# Patient Record
Sex: FEMALE | Race: WHITE | NOT HISPANIC OR LATINO | Employment: OTHER | URBAN - METROPOLITAN AREA
[De-identification: names, ages, dates, MRNs, and addresses within clinical notes are randomized per-mention and may not be internally consistent; named-entity substitution may affect disease eponyms.]

---

## 2017-01-02 ENCOUNTER — OFFICE VISIT (OUTPATIENT)
Dept: ALLERGY | Facility: CLINIC | Age: 62
End: 2017-01-02
Payer: COMMERCIAL

## 2017-01-02 VITALS
HEART RATE: 55 BPM | HEIGHT: 71 IN | DIASTOLIC BLOOD PRESSURE: 77 MMHG | WEIGHT: 193.8 LBS | OXYGEN SATURATION: 98 % | BODY MASS INDEX: 27.13 KG/M2 | SYSTOLIC BLOOD PRESSURE: 160 MMHG

## 2017-01-02 DIAGNOSIS — J31.0 RHINOCONJUNCTIVITIS: ICD-10-CM

## 2017-01-02 DIAGNOSIS — L20.89 OTHER ATOPIC DERMATITIS: ICD-10-CM

## 2017-01-02 DIAGNOSIS — H01.139 EYELID DERMATITIS, ECZEMATOUS, UNSPECIFIED LATERALITY: ICD-10-CM

## 2017-01-02 DIAGNOSIS — Z88.0 PENICILLIN ALLERGY: ICD-10-CM

## 2017-01-02 DIAGNOSIS — J45.40 MODERATE PERSISTENT ASTHMA WITHOUT COMPLICATION: Primary | ICD-10-CM

## 2017-01-02 DIAGNOSIS — H10.9 RHINOCONJUNCTIVITIS: ICD-10-CM

## 2017-01-02 PROBLEM — R06.02 SOB (SHORTNESS OF BREATH): Status: ACTIVE | Noted: 2017-01-02

## 2017-01-02 LAB
FEF 25/75: NORMAL
FEV-1: NORMAL
FEV1/FVC: NORMAL
FVC: NORMAL

## 2017-01-02 PROCEDURE — 99214 OFFICE O/P EST MOD 30 MIN: CPT | Mod: 25 | Performed by: ALLERGY & IMMUNOLOGY

## 2017-01-02 PROCEDURE — 94060 EVALUATION OF WHEEZING: CPT | Performed by: ALLERGY & IMMUNOLOGY

## 2017-01-02 RX ORDER — OLOPATADINE HYDROCHLORIDE 2 MG/ML
1 SOLUTION/ DROPS OPHTHALMIC DAILY
Qty: 2.5 ML | Refills: 3 | Status: SHIPPED | OUTPATIENT
Start: 2017-01-02 | End: 2017-03-02

## 2017-01-02 RX ORDER — ALBUTEROL SULFATE 90 UG/1
2 AEROSOL, METERED RESPIRATORY (INHALATION) EVERY 4 HOURS PRN
Qty: 1 INHALER | Refills: 1 | Status: SHIPPED | OUTPATIENT
Start: 2017-01-02 | End: 2019-02-04

## 2017-01-02 RX ORDER — HYDROCORTISONE 2.5 %
CREAM (GRAM) TOPICAL 2 TIMES DAILY
Qty: 60 G | Refills: 1 | Status: SHIPPED | OUTPATIENT
Start: 2017-01-02 | End: 2021-03-11

## 2017-01-02 RX ORDER — TRIAMCINOLONE ACETONIDE 1 MG/G
CREAM TOPICAL 2 TIMES DAILY
Qty: 60 G | Refills: 3 | Status: SHIPPED | OUTPATIENT
Start: 2017-01-02 | End: 2021-03-11

## 2017-01-02 NOTE — MR AVS SNAPSHOT
After Visit Summary   1/2/2017    Miri Naylor    MRN: [id number]           Patient Information     Date Of Birth          1955        Visit Information        Provider Department      1/2/2017 4:20 PM Nish Stockton,  [facility]        Today's Diagnoses     Moderate persistent asthma without complication    -  1     Eyelid dermatitis, eczematous, unspecified laterality         Other atopic dermatitis         Rhinoconjunctivitis           Care Instructions    If you have any questions regarding your allergies, asthma, or what we discussed during your visit today please call the allergy clinic or contact us via [employer].    [facility] Allergy ([place], [place]): [phone]  [facility] Allergy: [phone]  [facility] Allergy: [phone]  [facility] Allergy: [phone]  [facility] Allergy: [phone]  [facility] Allergy: [phone]    1. Return to clinic for skin testing. Need to hold antihistamines for 7 days prior to return appointment.   2. Start hydrocortisone 2.5% cream twice daily to rash under eyes. Do not use longer than 2 weeks.   3. Emollient (Eucerin, Vanicream, Aquaphor, or Vaseline) twice daily to eczematous areas.   4. Avoid itching at eyes.   5. Start Flonase, Nasacort or Rhinocort 2 sprays/nostril daily.   6. Pataday eye drops 1 drop/eye daily as needed. Hold for 2 days prior to skin testing.           Follow-ups after your visit        Your next 10 appointments already scheduled     Jul 19, 2017  1:00 PM   Return Visit with Nir Massey MD   [facility]    [address] [place] [id number] [phone]              Who to contact     If you have questions or need follow up information about today's clinic visit or your schedule please contact [facility] directly at [phone].  Normal or non-critical lab and imaging  "results will be communicated to you by MyChart, letter or phone within 4 business days after the clinic has received the results. If you do not hear from us within 7 days, please contact the clinic through [employer] or phone. If you have a critical or abnormal lab result, we will notify you by phone as soon as possible.  Submit refill requests through [employer] or call your pharmacy and they will forward the refill request to us. Please allow 3 business days for your refill to be completed.          Additional Information About Your Visit        [employer] Information     [employer] gives you secure access to your electronic health record. If you see a primary care provider, you can also send messages to your care team and make appointments. If you have questions, please call your primary care clinic.  If you do not have a primary care provider, please call [phone] and they will assist you.        Your Vitals Were     Pulse Height BMI (Body Mass Index) Pulse Oximetry          55 1.804 m (5' 11\") 27.01 kg/m2 98%         Blood Pressure from Last 3 Encounters:   01/02/17 160/77   07/12/16 140/100   04/27/16 141/86    Weight from Last 3 Encounters:   01/02/17 87.907 kg (193 lb 12.8 oz)   10/12/16 83.28 kg (183 lb 9.6 oz)   08/31/16 82.736 kg (182 lb 6.4 oz)              We Performed the Following     ALBUTEROL UNIT DOSE, 1 MG     Spirometry, Breathing Capacity          Today's Medication Changes          These changes are accurate as of: 1/2/17  5:29 PM.  If you have any questions, ask your nurse or doctor.               Start taking these medicines.        Dose/Directions    albuterol 108 (90 BASE) MCG/ACT Inhaler   Commonly known as:  albuterol   Used for:  Moderate persistent asthma without complication   Started by:  Nish Stockton,         Dose:  2 puff   Inhale 2 puffs into the lungs every 4 hours as needed for shortness of breath / dyspnea or wheezing   Quantity:  1 Inhaler   Refills:  1       beclomethasone 80 " MCG/ACT Inhaler   Commonly known as:  QVAR   Used for:  Moderate persistent asthma without complication   Started by:  Nish Stockton DO        Dose:  2 puff   Inhale 2 puffs into the lungs 2 times daily   Quantity:  1 Inhaler   Refills:  11       hydrocortisone 2.5 % cream   Used for:  Eyelid dermatitis, eczematous, unspecified laterality   Started by:  Nish Stockton DO        Apply topically 2 times daily   Quantity:  60 g   Refills:  1       olopatadine HCl 0.2 % Soln   Commonly known as:  PATADAY   Used for:  Rhinoconjunctivitis   Started by:  Nish Stockton DO        Dose:  1 drop   Place 1 drop into both eyes daily   Quantity:  2.5 mL   Refills:  3       triamcinolone 0.1 % cream   Commonly known as:  KENALOG   Used for:  Other atopic dermatitis   Started by:  Nish Stockton DO        Apply topically 2 times daily   Quantity:  60 g   Refills:  3            Where to get your medicines      These medications were sent to [facility] - [place], [place] - [address]  [address], [facility] [address]     Phone:  [phone]    - albuterol 108 (90 BASE) MCG/ACT Inhaler  - beclomethasone 80 MCG/ACT Inhaler  - hydrocortisone 2.5 % cream  - olopatadine HCl 0.2 % Soln  - triamcinolone 0.1 % cream             Primary Care Provider Office Phone # Fax #    Elizabeth Castrejon DO [place] [phone]       [address] [place] [address]        Thank you!     Thank you for choosing [facility]  for your care. Our goal is always to provide you with excellent care. Hearing back from our patients is one way we can continue to improve our services. Please take a few minutes to complete the written survey that you may receive in the mail after your visit with us. Thank you!             Your Updated Medication List - Protect others around you: Learn how to safely use, store and throw away your medicines at www.disposemymeds.org.           This list is accurate as of: 1/2/17  5:29 PM.  Always use your most recent med list.                   Brand Name Dispense Instructions for use    Acai 500 MG Caps      1 tablet daily       Acidophilus Tabs      1 tablet daily       albuterol 108 (90 BASE) MCG/ACT Inhaler    albuterol    1 Inhaler    Inhale 2 puffs into the lungs every 4 hours as needed for shortness of breath / dyspnea or wheezing       ascorbic acid 500 MG tablet    VITAMIN C     2 TABLET DAILY       atenolol 25 MG tablet    TENORMIN    90 tablet    Take 1 tablet (25 mg) by mouth daily OK to fill this once but she needs follow-up with vitals recheck on the ancillary schedule prior to additional refills.       beclomethasone 80 MCG/ACT Inhaler    QVAR    1 Inhaler    Inhale 2 puffs into the lungs 2 times daily       CALCIUM 600+D PO      250 mg vitamin d- 3 tablets daily       DAILY MULTIVITAMIN PO      1 tablet daily       escitalopram 20 MG tablet    LEXAPRO    90 tablet    Take 1 tablet (20 mg) by mouth daily       flaxseed oil 1000 MG Caps      2 tablets daily       gabapentin 300 MG capsule    NEURONTIN    270 capsule    Take one capsule by mouth three times daily       Glucosamine-Chondroitin Tabs      1500 mg glucosamine and 1200mg chondroitin daily-2 tablets daily       hydrocortisone 2.5 % cream     60 g    Apply topically 2 times daily       hydrOXYzine 25 MG tablet    ATARAX    60 tablet    Take 1-2 tablets (25-50 mg) by mouth every 6 hours as needed for itching (muscle spasm.)       ibuprofen 200 MG tablet    ADVIL/MOTRIN     take 1 tablet (200 mg) by oral route every 6 hours as needed with food       lisinopril 40 MG tablet    PRINIVIL/ZESTRIL    90 tablet    Take 1 tablet (40 mg) by mouth daily       olopatadine HCl 0.2 % Soln    PATADAY    2.5 mL    Place 1 drop into both eyes daily       pantoprazole 40 MG EC tablet    PROTONIX    90 tablet    Take 40 mg by mouth once daily, 30 to 60 minutes before a meal.       pravastatin 40 MG  tablet    PRAVACHOL    90 tablet    Take 1 tablet (40 mg) by mouth daily       S-Adenosylmethionine 200 MG Tabs      2 tablets daily       SUPER B COMPLEX Tabs      1 tablet daily       triamcinolone 0.1 % cream    KENALOG    60 g    Apply topically 2 times daily       vitamin E 400 UNIT capsule   Generic drug:  vitamin E      Take one pill by mouth once daily       ZYRTEC 10 MG tablet   Generic drug:  cetirizine      Take 10 mg by mouth daily.

## 2017-01-02 NOTE — PROGRESS NOTES
Miri Naylor is a 61 year old White female with previous medical history significant for hyperlipidemia, GERD, hypertension, depression, penicillin allergy who returns for a follow up visit. Miri Naylor is being seen today for asthma, eczema and seasonal allergies.     The patient reports that over the last 2 months she has had an erythematous, pruritic, rough, dry rash involving bilateral lower eyelids. She has tried using topical vitamin E which was not helpful. She has tried triple antibiotic ointment which is not helpful. She has tried hydrocortisone 1% cream which was not helpful. She is not consistently using a cream or lotion for an emollient. She has been using the same makeups, face washes and detergents for multiple years. She is using a Revlon Foundation, Elf eye shadow, cover girl eyeliner, Neutrogena night cream, Neutrogena daytime cream, Neutrogena face wash. She occasionally wears nail polish. She has had no change in her soaps or shampoos. She uses a seventh generation lavender scented detergent and a seventh generation fabric softener. She has had increased ocular watering, ocular itching. She does endorse a sensation of a foreign body. She is not using any eyedrops. She has not had any vision changes including photophobia or double vision. She does have a history of atopic dermatitis. This is been present for multiple years and can involve anywhere on her body.    The patient has a history of perennial nasal symptoms get worse in spring and fall. Symptoms include rhinorrhea, sneezing, congestion, postnasal drip, ocular watering and ocular itching. She will take Zyrtec 10 mg by mouth daily and this is helpful for these symptoms. She previously used Flonase and she thinks it was may be somewhat helpful. No other medications. Cause her to have increased symptoms. Talks do not bother her. She underwent allergy testing approximately 30 years ago and was positive for mold, dust mite and some  grasses.    Patient reports that recently she has been having increased shortness of breath, coughing and chest tightness with exercise. She will additionally notes symptoms if she is in hot weather. She has never been diagnosed with asthma. She does not have an albuterol inhaler. No hospitalizations for chest symptoms. She has not been on prednisone for chest symptoms.    Positive skin testing for Pre-Pen in 2014. She has since avoided penicillin antibiotics.     ACT Total Scores 1/2/2017   ACT TOTAL SCORE (Goal Greater than or Equal to 20) 18   In the past 12 months, how many times did you visit the emergency room for your asthma without being admitted to the hospital? 0   In the past 12 months, how many times were you hospitalized overnight because of your asthma? 0     Past Medical History   Diagnosis Date     Unspecified essential hypertension      Depressive disorder, not elsewhere classified      lumbar degeneration      Raynaud's disease      Hx of abnormal Pap smear ?     no specifics given     Drug allergy, multiple 9/23/14--passed graded oral challenge for Zithromax.      7/3/14 POS PRE-PEN skin test. 7/30/14 NEG skin tests and oral challenge to Cefzil     Family History   Problem Relation Age of Onset     Arthritis Mother      Blood Disease Father      Depression Son      Depression Daughter      Hypertension Father      Hyperlipidemia Mother      Hyperlipidemia Father      Hyperlipidemia Brother      Other Cancer Mother      Glioblastoma Multiforme     Depression Daughter      Depression Son      Anxiety Disorder Son      OSTEOPOROSIS Mother      Past Surgical History   Procedure Laterality Date     Biopsy of breast, needle core       Colonoscopy       C pelvis/hip joint surgery unlisted Right 7/19/16     total hip arthroplasty     Hip surgery Right 07/19/2016       REVIEW OF SYSTEMS:  General: negative for weight gain. negative for weight loss. positive for changes in sleep.   Ears: negative for  fullness. negative for hearing loss. positive  for dizziness.   Nose: positive  for snoring.positive  for changes in smell. positive  for drainage.   Throat: positive  for hoarseness. positive  for sore throat. negative for trouble swallowing.   Lungs: positive  for shortness of breath.negative for wheezing. negative for sputum production.   Cardiovascular: negative for chest pain. positive  for swelling of ankles. positive  for fast or irregular heartbeat.   Gastrointestinal: positive  for nausea. positive  for heartburn. positive  for acid reflux.   Musculoskeletal: negative for joint pain. positive  for joint stiffness. negative for joint swelling.   Neurologic: negative for seizures. negative for fainting. negative for weakness.   Psychiatric: negative for changes in mood. negative for anxiety.   Endocrine: negative for cold intolerance. positive  for heat intolerance. negative for tremors.   Hematologic: positive  for easy bruising. negative for easy bleeding.  Integumentary: positive  for rash. positive  for scaling. negative for nail changes.       Current outpatient prescriptions:      hydrocortisone 2.5 % cream, Apply topically 2 times daily, Disp: 60 g, Rfl: 1     triamcinolone (KENALOG) 0.1 % cream, Apply topically 2 times daily, Disp: 60 g, Rfl: 3     olopatadine HCl (PATADAY) 0.2 % SOLN, Place 1 drop into both eyes daily, Disp: 2.5 mL, Rfl: 3     beclomethasone (QVAR) 80 MCG/ACT Inhaler, Inhale 2 puffs into the lungs 2 times daily, Disp: 1 Inhaler, Rfl: 11     albuterol (ALBUTEROL) 108 (90 BASE) MCG/ACT Inhaler, Inhale 2 puffs into the lungs every 4 hours as needed for shortness of breath / dyspnea or wheezing, Disp: 1 Inhaler, Rfl: 1     pravastatin (PRAVACHOL) 40 MG tablet, Take 1 tablet (40 mg) by mouth daily, Disp: 90 tablet, Rfl: 0     escitalopram (LEXAPRO) 20 MG tablet, Take 1 tablet (20 mg) by mouth daily, Disp: 90 tablet, Rfl: 0     atenolol (TENORMIN) 25 MG tablet, Take 1 tablet (25 mg) by  mouth daily OK to fill this once but she needs follow-up with vitals recheck on the ancillary schedule prior to additional refills., Disp: 90 tablet, Rfl: 0     pantoprazole (PROTONIX) 40 MG enteric coated tablet, Take 40 mg by mouth once daily, 30 to 60 minutes before a meal., Disp: 90 tablet, Rfl: 3     lisinopril (PRINIVIL,ZESTRIL) 40 MG tablet, Take 1 tablet (40 mg) by mouth daily, Disp: 90 tablet, Rfl: 1     gabapentin (NEURONTIN) 300 MG capsule, Take one capsule by mouth three times daily, Disp: 270 capsule, Rfl: 1     ibuprofen (ADVIL,MOTRIN) 200 MG tablet, take 1 tablet (200 mg) by oral route every 6 hours as needed with food, Disp: , Rfl:      vitamin E (E-400) 400 UNIT capsule, Take one pill by mouth once daily, Disp: , Rfl:      cetirizine (ZYRTEC) 10 MG tablet, Take 10 mg by mouth daily., Disp: , Rfl:      VITAMIN C 500 MG PO TABS, 2 TABLET DAILY, Disp: , Rfl:      ACIDOPHILUS OR TABS, 1 tablet daily, Disp: , Rfl:      SUPER B COMPLEX PO TABS, 1 tablet daily, Disp: , Rfl:      FLAXSEED OIL 1000 MG PO CAPS, 2 tablets daily, Disp: , Rfl:      ACAI 500 MG PO CAPS, 1 tablet daily, Disp: , Rfl:      GLUCOSAMINE-CHONDROITIN PO TABS, 1500 mg glucosamine and 1200mg chondroitin daily-2 tablets daily, Disp: , Rfl:      CALCIUM 600+D PO, 250 mg vitamin d- 3 tablets daily, Disp: , Rfl:      DAILY MULTIVITAMIN PO, 1 tablet daily, Disp: , Rfl:      S-ADENOSYLMETHIONINE 200 MG PO TABS, 2 tablets daily, Disp: , Rfl:   Immunization History   Administered Date(s) Administered     Influenza Vaccine IM 3yrs+ 4 Valent IIV4 12/03/2013, 11/16/2015     TD (ADULT, 7+) 03/06/2008     TDAP (ADACEL AGES 11-64) 08/03/2012     Allergies   Allergen Reactions     Penicillins Hives     Confirmed by skin prick testing 7/3/14     Amoxicillin Hives     Clindamycin Hcl Hives     Levaquin      tendonitis     Augmentin Rash     Azithromycin Rash     Cephalexin Rash         EXAM:   Constitutional:  Appears well-developed and well-nourished. No  distress.   HEENT:   Head: Normocephalic.   Right Ear: External ear normal. TM normal  Left Ear: External ear normal. TM normal  Mouth/Throat: No oropharyngeal exudate present.   No cobblestoning of posterior oropharynx.   Boggy nasal tissue and pale.    Eyes: Conjunctivae are non-erythematous.   No papilla noted.   No ocular watering noted.   No maxillary or frontal sinus tenderness to palpation.   Cardiovascular: Normal rate, regular rhythm and normal heart sounds. Exam reveals no gallop and no friction rub.   No murmur heard.  Respiratory: Effort normal and breath sounds normal. No respiratory distress. No wheezes. No rales.   Musculoskeletal: Normal range of motion.   Lymphadenopathy:   No cervical adenopathy.   No lower extremity edema.   Neuro: Oriented to person, place, and time.  Skin: Erythematous rash of bilateral lower eyelids. Rough and dry to touch.   Dry, rough patches on bilateral hands and upper extremities consistent with eczema.   Psychiatric: Normal mood and affect.     Nursing note and vitals reviewed.      WORKUP:  Spirometry-Pre  FVC % pred:81  FEV1 % pred:67  FEV1/FVC % act:64  FEF 25-75% pred:56    Spirometry-Post  FVC % pred:85  FEV1 % pred:79  FEV1/FVC % act:72  FEF 25-75% pred:74    Significant improvement post bronchodilator in FEV1 and [id number].    ASSESSMENT/PLAN:  Problem List Items Addressed This Visit        Respiratory    Moderate persistent asthma without complication - Primary     She has been having increased chest symptoms with exercise and quitting shortness of breath, coughing and chest tightness. She does not have albuterol. She additionally can have symptoms with hot weather. No other triggers that she is aware of. She has never been diagnosed with asthma.    Spirometry today with an FEV1 of 67%. She was given albuterol and had repeat spirometry and had improvement of her FEV1 to 79%. She had significant improvement in both her FEV1 and FEF 25-75.  ACT 18    - An asthma  action plan was provided and discussed with patient and family.   - Albuterol 2-4 puffs inhaled (use a spacer unless using a Proair Respiclick device) every 4 hours as needed for chest tightness, wheezing, shortness of breath and/or coughing.   - Albuterol 2-4 puffs inhaled (use spacer if not using Proair Respiclick device) 15-20 minutes prior to physical activity.   - Please ensure warm up period prior to exercise.   - Avoid asthma triggers.  - Start QVAR 80mcg 2 puffs inhaled twice daily. Use with spacer. Instructed how and when to use.   - Return to clinic for allergen skin testing.              Relevant Medications    beclomethasone (QVAR) 80 MCG/ACT Inhaler    albuterol (ALBUTEROL) 108 (90 BASE) MCG/ACT Inhaler    Other Relevant Orders    Spirometry, Breathing Capacity (Completed)    ALBUTEROL UNIT DOSE, 1 MG (Completed)       Musculoskeletal and Integumentary    Eyelid dermatitis, eczematous, unspecified laterality     Bilateral lower eyelid dermatitis over the course of the last 2 months. Dermatitis is rough and itchy. Likely contact dermatitis versus atopic keratoconjunctivitis versus eczematous dermatitis. She does not have any papilla suggestive of atopic keratoconjunctivitis. She does have increased ocular itching and ocular watering. She has been using hydrocortisone 1% cream. She is using a Revlon Foundation, Elf eye shadow, cover girl eyeliner, Neutrogena night cream, Neutrogena daytime cream, Neutrogena face wash.     - Pataday (olapatadine) 1 drop/eye daily as needed.   - Hydrocortisone 2.5% cream b.i.d. to dermatitis. Use until rash resolves or two weeks.   - Emollient (Eucerin, Vanicream, Aquaphor, or Vaseline) twice daily to eczematous areas.   - If no improvement consider patch testing.   - Return to clinic skin testing.   - Avoid makeup and cleansers to eye. If going to use make up use hypoallergenic make up         Relevant Medications    hydrocortisone 2.5 % cream    triamcinolone (KENALOG)  0.1 % cream    olopatadine HCl (PATADAY) 0.2 % SOLN    beclomethasone (QVAR) 80 MCG/ACT Inhaler    albuterol (ALBUTEROL) 108 (90 BASE) MCG/ACT Inhaler    Other atopic dermatitis     History of atopic dermatitis which has been present for multiple years. She has some dry, rough patches on her hands and upper extremities. Not using topical corticosteroid.    - Emollients b.i.d.  - Triamcinolone 0.1% cream b.i.d. as needed.         Relevant Medications    hydrocortisone 2.5 % cream    triamcinolone (KENALOG) 0.1 % cream    olopatadine HCl (PATADAY) 0.2 % SOLN       Infectious/Inflammatory    Rhinoconjunctivitis     History of perennial nasal and ocular symptoms get worse in the spring and fall. Symptoms include ocular watering, ocular itching, rhinorrhea, sneezing, congestion and postnasal drip. Symptoms increase around cats.    - Return to clinic for allergy testing. She was on cetirizine today so could not obtain. Hold antihistamine for 7 days prior to allergy testing.   - Flonase, Nasacort or Rhinocort 2 sprays per nostril daily.  - Pataday (olapatadine) 1 drop/eye daily as needed.            Relevant Medications    olopatadine HCl (PATADAY) 0.2 % SOLN       Other    Penicillin allergy     Positive skin testing for Pre-Pen in 2014. She has since avoided penicillin antibiotics.     - Continue to avoid penicillin antibiotics.         Relevant Medications    beclomethasone (QVAR) 80 MCG/ACT Inhaler    albuterol (ALBUTEROL) 108 (90 BASE) MCG/ACT Inhaler          Chart documentation with Dragon Voice recognition Software. Although reviewed after completion, some words and grammatical errors may remain.    Nish Stockton,    Allergy/Immunology  [facility] [place]

## 2017-01-02 NOTE — NURSING NOTE
"Chief Complaint   Patient presents with     RECHECK       Initial /77 mmHg  Pulse 55  Ht 5' 11\" (1.804 m)  Wt 193 lb 12.8 oz (87.907 kg)  BMI 27.01 kg/m2  SpO2 98% Estimated body mass index is 27.01 kg/(m^2) as calculated from the following:    Height as of this encounter: 5' 11\" (1.803 m).    Weight as of this encounter: 193 lb 12.8 oz (87.907 kg).  BP completed using cuff size: ana Garcia MA      "

## 2017-01-03 ASSESSMENT — ASTHMA QUESTIONNAIRES: ACT_TOTALSCORE: 18

## 2017-01-03 NOTE — ASSESSMENT & PLAN NOTE
Positive skin testing for Pre-Pen in 2014. She has since avoided penicillin antibiotics.     - Continue to avoid penicillin antibiotics.

## 2017-01-03 NOTE — ASSESSMENT & PLAN NOTE
History of atopic dermatitis which has been present for multiple years. She has some dry, rough patches on her hands and upper extremities. Not using topical corticosteroid.    - Emollients b.i.d.  - Triamcinolone 0.1% cream b.i.d. as needed.

## 2017-01-03 NOTE — ASSESSMENT & PLAN NOTE
Bilateral lower eyelid dermatitis over the course of the last 2 months. Dermatitis is rough and itchy. Likely contact dermatitis versus atopic keratoconjunctivitis versus eczematous dermatitis. She does not have any papilla suggestive of atopic keratoconjunctivitis. She does have increased ocular itching and ocular watering. She has been using hydrocortisone 1% cream. She is using a Revlon Foundation, Elf eye shadow, cover girl eyeliner, Neutrogena night cream, Neutrogena daytime cream, Neutrogena face wash.     - Pataday (olapatadine) 1 drop/eye daily as needed.   - Hydrocortisone 2.5% cream b.i.d. to dermatitis. Use until rash resolves or two weeks.   - Emollient (Eucerin, Vanicream, Aquaphor, or Vaseline) twice daily to eczematous areas.   - If no improvement consider patch testing.   - Return to clinic skin testing.   - Avoid makeup and cleansers to eye. If going to use make up use hypoallergenic make up

## 2017-01-03 NOTE — ASSESSMENT & PLAN NOTE
History of perennial nasal and ocular symptoms get worse in the spring and fall. Symptoms include ocular watering, ocular itching, rhinorrhea, sneezing, congestion and postnasal drip. Symptoms increase around cats.    - Return to clinic for allergy testing. She was on cetirizine today so could not obtain. Hold antihistamine for 7 days prior to allergy testing.   - Flonase, Nasacort or Rhinocort 2 sprays per nostril daily.  - Pataday (olapatadine) 1 drop/eye daily as needed.

## 2017-01-16 ENCOUNTER — OFFICE VISIT (OUTPATIENT)
Dept: ALLERGY | Facility: CLINIC | Age: 62
End: 2017-01-16
Payer: COMMERCIAL

## 2017-01-16 VITALS
OXYGEN SATURATION: 97 % | DIASTOLIC BLOOD PRESSURE: 82 MMHG | SYSTOLIC BLOOD PRESSURE: 150 MMHG | HEART RATE: 56 BPM | BODY MASS INDEX: 26.71 KG/M2 | HEIGHT: 71 IN | WEIGHT: 190.8 LBS

## 2017-01-16 DIAGNOSIS — H10.9 RHINOCONJUNCTIVITIS: ICD-10-CM

## 2017-01-16 DIAGNOSIS — J45.40 MODERATE PERSISTENT ASTHMA WITHOUT COMPLICATION: Primary | ICD-10-CM

## 2017-01-16 DIAGNOSIS — J31.0 RHINOCONJUNCTIVITIS: ICD-10-CM

## 2017-01-16 DIAGNOSIS — L20.89 OTHER ATOPIC DERMATITIS: ICD-10-CM

## 2017-01-16 DIAGNOSIS — H01.139 EYELID DERMATITIS, ECZEMATOUS, UNSPECIFIED LATERALITY: ICD-10-CM

## 2017-01-16 LAB
FEF 25/75: NORMAL
FEV-1: NORMAL
FEV1/FVC: NORMAL
FVC: NORMAL

## 2017-01-16 PROCEDURE — 95004 PERQ TESTS W/ALRGNC XTRCS: CPT | Performed by: ALLERGY & IMMUNOLOGY

## 2017-01-16 PROCEDURE — 94010 BREATHING CAPACITY TEST: CPT | Performed by: ALLERGY & IMMUNOLOGY

## 2017-01-16 PROCEDURE — 99214 OFFICE O/P EST MOD 30 MIN: CPT | Mod: 25 | Performed by: ALLERGY & IMMUNOLOGY

## 2017-01-16 NOTE — ASSESSMENT & PLAN NOTE
Bilateral lower eyelid dermatitis over the course of the last 2 months. Dermatitis is rough and itchy. Likely contact dermatitis versus atopic keratoconjunctivitis versus eczematous dermatitis. Started on hydrocortisone 2.5% cream b.i.d. to active dermatitis at last visit. This has resolved dermatitis. No recurrent dermatitis.    - Ketotifen (Zaditor, Alaway) 1 drop/eye twice daily as needed.   - Hydrocortisone 2.5% cream b.i.d. to dermatitis as needed.     - Emollient (Eucerin, Vanicream, Aquaphor, or Vaseline) twice daily to eczematous areas.    - Avoid makeup and cleansers to eye. If going to use make up use hypoallergenic make up

## 2017-01-16 NOTE — NURSING NOTE
"Chief Complaint   Patient presents with     RECHECK     allergy testing       Initial /82 mmHg  Pulse 56  Ht 5' 10.95\" (1.802 m)  Wt 190 lb 12.8 oz (86.546 kg)  BMI 26.65 kg/m2  SpO2 97% Estimated body mass index is 26.65 kg/(m^2) as calculated from the following:    Height as of this encounter: 5' 10.95\" (1.802 m).    Weight as of this encounter: 190 lb 12.8 oz (86.546 kg).  BP completed using cuff size: ana Garcia MA      "

## 2017-01-16 NOTE — PATIENT INSTRUCTIONS
If you have any questions regarding your allergies, asthma, or what we discussed during your visit today please call the allergy clinic or contact us via [employer].    [facility] All[facility] ([place], [place]): [phone]  [facility] Allergy: [phone]  [facility]: [phone]  [facility] Allergy: [phone]  [facility] Allergy: [phone]  [facility] Allergy: [phone]    1. Continue Flonase 2 sprays/nostril daily.   2. Cetirizine (zyrtec) 10 mg by mouth daily as needed.   3. Ketotifen (Zaditor, Alaway) 1 drop/eye twice daily as needed.   4. Continue QVAR 80mcg 2 puffs inhaled twice daily.   5. Return in 3 months.

## 2017-01-16 NOTE — ASSESSMENT & PLAN NOTE
History of atopic dermatitis which has been present for multiple years. She has some dry, rough patches on her hands and upper extremities. Significant improvement since starting on triamcinolone. No active eczema.    - Emollients b.i.d.  - Triamcinolone 0.1% cream b.i.d. as needed.

## 2017-01-16 NOTE — ASSESSMENT & PLAN NOTE
She has been having increased chest symptoms with exercise. Symptoms include shortness of breath, coughing and chest tightness. She additionally can have symptoms with hot weather. Started on Qvar at last visit. This has been significantly helpful.    Spirometry today with an FEV1 of 78%. Significant improvement in FEV1 from last visit.  ACT 16     - Albuterol 2-4 puffs inhaled (use a spacer unless using a Proair Respiclick device) every 4 hours as needed for chest tightness, wheezing, shortness of breath and/or coughing.   - Albuterol 2-4 puffs inhaled (use spacer if not using Proair Respiclick device) 15-20 minutes prior to physical activity.    - Please ensure warm up period prior to exercise.    - Avoid asthma triggers.  - Continue QVAR 80mcg 2 puffs inhaled twice daily. Use with spacer. Instructed how and when to use.    - Return to clinic in 3 months

## 2017-01-16 NOTE — NURSING NOTE
Per provider verbal order, RN placed Adult Environmental scratch testing panels.  Consent was obtained prior to procedure.  Once panels were placed, patient was monitored for 15 minutes in clinic.  RN read test after 15 minutes and provider was notified of results.  Pt tolerated procedure well.  All questions and concerns were addressed at office visit.     Vanessa Adkins RN

## 2017-01-16 NOTE — MR AVS SNAPSHOT
After Visit Summary   1/16/2017    Miri Naylor    MRN: [id number]           Patient Information     Date Of Birth          1955        Visit Information        Provider Department      1/16/2017 4:00 PM Nish Stockton,  [facility]        Today's Diagnoses     Moderate persistent asthma without complication    -  1       Care Instructions    If you have any questions regarding your allergies, asthma, or what we discussed during your visit today please call the allergy clinic or contact us via [employer].    [facility] All[facility] ([place], [place]): [phone]  [facility] Allergy: [phone]  [facility]: [phone]  [facility] Allergy: [phone]  [facility] Allergy: [phone]  [facility] Allergy: [phone]    1. Continue Flonase 2 sprays/nostril daily.   2. Cetirizine (zyrtec) 10 mg by mouth daily as needed.   3. Ketotifen (Zaditor, Alaway) 1 drop/eye twice daily as needed.   4. Continue QVAR 80mcg 2 puffs inhaled twice daily.   5. Return in 3 months.         Follow-ups after your visit        Follow-up notes from your care team     Return in about 3 months (around 4/16/2017).      Your next 10 appointments already scheduled     Jul 19, 2017  1:00 PM   Return Visit with Nir Massey MD   [facility]    [address] [place] [id number] [phone]              Who to contact     If you have questions or need follow up information about today's clinic visit or your schedule please contact [facility] directly at [phone].  Normal or non-critical lab and imaging results will be communicated to you by MyChart, letter or phone within 4 business days after the clinic has received the results. If you do not hear from us within 7 days, please contact the clinic through MyChart or phone. If you have a critical or abnormal lab result,  "we will notify you by phone as soon as possible.  Submit refill requests through [employer] or call your pharmacy and they will forward the refill request to us. Please allow 3 business days for your refill to be completed.          Additional Information About Your Visit        [employer] Information     [employer] gives you secure access to your electronic health record. If you see a primary care provider, you can also send messages to your care team and make appointments. If you have questions, please call your primary care clinic.  If you do not have a primary care provider, please call [phone] and they will assist you.        Care EveryWhere ID     This is your Care EveryWhere ID. This could be used by other organizations to access your [facility] medical records  [id number]        Your Vitals Were     Pulse Height BMI (Body Mass Index) Pulse Oximetry          56 5' 10.95\" (1.802 m) 26.65 kg/m2 97%         Blood Pressure from Last 3 Encounters:   01/16/17 150/82   01/02/17 160/77   07/12/16 140/100    Weight from Last 3 Encounters:   01/16/17 190 lb 12.8 oz (86.546 kg)   01/02/17 193 lb 12.8 oz (87.907 kg)   10/12/16 183 lb 9.6 oz (83.28 kg)              We Performed the Following     Spirometry, Breathing Capacity        Primary Care Provider Office Phone # Fax #    Elizabeth Corley [phone]       [facility] [place] [place] [address]        Thank you!     Thank you for choosing [facility]  [facility]. Our goal is always to provide you with excellent care. Hearing back from our patients is one way we can continue to improve our services. Please take a few minutes to complete the written survey that you may receive in the mail after your visit with us. Thank you!             Your Updated Medication List - Protect others around you: Learn how to safely use, store and throw away your medicines at www.disposemymeds.org.          This list is accurate as of: " 1/16/17  5:02 PM.  Always use your most recent med list.                   Brand Name Dispense Instructions for use    Acai 500 MG Caps      1 tablet daily       Acidophilus Tabs      1 tablet daily       albuterol 108 (90 BASE) MCG/ACT Inhaler    albuterol    1 Inhaler    Inhale 2 puffs into the lungs every 4 hours as needed for shortness of breath / dyspnea or wheezing       ascorbic acid 500 MG tablet    VITAMIN C     2 TABLET DAILY       atenolol 25 MG tablet    TENORMIN    90 tablet    Take 1 tablet (25 mg) by mouth daily OK to fill this once but she needs follow-up with vitals recheck on the ancillary schedule prior to additional refills.       beclomethasone 80 MCG/ACT Inhaler    QVAR    1 Inhaler    Inhale 2 puffs into the lungs 2 times daily       CALCIUM 600+D PO      250 mg vitamin d- 3 tablets daily       DAILY MULTIVITAMIN PO      1 tablet daily       escitalopram 20 MG tablet    LEXAPRO    90 tablet    Take 1 tablet (20 mg) by mouth daily       flaxseed oil 1000 MG Caps      2 tablets daily       gabapentin 300 MG capsule    NEURONTIN    270 capsule    Take one capsule by mouth three times daily       Glucosamine-Chondroitin Tabs      1500 mg glucosamine and 1200mg chondroitin daily-2 tablets daily       hydrocortisone 2.5 % cream     60 g    Apply topically 2 times daily       ibuprofen 200 MG tablet    ADVIL/MOTRIN     take 1 tablet (200 mg) by oral route every 6 hours as needed with food       lisinopril 40 MG tablet    PRINIVIL/ZESTRIL    90 tablet    Take 1 tablet (40 mg) by mouth daily       olopatadine HCl 0.2 % Soln    PATADAY    2.5 mL    Place 1 drop into both eyes daily       pantoprazole 40 MG EC tablet    PROTONIX    90 tablet    Take 40 mg by mouth once daily, 30 to 60 minutes before a meal.       pravastatin 40 MG tablet    PRAVACHOL    90 tablet    Take 1 tablet (40 mg) by mouth daily       S-Adenosylmethionine 200 MG Tabs      2 tablets daily       SUPER B COMPLEX Tabs      1 tablet  daily       triamcinolone 0.1 % cream    KENALOG    60 g    Apply topically 2 times daily       vitamin E 400 UNIT capsule   Generic drug:  vitamin E      Take one pill by mouth once daily       ZYRTEC 10 MG tablet   Generic drug:  cetirizine      Take 10 mg by mouth daily.

## 2017-01-16 NOTE — PROGRESS NOTES
Miri Naylor is a 61 year old White female with previous medical history significant for moderate persistent asthma, eyelid dermatitis, chronic rhinitis and penicillin allergy who returns for a follow up visit. Miri Naylor is being seen today for asthma, eczema and seasonal allergies.     The patient was seen 2 weeks ago and at that time she had been having increased chest symptoms with exercise with shortness of breath, coughing and chest tightness. She had additionally had chest symptoms and hot weather. Minimal exertion was causing symptoms. She had an FEV1 of 67% that reversed with albuterol. She was started on Qvar 80  g 2 puffs inhaled b.i.d. She has had significant improvement in her chest symptoms since starting on Qvar. Over the last 2-3 days she has had increased coughing with an upper respiratory tract infection. She used her albuterol and this was helpful.    The patient additionally has a history of perennial with spring and fall worsening rhinorrhea, sneezing, congestion, postnasal drip, ocular watering and ocular itching. She is here today for environmental skin testing. She underwent skin testing 30 years ago which was positive for mold, dust mite and grasses. She was prescribed Pataday, but this was never used. She additionally has been using nasal corticosteroid 2 sprays per nostril daily. She is using Flonase. She had increased symptoms after she stopped cetirizine for 7 days for allergy testing.    At last visit the patient had an eyelid dermatitis.she used hydrocortisone 2.5% cream and this was significant helpful. He has additionally been using emollient twice daily to eczematous areas underneath her eyes.She additionally had a spot of atopic dermatitis on her hand for which she applied triamcinolone 0.1% cream.    ACT Total Scores 1/16/2017   ACT TOTAL SCORE (Goal Greater than or Equal to 20) 16   In the past 12 months, how many times did you visit the emergency room for your  asthma without being admitted to the hospital? 0   In the past 12 months, how many times were you hospitalized overnight because of your asthma? 0     Past Medical History   Diagnosis Date     Unspecified essential hypertension      Depressive disorder, not elsewhere classified      lumbar degeneration      Raynaud's disease      Hx of abnormal Pap smear ?     no specifics given     Drug allergy, multiple 9/23/14--passed graded oral challenge for Zithromax.      7/3/14 POS PRE-PEN skin test. 7/30/14 NEG skin tests and oral challenge to Cefzil     Family History   Problem Relation Age of Onset     Arthritis Mother      Blood Disease Father      Depression Son      Depression Daughter      Hypertension Father      Hyperlipidemia Mother      Hyperlipidemia Father      Hyperlipidemia Brother      Other Cancer Mother      Glioblastoma Multiforme     Depression Daughter      Depression Son      Anxiety Disorder Son      OSTEOPOROSIS Mother      Past Surgical History   Procedure Laterality Date     Biopsy of breast, needle core       Colonoscopy       C pelvis/hip joint surgery unlisted Right 7/19/16     total hip arthroplasty     Hip surgery Right 07/19/2016       REVIEW OF SYSTEMS:  General: negative for weight gain. negative for weight loss. positive  for changes in sleep.   Ears: negative for fullness. negative for hearing loss. negative for dizziness.   Nose: negative for snoring.negative for changes in smell. negative for drainage.   Throat: negative for hoarseness. negative for sore throat. negative for trouble swallowing.   Lungs: negative for shortness of breath.negative for wheezing. negative for sputum production.   Cardiovascular: negative for chest pain. negative for swelling of ankles. negative for fast or irregular heartbeat.   Gastrointestinal: positive  for nausea. negative for heartburn. negative for acid reflux.   Musculoskeletal: positive  for joint pain. positive  for joint stiffness. negative for  joint swelling.   Neurologic: negative for seizures. negative for fainting. negative for weakness.   Psychiatric: negative for changes in mood. negative for anxiety.   Endocrine: negative for cold intolerance. negative for heat intolerance. negative for tremors.   Hematologic: positive  for easy bruising. negative for easy bleeding.  Integumentary: negative for rash. negative for scaling. negative for nail changes.       Current outpatient prescriptions:      hydrocortisone 2.5 % cream, Apply topically 2 times daily, Disp: 60 g, Rfl: 1     triamcinolone (KENALOG) 0.1 % cream, Apply topically 2 times daily, Disp: 60 g, Rfl: 3     olopatadine HCl (PATADAY) 0.2 % SOLN, Place 1 drop into both eyes daily, Disp: 2.5 mL, Rfl: 3     beclomethasone (QVAR) 80 MCG/ACT Inhaler, Inhale 2 puffs into the lungs 2 times daily, Disp: 1 Inhaler, Rfl: 11     albuterol (ALBUTEROL) 108 (90 BASE) MCG/ACT Inhaler, Inhale 2 puffs into the lungs every 4 hours as needed for shortness of breath / dyspnea or wheezing, Disp: 1 Inhaler, Rfl: 1     pravastatin (PRAVACHOL) 40 MG tablet, Take 1 tablet (40 mg) by mouth daily, Disp: 90 tablet, Rfl: 0     escitalopram (LEXAPRO) 20 MG tablet, Take 1 tablet (20 mg) by mouth daily, Disp: 90 tablet, Rfl: 0     atenolol (TENORMIN) 25 MG tablet, Take 1 tablet (25 mg) by mouth daily OK to fill this once but she needs follow-up with vitals recheck on the ancillary schedule prior to additional refills., Disp: 90 tablet, Rfl: 0     pantoprazole (PROTONIX) 40 MG enteric coated tablet, Take 40 mg by mouth once daily, 30 to 60 minutes before a meal., Disp: 90 tablet, Rfl: 3     lisinopril (PRINIVIL,ZESTRIL) 40 MG tablet, Take 1 tablet (40 mg) by mouth daily, Disp: 90 tablet, Rfl: 1     gabapentin (NEURONTIN) 300 MG capsule, Take one capsule by mouth three times daily, Disp: 270 capsule, Rfl: 1     ibuprofen (ADVIL,MOTRIN) 200 MG tablet, take 1 tablet (200 mg) by oral route every 6 hours as needed with food, Disp: ,  Rfl:      vitamin E (E-400) 400 UNIT capsule, Take one pill by mouth once daily, Disp: , Rfl:      cetirizine (ZYRTEC) 10 MG tablet, Take 10 mg by mouth daily., Disp: , Rfl:      VITAMIN C 500 MG PO TABS, 2 TABLET DAILY, Disp: , Rfl:      ACIDOPHILUS OR TABS, 1 tablet daily, Disp: , Rfl:      SUPER B COMPLEX PO TABS, 1 tablet daily, Disp: , Rfl:      FLAXSEED OIL 1000 MG PO CAPS, 2 tablets daily, Disp: , Rfl:      ACAI 500 MG PO CAPS, 1 tablet daily, Disp: , Rfl:      GLUCOSAMINE-CHONDROITIN PO TABS, 1500 mg glucosamine and 1200mg chondroitin daily-2 tablets daily, Disp: , Rfl:      CALCIUM 600+D PO, 250 mg vitamin d- 3 tablets daily, Disp: , Rfl:      DAILY MULTIVITAMIN PO, 1 tablet daily, Disp: , Rfl:      S-ADENOSYLMETHIONINE 200 MG PO TABS, 2 tablets daily, Disp: , Rfl:   Immunization History   Administered Date(s) Administered     Influenza Vaccine IM 3yrs+ 4 Valent IIV4 12/03/2013, 11/16/2015     TD (ADULT, 7+) 03/06/2008     TDAP (ADACEL AGES 11-64) 08/03/2012     Allergies   Allergen Reactions     Penicillins Hives     Confirmed by skin prick testing 7/3/14     Amoxicillin Hives     Clindamycin Hcl Hives     Levaquin      tendonitis     Augmentin Rash     Azithromycin Rash     Cephalexin Rash         EXAM:   Constitutional:  Appears well-developed and well-nourished. No distress.   HEENT:   Head: Normocephalic.   Right Ear: External ear normal. TM normal  Left Ear: External ear normal. TM normal  Mouth/Throat: No oropharyngeal exudate present.   No cobblestoning of posterior oropharynx.   Nasal tissue pink and normal appearing.  No rhinorrhea noted.    Eyes: Conjunctivae are non-erythematous   No maxillary or frontal sinus tenderness to palpation.   Cardiovascular: Normal rate, regular rhythm and normal heart sounds. Exam reveals no gallop and no friction rub.   No murmur heard.  Respiratory: Effort normal and breath sounds normal. No respiratory distress. No wheezes. No rales.   Musculoskeletal: Normal  range of motion.   Lymphadenopathy:   No cervical adenopathy.   No lower extremity edema.   Neuro: Oriented to person, place, and time.  Skin: Skin is warm and dry. No rash noted.   Psychiatric: Normal mood and affect.     Nursing note and vitals reviewed.      WORKUP:   Skin testing:  Negative for environmental allergens.     Spirometry  FVC % pred:88  FEV1 % pred:78  FEV1/FVC % act:69  FEF 25-75% pred:75    ASSESSMENT/PLAN:  Problem List Items Addressed This Visit        Respiratory    Moderate persistent asthma without complication - Primary     She has been having increased chest symptoms with exercise. Symptoms include shortness of breath, coughing and chest tightness. She additionally can have symptoms with hot weather. Started on Qvar at last visit. This has been significantly helpful.    Spirometry today with an FEV1 of 78%. Significant improvement in FEV1 from last visit.  ACT 16     - Albuterol 2-4 puffs inhaled (use a spacer unless using a Proair Respiclick device) every 4 hours as needed for chest tightness, wheezing, shortness of breath and/or coughing.   - Albuterol 2-4 puffs inhaled (use spacer if not using Proair Respiclick device) 15-20 minutes prior to physical activity.    - Please ensure warm up period prior to exercise.    - Avoid asthma triggers.  - Continue QVAR 80mcg 2 puffs inhaled twice daily. Use with spacer. Instructed how and when to use.    - Return to clinic in 3 months         Relevant Orders    Spirometry, Breathing Capacity (Completed)    ALLERGY SKIN TESTS,ALLERGENS (Completed)       Musculoskeletal and Integumentary    Eyelid dermatitis, eczematous, unspecified laterality     Bilateral lower eyelid dermatitis over the course of the last 2 months. Dermatitis is rough and itchy. Likely contact dermatitis versus atopic keratoconjunctivitis versus eczematous dermatitis. Started on hydrocortisone 2.5% cream b.i.d. to active dermatitis at last visit. This has resolved dermatitis. No  recurrent dermatitis.    - Ketotifen (Zaditor, Alaway) 1 drop/eye twice daily as needed.   - Hydrocortisone 2.5% cream b.i.d. to dermatitis as needed.     - Emollient (Eucerin, Vanicream, Aquaphor, or Vaseline) twice daily to eczematous areas.    - Avoid makeup and cleansers to eye. If going to use make up use hypoallergenic make up         Other atopic dermatitis     History of atopic dermatitis which has been present for multiple years. She has some dry, rough patches on her hands and upper extremities. Significant improvement since starting on triamcinolone. No active eczema.    - Emollients b.i.d.  - Triamcinolone 0.1% cream b.i.d. as needed.            Infectious/Inflammatory    Rhinoconjunctivitis     History of perennial nasal and ocular symptoms get worse in the spring and fall. Symptoms include ocular watering, ocular itching, rhinorrhea, sneezing, congestion and postnasal drip. Symptoms increase around cats. She underwent allergy testing approximately 30 years ago and was positive for mold, dust mite and some grasses. Increased symptoms when she stopped taking cetirizine.    Allergy skin testing was completely negative today.    - She is nonallergic based on skin testing. However, when she stopped cetirizine she had increased symptoms. Therefore continue cetirizine as needed. She either has nonallergic rhinitis or local allergic rhinitis with negative skin testing.   - Flonase 2 sprays per nostril daily.  - Ketotifen (Zaditor, Alaway) 1 drop/eye twice daily as needed.            Relevant Orders    ALLERGY SKIN TESTS,ALLERGENS (Completed)         Total time 25 minutes. Estimated counseling time 15 minutes. Counseled patient regarding dermatitis, rhinoconjunctivitis and asthma.    Chart documentation with Dragon Voice recognition Software. Although reviewed after completion, some words and grammatical errors may remain.    Nish Stockton,    Allergy/Immunology  [facility], [place] and  [place], [place]

## 2017-01-16 NOTE — ASSESSMENT & PLAN NOTE
History of perennial nasal and ocular symptoms get worse in the spring and fall. Symptoms include ocular watering, ocular itching, rhinorrhea, sneezing, congestion and postnasal drip. Symptoms increase around cats. She underwent allergy testing approximately 30 years ago and was positive for mold, dust mite and some grasses. Increased symptoms when she stopped taking cetirizine.    Allergy skin testing was completely negative today.    - She is nonallergic based on skin testing. However, when she stopped cetirizine she had increased symptoms. Therefore continue cetirizine as needed. She either has nonallergic rhinitis or local allergic rhinitis with negative skin testing.   - Flonase 2 sprays per nostril daily.  - Ketotifen (Zaditor, Alaway) 1 drop/eye twice daily as needed.

## 2017-01-17 ASSESSMENT — ASTHMA QUESTIONNAIRES: ACT_TOTALSCORE: 16

## 2017-01-28 DIAGNOSIS — Z12.31 VISIT FOR SCREENING MAMMOGRAM: ICD-10-CM

## 2017-01-28 PROCEDURE — G0202 SCR MAMMO BI INCL CAD: HCPCS | Mod: TC

## 2017-02-02 ENCOUNTER — TELEPHONE (OUTPATIENT)
Dept: FAMILY MEDICINE | Facility: CLINIC | Age: 62
End: 2017-02-02

## 2017-02-02 DIAGNOSIS — F32.5 MAJOR DEPRESSION IN COMPLETE REMISSION (H): ICD-10-CM

## 2017-02-02 DIAGNOSIS — J45.40 MODERATE PERSISTENT ASTHMA WITHOUT COMPLICATION: Primary | ICD-10-CM

## 2017-02-02 NOTE — TELEPHONE ENCOUNTER
Panel Management Review      Patient has the following on her problem list:     Depression / Dysthymia review  PHQ-9 SCORE 1/8/2016 7/12/2016 1/16/2017   Total Score - - -   Total Score MyChart - - 4 (Minimal depression)   Total Score 4 3 -      Patient is due for:  DAP    Asthma review     ACT Total Scores 1/16/2017   ACT TOTAL SCORE (Goal Greater than or Equal to 20) 16   In the past 12 months, how many times did you visit the emergency room for your asthma without being admitted to the hospital? 0   In the past 12 months, how many times were you hospitalized overnight because of your asthma? 0      1. Is Asthma diagnosis on the Problem List? Yes    2. Is Asthma listed on Health Maintenance? No but I added it today   3. Patient is due for:  AAP. ACT is not at goal but just saw allergy on 1/16/17.      IVD   ASA: Failed    Last LDL:    CHOL      224   1/8/2016  HDL       69   1/8/2016  LDL      137   1/8/2016  LDL      171   7/27/2012  TRIG       90   1/8/2016   CHOLHDLRATIO      3.3   3/12/2015     Is the patient on a Statin? YES   Is the patient on Aspirin? NO                  Medications     HMG CoA Reductase Inhibitors    pravastatin (PRAVACHOL) 40 MG tablet          Last three blood pressure readings:  BP Readings from Last 3 Encounters:   01/16/17 150/82   01/02/17 160/77   07/12/16 140/100        Tobacco History:     History   Smoking status     Never Smoker    Smokeless tobacco     Never Used         Hypertension   Last three blood pressure readings:  BP Readings from Last 3 Encounters:   01/16/17 150/82   01/02/17 160/77   07/12/16 140/100     Blood pressure: Failed    HTN Guidelines:  Age 18-59 BP range:  Less than 140/90  Age 60-85 with Diabetes:  Less than 140/90  Age 60-85 without Diabetes:  less than 150/90      Composite cancer screening  Chart review shows that this patient is due/due soon for the following None  Summary:    Patient is due/failing the following:   BP check, AAP, DAP, lipid,  advanced directive    Action needed:   Patient needs nurse only appointment. Print and mail AAP and DAP. Needs a lab appt for lipid. Needs info for advanced directive.    Type of outreach:    Sent [employer] message. I also mailed her AAP and DAP.    Questions for provider review:    None                                                                                                                                    Domonique ATKINSON     Chart routed to none .

## 2017-02-02 NOTE — Clinical Note
My Depression Action Plan  Name: Miri Naylor   Date of Birth 1955  Date: 2/2/2017    My doctor: Elizabeth Corley   My clinic: [address] [address] [phone]          GREEN    ZONE   Good Control    What it looks like:     Things are going generally well. You have normal up s and down s. You may even feel depressed from time to time, but bad moods usually last less than a day.   What you need to do:  1. Continue to care for yourself (see self care plan)  2. Check your depression survival kit and update it as needed  3. Follow your physician s recommendations including any medication.  4. Do not stop taking medication unless you consult with your physician first.           YELLOW         ZONE Getting Worse    What it looks like:     Depression is starting to interfere with your life.     It may be hard to get out of bed; you may be starting to isolate yourself from others.    Symptoms of depression are starting to last most all day and this has happened for several days.     You may have suicidal thoughts but they are not constant.   What you need to do:     1. Call your care team, your response to treatment will improve if you keep your care team informed of your progress. Yellow periods are signs an adjustment may need to be made.     2. Continue your self-care, even if you have to fake it!    3. Talk to someone in your support network    4. Open up your depression survival kit           RED    ZONE Medical Alert - Get Help    What it looks like:     Depression is seriously interfering with your life.     You may experience these or other symptoms: You can t get out of bed most days, can t work or engage in other necessary activities, you have trouble taking care of basic hygiene, or basic responsibilities, thoughts of suicide or death that will not go away, self-injurious behavior.     What you need to do:  1. Call your care team  and request a same-day appointment. If they are not available (weekends or after hours) call your local crisis line, emergency room or 911.          Depression Self Care Plan / Survival Kit    Self-Care for Depression  Here s the deal. Your body and mind are really not as separate as most people think.  What you do and think affects how you feel and how you feel influences what you do and think. This means if you do things that people who feel good do, it will help you feel better.  Sometimes this is all it takes.  There is also a place for medication and therapy depending on how severe your depression is, so be sure to consult with your medical provider and/ or Behavioral Health Consultant if your symptoms are worsening or not improving.     In order to better manage my stress, I will:    Exercise  Get some form of exercise, every day. This will help reduce pain and release endorphins, the  feel good  chemicals in your brain. This is almost as good as taking antidepressants!  This is not the same as joining a gym and then never going! (they count on that by the way ) It can be as simple as just going for a walk or doing some gardening, anything that will get you moving.      Hygiene   Maintain good hygiene (Get out of bed in the morning, Make your bed, Brush your teeth, Take a shower, and Get dressed like you were going to work, even if you are unemployed).  If your clothes don't fit try to get ones that do.    Diet  I will strive to eat foods that are good for me, drink plenty of water, and avoid excessive sugar, caffeine, alcohol, and other mood-altering substances.  Some foods that are helpful in depression are: complex carbohydrates, B vitamins, flaxseed, fish or fish oil, fresh fruits and vegetables.    Psychotherapy  I agree to participate in Individual Therapy (if recommended).    Medication  If prescribed medications, I agree to take them.  Missing doses can result in serious side effects.  I understand  that drinking alcohol, or other illicit drug use, may cause potential side effects.  I will not stop my medication abruptly without first discussing it with my provider.    Staying Connected With Others  I will stay in touch with my friends, family members, and my primary care provider/team.    Use your imagination  Be creative.  We all have a creative side; it doesn t matter if it s oil painting, sand castles, or mud pies! This will also kick up the endorphins.    Witness Beauty  (AKA stop and smell the roses) Take a look outside, even in mid-winter. Notice colors, textures. Watch the squirrels and birds.     Service to others  Be of service to others.  There is always someone else in need.  By helping others we can  get out of ourselves  and remember the really important things.  This also provides opportunities for practicing all the other parts of the program.    Humor  Laugh and be silly!  Adjust your TV habits for less news and crime-drama and more comedy.    Control your stress  Try breathing deep, massage therapy, biofeedback, and meditation. Find time to relax each day.     My support system    Clinic Contact:  Phone number:    Contact 1:  Phone number:    Contact 2:  Phone number:    Mormon/:  Phone number:    Therapist:  Phone number:    Local crisis center:    Phone number:    Other community support:  Phone number:

## 2017-02-02 NOTE — Clinical Note
My Asthma Action Plan  Name: Miri Naylor   YOB: 1955  Date: 2/2/2017   My doctor: Elizabeth Corley   My clinic: [facility]      My Control Medicine: Beclomethasone (QVar) -  80 mcg        Dose: 2 puffs 2 times per day  My Rescue Medicine: Albuterol (Proair/Ventolin/Proventil) HFA        Dose: 2 puffs every 4 hours as needed   My Asthma Severity: moderate persistent  Avoid your asthma triggers: see enclosed list        GREEN ZONE   Good Control    I feel good    No cough or wheeze    Can work, sleep and play without asthma symptoms       Take your asthma control medicine every day.     1. If exercise triggers your asthma, take your rescue medication    15 minutes before exercise or sports, and    During exercise if you have asthma symptoms  2. Spacer to use with inhaler: If you have a spacer, make sure to use it with your inhaler             YELLOW ZONE Getting Worse  I have ANY of these:    I do not feel good    Cough or wheeze    Chest feels tight    Wake up at night   1. Keep taking your Green Zone medications  2. Start taking your rescue medicine:    every 20 minutes for up to 1 hour. Then every 4 hours for 24-48 hours.  3. If you stay in the Yellow Zone for more than 12-24 hours, contact your doctor.  4. If you do not return to the Green Zone in 12-24 hours or you get worse, start taking your oral steroid medicine if prescribed by your provider.           RED ZONE Medical Alert - Get Help  I have ANY of these:    I feel awful    Medicine is not helping    Breathing getting harder    Trouble walking or talking    Nose opens wide to breathe       1. Take your rescue medicine NOW  2. If your provider has prescribed an oral steroid medicine, start taking it NOW  3. Call your doctor NOW  4. If you are still in the Red Zone after 20 minutes and you have not reached your doctor:    Take your rescue medicine again and    Call 911 or go to the emergency room right away    See your  regular doctor within 2 weeks of an Emergency Room or Urgent Care visit for follow-up treatment.        The above medication may be given at school or day care?: N/A (Adult Patient)  Child can carry and use inhaler(s) at school with approval of school nurse?: N/A (Adult Patient)      Annual Reminders:  Meet with Asthma Educator,  Flu Shot in the Fall, consider Pneumonia Vaccination for patients with asthma (aged 19 and older).    Pharmacy:    [facility] - [place], [place] - [address] [facility] - [place], [place] - [address]  [facility] - [place], [place] - [address]                    Asthma Triggers  How To Control Things That Make Your Asthma Worse    Triggers are things that make your asthma worse.  Look at the list below to help you find your triggers and what you can do about them.  You can help prevent asthma flare-ups by staying away from your triggers.      Trigger                                                          What you can do   Cigarette Smoke  Tobacco smoke can make asthma worse. Do not allow smoking in your home, car or around you.  Be sure no one smokes at a child s day care or school.  If you smoke, ask your health care provider for ways to help you quit.  Ask family members to quit too.  Ask your health care provider for a referral to Quit Plan to help you quit smoking, or call 9-891-119-PLAN.     Colds, Flu, Bronchitis  These are common triggers of asthma. Wash your hands often.  Don t touch your eyes, nose or mouth.  Get a flu shot every year.     Dust Mites  These are tiny bugs that live in cloth or carpet. They are too small to see. Wash sheets and blankets in hot water every week.   Encase pillows and mattress in dust mite proof covers.  Avoid having carpet if you can. If you have carpet, vacuum weekly.   Use a dust mask and HEPA vacuum.   Pollen and Outdoor Mold  Some people are allergic to trees, grass, or weed  pollen, or molds. Try to keep your windows closed.  Limit time out doors when pollen count is high.   Ask you health care provider about taking medicine during allergy season.     Animal Dander  Some people are allergic to skin flakes, urine or saliva from pets with fur or feathers. Keep pets with fur or feathers out of your home.    If you can t keep the pet outdoors, then keep the pet out of your bedroom.  Keep the bedroom door closed.  Keep pets off cloth furniture and away from stuffed toys.     Mice, Rats, and Cockroaches  Some people are allergic to the waste from these pests.   Cover food and garbage.  Clean up spills and food crumbs.  Store grease in the refrigerator.   Keep food out of the bedroom.   Indoor Mold  This can be a trigger if your home has high moisture. Fix leaking faucets, pipes, or other sources of water.   Clean moldy surfaces.  Dehumidify basement if it is damp and smelly.   Smoke, Strong Odors, and Sprays  These can reduce air quality. Stay away from strong odors and sprays, such as perfume, powder, hair spray, paints, smoke incense, paint, cleaning products, candles and new carpet.   Exercise or Sports  Some people with asthma have this trigger. Be active!  Ask your doctor about taking medicine before sports or exercise to prevent symptoms.    Warm up for 5-10 minutes before and after sports or exercise.     Other Triggers of Asthma  Cold air:  Cover your nose and mouth with a scarf.  Sometimes laughing or crying can be a trigger.  Some medicines and food can trigger asthma.

## 2017-02-06 DIAGNOSIS — F32.5 MAJOR DEPRESSION IN COMPLETE REMISSION (H): Primary | ICD-10-CM

## 2017-02-06 NOTE — TELEPHONE ENCOUNTER
Escitalopram  20mg     Last Written Prescription Date: 11/28/2016 #90 x 0  Last filled - not provided, mail order pharmacy, [facility]  Last Office Visit with [facility] primary care provider:  07/12/2016 PRADIP Corley        Last PHQ-9 score on record=   PHQ-9 SCORE 1/16/2017   Total Score -   Total Score MyChart 4 (Minimal depression)   Total Score -

## 2017-02-07 RX ORDER — ESCITALOPRAM OXALATE 20 MG/1
20 TABLET ORAL DAILY
Qty: 90 TABLET | Refills: 0 | Status: SHIPPED | OUTPATIENT
Start: 2017-02-07 | End: 2017-05-08

## 2017-02-07 NOTE — TELEPHONE ENCOUNTER
Prescription approved per [facility].  Jhoana Russell RN

## 2017-02-15 ENCOUNTER — TELEPHONE (OUTPATIENT)
Dept: ALLERGY | Facility: OTHER | Age: 62
End: 2017-02-15

## 2017-02-15 NOTE — TELEPHONE ENCOUNTER
Incoming fax requesting 90 day supply of QVAR instead of 30 day supply. Provider signed prescription. Faxed back to [phone]. Closing encounter.    Vanessa Adkins RN

## 2017-02-24 DIAGNOSIS — I10 ESSENTIAL HYPERTENSION WITH GOAL BLOOD PRESSURE LESS THAN 140/90: ICD-10-CM

## 2017-02-24 NOTE — TELEPHONE ENCOUNTER
Atenolol  25mg      Last Written Prescription Date: 11/25/2016 #90 x 0  Last filled - not provided, mail order pharmacy, [facility]  Last Office Visit with [facility], [facility] or [facility] prescribing provider:  07/12/2016 PRADIP Corley   Future Office Visit:   none     BP Readings from Last 3 Encounters:   01/16/17 150/82   01/02/17 160/77   07/12/16 (!) 140/100

## 2017-02-26 ENCOUNTER — DOCUMENTATION ONLY (OUTPATIENT)
Dept: FAMILY MEDICINE | Facility: CLINIC | Age: 62
End: 2017-02-26

## 2017-02-26 DIAGNOSIS — Z11.59 NEED FOR HEPATITIS C SCREENING TEST: Primary | ICD-10-CM

## 2017-02-26 NOTE — PROGRESS NOTES
Per notes, Miri is requesting multiple labs and vaccinations, please sign orders or notify patient if an OV is required

## 2017-02-27 RX ORDER — ATENOLOL 25 MG/1
25 TABLET ORAL DAILY
Qty: 90 TABLET | Refills: 0 | Status: SHIPPED | OUTPATIENT
Start: 2017-02-27 | End: 2017-05-26

## 2017-02-27 NOTE — TELEPHONE ENCOUNTER
Please call pt.  She needs to see me to address BP prior to additional refills.  Elizabeth Corley

## 2017-02-27 NOTE — PROGRESS NOTES
Left patient a message to call back .     [facility]

## 2017-02-27 NOTE — PROGRESS NOTES
Only hep c was pended.  Please make sure this is all pt was looking for.  She is also due for a lipid and order is available.    Elizabeth Corley

## 2017-03-01 DIAGNOSIS — Z11.59 NEED FOR HEPATITIS C SCREENING TEST: ICD-10-CM

## 2017-03-01 DIAGNOSIS — E78.5 HYPERLIPIDEMIA LDL GOAL <130: ICD-10-CM

## 2017-03-01 LAB
CHOLEST SERPL-MCNC: 242 MG/DL
HCV AB SERPL QL IA: NORMAL
HDLC SERPL-MCNC: 78 MG/DL
LDLC SERPL CALC-MCNC: 141 MG/DL
NONHDLC SERPL-MCNC: 164 MG/DL
TRIGL SERPL-MCNC: 115 MG/DL

## 2017-03-01 PROCEDURE — 86803 HEPATITIS C AB TEST: CPT | Performed by: FAMILY MEDICINE

## 2017-03-01 PROCEDURE — 36415 COLL VENOUS BLD VENIPUNCTURE: CPT | Performed by: FAMILY MEDICINE

## 2017-03-01 PROCEDURE — 80061 LIPID PANEL: CPT | Performed by: FAMILY MEDICINE

## 2017-03-02 ENCOUNTER — OFFICE VISIT (OUTPATIENT)
Dept: FAMILY MEDICINE | Facility: CLINIC | Age: 62
End: 2017-03-02
Payer: COMMERCIAL

## 2017-03-02 VITALS
SYSTOLIC BLOOD PRESSURE: 132 MMHG | WEIGHT: 196.2 LBS | TEMPERATURE: 97.5 F | DIASTOLIC BLOOD PRESSURE: 82 MMHG | HEART RATE: 60 BPM | HEIGHT: 71 IN | BODY MASS INDEX: 27.47 KG/M2

## 2017-03-02 DIAGNOSIS — E78.5 HYPERLIPIDEMIA LDL GOAL <130: Primary | ICD-10-CM

## 2017-03-02 DIAGNOSIS — Z12.11 SPECIAL SCREENING FOR MALIGNANT NEOPLASMS, COLON: ICD-10-CM

## 2017-03-02 DIAGNOSIS — I10 HYPERTENSION GOAL BP (BLOOD PRESSURE) < 140/90: ICD-10-CM

## 2017-03-02 DIAGNOSIS — Z80.0 FAMILY HISTORY OF COLON CANCER: ICD-10-CM

## 2017-03-02 PROCEDURE — 99213 OFFICE O/P EST LOW 20 MIN: CPT | Performed by: FAMILY MEDICINE

## 2017-03-02 RX ORDER — ATORVASTATIN CALCIUM 40 MG/1
40 TABLET, FILM COATED ORAL DAILY
Qty: 90 TABLET | Refills: 3 | Status: SHIPPED | OUTPATIENT
Start: 2017-03-02 | End: 2017-06-29

## 2017-03-02 NOTE — PATIENT INSTRUCTIONS
We'll have you stop the pravastatin and begin the atorvastatin in its place.  I sent a new prescription to your [facility].    Please plan on scheduling your colonoscopy sometime this summer.    You'll be due for fasting labs again then too.  Orders are available.    Please stop into a [facility] a couple of times per month for blood pressure checks.  We'll want to make sure your numbers stay in this range.    We can visit again this time next year for your physical as long as everything is going well.

## 2017-03-02 NOTE — PROGRESS NOTES
SUBJECTIVE:                                                    Miri Naylor is a 61 year old female who presents to clinic today for the following health issues:    Hypertension Follow-up      Outpatient blood pressures are being checked at store.  Results are 135-150/80.    Low Salt Diet: not monitoring salt       Amount of exercise or physical activity: 2-3 days/week for an average of 30-45 minutes    Problems taking medications regularly: No    Medication side effects: fatigue  Diet: regular (no restrictions)    Problem list and histories reviewed & adjusted, as indicated.  Additional history: as documented    Reviewed and updated as needed this visit by clinical staff  Tobacco  Allergies  Meds  Med Hx  Surg Hx  Fam Hx  Soc Hx      Reviewed and updated as needed this visit by Provider  Tobacco  Med Hx  Surg Hx  Fam Hx  Soc Hx        ROS: Remainder of Constitutional, CV, Respiratory, GI,  negative with exception of that mentioned above    PE:  VS as above   Gen:  WN/WD/WH female in NAD   Heart:  RRR without murmur, nl S1, S2, no rubs or gallops   Lungs CTA hua without rales/ronchi/wheezes   Ext:  No pedal edema    A/P:      ICD-10-CM    1. Hyperlipidemia LDL goal <130 E78.5 atorvastatin (LIPITOR) 40 MG tablet     Lipid Profile (Chol, Trig, HDL, LDL calc)     Comprehensive metabolic panel   2. Hypertension goal BP (blood pressure) < 140/90 I10 Comprehensive metabolic panel   3. Family history of colon cancer Z80.0    4. Special screening for malignant neoplasms, colon Z12.11 GASTROENTEROLOGY ADULT REF PROCEDURE ONLY     Patient Instructions   We'll have you stop the pravastatin and begin the atorvastatin in its place.  I sent a new prescription to your [facility].    Please plan on scheduling your colonoscopy sometime this summer.    You'll be due for fasting labs again then too.  Orders are available.    Please stop into a [facility] a couple of times per month for blood pressure  checks.  We'll want to make sure your numbers stay in this range.    We can visit again this time next year for your physical as long as everything is going well.

## 2017-03-02 NOTE — MR AVS SNAPSHOT
After Visit Summary   3/2/2017    Miri Naylor    MRN: [id number]           Patient Information     Date Of Birth          1955        Visit Information        Provider Department      3/2/2017 9:40 AM Elizabeth Corley DO [facility]        Today's Diagnoses     Hyperlipidemia LDL goal <130    -  1    Hypertension goal BP (blood pressure) < 140/90        Family history of colon cancer        Special screening for malignant neoplasms, colon          Care Instructions    We'll have you stop the pravastatin and begin the atorvastatin in its place.  I sent a new prescription to your [facility].    Please plan on scheduling your colonoscopy sometime this summer.    You'll be due for fasting labs again then too.  Orders are available.    Please stop into a [facility] a couple of times per month for blood pressure checks.  We'll want to make sure your numbers stay in this range.    We can visit again this time next year for your physical as long as everything is going well.        Follow-ups after your visit        Additional Services     GASTROENTEROLOGY ADULT REF PROCEDURE ONLY       Last Lab Result: Creatinine (mg/dL)       Date                     Value                 07/27/2016               0.82             ----------  Body mass index is 27.17 kg/(m^2).     Needed:  No  Language:  English    Patient will be contacted to schedule procedure.     Please be aware that coverage of these services is subject to the terms and limitations of your health insurance plan.  Call member services at your health plan with any benefit or coverage questions.  Any procedures must be performed at a [facility] facility OR coordinated by your clinic's referral office.    Please bring the following with you to your appointment:    (1) Any X-Rays, CTs or MRIs which have been performed.  Contact the facility where they were done to arrange for  prior to your scheduled  appointment.    (2) List of current medications   (3) This referral request   (4) Any documents/labs given to you for this referral                  Your next 10 appointments already scheduled     Apr 17, 2017  4:00 PM CDT   [email or web address] Allergy Care Return with Nish Stockton DO   [facility] ([facility])    [address] [facility] [address]   [phone]            Jul 19, 2017  1:00 PM CDT   Return Visit with Nir Massey MD   [facility] ([facility]    [address] [facility] [id number] [phone]              Future tests that were ordered for you today     Open Future Orders        Priority Expected Expires Ordered    Lipid Profile (Chol, Trig, HDL, LDL calc) Routine 7/2/2017 3/2/2018 3/2/2017    Comprehensive metabolic panel Routine 7/2/2017 3/2/2018 3/2/2017            Who to contact     Normal or non-critical lab and imaging results will be communicated to you by FÃ[employer]vrier 46hart, letter or phone within 4 business days after the clinic has received the results. If you do not hear from us within 7 days, please contact the clinic through [email or web address] or phone. If you have a critical or abnormal lab result, we will notify you by phone as soon as possible.  Submit refill requests through [employer] or call your pharmacy and they will forward the refill request to us. Please allow 3 business days for your refill to be completed.          If you need to speak with a  for additional information , please call: [phone]           Additional Information About Your Visit        Hungrio Information     [employer] gives you secure access to your electronic health record. If you see a primary care provider, you can also send messages to your care team and make appointments. If you have questions, please call your primary care clinic.  If you do not have a primary care provider, please call [phone] and they will  "assist you.        Care EveryWhere ID     This is your Care EveryWhere ID. This could be used by other organizations to access your [facility] medical records  [id number]        Your Vitals Were     Pulse Temperature Height Breastfeeding? BMI (Body Mass Index)       60 97.5  F (36.4  C) (Tympanic) 5' 11.25\" (1.81 m) No 27.17 kg/m2        Blood Pressure from Last 3 Encounters:   03/02/17 132/82   01/16/17 150/82   01/02/17 160/77    Weight from Last 3 Encounters:   03/02/17 196 lb 3.2 oz (89 kg)   01/16/17 190 lb 12.8 oz (86.5 kg)   01/02/17 193 lb 12.8 oz (87.9 kg)              We Performed the Following     GASTROENTEROLOGY ADULT REF PROCEDURE ONLY          Today's Medication Changes          These changes are accurate as of: 3/2/17 10:33 AM.  If you have any questions, ask your nurse or doctor.               Start taking these medicines.        Dose/Directions    atorvastatin 40 MG tablet   Commonly known as:  LIPITOR   Used for:  Hyperlipidemia LDL goal <130   Started by:  Elizabeth Corley DO        Dose:  40 mg   Take 1 tablet (40 mg) by mouth daily   Quantity:  90 tablet   Refills:  3         Stop taking these medicines if you haven't already. Please contact your care team if you have questions.     olopatadine HCl 0.2 % Soln   Commonly known as:  PATADAY   Stopped by:  Elizabeth Corley DO           pravastatin 40 MG tablet   Commonly known as:  PRAVACHOL   Stopped by:  Elizabeth Corley DO                Where to get your medicines      These medications were sent to [facility] [address] [address]     Phone:  [phone]     atorvastatin 40 MG tablet                Primary Care Provider Office Phone # Fax #    Elizabeth Corley [phone]       [address] [place] [address]        Thank you!     Thank you for choosing [facility]  for your care. Our goal is always to " provide you with excellent care. Hearing back from our patients is one way we can continue to improve our services. Please take a few minutes to complete the written survey that you may receive in the mail after your visit with us. Thank you!             Your Updated Medication List - Protect others around you: Learn how to safely use, store and throw away your medicines at www.disposemymeds.org.          This list is accurate as of: 3/2/17 10:33 AM.  Always use your most recent med list.                   Brand Name Dispense Instructions for use    Acai 500 MG Caps      1 tablet daily       Acidophilus Tabs      1 tablet daily       albuterol 108 (90 BASE) MCG/ACT Inhaler    albuterol    1 Inhaler    Inhale 2 puffs into the lungs every 4 hours as needed for shortness of breath / dyspnea or wheezing       ascorbic acid 500 MG tablet    VITAMIN C     2 TABLET DAILY       atenolol 25 MG tablet    TENORMIN    90 tablet    Take 1 tablet (25 mg) by mouth daily       atorvastatin 40 MG tablet    LIPITOR    90 tablet    Take 1 tablet (40 mg) by mouth daily       beclomethasone 80 MCG/ACT Inhaler    QVAR    1 Inhaler    Inhale 2 puffs into the lungs 2 times daily       CALCIUM 600+D PO      250 mg vitamin d- 3 tablets daily       DAILY MULTIVITAMIN PO      1 tablet daily       escitalopram 20 MG tablet    LEXAPRO    90 tablet    Take 1 tablet (20 mg) by mouth daily       flaxseed oil 1000 MG Caps      1 tablet daily       gabapentin 300 MG capsule    NEURONTIN    270 capsule    Take one capsule by mouth three times daily       Glucosamine-Chondroitin Tabs      1500 mg glucosamine and 1200mg chondroitin daily-2 tablets daily       hydrocortisone 2.5 % cream     60 g    Apply topically 2 times daily       ibuprofen 200 MG tablet    ADVIL/MOTRIN     take 1 tablet (200 mg) by oral route every 6 hours as needed with food       lisinopril 40 MG tablet    PRINIVIL/ZESTRIL    90 tablet    Take 1 tablet (40 mg) by mouth daily        pantoprazole 40 MG EC tablet    PROTONIX    90 tablet    Take 40 mg by mouth once daily, 30 to 60 minutes before a meal.       S-Adenosylmethionine 200 MG Tabs      2 tablets daily       SUPER B COMPLEX Tabs      1 tablet daily       triamcinolone 0.1 % cream    KENALOG    60 g    Apply topically 2 times daily       vitamin E 400 UNIT capsule   Generic drug:  vitamin E      Take one pill by mouth once daily       ZADITOR OP      Apply to eye daily as needed       ZYRTEC 10 MG tablet   Generic drug:  cetirizine      Take 10 mg by mouth daily.

## 2017-03-20 DIAGNOSIS — G89.29 CHRONIC NONINTRACTABLE HEADACHE, UNSPECIFIED HEADACHE TYPE: ICD-10-CM

## 2017-03-20 DIAGNOSIS — R51.9 CHRONIC NONINTRACTABLE HEADACHE, UNSPECIFIED HEADACHE TYPE: ICD-10-CM

## 2017-03-20 NOTE — TELEPHONE ENCOUNTER
Gabapentin 300mg      Last Written Prescription Date: 09/22/2016 #270 x 1  Last filled - not provided, mail order pharmacy, [facility]  Last Office Visit with G, P or [facility] prescribing provider: 03/02/2017 PRADIP Corley       Creatinine   Date Value Ref Range Status   07/27/2016 0.82 0.52 - 1.04 mg/dL Final     Lab Results   Component Value Date    AST 28 07/12/2016     Lab Results   Component Value Date    ALT 42 07/12/2016     BP Readings from Last 3 Encounters:   03/02/17 132/82   01/16/17 150/82   01/02/17 160/77

## 2017-03-21 RX ORDER — GABAPENTIN 300 MG/1
CAPSULE ORAL
Qty: 270 CAPSULE | Refills: 1 | Status: SHIPPED | OUTPATIENT
Start: 2017-03-21 | End: 2017-09-17

## 2017-03-21 NOTE — TELEPHONE ENCOUNTER
Routing refill request to provider for review/approval because:  Drug not on the [facility] refill protocol     Thank you  Mary Lou TAVAREZ RN

## 2017-03-27 DIAGNOSIS — I10 ESSENTIAL HYPERTENSION WITH GOAL BLOOD PRESSURE LESS THAN 140/90: ICD-10-CM

## 2017-03-27 NOTE — TELEPHONE ENCOUNTER
Lisinopril  40mg      Last Written Prescription Date: 09/26/2016 #90 x 1  Last filled - not provided, mail order pharmacy, [facility]  Last Office Visit with [facility], [facility] or [facility] prescribing provider: 03/02/2017 PRADIP Barney       Potassium   Date Value Ref Range Status   07/27/2016 4.2 3.4 - 5.3 mmol/L Final     Creatinine   Date Value Ref Range Status   07/27/2016 0.82 0.52 - 1.04 mg/dL Final     BP Readings from Last 3 Encounters:   03/02/17 132/82   01/16/17 150/82   01/02/17 160/77

## 2017-03-28 RX ORDER — LISINOPRIL 40 MG/1
40 TABLET ORAL DAILY
Qty: 90 TABLET | Refills: 1 | Status: SHIPPED | OUTPATIENT
Start: 2017-03-28 | End: 2017-09-27

## 2017-03-28 NOTE — TELEPHONE ENCOUNTER
Prescription approved per [facility] Refill Protocol or patient Primary care provider (PCP)  JAKE Jones RN/Sy Grimaldo

## 2017-04-17 ENCOUNTER — OFFICE VISIT (OUTPATIENT)
Dept: ALLERGY | Facility: CLINIC | Age: 62
End: 2017-04-17
Payer: COMMERCIAL

## 2017-04-17 VITALS
WEIGHT: 200.6 LBS | SYSTOLIC BLOOD PRESSURE: 150 MMHG | HEART RATE: 53 BPM | DIASTOLIC BLOOD PRESSURE: 74 MMHG | OXYGEN SATURATION: 99 % | BODY MASS INDEX: 27.78 KG/M2

## 2017-04-17 DIAGNOSIS — J31.0 RHINOCONJUNCTIVITIS: ICD-10-CM

## 2017-04-17 DIAGNOSIS — H10.9 RHINOCONJUNCTIVITIS: ICD-10-CM

## 2017-04-17 DIAGNOSIS — J45.40 MODERATE PERSISTENT ASTHMA WITHOUT COMPLICATION: Primary | ICD-10-CM

## 2017-04-17 LAB
FEF 25/75: NORMAL
FEV-1: NORMAL
FEV1/FVC: NORMAL
FVC: NORMAL

## 2017-04-17 PROCEDURE — 94010 BREATHING CAPACITY TEST: CPT | Performed by: ALLERGY & IMMUNOLOGY

## 2017-04-17 PROCEDURE — 99214 OFFICE O/P EST MOD 30 MIN: CPT | Mod: 25 | Performed by: ALLERGY & IMMUNOLOGY

## 2017-04-17 RX ORDER — BUDESONIDE AND FORMOTEROL FUMARATE DIHYDRATE 160; 4.5 UG/1; UG/1
2 AEROSOL RESPIRATORY (INHALATION) 2 TIMES DAILY
Qty: 1 INHALER | Refills: 11 | Status: SHIPPED | OUTPATIENT
Start: 2017-04-17 | End: 2018-02-09

## 2017-04-17 NOTE — LETTER
My Asthma Action Plan  Name: Miri Naylor   YOB: 1955  Date: 4/17/2017   My doctor: Nish Stockton, DO   My clinic: [facility]        My Control Medicine: Budesonide + formoterol (Symbicort) -  160/4.5 mcg 2 puffs inhaled twice daily.   My Rescue Medicine:Albuterol 2-4 puffs inhaled (use a spacer unless using a Proair Respiclick device) every 4 hours as needed for chest tightness, wheezing, shortness of breath and/or coughing.      My Asthma Severity: moderate persistent  Avoid your asthma triggers: exercise or sports and signing               GREEN ZONE     Good Control    I feel good    No cough or wheeze    Can work, sleep and play without asthma symptoms       Take your asthma control medicine every day.     1. If exercise triggers your asthma, take your rescue medication    15 minutes before exercise or sports, and    During exercise if you have asthma symptoms  2. Spacer to use with inhaler: If you have a spacer, make sure to use it with your inhaler             YELLOW ZONE     Getting Worse  I have ANY of these:    I do not feel good    Cough or wheeze    Chest feels tight    Wake up at night   1. Keep taking your Green Zone medications  2. Start taking your rescue medicine:    every 20 minutes for up to 1 hour. Then every 4 hours for 24-48 hours.  3. If you stay in the Yellow Zone for more than 12-24 hours, contact your doctor.  4. If you do not return to the Green Zone in 12-24 hours or you get worse, start taking your oral steroid medicine if prescribed by your provider.           RED ZONE     Medical Alert - Get Help  I have ANY of these:    I feel awful    Medicine is not helping    Breathing getting harder    Trouble walking or talking    Nose opens wide to breathe       1. Take your rescue medicine NOW  2. If your provider has prescribed an oral steroid medicine, start taking it NOW  3. Call your doctor NOW  4. If you are still in the Red Zone after 20 minutes and  you have not reached your doctor:    Take your rescue medicine again and    Call 911 or go to the emergency room right away    See your regular doctor within 2 weeks of an Emergency Room or Urgent Care visit for follow-up treatment.        Electronically signed by: Nish Stockton, April 17, 2017    Annual Reminders:  Meet with Asthma Educator,  Flu Shot in the Fall, consider Pneumonia Vaccination for patients with asthma (aged 19 and older).    Pharmacy:    [facility] - [place], [place] - [address] [facility] - [place], [place] - [address]                    Asthma Triggers  How To Control Things That Make Your Asthma Worse    Triggers are things that make your asthma worse.  Look at the list below to help you find your triggers and what you can do about them.  You can help prevent asthma flare-ups by staying away from your triggers.      Trigger                                                          What you can do   Cigarette Smoke  Tobacco smoke can make asthma worse. Do not allow smoking in your home, car or around you.  Be sure no one smokes at a child s day care or school.  If you smoke, ask your health care provider for ways to help you quit.  Ask family members to quit too.  Ask your health care provider for a referral to Quit Plan to help you quit smoking, or call 2-835-893-PLAN.     Colds, Flu, Bronchitis  These are common triggers of asthma. Wash your hands often.  Don t touch your eyes, nose or mouth.  Get a flu shot every year.     Dust Mites  These are tiny bugs that live in cloth or carpet. They are too small to see. Wash sheets and blankets in hot water every week.   Encase pillows and mattress in dust mite proof covers.  Avoid having carpet if you can. If you have carpet, vacuum weekly.   Use a dust mask and HEPA vacuum.   Pollen and Outdoor Mold  Some people are allergic to trees, grass, or weed pollen, or molds. Try to keep your  windows closed.  Limit time out doors when pollen count is high.   Ask you health care provider about taking medicine during allergy season.     Animal Dander  Some people are allergic to skin flakes, urine or saliva from pets with fur or feathers. Keep pets with fur or feathers out of your home.    If you can t keep the pet outdoors, then keep the pet out of your bedroom.  Keep the bedroom door closed.  Keep pets off cloth furniture and away from stuffed toys.     Mice, Rats, and Cockroaches  Some people are allergic to the waste from these pests.   Cover food and garbage.  Clean up spills and food crumbs.  Store grease in the refrigerator.   Keep food out of the bedroom.   Indoor Mold  This can be a trigger if your home has high moisture. Fix leaking faucets, pipes, or other sources of water.   Clean moldy surfaces.  Dehumidify basement if it is damp and smelly.   Smoke, Strong Odors, and Sprays  These can reduce air quality. Stay away from strong odors and sprays, such as perfume, powder, hair spray, paints, smoke incense, paint, cleaning products, candles and new carpet.   Exercise or Sports  Some people with asthma have this trigger. Be active!  Ask your doctor about taking medicine before sports or exercise to prevent symptoms.    Warm up for 5-10 minutes before and after sports or exercise.     Other Triggers of Asthma  Cold air:  Cover your nose and mouth with a scarf.  Sometimes laughing or crying can be a trigger.  Some medicines and food can trigger asthma.

## 2017-04-17 NOTE — PROGRESS NOTES
Miri Naylor is a 61 year old White female with previous medical history significant for moderate persistent asthma and nonallergic rhinitis who returns for a follow up visit. Miri Naylor is being seen today for asthma and nonallergic rhinitis.     The patient was last seen in January 2017. At that time she was continued on Qvar 80 micrograms 2 puffs inhaled twice daily. She was started on that medication by me at prior visit. She had had improvement in her shortness of breath, wheezing, chest tightness and coughing after starting on Qvar. Since last visit she reports that she has had no shortness of breath, wheezing or chest tightness aside from occasionally with moderate exertion. She has additionally had coughing and shortness of breath with singing. She has  used albuterol prior to singing and this was beneficial. She additionally has used prior to exertional activities and this has been beneficial. No nocturnal awakenings. Otherwise no chest symptoms. No interval prednisone, no interval hospitalizations, and no interval ER visits.    She does report that her spring rhinorrhea, congestion, postnasal drip and sneezing have started. As you may recall she underwent skin testing at last visit and was nonallergic based on skin testing. I thought was either local allergic or nonallergic. She was continued on Flonase 2 sprays per nostril daily, oral antihistamine as needed and ocular antihistamine as needed. These medications are significantly helpful.      ACT Total Scores 4/17/2017   ACT TOTAL SCORE (Goal Greater than or Equal to 20) 20   In the past 12 months, how many times did you visit the emergency room for your asthma without being admitted to the hospital? 0   In the past 12 months, how many times were you hospitalized overnight because of your asthma? 0       Recent asthma triggers that patient is dealing with: exercise or sports and Singing          Past Medical History:   Diagnosis Date      Depressive disorder, not elsewhere classified      Drug allergy, multiple 9/23/14--passed graded oral challenge for Zithromax.     7/3/14 POS PRE-PEN skin test. 7/30/14 NEG skin tests and oral challenge to Cefzil     Hx of abnormal Pap smear ?    no specifics given     lumbar degeneration      Raynaud's disease      Unspecified essential hypertension      Family History   Problem Relation Age of Onset     Arthritis Mother      Hyperlipidemia Mother      Other Cancer Mother      Glioblastoma Multiforme     OSTEOPOROSIS Mother      Blood Disease Father      Hypertension Father      Hyperlipidemia Father      Depression Son      Depression Daughter      Hyperlipidemia Brother      Anxiety Disorder Son      Past Surgical History:   Procedure Laterality Date     BIOPSY OF BREAST, NEEDLE CORE       C PELVIS/HIP JOINT SURGERY UNLISTED Right 7/19/16    total hip arthroplasty     COLONOSCOPY       HIP SURGERY Right 07/19/2016       REVIEW OF SYSTEMS:  General: negative for weight gain. negative for weight loss. negative for changes in sleep.   Ears: negative for fullness. negative for hearing loss. negative for dizziness.   Nose: negative for snoring.negative for changes in smell. negative for drainage.   Throat: positive  for hoarseness. negative for sore throat. negative for trouble swallowing.   Lungs: negative for shortness of breath.negative for wheezing. negative for sputum production.   Cardiovascular: negative for chest pain. negative for swelling of ankles. negative for fast or irregular heartbeat.   Gastrointestinal: negative for nausea. negative for heartburn. negative for acid reflux.   Musculoskeletal: negative for joint pain. negative for joint stiffness. negative for joint swelling.   Neurologic: negative for seizures. negative for fainting. negative for weakness.   Psychiatric: negative for changes in mood. negative for anxiety.   Endocrine: negative for cold intolerance. negative for heat intolerance.  negative for tremors.   Hematologic: negative for easy bruising. negative for easy bleeding.  Integumentary: negative for rash. negative for scaling. negative for nail changes.       Current Outpatient Prescriptions:      budesonide-formoterol (SYMBICORT) 160-4.5 MCG/ACT Inhaler, Inhale 2 puffs into the lungs 2 times daily, Disp: 1 Inhaler, Rfl: 11     lisinopril (PRINIVIL/ZESTRIL) 40 MG tablet, Take 1 tablet (40 mg) by mouth daily, Disp: 90 tablet, Rfl: 1     gabapentin (NEURONTIN) 300 MG capsule, Take one capsule by mouth three times daily, Disp: 270 capsule, Rfl: 1     Ketotifen Fumarate (ZADITOR OP), Apply to eye daily as needed, Disp: , Rfl:      atorvastatin (LIPITOR) 40 MG tablet, Take 1 tablet (40 mg) by mouth daily, Disp: 90 tablet, Rfl: 3     atenolol (TENORMIN) 25 MG tablet, Take 1 tablet (25 mg) by mouth daily, Disp: 90 tablet, Rfl: 0     escitalopram (LEXAPRO) 20 MG tablet, Take 1 tablet (20 mg) by mouth daily, Disp: 90 tablet, Rfl: 0     hydrocortisone 2.5 % cream, Apply topically 2 times daily, Disp: 60 g, Rfl: 1     triamcinolone (KENALOG) 0.1 % cream, Apply topically 2 times daily, Disp: 60 g, Rfl: 3     beclomethasone (QVAR) 80 MCG/ACT Inhaler, Inhale 2 puffs into the lungs 2 times daily, Disp: 1 Inhaler, Rfl: 11     albuterol (ALBUTEROL) 108 (90 BASE) MCG/ACT Inhaler, Inhale 2 puffs into the lungs every 4 hours as needed for shortness of breath / dyspnea or wheezing, Disp: 1 Inhaler, Rfl: 1     pantoprazole (PROTONIX) 40 MG enteric coated tablet, Take 40 mg by mouth once daily, 30 to 60 minutes before a meal., Disp: 90 tablet, Rfl: 3     ibuprofen (ADVIL,MOTRIN) 200 MG tablet, take 1 tablet (200 mg) by oral route every 6 hours as needed with food, Disp: , Rfl:      vitamin E (E-400) 400 UNIT capsule, Take one pill by mouth once daily, Disp: , Rfl:      cetirizine (ZYRTEC) 10 MG tablet, Take 10 mg by mouth daily., Disp: , Rfl:      VITAMIN C 500 MG PO TABS, 2 TABLET DAILY, Disp: , Rfl:       ACIDOPHILUS OR TABS, 1 tablet daily, Disp: , Rfl:      SUPER B COMPLEX PO TABS, 1 tablet daily, Disp: , Rfl:      FLAXSEED OIL 1000 MG PO CAPS, 1 tablet daily, Disp: , Rfl:      ACAI 500 MG PO CAPS, 1 tablet daily, Disp: , Rfl:      GLUCOSAMINE-CHONDROITIN PO TABS, 1500 mg glucosamine and 1200mg chondroitin daily-2 tablets daily, Disp: , Rfl:      CALCIUM 600+D PO, 250 mg vitamin d- 3 tablets daily, Disp: , Rfl:      DAILY MULTIVITAMIN PO, 1 tablet daily, Disp: , Rfl:      S-ADENOSYLMETHIONINE 200 MG PO TABS, 2 tablets daily, Disp: , Rfl:   Immunization History   Administered Date(s) Administered     Influenza Vaccine IM 3yrs+ 4 Valent IIV4 12/03/2013, 11/16/2015     TD (ADULT, 7+) 03/06/2008     TDAP Vaccine (Adacel) 08/03/2012     Allergies   Allergen Reactions     Penicillins Hives     Confirmed by skin prick testing 7/3/14     Amoxicillin Hives     Clindamycin Hcl Hives     Levaquin      tendonitis     Augmentin Rash     Azithromycin Rash     Cephalexin Rash         EXAM:   Constitutional:  Appears well-developed and well-nourished. No distress.   HEENT:   Head: Normocephalic.   Right Ear: External ear normal. TM normal  Left Ear: External ear normal. TM normal  Mouth/Throat: No oropharyngeal exudate present.   Cobblestoning of posterior oropharynx.   Boggy nasal tissue and pale.    Eyes: Conjunctivae are non-erythematous   No maxillary or frontal sinus tenderness to palpation.   Cardiovascular: Normal rate, regular rhythm and normal heart sounds. Exam reveals no gallop and no friction rub.   No murmur heard.  Respiratory: Effort normal and breath sounds normal. No respiratory distress. No wheezes. No rales.   Musculoskeletal: Normal range of motion.   Lymphadenopathy:   No cervical adenopathy.   No lower extremity edema.   Neuro: Oriented to person, place, and time.  Skin: Skin is warm and dry. No rash noted.   Psychiatric: Normal mood and affect.     Nursing note and vitals reviewed.      WORKUP:    Spirometry  FVC % pred:77  FEV1 % pred:64  FEV1/FVC % act:64  FEF 25-75% pred:62    Mild obstruction noted.    ASSESSMENT/PLAN:  Problem List Items Addressed This Visit        Respiratory    Moderate persistent asthma without complication - Primary     She has had chest symptoms with signing and exertion. No symptoms at other times. She is on QVAR 80mcg 2 puffs bid.      Spirometry today with an FEV1 of 64%. Down from last visit.       - Albuterol 2-4 puffs inhaled (use a spacer unless using a Proair Respiclick device) every 4 hours as needed for chest tightness, wheezing, shortness of breath and/or coughing.   - Albuterol 2-4 puffs inhaled (use spacer if not using Proair Respiclick device) 15-20 minutes prior to physical activity.    - Please ensure warm up period prior to exercise.    - Avoid asthma triggers.  - Given chest symptoms with minimal exertion and singing and decrease in FEV1 will trial Symbicort 160/4.5mcg 2 puffs inhaled twice daily.   - An updated asthma action plan was provided and reviewed with the patient.   - Return to clinic in 3 months         Relevant Medications    budesonide-formoterol (SYMBICORT) 160-4.5 MCG/ACT Inhaler    Other Relevant Orders    Spirometry, Breathing Capacity (Completed)       Infectious/Inflammatory    Rhinoconjunctivitis     History of perennial nasal and ocular symptoms get worse in the spring and fall. Symptoms include ocular watering, ocular itching, rhinorrhea, sneezing, congestion and postnasal drip. Symptoms increase around cats. Symptoms over the last 1-2 weeks.      Allergy skin testing was completely negative.     - Flonase 2 sprays per nostril daily.  - Ketotifen (Zaditor, Al[place]) 1 drop/eye twice daily as needed.   - Oral antihistamine as needed.   - Likely local allergic rhinitis as symptoms improved with treatment with antihistamines               Chart documentation with Dragon Voice recognition Software. Although reviewed after completion, some words  and grammatical errors may remain.    Nish [place],    Allergy/Immunology  [facility], [place] and [place], [place]

## 2017-04-17 NOTE — ASSESSMENT & PLAN NOTE
She has had chest symptoms with signing and exertion. No symptoms at other times. She is on QVAR 80mcg 2 puffs bid.      Spirometry today with an FEV1 of 64%. Down from last visit.       - Albuterol 2-4 puffs inhaled (use a spacer unless using a Proair Respiclick device) every 4 hours as needed for chest tightness, wheezing, shortness of breath and/or coughing.   - Albuterol 2-4 puffs inhaled (use spacer if not using Proair Respiclick device) 15-20 minutes prior to physical activity.    - Please ensure warm up period prior to exercise.    - Avoid asthma triggers.  - Given chest symptoms with minimal exertion and singing and decrease in FEV1 will trial Symbicort 160/4.5mcg 2 puffs inhaled twice daily.   - An updated asthma action plan was provided and reviewed with the patient.   - Return to clinic in 3 months

## 2017-04-17 NOTE — ASSESSMENT & PLAN NOTE
History of perennial nasal and ocular symptoms get worse in the spring and fall. Symptoms include ocular watering, ocular itching, rhinorrhea, sneezing, congestion and postnasal drip. Symptoms increase around cats. Symptoms over the last 1-2 weeks.      Allergy skin testing was completely negative.     - Flonase 2 sprays per nostril daily.  - Ketotifen (Zaditor, Alaway) 1 drop/eye twice daily as needed.   - Oral antihistamine as needed.   - Likely local allergic rhinitis as symptoms improved with treatment with antihistamines

## 2017-04-17 NOTE — MR AVS SNAPSHOT
After Visit Summary   4/17/2017    Miri Naylor    MRN: [id number]           Patient Information     Date Of Birth          1955        Visit Information        Provider Department      4/17/2017 4:00 PM Nish [place] DO [facility]        Today's Diagnoses     Moderate persistent asthma without complication    -  1      Care Instructions    Allergy Staff Appt Hours Shot Hours Locations    Physician     Nish [place] DO       Support Staff     MEMO Carias MA  Monday:                      [place] 8-7     Tuesday:         [place] 8-5     Wednesday:        [place]: 7-5 Friday:        [place] 7-5   [place]        Monday: 9-6 Friday: 7-2     [place]        Tuesday: 7-12 Thursday: 1-6 [place] Tuesday: 1-6 Wednesday: 11-6 Thursday: 7-12 [facility]  [address] [place] [address]  Appt Line: [phone]  Allergy RN (Monday):  [phone]    [facility]  [address] [place] [address]  Appt Line: [phone]  Allergy RN (Tues & Wed):  [phone]    [facility]  [address] [place] [address]  Appt Line: [phone]  Allergy RN (Friday):  [phone]       Important Scheduling Information  Aspirin Desensitization: Appt will last 2 clinic days. Please call the Allergy RN line for your clinic to schedule. Discontinue antihistamines 7 days prior to the appointment.     Food Challenges: Appt will last 3-4 hours. Please call the Allergy RN line for your clinic to schedule. Discontinue antihistamines 7 days prior to the appointment.     Penicillin Testing: Appt will last 2-3 hours. Please call the Allergy RN line for your clinic to schedule. Discontinue antihistamines 7 days prior to the appointment.     Skin Testing: Appt will about 40 minutes. Call the appointment line for your clinic to schedule. Discontinue antihistamines 7 days prior to the appointment.      Venom Testing: Appt will last 2-3 hours. Please call the Allergy RN line for your clinic to schedule. Discontinue antihistamines 7 days prior to the appointment.     Thank you for trusting us with your Allergy, Asthma, and Immunology care. Please feel free to contact us with any questions or concerns you may have.      - See asthma action plan.   - Allergy medications as needed.   - Return to clinic in 3 months.         Follow-ups after your visit        Follow-up notes from your care team     Return in about 3 months (around 7/17/2017).      Your next 10 appointments already scheduled     Jul 19, 2017  1:00 PM CDT   Return Visit with Nir Massey MD   [facility] ([facility])    [address] [place] [address] [phone]              Who to contact     If you have questions or need follow up information about today's clinic visit or your schedule please contact [facility] directly at [phone].  Normal or non-critical lab and imaging results will be communicated to you by AmigoCAThart, letter or phone within 4 business days after the clinic has received the results. If you do not hear from us within 7 days, please contact the clinic through NotesFirstt or phone. If you have a critical or abnormal lab result, we will notify you by phone as soon as possible.  Submit refill requests through WellFX or call your pharmacy and they will forward the refill request to us. Please allow 3 business days for your refill to be completed.          Additional Information About Your Visit        [employer] Information     Well[employer] gives you secure access to your electronic health record. If you see a primary care provider, you can also send messages to your care team and make appointments. If you have questions, please call your primary care clinic.  If you do not have a primary care provider, please call [phone] and they will assist you.        Care  EveryWhere ID     This is your Care EveryWhere ID. This could be used by other organizations to access your [facility] medical records  [id number]        Your Vitals Were     Pulse Pulse Oximetry BMI (Body Mass Index)             53 99% 27.78 kg/m2          Blood Pressure from Last 3 Encounters:   04/17/17 150/74   03/02/17 132/82   01/16/17 150/82    Weight from Last 3 Encounters:   04/17/17 91 kg (200 lb 9.6 oz)   03/02/17 89 kg (196 lb 3.2 oz)   01/16/17 86.5 kg (190 lb 12.8 oz)              We Performed the Following     Spirometry, Breathing Capacity          Today's Medication Changes          These changes are accurate as of: 4/17/17  4:29 PM.  If you have any questions, ask your nurse or doctor.               Start taking these medicines.        Dose/Directions    budesonide-formoterol 160-4.5 MCG/ACT Inhaler   Commonly known as:  SYMBICORT   Used for:  Moderate persistent asthma without complication   Started by:  Nish [place] DO        Dose:  2 puff   Inhale 2 puffs into the lungs 2 times daily   Quantity:  1 Inhaler   Refills:  11            Where to get your medicines      These medications were sent to [facility] - [address] [address]     Phone:  [phone]     budesonide-formoterol 160-4.5 MCG/ACT Inhaler                Primary Care Provider Office Phone # Fax #    Elizabeth Lucía Corley [phone]       [place] TIAGO ROMAN [address] [place] [address] DR TIAGO ROMAN [place] [address]        Thank you!     Thank you for [facility]  [facility]. Our goal is always to provide you with excellent care. Hearing back from our patients is one way we can continue to improve our services. Please take a few minutes to complete the written survey that you may receive in the mail after your visit with us. Thank you!             Your Updated Medication List - Protect others around you: Learn how to safely use, store  and throw away your medicines at www.disposemymeds.org.          This list is accurate as of: 4/17/17  4:29 PM.  Always use your most recent med list.                   Brand Name Dispense Instructions for use    Acai 500 MG Caps      1 tablet daily       Acidophilus Tabs      1 tablet daily       albuterol 108 (90 BASE) MCG/ACT Inhaler    albuterol    1 Inhaler    Inhale 2 puffs into the lungs every 4 hours as needed for shortness of breath / dyspnea or wheezing       ascorbic acid 500 MG tablet    VITAMIN C     2 TABLET DAILY       atenolol 25 MG tablet    TENORMIN    90 tablet    Take 1 tablet (25 mg) by mouth daily       atorvastatin 40 MG tablet    LIPITOR    90 tablet    Take 1 tablet (40 mg) by mouth daily       beclomethasone 80 MCG/ACT Inhaler    QVAR    1 Inhaler    Inhale 2 puffs into the lungs 2 times daily       budesonide-formoterol 160-4.5 MCG/ACT Inhaler    SYMBICORT    1 Inhaler    Inhale 2 puffs into the lungs 2 times daily       CALCIUM 600+D PO      250 mg vitamin d- 3 tablets daily       DAILY MULTIVITAMIN PO      1 tablet daily       escitalopram 20 MG tablet    LEXAPRO    90 tablet    Take 1 tablet (20 mg) by mouth daily       flaxseed oil 1000 MG Caps      1 tablet daily       gabapentin 300 MG capsule    NEURONTIN    270 capsule    Take one capsule by mouth three times daily       Glucosamine-Chondroitin Tabs      1500 mg glucosamine and 1200mg chondroitin daily-2 tablets daily       hydrocortisone 2.5 % cream     60 g    Apply topically 2 times daily       ibuprofen 200 MG tablet    ADVIL/MOTRIN     take 1 tablet (200 mg) by oral route every 6 hours as needed with food       lisinopril 40 MG tablet    PRINIVIL/ZESTRIL    90 tablet    Take 1 tablet (40 mg) by mouth daily       pantoprazole 40 MG EC tablet    PROTONIX    90 tablet    Take 40 mg by mouth once daily, 30 to 60 minutes before a meal.       S-Adenosylmethionine 200 MG Tabs      2 tablets daily       SUPER B COMPLEX Tabs      1  tablet daily       triamcinolone 0.1 % cream    KENALOG    60 g    Apply topically 2 times daily       vitamin E 400 UNIT capsule   Generic drug:  vitamin E      Take one pill by mouth once daily       ZADITOR OP      Apply to eye daily as needed       ZYRTEC 10 MG tablet   Generic drug:  cetirizine      Take 10 mg by mouth daily.

## 2017-04-17 NOTE — NURSING NOTE
"Chief Complaint   Patient presents with     RECHECK       Initial /74 (BP Location: Right arm, Patient Position: Chair, Cuff Size: Adult Large)  Pulse 53  Wt 91 kg (200 lb 9.6 oz)  SpO2 99%  BMI 27.78 kg/m2 Estimated body mass index is 27.78 kg/(m^2) as calculated from the following:    Height as of 3/2/17: 1.81 m (5' 11.25\").    Weight as of this encounter: 91 kg (200 lb 9.6 oz).  Medication Reconciliation: complete   Angie Calderon MA      "

## 2017-04-17 NOTE — PATIENT INSTRUCTIONS
Allergy Staff Appt Hours Shot Hours Locations    Physician     Nish [place] DO       Support Staff     Vanessa ANDERSON RN      Angie LEAL MA  Monday:                      [place] 8-7     Tuesday:         [place] 8-5     Wednesday:        [place]: 7-5     Friday:        [place] 7-5   [place]        Monday: 9-6        Friday: 7-2     [place]        Tuesday: 7-12 Thursday: 1-6     [place] Tuesday: 1-6 Wednesday: 11-6 Thursday: 7-12 [facility]  [address] [place] [address]  Appt Line: [phone]  Allergy RN (Monday):  [phone]    [facility]  [address] [place] [address]  Appt Line: [phone]  Allergy RN (Tues & Wed):  [phone]    [facility]  [address] [place] [address]  Appt Line: [phone]  Allergy RN (Friday):  [phone]       Important Scheduling Information  Aspirin Desensitization: Appt will last 2 clinic days. Please call the Allergy RN line for your clinic to schedule. Discontinue antihistamines 7 days prior to the appointment.     Food Challenges: Appt will last 3-4 hours. Please call the Allergy RN line for your clinic to schedule. Discontinue antihistamines 7 days prior to the appointment.     Penicillin Testing: Appt will last 2-3 hours. Please call the Allergy RN line for your clinic to schedule. Discontinue antihistamines 7 days prior to the appointment.     Skin Testing: Appt will about 40 minutes. Call the appointment line for your clinic to schedule. Discontinue antihistamines 7 days prior to the appointment.     Venom Testing: Appt will last 2-3 hours. Please call the Allergy RN line for your clinic to schedule. Discontinue antihistamines 7 days prior to the appointment.     Thank you for trusting us with your Allergy, Asthma, and Immunology care. Please feel free to contact us with any questions or concerns you may have.      - See asthma action plan.   - Allergy medications as needed.   - Return  to clinic in 3 months.

## 2017-04-18 ASSESSMENT — ASTHMA QUESTIONNAIRES: ACT_TOTALSCORE: 20

## 2017-05-08 DIAGNOSIS — F32.5 MAJOR DEPRESSION IN COMPLETE REMISSION (H): ICD-10-CM

## 2017-05-09 NOTE — TELEPHONE ENCOUNTER
Escitalopram  20mg     Last Written Prescription Date: 02/07/2017 #90 x 0  Last filled 02/09/2017  Last Office Visit with [facility] primary care provider:  03/02/2017 PRADIP Corley     Next 5 appointments (look out 90 days)     Jul 19, 2017  1:00 PM CDT   Return Visit with Nir Massey MD   [facility] ([facility])    [address] [facility] [id number] [phone]            Jul 24, 2017  5:00 PM CDT   Return Visit with Nish [place] DO   [facility] ([facility]    [address] [facility] [address] [phone]                   Last PHQ-9 score on record=   PHQ-9 SCORE 1/16/2017   Total Score -   Total Score MyChart 4 (Minimal depression)   Total Score -

## 2017-05-11 RX ORDER — ESCITALOPRAM OXALATE 20 MG/1
TABLET ORAL
Qty: 90 TABLET | Refills: 1 | Status: SHIPPED | OUTPATIENT
Start: 2017-05-11 | End: 2017-11-07

## 2017-05-11 NOTE — TELEPHONE ENCOUNTER
Prescription approved per [facility] Refill Protocol or patient Primary care provider (PCP)  JAKE Jones RN/Sy Grimaldo

## 2017-05-25 ENCOUNTER — TELEPHONE (OUTPATIENT)
Dept: FAMILY MEDICINE | Facility: CLINIC | Age: 62
End: 2017-05-25

## 2017-05-25 DIAGNOSIS — Z12.11 SPECIAL SCREENING FOR MALIGNANT NEOPLASMS, COLON: Primary | ICD-10-CM

## 2017-05-25 NOTE — TELEPHONE ENCOUNTER
Same Day surgery called and said there were no orders for the patient colonoscopy.Jhoana Russell RN

## 2017-05-26 DIAGNOSIS — I10 ESSENTIAL HYPERTENSION WITH GOAL BLOOD PRESSURE LESS THAN 140/90: ICD-10-CM

## 2017-05-26 RX ORDER — ATENOLOL 25 MG/1
TABLET ORAL
Qty: 90 TABLET | Refills: 2 | Status: SHIPPED | OUTPATIENT
Start: 2017-05-26 | End: 2018-02-19

## 2017-05-26 NOTE — TELEPHONE ENCOUNTER
Atenolol  25mg      Last Written Prescription Date: 03/27/2017  #90 x 0  Last filled 02/28/2017  Last Office Visit with [facility] or [facility] prescribing provider:  03/02/2017 PRADIP Corley   Future Office Visit:    Next 5 appointments (look out 90 days)     Jul 24, 2017  5:00 PM CDT   Return Visit with Nish [place] DO   [facility]    [address] [facility] [address] [phone]                    BP Readings from Last 3 Encounters:   04/17/17 150/74   03/02/17 132/82   01/16/17 150/82

## 2017-06-29 ENCOUNTER — OFFICE VISIT (OUTPATIENT)
Dept: URGENT CARE | Facility: URGENT CARE | Age: 62
End: 2017-06-29
Payer: COMMERCIAL

## 2017-06-29 VITALS
TEMPERATURE: 97.8 F | DIASTOLIC BLOOD PRESSURE: 98 MMHG | HEART RATE: 64 BPM | WEIGHT: 198 LBS | OXYGEN SATURATION: 94 % | SYSTOLIC BLOOD PRESSURE: 147 MMHG | BODY MASS INDEX: 27.42 KG/M2

## 2017-06-29 DIAGNOSIS — R06.2 WHEEZING: ICD-10-CM

## 2017-06-29 DIAGNOSIS — J20.9 ACUTE BRONCHITIS, UNSPECIFIED ORGANISM: Primary | ICD-10-CM

## 2017-06-29 DIAGNOSIS — J01.90 ACUTE BACTERIAL SINUSITIS: ICD-10-CM

## 2017-06-29 DIAGNOSIS — B96.89 ACUTE BACTERIAL SINUSITIS: ICD-10-CM

## 2017-06-29 PROCEDURE — 99213 OFFICE O/P EST LOW 20 MIN: CPT | Performed by: NURSE PRACTITIONER

## 2017-06-29 RX ORDER — CODEINE PHOSPHATE AND GUAIFENESIN 10; 100 MG/5ML; MG/5ML
1 SOLUTION ORAL EVERY 4 HOURS PRN
Qty: 120 ML | Refills: 0 | Status: SHIPPED | OUTPATIENT
Start: 2017-06-29 | End: 2017-07-04

## 2017-06-29 RX ORDER — DOXYCYCLINE 100 MG/1
100 CAPSULE ORAL 2 TIMES DAILY
Qty: 20 CAPSULE | Refills: 0 | Status: SHIPPED | OUTPATIENT
Start: 2017-06-29 | End: 2017-07-09

## 2017-06-29 RX ORDER — PREDNISONE 20 MG/1
20 TABLET ORAL 2 TIMES DAILY
Qty: 10 TABLET | Refills: 0 | Status: SHIPPED | OUTPATIENT
Start: 2017-06-29 | End: 2017-07-04

## 2017-06-29 NOTE — NURSING NOTE
"Chief Complaint   Patient presents with     URI     Pt c/o URI sx since last Wednesday.       Initial BP (!) 147/98 (BP Location: Left arm, Patient Position: Chair, Cuff Size: Adult Large)  Pulse 64  Temp 97.8  F (36.6  C) (Oral)  Wt 198 lb (89.8 kg)  SpO2 94%  Breastfeeding? No  BMI 27.42 kg/m2 Estimated body mass index is 27.42 kg/(m^2) as calculated from the following:    Height as of 3/2/17: 5' 11.25\" (1.81 m).    Weight as of this encounter: 198 lb (89.8 kg).  Medication Reconciliation: complete     Celeste Orozco CMA (AAMA)      "

## 2017-06-29 NOTE — PATIENT INSTRUCTIONS
Bronchitis, Antibiotic Treatment (Adult)    Bronchitis is an infection of the air passages (bronchial tubes) in your lungs. It often occurs when you have a cold. This illness is contagious during the first few days and is spread through the air by coughing and sneezing, or by direct contact (touching the sick person and then touching your own eyes, nose, or mouth).  Symptoms of bronchitis include cough with mucus (phlegm) and low-grade fever. Bronchitis usually lasts 7 to 14 days. Mild cases can be treated with simple home remedies. More severe infection is treated with an antibiotic.  Home care  Follow these guidelines when caring for yourself at home:    If your symptoms are severe, rest at home for the first 2 to 3 days. When you go back to your usual activities, don't let yourself get too tired.    Do not smoke. Also avoid being exposed to secondhand smoke.    You may use over-the-counter medicines to control fever or pain, unless another medicine was prescribed. (Note: If you have chronic liver or kidney disease or have ever had a stomach ulcer or gastrointestinal bleeding, talk with your healthcare provider before using these medicines. Also talk to your provider if you are taking medicine to prevent blood clots.) Aspirin should never be given to anyone younger than 18 years of age who is ill with a viral infection or fever. It may cause severe liver or brain damage.    Your appetite may be poor, so a light diet is fine. Avoid dehydration by drinking 6 to 8 glasses of fluids per day (such as water, soft drinks, sports drinks, juices, tea, or soup). Extra fluids will help loosen secretions in the nose and lungs.    Over-the-counter cough, cold, and sore-throat medicines will not shorten the length of the illness, but they may be helpful to reduce symptoms. (Note: Do not use decongestants if you have high blood pressure.)    Finish all antibiotic medicine. Do this even if you are feeling better after only a  few days.  Follow-up care  Follow up with your healthcare provider, or as advised. If you had an X-ray or ECG (electrocardiogram), a specialist will review it. You will be notified of any new findings that may affect your care.  Note: If you are age 65 or older, or if you have a chronic lung disease or condition that affects your immune system, or you smoke, talk to your healthcare provider about having pneumococcal vaccinations and a yearly influenza vaccination (flu shot).  When to seek medical advice  Call your healthcare provider right away if any of these occur:    Fever of 100.4 F (38 C) or higher    Coughing up increased amounts of colored sputum    Weakness, drowsiness, headache, facial pain, ear pain, or a stiff neck  Call 911, or get immediate medical care  Contact emergency services right away if any of these occur.    Coughing up blood    Worsening weakness, drowsiness, headache, or stiff neck    Trouble breathing, wheezing, or pain with breathing  Date Last Reviewed: 9/13/2015 2000-2017 [employer]. [address] [place] [address]. All rights reserved. This information is not intended as a substitute for professional medical care. Always follow your healthcare professional's instructions.        Acute Bacterial Rhinosinusitis (ABRS)    Acute bacterial rhinosinusitis (ABRS) is an infection of your nasal cavity and sinuses. It s caused by bacteria. Acute means that you ve had symptoms for less than 12 weeks.  Understanding your sinuses  The nasal cavity is the large air-filled space behind your nose. The sinuses are a group of spaces formed by the bones of your face. They connect with your nasal cavity. ABRS causes the tissue lining these spaces to become inflamed. Mucus may not drain normally. This leads to facial pain and other symptoms.  What causes ABRS?  ABRS most often follows an upper respiratory infection caused by a virus. Bacteria then infect the lining of your nasal  cavity and sinuses. But you can also get ABRS if you have:    Nasal allergies    Long-term nasal swelling and congestion not caused by allergies    Blockage in the nose  Symptoms of ABRS  The symptoms of ABRS may be different for each person, and can include:    Nasal congestion    Runny nose    Fluid draining from the nose down the throat (postnasal drip)    Headache    Cough    Pain in the sinuses    Thick, colored fluid from the nose (mucus)    Fever  Diagnosing ABRS  ABRS may be diagnosed if you ve had an upper respiratory infection like a cold and cough for longer than 10 to 14 days. Your health care provider will ask about your symptoms and your medical history. The provider will check your vital signs, including your temperature. You ll have a physical exam. The health care provider will check your ears, nose, and throat. You likely won t need any tests. If ABRS comes back, you may have a culture or other tests.  Treatment for ABRS  Treatment may include:    Antibiotic medicine. This is for symptoms that last for at least 10 to 14 days.    Nasal corticosteroid medicine. Drops or spray used in the nose can lessen swelling and congestion.    Over-the-counter pain medicine. This is to lessen sinus pain and pressure.    Nasal decongestant medicine. Spray or drops may help to lessen congestion. Do not use them for more than a few days.    Salt wash (saline irrigation). This can help to loosen mucus.  Possible complications of ABRS  ABRS may come back or become long-term (chronic).  In rare cases, ABRS may cause complications such as:     Inflamed tissue around the brain and spinal cord (meningitis)    Inflamed tissue around the eyes (orbital cellulitis)    Inflamed bones around the sinuses (osteitis)  These problems may need to be treated in a hospital with intravenous (IV) antibiotic medicine or surgery.  When to call the health care provider  Call your health care provider if you have any of the  following:    Symptoms that don t get better, or get worse    Symptoms that don t get better after 3 to 5 days on antibiotics    Trouble seeing    Swelling around your eyes    Confusion or trouble staying awake   Date Last Reviewed: 3/3/2015    6346-3722 [employer]. [address] [place] [address], [place] [address]. All rights reserved. This information is not intended as a substitute for professional medical care. Always follow your healthcare professional's instructions.

## 2017-06-29 NOTE — MR AVS SNAPSHOT
After Visit Summary   6/29/2017    Miri Naylor    MRN: [id number]           Patient Information     Date Of Birth          1955        Visit Information        Provider Department      6/29/2017 11:45 AM Ce Hamm NP [facility]        Today's Diagnoses     Acute bronchitis, unspecified organism    -  1    Acute bacterial sinusitis          Care Instructions      Bronchitis, Antibiotic Treatment (Adult)    Bronchitis is an infection of the air passages (bronchial tubes) in your lungs. It often occurs when you have a cold. This illness is contagious during the first few days and is spread through the air by coughing and sneezing, or by direct contact (touching the sick person and then touching your own eyes, nose, or mouth).  Symptoms of bronchitis include cough with mucus (phlegm) and low-grade fever. Bronchitis usually lasts 7 to 14 days. Mild cases can be treated with simple home remedies. More severe infection is treated with an antibiotic.  Home care  Follow these guidelines when caring for yourself at home:    If your symptoms are severe, rest at home for the first 2 to 3 days. When you go back to your usual activities, don't let yourself get too tired.    Do not smoke. Also avoid being exposed to secondhand smoke.    You may use over-the-counter medicines to control fever or pain, unless another medicine was prescribed. (Note: If you have chronic liver or kidney disease or have ever had a stomach ulcer or gastrointestinal bleeding, talk with your healthcare provider before using these medicines. Also talk to your provider if you are taking medicine to prevent blood clots.) Aspirin should never be given to anyone younger than 18 years of age who is ill with a viral infection or fever. It may cause severe liver or brain damage.    Your appetite may be poor, so a light diet is fine. Avoid dehydration by drinking 6 to 8 glasses of fluids per day (such as water, soft  drinks, sports drinks, juices, tea, or soup). Extra fluids will help loosen secretions in the nose and lungs.    Over-the-counter cough, cold, and sore-throat medicines will not shorten the length of the illness, but they may be helpful to reduce symptoms. (Note: Do not use decongestants if you have high blood pressure.)    Finish all antibiotic medicine. Do this even if you are feeling better after only a few days.  Follow-up care  Follow up with your healthcare provider, or as advised. If you had an X-ray or ECG (electrocardiogram), a specialist will review it. You will be notified of any new findings that may affect your care.  Note: If you are age 65 or older, or if you have a chronic lung disease or condition that affects your immune system, or you smoke, talk to your healthcare provider about having pneumococcal vaccinations and a yearly influenza vaccination (flu shot).  When to seek medical advice  Call your healthcare provider right away if any of these occur:    Fever of 100.4 F (38 C) or higher    Coughing up increased amounts of colored sputum    Weakness, drowsiness, headache, facial pain, ear pain, or a stiff neck  Call 911, or get immediate medical care  Contact emergency services right away if any of these occur.    Coughing up blood    Worsening weakness, drowsiness, headache, or stiff neck    Trouble breathing, wheezing, or pain with breathing  Date Last Reviewed: 9/13/2015 2000-2017 [employer]. [address] [place], [place] [id number]. All rights reserved. This information is not intended as a substitute for professional medical care. Always follow your healthcare professional's instructions.        Acute Bacterial Rhinosinusitis (ABRS)    Acute bacterial rhinosinusitis (ABRS) is an infection of your nasal cavity and sinuses. It s caused by bacteria. Acute means that you ve had symptoms for less than 12 weeks.  Understanding your sinuses  The nasal cavity is the large air-filled  space behind your nose. The sinuses are a group of spaces formed by the bones of your face. They connect with your nasal cavity. ABRS causes the tissue lining these spaces to become inflamed. Mucus may not drain normally. This leads to facial pain and other symptoms.  What causes ABRS?  ABRS most often follows an upper respiratory infection caused by a virus. Bacteria then infect the lining of your nasal cavity and sinuses. But you can also get ABRS if you have:    Nasal allergies    Long-term nasal swelling and congestion not caused by allergies    Blockage in the nose  Symptoms of ABRS  The symptoms of ABRS may be different for each person, and can include:    Nasal congestion    Runny nose    Fluid draining from the nose down the throat (postnasal drip)    Headache    Cough    Pain in the sinuses    Thick, colored fluid from the nose (mucus)    Fever  Diagnosing ABRS  ABRS may be diagnosed if you ve had an upper respiratory infection like a cold and cough for longer than 10 to 14 days. Your health care provider will ask about your symptoms and your medical history. The provider will check your vital signs, including your temperature. You ll have a physical exam. The health care provider will check your ears, nose, and throat. You likely won t need any tests. If ABRS comes back, you may have a culture or other tests.  Treatment for ABRS  Treatment may include:    Antibiotic medicine. This is for symptoms that last for at least 10 to 14 days.    Nasal corticosteroid medicine. Drops or spray used in the nose can lessen swelling and congestion.    Over-the-counter pain medicine. This is to lessen sinus pain and pressure.    Nasal decongestant medicine. Spray or drops may help to lessen congestion. Do not use them for more than a few days.    Salt wash (saline irrigation). This can help to loosen mucus.  Possible complications of ABRS  ABRS may come back or become long-term (chronic).  In rare cases, ABRS may cause  complications such as:     Inflamed tissue around the brain and spinal cord (meningitis)    Inflamed tissue around the eyes (orbital cellulitis)    Inflamed bones around the sinuses (osteitis)  These problems may need to be treated in a hospital with intravenous (IV) antibiotic medicine or surgery.  When to call the health care provider  Call your health care provider if you have any of the following:    Symptoms that don t get better, or get worse    Symptoms that don t get better after 3 to 5 days on antibiotics    Trouble seeing    Swelling around your eyes    Confusion or trouble staying awake   Date Last Reviewed: 3/3/2015    9098-1124 [employer]. [address] [place] [address]. All rights reserved. This information is not intended as a substitute for professional medical care. Always follow your healthcare professional's instructions.                Follow-ups after your visit        Your next 10 appointments already scheduled     Jul 24, 2017  5:00 PM CDT   Return Visit with Nish Stockton DO   [facility] ([facility]    [address] [address] [phone]            Jul 26, 2017  1:00 PM CDT   [facility]   [facility]    [address] [place] [address] [phone]              Who to contact     If you have questions or need follow up information about today's clinic visit or your schedule please contact [facility] directly at [phone].  Normal or non-critical lab and imaging results will be communicated to you by MyChart, letter or phone within 4 business days after the clinic has received the results. If you do not hear from us within 7 days, please contact the clinic through MyChart or phone. If you have a critical or abnormal lab result, we will notify you by phone as soon as  possible.  Submit refill requests through "[employer]" or call your pharmacy and they will forward the refill request to us. Please allow 3 business days for your refill to be completed.          Additional Information About Your Visit        [employer] Information     "[employer]" gives you secure access to your electronic health record. If you see a primary care provider, you can also send messages to your care team and make appointments. If you have questions, please call your primary care clinic.  If you do not have a primary care provider, please call [phone] and they will assist you.        Care EveryWhere ID     This is your Care EveryWhere ID. This could be used by other organizations to access your [facility] medical records  [id number]        Your Vitals Were     Pulse Temperature Pulse Oximetry Breastfeeding? BMI (Body Mass Index)       64 97.8  F (36.6  C) (Oral) 94% No 27.42 kg/m2        Blood Pressure from Last 3 Encounters:   06/29/17 (!) 147/98   04/17/17 150/74   03/02/17 132/82    Weight from Last 3 Encounters:   06/29/17 198 lb (89.8 kg)   04/17/17 200 lb 9.6 oz (91 kg)   03/02/17 196 lb 3.2 oz (89 kg)              Today, you had the following     No orders found for display         Today's Medication Changes          These changes are accurate as of: 6/29/17 12:09 PM.  If you have any questions, ask your nurse or doctor.               Start taking these medicines.        Dose/Directions    doxycycline 100 MG capsule   Commonly known as:  VIBRAMYCIN   Used for:  Acute bacterial sinusitis   Started by:  Ce Hamm NP        Dose:  100 mg   Take 1 capsule (100 mg) by mouth 2 times daily for 10 days   Quantity:  20 capsule   Refills:  0       guaiFENesin-codeine 100-10 MG/5ML Soln solution   Commonly known as:  ROBITUSSIN AC   Used for:  Acute bronchitis, unspecified organism   Started by:  Ce Hamm NP        Dose:  1 tsp.   Take 5 mLs by mouth every 4 hours as needed   Quantity:  120 mL   Refills:  0        predniSONE 20 MG tablet   Commonly known as:  DELTASONE   Used for:  Acute bronchitis, unspecified organism   Started by:  Ce Hamm NP        Dose:  20 mg   Take 1 tablet (20 mg) by mouth 2 times daily for 5 days   Quantity:  10 tablet   Refills:  0            Where to get your medicines      These medications were sent to [facility] [place] - [place] - [address]  [address], [place] [address]     Phone:  [phone]     doxycycline 100 MG capsule    predniSONE 20 MG tablet         Some of these will need a paper prescription and others can be bought over the counter.  Ask your nurse if you have questions.     Bring a paper prescription for each of these medications     guaiFENesin-codeine 100-10 MG/5ML Soln solution                Primary Care Provider Office Phone # Fax #    Elizabeth Castrejon DO [place] [phone]       [place] [facility] [address] [place] [facility] [address]        Equal Access to Services     SONIA SANDERS AH: Hadii aad ku hadasho Soomaali, waaxda luqadaha, qaybta kaalmada adeegyada, waxay idiin hayduc cohen . So [facility] [phone].    ATENCIÓN: Si habla español, tiene a dao disposición servicios gratuitos de asistencia lingüística. Llame al [phone].    We comply with applicable federal civil rights laws and [place] laws. We do not discriminate on the basis of race, color, national origin, age, disability sex, sexual orientation or gender identity.            Thank you!     Thank you for choosing [facility]  for your care. Our goal is always to provide you with excellent care. Hearing back from our patients is one way we can continue to improve our services. Please take a few minutes to complete the written survey that you may receive in the mail after your visit with us. Thank you!             Your Updated Medication List - Protect others around you: Learn how to safely use, store and throw away your  medicines at www.disposemymeds.org.          This list is accurate as of: 6/29/17 12:09 PM.  Always use your most recent med list.                   Brand Name Dispense Instructions for use Diagnosis    Acai 500 MG Caps      1 tablet daily        Acidophilus Tabs      1 tablet daily        albuterol 108 (90 BASE) MCG/ACT Inhaler    albuterol    1 Inhaler    Inhale 2 puffs into the lungs every 4 hours as needed for shortness of breath / dyspnea or wheezing    Moderate persistent asthma without complication       ascorbic acid 500 MG tablet    VITAMIN C     2 TABLET DAILY        atenolol 25 MG tablet    TENORMIN    90 tablet    TAKE 1 TABLET DAILY    Essential hypertension with goal blood pressure less than 140/90       beclomethasone 80 MCG/ACT Inhaler    QVAR    1 Inhaler    Inhale 2 puffs into the lungs 2 times daily    Moderate persistent asthma without complication       budesonide-formoterol 160-4.5 MCG/ACT Inhaler    SYMBICORT    1 Inhaler    Inhale 2 puffs into the lungs 2 times daily    Moderate persistent asthma without complication       CALCIUM 600+D PO      250 mg vitamin d- 3 tablets daily        DAILY MULTIVITAMIN PO      1 tablet daily        doxycycline 100 MG capsule    VIBRAMYCIN    20 capsule    Take 1 capsule (100 mg) by mouth 2 times daily for 10 days    Acute bacterial sinusitis       escitalopram 20 MG tablet    LEXAPRO    90 tablet    TAKE 1 TABLET DAILY    Major depression in complete remission (H)       flaxseed oil 1000 MG Caps      1 tablet daily        gabapentin 300 MG capsule    NEURONTIN    270 capsule    Take one capsule by mouth three times daily    Chronic nonintractable headache, unspecified headache type       Glucosamine-Chondroitin Tabs      1500 mg glucosamine and 1200mg chondroitin daily-2 tablets daily        guaiFENesin-codeine 100-10 MG/5ML Soln solution    ROBITUSSIN AC    120 mL    Take 5 mLs by mouth every 4 hours as needed    Acute bronchitis, unspecified organism        hydrocortisone 2.5 % cream     60 g    Apply topically 2 times daily    Eyelid dermatitis, eczematous, unspecified laterality       ibuprofen 200 MG tablet    ADVIL/MOTRIN     take 1 tablet (200 mg) by oral route every 6 hours as needed with food        lisinopril 40 MG tablet    PRINIVIL/ZESTRIL    90 tablet    Take 1 tablet (40 mg) by mouth daily    Essential hypertension with goal blood pressure less than 140/90       pantoprazole 40 MG EC tablet    PROTONIX    90 tablet    Take 40 mg by mouth once daily, 30 to 60 minutes before a meal.    Gastroesophageal reflux disease without esophagitis       predniSONE 20 MG tablet    DELTASONE    10 tablet    Take 1 tablet (20 mg) by mouth 2 times daily for 5 days    Acute bronchitis, unspecified organism       S-Adenosylmethionine 200 MG Tabs      2 tablets daily    Menopausal syndrome (hot flashes)       SUPER B COMPLEX Tabs      1 tablet daily        triamcinolone 0.1 % cream    KENALOG    60 g    Apply topically 2 times daily    Other atopic dermatitis       vitamin E 400 UNIT capsule   Generic drug:  vitamin E      Take one pill by mouth once daily        ZADITOR OP      Apply to eye daily as needed        ZYRTEC 10 MG tablet   Generic drug:  cetirizine      Take 10 mg by mouth daily.

## 2017-06-29 NOTE — PROGRESS NOTES
SUBJECTIVE:                                                    Miri Naylor is a 62 year old female who presents to clinic today for the following health issues:  RESPIRATORY SYMPTOMS      Duration: last Wednesday    Description  Cough, loss of voice, abdominal discomfort, headache, wheezing, facial pain, sinus pain    Severity: moderate    Accompanying signs and symptoms: None    History (predisposing factors): hx of asthma    Precipitating or alleviating factors: None    Therapies tried and outcome:  Inhaler, cough syrup OTC- little relief.          Problem list and histories reviewed & adjusted, as indicated.  Additional history: as documented    Patient Active Problem List   Diagnosis     Major depression in complete remission (H)     Essential hypertension     Raynaud's disease     Backache     Hypertension goal BP (blood pressure) < 140/90     Allergic state     Psoriasis     Advanced directives, counseling/discussion     Abnormal Pap smear of cervix     24 hour contact handout given     Hyperlipidemia LDL goal <130     GERD (gastroesophageal reflux disease)     Family history of colon cancer     Penicillin allergy     Drug allergy, multiple     Chronic nonintractable headache, unspecified headache type     Moderate persistent asthma without complication     Eyelid dermatitis, eczematous, unspecified laterality     Rhinoconjunctivitis     Other atopic dermatitis     Past Surgical History:   Procedure Laterality Date     BIOPSY OF BREAST, NEEDLE CORE       C PELVIS/HIP JOINT SURGERY UNLISTED Right 7/19/16    total hip arthroplasty     COLONOSCOPY       HIP SURGERY Right 07/19/2016       Social History   Substance Use Topics     Smoking status: Never Smoker     Smokeless tobacco: Never Used     Alcohol use Yes      Comment: one glass of wine daily     Family History   Problem Relation Age of Onset     Arthritis Mother      Hyperlipidemia Mother      Other Cancer Mother      Glioblastoma Multiforme      OSTEOPOROSIS Mother      Blood Disease Father      Hypertension Father      Hyperlipidemia Father      Depression Son      Depression Daughter      Hyperlipidemia Brother      Anxiety Disorder Son          Current Outpatient Prescriptions   Medication Sig Dispense Refill     predniSONE (DELTASONE) 20 MG tablet Take 1 tablet (20 mg) by mouth 2 times daily for 5 days 10 tablet 0     doxycycline (VIBRAMYCIN) 100 MG capsule Take 1 capsule (100 mg) by mouth 2 times daily for 10 days 20 capsule 0     guaiFENesin-codeine (ROBITUSSIN AC) 100-10 MG/5ML SOLN solution Take 5 mLs by mouth every 4 hours as needed 120 mL 0     atenolol (TENORMIN) 25 MG tablet TAKE 1 TABLET DAILY 90 tablet 2     escitalopram (LEXAPRO) 20 MG tablet TAKE 1 TABLET DAILY 90 tablet 1     budesonide-formoterol (SYMBICORT) 160-4.5 MCG/ACT Inhaler Inhale 2 puffs into the lungs 2 times daily 1 Inhaler 11     lisinopril (PRINIVIL/ZESTRIL) 40 MG tablet Take 1 tablet (40 mg) by mouth daily 90 tablet 1     gabapentin (NEURONTIN) 300 MG capsule Take one capsule by mouth three times daily 270 capsule 1     Ketotifen Fumarate (ZADITOR OP) Apply to eye daily as needed       hydrocortisone 2.5 % cream Apply topically 2 times daily 60 g 1     triamcinolone (KENALOG) 0.1 % cream Apply topically 2 times daily 60 g 3     beclomethasone (QVAR) 80 MCG/ACT Inhaler Inhale 2 puffs into the lungs 2 times daily 1 Inhaler 11     albuterol (ALBUTEROL) 108 (90 BASE) MCG/ACT Inhaler Inhale 2 puffs into the lungs every 4 hours as needed for shortness of breath / dyspnea or wheezing 1 Inhaler 1     pantoprazole (PROTONIX) 40 MG enteric coated tablet Take 40 mg by mouth once daily, 30 to 60 minutes before a meal. 90 tablet 3     ibuprofen (ADVIL,MOTRIN) 200 MG tablet take 1 tablet (200 mg) by oral route every 6 hours as needed with food       vitamin E (E-400) 400 UNIT capsule Take one pill by mouth once daily       cetirizine (ZYRTEC) 10 MG tablet Take 10 mg by mouth daily.        VITAMIN C 500 MG PO TABS 2 TABLET DAILY       ACIDOPHILUS OR TABS 1 tablet daily       SUPER B COMPLEX PO TABS 1 tablet daily       FLAXSEED OIL 1000 MG PO CAPS 1 tablet daily       ACAI 500 MG PO CAPS 1 tablet daily       GLUCOSAMINE-CHONDROITIN PO TABS 1500 mg glucosamine and 1200mg chondroitin daily-2 tablets daily       CALCIUM 600+D  mg vitamin d- 3 tablets daily       DAILY MULTIVITAMIN PO 1 tablet daily       S-ADENOSYLMETHIONINE 200 MG PO TABS 2 tablets daily       Allergies   Allergen Reactions     Penicillins Hives     Confirmed by skin prick testing 7/3/14     Amoxicillin Hives     Clindamycin Hcl Hives     Levaquin      tendonitis     Augmentin Rash     Azithromycin Rash     Cephalexin Rash       Reviewed and updated as needed this visit by clinical staff       Reviewed and updated as needed this visit by Provider         ROS:  C: NEGATIVE for fever, chills, change in weight  E/M: NEGATIVE for ear, mouth and throat problems  RESP:POSITIVE for  cough-productive, sputum yellow,  wheezing   CV: NEGATIVE for chest pain, palpitations or peripheral edema    OBJECTIVE:     BP (!) 147/98 (BP Location: Left arm, Patient Position: Chair, Cuff Size: Adult Large)  Pulse 64  Temp 97.8  F (36.6  C) (Oral)  Wt 198 lb (89.8 kg)  SpO2 94%  Breastfeeding? No  BMI 27.42 kg/m2  Body mass index is 27.42 kg/(m^2).  GENERAL: healthy, alert and no distress  HENT: normal cephalic/atraumatic, ear canals and TM's normal, nasal mucosa edematous , rhinorrhea yellow and green, oral mucous membranes moist and sinuses: maxillary, frontal tenderness on bilateral  NECK: no adenopathy, no asymmetry, masses, or scars and thyroid normal to palpation  RESP: expiratory wheeze and rhonchi throughout  CV: regular rate and rhythm, normal S1 S2, no S3 or S4, no murmur, click or rub, no peripheral edema and peripheral pulses strong  ABDOMEN: soft, nontender, no hepatosplenomegaly, no masses and bowel sounds normal  MS: no gross  musculoskeletal defects noted, no edema    Diagnostic Test Results:  none     ASSESSMENT/PLAN:       ICD-10-CM    1. Acute bronchitis, unspecified organism J20.9 predniSONE (DELTASONE) 20 MG tablet     guaiFENesin-codeine (ROBITUSSIN AC) 100-10 MG/5ML SOLN solution   2. Acute bacterial sinusitis J01.90 doxycycline (VIBRAMYCIN) 100 MG capsule    B96.89    3. Wheezing R06.2        Albuterol every 4 hours for the next 48 hours, then as needed.   Antibiotics as prescribed.  Recheck in 2 weeks.  Rest and fluids.  Patient educational/instructional material provided including reasons for follow-up    The patient indicates understanding of these issues and agrees with the plan.    Ce Hamm NP  [facility]

## 2017-07-24 ENCOUNTER — OFFICE VISIT (OUTPATIENT)
Dept: ALLERGY | Facility: CLINIC | Age: 62
End: 2017-07-24
Payer: COMMERCIAL

## 2017-07-24 VITALS
HEIGHT: 71 IN | SYSTOLIC BLOOD PRESSURE: 145 MMHG | OXYGEN SATURATION: 98 % | HEART RATE: 61 BPM | WEIGHT: 202.4 LBS | BODY MASS INDEX: 28.34 KG/M2 | DIASTOLIC BLOOD PRESSURE: 80 MMHG

## 2017-07-24 DIAGNOSIS — H10.9 RHINOCONJUNCTIVITIS: ICD-10-CM

## 2017-07-24 DIAGNOSIS — J45.40 MODERATE PERSISTENT ASTHMA WITHOUT COMPLICATION: Primary | ICD-10-CM

## 2017-07-24 DIAGNOSIS — J31.0 RHINOCONJUNCTIVITIS: ICD-10-CM

## 2017-07-24 DIAGNOSIS — K21.9 GASTROESOPHAGEAL REFLUX DISEASE, ESOPHAGITIS PRESENCE NOT SPECIFIED: ICD-10-CM

## 2017-07-24 DIAGNOSIS — J01.90 ACUTE SINUSITIS WITH SYMPTOMS > 10 DAYS: ICD-10-CM

## 2017-07-24 LAB
FEF 25/75: NORMAL
FEV-1: NORMAL
FEV1/FVC: NORMAL
FVC: NORMAL

## 2017-07-24 PROCEDURE — 99214 OFFICE O/P EST MOD 30 MIN: CPT | Mod: 25 | Performed by: ALLERGY & IMMUNOLOGY

## 2017-07-24 PROCEDURE — 94010 BREATHING CAPACITY TEST: CPT | Performed by: ALLERGY & IMMUNOLOGY

## 2017-07-24 RX ORDER — FLUTICASONE PROPIONATE 50 MCG
1-2 SPRAY, SUSPENSION (ML) NASAL DAILY
Qty: 1 BOTTLE | Refills: 11 | Status: SHIPPED | OUTPATIENT
Start: 2017-07-24 | End: 2017-07-26

## 2017-07-24 RX ORDER — MONTELUKAST SODIUM 10 MG/1
10 TABLET ORAL AT BEDTIME
Qty: 90 TABLET | Refills: 3 | Status: SHIPPED | OUTPATIENT
Start: 2017-07-24 | End: 2017-07-26

## 2017-07-24 RX ORDER — DOXYCYCLINE 100 MG/1
100 CAPSULE ORAL 2 TIMES DAILY
Qty: 28 CAPSULE | Refills: 0 | Status: SHIPPED | OUTPATIENT
Start: 2017-07-24 | End: 2017-07-26

## 2017-07-24 NOTE — PROGRESS NOTES
Miri Naylor is a 62 year old White female with previous medical history significant for moderate persistent asthma, GERD, hyperlipidemia, hypertension who returns for a follow up visit. Miri Naylor is being seen today for asthma and sinusitis.     The patient was last seen in April 2017. At that time she was started on Symbicort 160/4.5  g 2 puffs inhaled b.i.d. She continued to have chest tightness and coughing greater than twice per week independent of exertion. She had been using albuterol prior to exertion and this had been beneficial. She developed an upper respiratory tract infection and was treated for bronchitis 3 weeks ago. On 6/29/2017 she was prescribed prednisone 20 mg by mouth twice daily for 5 days and doxycycline for 10 days. She reports that her nasal and chest symptoms improved, but not return to baseline. After this she started to develop wheezing, tightness in chest, coughing and shortness of breath multiple times daily. Albuterol was not clearly beneficial for these symptoms. She did not get a chest x-ray. Prednisone was beneficial. She continues to have chest symptoms at least once per day. She is not on montelukast. She does endorse sinus pressure and white mucus. She has had increased congestion and postnasal drip. She has not been using Flonase. Oral antihistamine has been somewhat beneficial, but not 100%. Otherwise she has been having ocular itching, hoarseness, postnasal drip, sneezing. As you may recall allergy testing was normal.    ACT Total Scores 7/24/2017   ACT TOTAL SCORE (Goal Greater than or Equal to 20) 14   In the past 12 months, how many times did you visit the emergency room for your asthma without being admitted to the hospital? 0   In the past 12 months, how many times were you hospitalized overnight because of your asthma? 0           Past Medical History:   Diagnosis Date     Depressive disorder, not elsewhere classified      Drug allergy, multiple  9/23/14--passed graded oral challenge for Zithromax.     7/3/14 POS PRE-PEN skin test. 7/30/14 NEG skin tests and oral challenge to Cefzil     Hx of abnormal Pap smear ?    no specifics given     lumbar degeneration      Raynaud's disease      Unspecified essential hypertension      Family History   Problem Relation Age of Onset     Arthritis Mother      Hyperlipidemia Mother      Other Cancer Mother      Glioblastoma Multiforme     OSTEOPOROSIS Mother      Blood Disease Father      Hypertension Father      Hyperlipidemia Father      Depression Son      Depression Daughter      Hyperlipidemia Brother      Anxiety Disorder Son      Past Surgical History:   Procedure Laterality Date     BIOPSY OF BREAST, NEEDLE CORE       C PELVIS/HIP JOINT SURGERY UNLISTED Right 7/19/16    total hip arthroplasty     COLONOSCOPY       HIP SURGERY Right 07/19/2016       REVIEW OF SYSTEMS:  General: positive  for weight gain. negative for weight loss. positive  for changes in sleep.   Ears: negative for fullness. negative for hearing loss. negative for dizziness.   Nose: positive  for snoring.negative for changes in smell. negative for drainage.   Throat: positive  for hoarseness. positive  for sore throat. negative for trouble swallowing.   Lungs: positive  for shortness of breath.positive  for wheezing. positive  for sputum production.   Cardiovascular: negative for chest pain. positive  for swelling of ankles. negative for fast or irregular heartbeat.   Gastrointestinal: positive  for nausea. positive  for heartburn. negative for acid reflux.   Musculoskeletal: positive  for joint pain. negative for joint stiffness. negative for joint swelling.   Neurologic: negative for seizures. negative for fainting. negative for weakness.   Psychiatric: negative for changes in mood. negative for anxiety.   Endocrine: negative for cold intolerance. negative for heat intolerance. negative for tremors.   Hematologic: positive  for easy bruising.  negative for easy bleeding.  Integumentary: negative for rash. negative for scaling. negative for nail changes.       Current Outpatient Prescriptions:      montelukast (SINGULAIR) 10 MG tablet, Take 1 tablet (10 mg) by mouth At Bedtime, Disp: 90 tablet, Rfl: 3     doxycycline (VIBRAMYCIN) 100 MG capsule, Take 1 capsule (100 mg) by mouth 2 times daily, Disp: 28 capsule, Rfl: 0     fluticasone (FLONASE) 50 MCG/ACT spray, Spray 1-2 sprays into both nostrils daily, Disp: 1 Bottle, Rfl: 11     atenolol (TENORMIN) 25 MG tablet, TAKE 1 TABLET DAILY, Disp: 90 tablet, Rfl: 2     escitalopram (LEXAPRO) 20 MG tablet, TAKE 1 TABLET DAILY, Disp: 90 tablet, Rfl: 1     budesonide-formoterol (SYMBICORT) 160-4.5 MCG/ACT Inhaler, Inhale 2 puffs into the lungs 2 times daily, Disp: 1 Inhaler, Rfl: 11     lisinopril (PRINIVIL/ZESTRIL) 40 MG tablet, Take 1 tablet (40 mg) by mouth daily, Disp: 90 tablet, Rfl: 1     gabapentin (NEURONTIN) 300 MG capsule, Take one capsule by mouth three times daily, Disp: 270 capsule, Rfl: 1     Ketotifen Fumarate (ZADITOR OP), Apply to eye daily as needed, Disp: , Rfl:      hydrocortisone 2.5 % cream, Apply topically 2 times daily, Disp: 60 g, Rfl: 1     triamcinolone (KENALOG) 0.1 % cream, Apply topically 2 times daily, Disp: 60 g, Rfl: 3     beclomethasone (QVAR) 80 MCG/ACT Inhaler, Inhale 2 puffs into the lungs 2 times daily, Disp: 1 Inhaler, Rfl: 11     albuterol (ALBUTEROL) 108 (90 BASE) MCG/ACT Inhaler, Inhale 2 puffs into the lungs every 4 hours as needed for shortness of breath / dyspnea or wheezing, Disp: 1 Inhaler, Rfl: 1     pantoprazole (PROTONIX) 40 MG enteric coated tablet, Take 40 mg by mouth once daily, 30 to 60 minutes before a meal., Disp: 90 tablet, Rfl: 3     ibuprofen (ADVIL,MOTRIN) 200 MG tablet, take 1 tablet (200 mg) by oral route every 6 hours as needed with food, Disp: , Rfl:      vitamin E (E-400) 400 UNIT capsule, Take one pill by mouth once daily, Disp: , Rfl:      cetirizine  (ZYRTEC) 10 MG tablet, Take 10 mg by mouth daily., Disp: , Rfl:      VITAMIN C 500 MG PO TABS, 2 TABLET DAILY, Disp: , Rfl:      ACIDOPHILUS OR TABS, 1 tablet daily, Disp: , Rfl:      SUPER B COMPLEX PO TABS, 1 tablet daily, Disp: , Rfl:      FLAXSEED OIL 1000 MG PO CAPS, 1 tablet daily, Disp: , Rfl:      ACAI 500 MG PO CAPS, 1 tablet daily, Disp: , Rfl:      GLUCOSAMINE-CHONDROITIN PO TABS, 1500 mg glucosamine and 1200mg chondroitin daily-2 tablets daily, Disp: , Rfl:      CALCIUM 600+D PO, 250 mg vitamin d- 3 tablets daily, Disp: , Rfl:      DAILY MULTIVITAMIN PO, 1 tablet daily, Disp: , Rfl:      S-ADENOSYLMETHIONINE 200 MG PO TABS, 2 tablets daily, Disp: , Rfl:   Immunization History   Administered Date(s) Administered     Influenza Vaccine IM 3yrs+ 4 Valent IIV4 12/03/2013, 11/16/2015     TD (ADULT, 7+) 03/06/2008     TDAP Vaccine (Adacel) 08/03/2012     Allergies   Allergen Reactions     Penicillins Hives     Confirmed by skin prick testing 7/3/14     Amoxicillin Hives     Clindamycin Hcl Hives     Levaquin      tendonitis     Augmentin Rash     Azithromycin Rash     Cephalexin Rash         EXAM:   Constitutional:  Appears well-developed and well-nourished. No distress.   HEENT:   Head: Normocephalic.   Right Ear: External ear normal. TM normal  Left Ear: External ear normal. TM normal  Mouth/Throat: White oropharyngeal mucus present.   Cobblestoning of posterior oropharynx.   Boggy nasal tissue and pale.    Eyes: Conjunctivae are non-erythematous   Maxillary and frontal sinus tenderness to palpation.  Cardiovascular: Normal rate, regular rhythm and normal heart sounds. Exam reveals no gallop and no friction rub.   No murmur heard.  Respiratory: Effort normal and breath sounds normal. No respiratory distress. No wheezes. No rales.   Musculoskeletal: Normal range of motion.   Lymphadenopathy:   No cervical adenopathy.   No lower extremity edema.   Neuro: Oriented to person, place, and time.  Skin: Skin is warm  and dry. No rash noted.   Psychiatric: Normal mood and affect.     Nursing note and vitals reviewed.      WORKUP:   Spirometry  FVC % pred:81  FEV1 % pred:59  FEV1/FVC % act:57  FEF 25-75% pred:53    Moderate obstruction.    ASSESSMENT/PLAN:  Problem List Items Addressed This Visit        Respiratory    Moderate persistent asthma without complication - Primary     She has had chest symptoms with signing and exertion. Symptoms recently with bronchitis. Prednisone was used and beneficial. Albuterol not clearly beneficial. Albuterol prior to exertion has been beneficial. Symbicort 160/4.5mcg 2 puffs inhaled twice daily with a spacer.     Spirometry today with an FEV1 of 59%. Down from last visit.   ACT 14      - Albuterol 2-4 puffs inhaled (use a spacer unless using a Proair Respiclick device) every 4 hours as needed for chest tightness, wheezing, shortness of breath and/or coughing.   - Albuterol 2-4 puffs inhaled (use spacer if not using Proair Respiclick device) 15-20 minutes prior to physical activity.    - Please ensure warm up period prior to exercise.    - Avoid asthma triggers.  - Symbicort 160/4.5mcg 2 puffs inhaled twice daily with a spacer.   - Singulair 10mg by mouth daily at night.   -  Treating sinusitis with another course of doxycycline for 14 days. May be contributing to symptoms.   - If no improvement will obtain chest x-ray, cbc with diff and allergy blood testing including total IgE.   - An updated asthma action plan was provided and reviewed with the patient.   - Return to clinic in 3 months         Relevant Medications    montelukast (SINGULAIR) 10 MG tablet    fluticasone (FLONASE) 50 MCG/ACT spray    Other Relevant Orders    Spirometry, Breathing Capacity (Completed)    Acute sinusitis with symptoms > 10 days     Sinus pressure, increased congestion and postnasal drip. Symptoms improved somewhat, but did not return to baseline after recent treatment with doxycycline. Tenderness to palpation  involving maxillary and frontal sinuses on examination. White mucus noted in posterior oropharynx.    - Doxycycline 100 mg p.o. b.i.d. for 14 days.  - Resume Flonase 2 sprays per nostril daily.  - If no improvement would consider CT scan of sinuses.         Relevant Medications    montelukast (SINGULAIR) 10 MG tablet    doxycycline (VIBRAMYCIN) 100 MG capsule    fluticasone (FLONASE) 50 MCG/ACT spray       Digestive    GERD (gastroesophageal reflux disease)     - Continue protonix.             Infectious/Inflammatory    Rhinoconjunctivitis     History of perennial nasal and ocular symptoms that get worse in the spring and fall. Symptoms include ocular watering, ocular itching, rhinorrhea, sneezing, congestion and postnasal drip. Symptoms increase around cats. Stopped using Flonase and increased symptoms.     Allergy skin testing was negative.      - Resume Flonase 2 sprays per nostril daily.  - Oral antihistamine as needed.   - Likely local allergic rhinitis as symptoms improved with treatment with antihistamines.               Chart documentation with Dragon Voice recognition Software. Although reviewed after completion, some words and grammatical errors may remain.    Nish Stockton,    Allergy/Immunology  [facility] [place] and [place]

## 2017-07-24 NOTE — PATIENT INSTRUCTIONS
Allergy Staff Appt Hours Shot Hours Locations    Physician     Nish Stockton DO       Support Staff     Vanessa ANDERSON RN      Angie LEAL MA  Monday:                      [place] 8-7     Tuesday:         [place] 8-5 Wednesday:        [place]: 7-5     Friday:        [place] 7-5   [place]        Monday: 9-6        Friday: 7-2     [place]        Tuesday: 7-12 Thursday: 1-6 [place] Tuesday: 1-6 Wednesday: 11-6 Thursday: 7-12 [facility]  [address] [place] [address]  Appt Line: [phone]  Allergy RN (Monday):  [phone]    [facility]  [address] [place] [address]  Appt Line: [phone]  Allergy RN (Tues & Wed):  [phone]    [facility]  [address] [place] [address]  Appt Line: [phone]  Allergy RN (Friday):  [phone]       Important Scheduling Information  Aspirin Desensitization: Appt will last 2 clinic days. Please call the Allergy RN line for your clinic to schedule. Discontinue antihistamines 7 days prior to the appointment.     Food Challenges: Appt will last 3-4 hours. Please call the Allergy RN line for your clinic to schedule. Discontinue antihistamines 7 days prior to the appointment.     Penicillin Testing: Appt will last 2-3 hours. Please call the Allergy RN line for your clinic to schedule. Discontinue antihistamines 7 days prior to the appointment.     Skin Testing: Appt will about 40 minutes. Call the appointment line for your clinic to schedule. Discontinue antihistamines 7 days prior to the appointment.     Venom Testing: Appt will last 2-3 hours. Please call the Allergy RN line for your clinic to schedule. Discontinue antihistamines 7 days prior to the appointment.     Thank you for trusting us with your Allergy, Asthma, and Immunology care. Please feel free to contact us with any questions or concerns you may have.      - Symbicort 160/4.5mcg 2 puffs inhaled twice daily with a spacer.   -  Singulair 10mg by mouth daily at night.   - Flonase 50mcg 2 sprays/nostril daily.   - Doxycycline 100mg by mouth daily for 14 days.   - Oral antihistamine as needed.   - Return to clinic in 6 weeks.

## 2017-07-24 NOTE — MR AVS SNAPSHOT
After Visit Summary   7/24/2017    Miri Naylor    MRN: [id number]           Patient Information     Date Of Birth          1955        Visit Information        Provider Department      7/24/2017 5:00 PM Nish Stockton DO [facility]        Today's Diagnoses     Moderate persistent asthma without complication    -  1    Acute sinusitis with symptoms > 10 days          Care Instructions    Allergy Staff Appt Hours Shot Hours Locations    Physician     Nish Stockton DO       Support Staff     MEMO Carias MA  Monday:                      [place] 8-7     Tuesday:         [place] 8-5     Wednesday:        [place]: 7-5 Friday:        [place] 7-5   [place]        Monday: 9-6 Friday: 7-2     [place]        Tuesday: 7-12 Thursday: 1-6 [place] Tuesday: 1-6 Wednesday: 11-6 Thursday: 7-12 [facility]  [address] [place] [address]  Appt Line: [phone]  Allergy RN (Monday):  [phone]    [facility]  [address] [place] [address]  Appt Line: [phone]  Allergy RN (Tues & Wed):  [phone]    [facility]  [address] [place] [address]  Appt Line: [phone]  Allergy RN (Friday):  [phone]       Important Scheduling Information  Aspirin Desensitization: Appt will last 2 clinic days. Please call the Allergy RN line for your clinic to schedule. Discontinue antihistamines 7 days prior to the appointment.     Food Challenges: Appt will last 3-4 hours. Please call the Allergy RN line for your clinic to schedule. Discontinue antihistamines 7 days prior to the appointment.     Penicillin Testing: Appt will last 2-3 hours. Please call the Allergy RN line for your clinic to schedule. Discontinue antihistamines 7 days prior to the appointment.     Skin Testing: Appt will about 40 minutes. Call the appointment line for your clinic to schedule. Discontinue  antihistamines 7 days prior to the appointment.     Venom Testing: Appt will last 2-3 hours. Please call the Allergy RN line for your clinic to schedule. Discontinue antihistamines 7 days prior to the appointment.     Thank you for trusting us with your Allergy, Asthma, and Immunology care. Please feel free to contact us with any questions or concerns you may have.      - Symbicort 160/4.5mcg 2 puffs inhaled twice daily with a spacer.   - Singulair 10mg by mouth daily at night.   - Flonase 50mcg 2 sprays/nostril daily.   - Doxycycline 100mg by mouth daily for 14 days.   - Oral antihistamine as needed.   - Return to clinic in 6 weeks.             Follow-ups after your visit        Follow-up notes from your care team     Return in about 6 weeks (around 9/4/2017).      Who to contact     If you have questions or need follow up information about today's clinic visit or your schedule please contact [facility] directly at [phone].  Normal or non-critical lab and imaging results will be communicated to you by Aeponahart, letter or phone within 4 business days after the clinic has received the results. If you do not hear from us within 7 days, please contact the clinic through [employer] or phone. If you have a critical or abnormal lab result, we will notify you by phone as soon as possible.  Submit refill requests through [employer] or call your pharmacy and they will forward the refill request to us. Please allow 3 business days for your refill to be completed.          Additional Information About Your Visit        [employer] Information     [employer] gives you secure access to your electronic health record. If you see a primary care provider, you can also send messages to your care team and make appointments. If you have questions, please call your primary care clinic.  If you do not have a primary care provider, please call [phone] and they will assist you.        Care EveryWhere ID     This is your Care  "EveryWhere ID. This could be used by other organizations to access your [facility] medical records  [id number]        Your Vitals Were     Pulse Height Pulse Oximetry BMI (Body Mass Index)          61 5' 11.06\" (1.805 m) 98% 28.18 kg/m2         Blood Pressure from Last 3 Encounters:   07/24/17 145/80   06/29/17 (!) 147/98   04/17/17 150/74    Weight from Last 3 Encounters:   07/24/17 202 lb 6.4 oz (91.8 kg)   06/29/17 198 lb (89.8 kg)   04/17/17 200 lb 9.6 oz (91 kg)              We Performed the Following     Spirometry, Breathing Capacity          Today's Medication Changes          These changes are accurate as of: 7/24/17  5:43 PM.  If you have any questions, ask your nurse or doctor.               Start taking these medicines.        Dose/Directions    doxycycline 100 MG capsule   Commonly known as:  VIBRAMYCIN   Used for:  Acute sinusitis with symptoms > 10 days   Started by:  Nish Stockton DO        Dose:  100 mg   Take 1 capsule (100 mg) by mouth 2 times daily   Quantity:  28 capsule   Refills:  0       fluticasone 50 MCG/ACT spray   Commonly known as:  FLONASE   Used for:  Acute sinusitis with symptoms > 10 days   Started by:  Nish Stockton DO        Dose:  1-2 spray   Spray 1-2 sprays into both nostrils daily   Quantity:  1 Bottle   Refills:  11       montelukast 10 MG tablet   Commonly known as:  SINGULAIR   Used for:  Moderate persistent asthma without complication   Started by:  Nish Stockton DO        Dose:  10 mg   Take 1 tablet (10 mg) by mouth At Bedtime   Quantity:  90 tablet   Refills:  3            Where to get your medicines      These medications were sent to "[facility] HOME DELIVERY - [facility], [place] - [address]  [address] [facility] [address]     Phone:  [phone]     doxycycline 100 MG capsule    fluticasone 50 MCG/ACT spray    montelukast 10 MG tablet                Primary Care Provider Office Phone # Fax #    Elizabeth Corley [phone] " [phone]       [facility] [address] [place] [facility] [address]        Equal Access to Services     SONIA SANDERS : Hadii aad ku haddidieraleksandra Sanchezali, waaxda luqadaha, qaybta kaalmada lindy, bj shaqin hayaapete armandomando gonzales noel espinoza. So [facility] [phone].    ATENCIÓN: Si habla español, tiene a dao disposición servicios gratuitos de asistencia lingüística. Llame al [phone].    We comply with applicable federal civil rights laws and [place] laws. We do not discriminate on the basis of race, color, national origin, age, disability sex, sexual orientation or gender identity.            Thank you!     Thank you for choosing [facility]  for your care. Our goal is always to provide you with excellent care. Hearing back from our patients is one way we can continue to improve our services. Please take a few minutes to complete the written survey that you may receive in the mail after your visit with us. Thank you!             Your Updated Medication List - Protect others around you: Learn how to safely use, store and throw away your medicines at www.disposemymeds.org.          This list is accurate as of: 7/24/17  5:43 PM.  Always use your most recent med list.                   Brand Name Dispense Instructions for use Diagnosis    Acai 500 MG Caps      1 tablet daily        Acidophilus Tabs      1 tablet daily        albuterol 108 (90 BASE) MCG/ACT Inhaler    albuterol    1 Inhaler    Inhale 2 puffs into the lungs every 4 hours as needed for shortness of breath / dyspnea or wheezing    Moderate persistent asthma without complication       ascorbic acid 500 MG tablet    VITAMIN C     2 TABLET DAILY        atenolol 25 MG tablet    TENORMIN    90 tablet    TAKE 1 TABLET DAILY    Essential hypertension with goal blood pressure less than 140/90       beclomethasone 80 MCG/ACT Inhaler    QVAR    1 Inhaler    Inhale 2 puffs into the lungs 2 times daily    Moderate persistent asthma without  complication       budesonide-formoterol 160-4.5 MCG/ACT Inhaler    SYMBICORT    1 Inhaler    Inhale 2 puffs into the lungs 2 times daily    Moderate persistent asthma without complication       CALCIUM 600+D PO      250 mg vitamin d- 3 tablets daily        DAILY MULTIVITAMIN PO      1 tablet daily        doxycycline 100 MG capsule    VIBRAMYCIN    28 capsule    Take 1 capsule (100 mg) by mouth 2 times daily    Acute sinusitis with symptoms > 10 days       escitalopram 20 MG tablet    LEXAPRO    90 tablet    TAKE 1 TABLET DAILY    Major depression in complete remission (H)       flaxseed oil 1000 MG Caps      1 tablet daily        fluticasone 50 MCG/ACT spray    FLONASE    1 Bottle    Spray 1-2 sprays into both nostrils daily    Acute sinusitis with symptoms > 10 days       gabapentin 300 MG capsule    NEURONTIN    270 capsule    Take one capsule by mouth three times daily    Chronic nonintractable headache, unspecified headache type       Glucosamine-Chondroitin Tabs      1500 mg glucosamine and 1200mg chondroitin daily-2 tablets daily        hydrocortisone 2.5 % cream     60 g    Apply topically 2 times daily    Eyelid dermatitis, eczematous, unspecified laterality       ibuprofen 200 MG tablet    ADVIL/MOTRIN     take 1 tablet (200 mg) by oral route every 6 hours as needed with food        lisinopril 40 MG tablet    PRINIVIL/ZESTRIL    90 tablet    Take 1 tablet (40 mg) by mouth daily    Essential hypertension with goal blood pressure less than 140/90       montelukast 10 MG tablet    SINGULAIR    90 tablet    Take 1 tablet (10 mg) by mouth At Bedtime    Moderate persistent asthma without complication       pantoprazole 40 MG EC tablet    PROTONIX    90 tablet    Take 40 mg by mouth once daily, 30 to 60 minutes before a meal.    Gastroesophageal reflux disease without esophagitis       S-Adenosylmethionine 200 MG Tabs      2 tablets daily    Menopausal syndrome (hot flashes)       SUPER B COMPLEX Tabs      1  tablet daily        triamcinolone 0.1 % cream    KENALOG    60 g    Apply topically 2 times daily    Other atopic dermatitis       vitamin E 400 UNIT capsule   Generic drug:  vitamin E      Take one pill by mouth once daily        ZADITOR OP      Apply to eye daily as needed        ZYRTEC 10 MG tablet   Generic drug:  cetirizine      Take 10 mg by mouth daily.

## 2017-07-24 NOTE — LETTER
My Asthma Action Plan  Name: Miri Naylor   YOB: 1955  Date: 7/24/2017   My doctor: Nish Stockton, DO   My clinic: [facility]        My Control Medicine: Budesonide + formoterol (Symbicort) -  160/4.5 mcg 2 puffs inhaled twice daily.   Montelukast (Singulair) -  10 mg daily  My Rescue Medicine:   Albuterol 2-4 puffs inhaled (use a spacer unless using a Proair Respiclick device) every 4 hours as needed for chest tightness, wheezing, shortness of breath and/or coughing.     Albuterol 2-4 puffs inhaled (use spacer if not using Proair Respiclick device) 15-20 minutes prior to physical activity.     Please ensure warm up period prior to exercise.      My Asthma Severity: moderate persistent  Avoid your asthma triggers:   exercise or sports  Singing            GREEN ZONE   Good Control    I feel good    No cough or wheeze    Can work, sleep and play without asthma symptoms       Take your asthma control medicine every day.     1. If exercise triggers your asthma, take your rescue medication    15 minutes before exercise or sports, and    During exercise if you have asthma symptoms  2. Spacer to use with inhaler: If you have a spacer, make sure to use it with your inhaler             YELLOW ZONE Getting Worse  I have ANY of these:    I do not feel good    Cough or wheeze    Chest feels tight    Wake up at night   1. Keep taking your Green Zone medications  2. Start taking your rescue medicine:    every 20 minutes for up to 1 hour. Then every 4 hours for 24-48 hours.  3. If you stay in the Yellow Zone for more than 12-24 hours, contact your doctor.  4. If you do not return to the Green Zone in 12-24 hours or you get worse, start taking your oral steroid medicine if prescribed by your provider.           RED ZONE Medical Alert - Get Help  I have ANY of these:    I feel awful    Medicine is not helping    Breathing getting harder    Trouble walking or talking    Nose opens wide to  breathe       1. Take your rescue medicine NOW  2. If your provider has prescribed an oral steroid medicine, start taking it NOW  3. Call your doctor NOW  4. If you are still in the Red Zone after 20 minutes and you have not reached your doctor:    Take your rescue medicine again and    Call 911 or go to the emergency room right away    See your regular doctor within 2 weeks of an Emergency Room or Urgent Care visit for follow-up treatment.        Electronically signed by: Nish Stockton, July 24, 2017    Annual Reminders:  Meet with Asthma Educator,  Flu Shot in the Fall, consider Pneumonia Vaccination for patients with asthma (aged 19 and older).    Pharmacy:    [facility] - [facility] [place] - [address] [facility] [place], [place] - [address]                    Asthma Triggers  How To Control Things That Make Your Asthma Worse    Triggers are things that make your asthma worse.  Look at the list below to help you find your triggers and what you can do about them.  You can help prevent asthma flare-ups by staying away from your triggers.      Trigger                                                          What you can do   Cigarette Smoke  Tobacco smoke can make asthma worse. Do not allow smoking in your home, car or around you.  Be sure no one smokes at a child s day care or school.  If you smoke, ask your health care provider for ways to help you quit.  Ask family members to quit too.  Ask your health care provider for a referral to Quit Plan to help you quit smoking, or call 8-694-954-PLAN.     Colds, Flu, Bronchitis  These are common triggers of asthma. Wash your hands often.  Don t touch your eyes, nose or mouth.  Get a flu shot every year.     Dust Mites  These are tiny bugs that live in cloth or carpet. They are too small to see. Wash sheets and blankets in hot water every week.   Encase pillows and mattress in dust mite proof covers.  Avoid  having carpet if you can. If you have carpet, vacuum weekly.   Use a dust mask and HEPA vacuum.   Pollen and Outdoor Mold  Some people are allergic to trees, grass, or weed pollen, or molds. Try to keep your windows closed.  Limit time out doors when pollen count is high.   Ask you health care provider about taking medicine during allergy season.     Animal Dander  Some people are allergic to skin flakes, urine or saliva from pets with fur or feathers. Keep pets with fur or feathers out of your home.    If you can t keep the pet outdoors, then keep the pet out of your bedroom.  Keep the bedroom door closed.  Keep pets off cloth furniture and away from stuffed toys.     Mice, Rats, and Cockroaches  Some people are allergic to the waste from these pests.   Cover food and garbage.  Clean up spills and food crumbs.  Store grease in the refrigerator.   Keep food out of the bedroom.   Indoor Mold  This can be a trigger if your home has high moisture. Fix leaking faucets, pipes, or other sources of water.   Clean moldy surfaces.  Dehumidify basement if it is damp and smelly.   Smoke, Strong Odors, and Sprays  These can reduce air quality. Stay away from strong odors and sprays, such as perfume, powder, hair spray, paints, smoke incense, paint, cleaning products, candles and new carpet.   Exercise or Sports  Some people with asthma have this trigger. Be active!  Ask your doctor about taking medicine before sports or exercise to prevent symptoms.    Warm up for 5-10 minutes before and after sports or exercise.     Other Triggers of Asthma  Cold air:  Cover your nose and mouth with a scarf.  Sometimes laughing or crying can be a trigger.  Some medicines and food can trigger asthma.

## 2017-07-25 ENCOUNTER — MYC MEDICAL ADVICE (OUTPATIENT)
Dept: ALLERGY | Facility: OTHER | Age: 62
End: 2017-07-25

## 2017-07-25 ASSESSMENT — ASTHMA QUESTIONNAIRES: ACT_TOTALSCORE: 14

## 2017-07-25 NOTE — ASSESSMENT & PLAN NOTE
She has had chest symptoms with signing and exertion. Symptoms recently with bronchitis. Prednisone was used and beneficial. Albuterol not clearly beneficial. Albuterol prior to exertion has been beneficial. Symbicort 160/4.5mcg 2 puffs inhaled twice daily with a spacer.     Spirometry today with an FEV1 of 59%. Down from last visit.   ACT 14      - Albuterol 2-4 puffs inhaled (use a spacer unless using a Proair Respiclick device) every 4 hours as needed for chest tightness, wheezing, shortness of breath and/or coughing.   - Albuterol 2-4 puffs inhaled (use spacer if not using Proair Respiclick device) 15-20 minutes prior to physical activity.    - Please ensure warm up period prior to exercise.    - Avoid asthma triggers.  - Symbicort 160/4.5mcg 2 puffs inhaled twice daily with a spacer.   - Singulair 10mg by mouth daily at night.   -  Treating sinusitis with another course of doxycycline for 14 days. May be contributing to symptoms.   - If no improvement will obtain chest x-ray, cbc with diff and allergy blood testing including total IgE.   - An updated asthma action plan was provided and reviewed with the patient.   - Return to clinic in 3 months

## 2017-07-25 NOTE — ASSESSMENT & PLAN NOTE
History of perennial nasal and ocular symptoms that get worse in the spring and fall. Symptoms include ocular watering, ocular itching, rhinorrhea, sneezing, congestion and postnasal drip. Symptoms increase around cats. Stopped using Flonase and increased symptoms.     Allergy skin testing was negative.      - Resume Flonase 2 sprays per nostril daily.  - Oral antihistamine as needed.   - Likely local allergic rhinitis as symptoms improved with treatment with antihistamines.

## 2017-07-25 NOTE — ASSESSMENT & PLAN NOTE
Sinus pressure, increased congestion and postnasal drip. Symptoms improved somewhat, but did not return to baseline after recent treatment with doxycycline. Tenderness to palpation involving maxillary and frontal sinuses on examination. White mucus noted in posterior oropharynx.    - Doxycycline 100 mg p.o. b.i.d. for 14 days.  - Resume Flonase 2 sprays per nostril daily.  - If no improvement would consider CT scan of sinuses.

## 2017-07-26 RX ORDER — DOXYCYCLINE 100 MG/1
100 CAPSULE ORAL 2 TIMES DAILY
Qty: 28 CAPSULE | Refills: 0 | Status: SHIPPED | OUTPATIENT
Start: 2017-07-26 | End: 2017-11-10

## 2017-07-26 RX ORDER — MONTELUKAST SODIUM 10 MG/1
10 TABLET ORAL AT BEDTIME
Qty: 90 TABLET | Refills: 3 | Status: SHIPPED | OUTPATIENT
Start: 2017-07-26 | End: 2018-07-02

## 2017-07-26 RX ORDER — FLUTICASONE PROPIONATE 50 MCG
1-2 SPRAY, SUSPENSION (ML) NASAL DAILY
Qty: 1 BOTTLE | Refills: 11 | Status: SHIPPED | OUTPATIENT
Start: 2017-07-26 | End: 2018-07-27

## 2017-08-16 ENCOUNTER — OFFICE VISIT (OUTPATIENT)
Dept: ORTHOPEDICS | Facility: CLINIC | Age: 62
End: 2017-08-16
Payer: COMMERCIAL

## 2017-08-16 ENCOUNTER — RADIANT APPOINTMENT (OUTPATIENT)
Dept: GENERAL RADIOLOGY | Facility: CLINIC | Age: 62
End: 2017-08-16
Attending: ORTHOPAEDIC SURGERY
Payer: COMMERCIAL

## 2017-08-16 VITALS — BODY MASS INDEX: 28.22 KG/M2 | HEIGHT: 71 IN | WEIGHT: 201.6 LBS | RESPIRATION RATE: 16 BRPM

## 2017-08-16 DIAGNOSIS — Z96.641 S/P TOTAL HIP ARTHROPLASTY, RIGHT: ICD-10-CM

## 2017-08-16 DIAGNOSIS — Z96.641 S/P TOTAL HIP ARTHROPLASTY, RIGHT: Primary | ICD-10-CM

## 2017-08-16 PROCEDURE — 73502 X-RAY EXAM HIP UNI 2-3 VIEWS: CPT

## 2017-08-16 PROCEDURE — 99212 OFFICE O/P EST SF 10 MIN: CPT | Performed by: ORTHOPAEDIC SURGERY

## 2017-08-16 ASSESSMENT — PAIN SCALES - GENERAL: PAINLEVEL: MILD PAIN (2)

## 2017-08-16 NOTE — PATIENT INSTRUCTIONS
Please remember to call and schedule a follow up appointment if one was recommended at your earliest convenience.  Orthopedics CLINIC HOURS TELEPHONE NUMBER   Dr. Bryson Smith  Certified Medical Assistant   Monday & Wednesday   8am - 5pm  Thursday 1pm - 5pm  Friday 8am -11:30am Specialty schedulers:   [phone] to schedule your surgery.  Main Clinic:   [phone] to make an appointment with any provider.    Urgent Care locations:    [facility] Monday-Friday Closed  Saturday-Sunday 9am-5pm      Monday-Friday 12pm - 8pm  Saturday-Sunday 9am-5pm [phone]     If SURGERY has been recommended, please call our Specialty Schedulers at [phone] to schedule your procedure.    If you need a medication refill, please contact your pharmacy. Please allow 3 business days for your refill to be completed.    If an MRI or CT scan has been recommended, please call [facility] Schedulers at [phone] to schedule your appointment.  Use [employer] (secure e-mail communication and access to your chart) to send a message or to make an appointment. Please ask how you can sign up for [employer].  Your care team's suggested websites for health information:   [email or web address] : Up to date and easily searchable information on multiple topics.   [email or web address] : [place] dept of heat, public health issues in [place], N1N1

## 2017-08-16 NOTE — PROGRESS NOTES
Chief Complaint   Patient presents with     Surgical Followup     Right WENCESLAO. DOS 7/19/16, 1 year 1 month PO. Patient states her hip is feeling ok. She still has some soreness in the piriformis area, otherwise it is functioning. She will take ibuprofen/tylenol for pain. She is now starting to have issues with her left hip.        SURGERY: Total hip arthroplasty, right hip ( [facility])  DATE OF SURGERY: 7/19/2016      HISTORY OF PRESENT ILLNESS: Miri Naylor is a 62 year old female seen for postoperative evaluation of right total hip arthroplasty. It has been 1 year since surgery. Returns today doing well. Her pain is mild today, rated a 2/10. She continues to have mild soreness with external rotation in the buttock area. She feels that with prolonged walking she will have some soreness. Minimal pain with sleeping on it. Pre-op symptoms in the groin area have resolved. She is having a colonoscopy in the near future and is wondering if she needs antibiotics.    She is also having some mild left hip pain. Notes that it is not of any concern at present, however would like to bring it up as she will consider surgery in the future.      PAST MEDICAL HISTORY:   Past Medical History:   Diagnosis Date     Depressive disorder, not elsewhere classified      Drug allergy, multiple 9/23/14--passed graded oral challenge for Zithromax.     7/3/14 POS PRE-PEN skin test. 7/30/14 NEG skin tests and oral challenge to Cefzil     Hx of abnormal Pap smear ?    no specifics given     lumbar degeneration      Raynaud's disease      Unspecified essential hypertension        PAST SURGICAL HISTORY:   Past Surgical History:   Procedure Laterality Date     BIOPSY OF BREAST, NEEDLE CORE       C PELVIS/HIP JOINT SURGERY UNLISTED Right 7/19/16    total hip arthroplasty     COLONOSCOPY       HIP SURGERY Right 07/19/2016       Medications:   Current Outpatient Prescriptions:      doxycycline (VIBRAMYCIN) 100 MG capsule, Take 1  capsule (100 mg) by mouth 2 times daily, Disp: 28 capsule, Rfl: 0     fluticasone (FLONASE) 50 MCG/ACT spray, Spray 1-2 sprays into both nostrils daily, Disp: 1 Bottle, Rfl: 11     montelukast (SINGULAIR) 10 MG tablet, Take 1 tablet (10 mg) by mouth At Bedtime, Disp: 90 tablet, Rfl: 3     atenolol (TENORMIN) 25 MG tablet, TAKE 1 TABLET DAILY, Disp: 90 tablet, Rfl: 2     escitalopram (LEXAPRO) 20 MG tablet, TAKE 1 TABLET DAILY, Disp: 90 tablet, Rfl: 1     budesonide-formoterol (SYMBICORT) 160-4.5 MCG/ACT Inhaler, Inhale 2 puffs into the lungs 2 times daily, Disp: 1 Inhaler, Rfl: 11     lisinopril (PRINIVIL/ZESTRIL) 40 MG tablet, Take 1 tablet (40 mg) by mouth daily, Disp: 90 tablet, Rfl: 1     gabapentin (NEURONTIN) 300 MG capsule, Take one capsule by mouth three times daily, Disp: 270 capsule, Rfl: 1     Ketotifen Fumarate (ZADITOR OP), Apply to eye daily as needed, Disp: , Rfl:      hydrocortisone 2.5 % cream, Apply topically 2 times daily, Disp: 60 g, Rfl: 1     triamcinolone (KENALOG) 0.1 % cream, Apply topically 2 times daily, Disp: 60 g, Rfl: 3     beclomethasone (QVAR) 80 MCG/ACT Inhaler, Inhale 2 puffs into the lungs 2 times daily, Disp: 1 Inhaler, Rfl: 11     albuterol (ALBUTEROL) 108 (90 BASE) MCG/ACT Inhaler, Inhale 2 puffs into the lungs every 4 hours as needed for shortness of breath / dyspnea or wheezing, Disp: 1 Inhaler, Rfl: 1     pantoprazole (PROTONIX) 40 MG enteric coated tablet, Take 40 mg by mouth once daily, 30 to 60 minutes before a meal., Disp: 90 tablet, Rfl: 3     ibuprofen (ADVIL,MOTRIN) 200 MG tablet, take 1 tablet (200 mg) by oral route every 6 hours as needed with food, Disp: , Rfl:      vitamin E (E-400) 400 UNIT capsule, Take one pill by mouth once daily, Disp: , Rfl:      cetirizine (ZYRTEC) 10 MG tablet, Take 10 mg by mouth daily., Disp: , Rfl:      VITAMIN C 500 MG PO TABS, 2 TABLET DAILY, Disp: , Rfl:      ACIDOPHILUS OR TABS, 1 tablet daily, Disp: , Rfl:      SUPER B COMPLEX PO  "TABS, 1 tablet daily, Disp: , Rfl:      FLAXSEED OIL 1000 MG PO CAPS, 1 tablet daily, Disp: , Rfl:      ACAI 500 MG PO CAPS, 1 tablet daily, Disp: , Rfl:      GLUCOSAMINE-CHONDROITIN PO TABS, 1500 mg glucosamine and 1200mg chondroitin daily-2 tablets daily, Disp: , Rfl:      CALCIUM 600+D PO, 250 mg vitamin d- 3 tablets daily, Disp: , Rfl:      DAILY MULTIVITAMIN PO, 1 tablet daily, Disp: , Rfl:      S-ADENOSYLMETHIONINE 200 MG PO TABS, 2 tablets daily, Disp: , Rfl:     Allergies:   Allergies   Allergen Reactions     Penicillins Hives     Confirmed by skin prick testing 7/3/14     Amoxicillin Hives     Clindamycin Hcl Hives     Levaquin      tendonitis     Augmentin Rash     Azithromycin Rash     Cephalexin Rash         REVIEW OF SYSTEMS:  CONSTITUTIONAL:NEGATIVE for fever, chills, night sweats, change in weight  INTEGUMENTARY/SKIN: NEGATIVE for worrisome rashes, moles or lesions  MUSCULOSKELETAL:See HPI above  NEURO: NEGATIVE for weakness, dizziness or paresthesias    This document serves as a record of the services and decisions personally performed and made by Nir Massey MD. It was created on his behalf by Bree Mcbride, a trained medical scribe. The creation of this document is based the provider's statements to the medical scribe.    Scribe Bree Mcbride 1:04 PM 8/16/2017       PHYSICAL EXAM:  Resp 16  Ht 1.803 m (5' 11\")  Wt 91.4 kg (201 lb 9.6 oz)  BMI 28.12 kg/m2   GENERAL APPEARANCE: healthy, alert, no distress  SKIN: no suspicious lesions or rashes  NEURO: Normal strength and tone, mentation intact and speech normal  PSYCH:  mentation appears normal and affect normal/bright  RESPIRATORY: No increased work of breathing.    BILATERAL LOWER EXTREMITIES:  Gait: normal.    Right lower extremity:  No gross deformities or masses.  mild Quad atrophy  Intact sensation deep peroneal nerve, superficial peroneal nerve, med/lat tibial nerve, sural nerve, saphenous nerve  Intact EHL, EDL, TA, FHL, GS, quadriceps " hamstrings and hip flexors  Toes warm and well perfused, brisk capillary refill. Palpable 2+ dp pulses.  calf soft and minimal tender to squeeze.  Edema: trace      RIGHT HIP EXAM:    Palpation: tenderness over sacroiliac joint, buttock.  Nontender to palpation greater trochanter, iliotibial band, groin.  Strength:  Grossly intact, able to quickly flexion at the hip without discomfort.  Hip range of motion: within normal limits, no discomfort.  Leg lengths: grossly symmetric at medial malleolus    X-RAY:  AP pelvis, lateral views right hip from 8/16/2017 were reviewed in clinic today. No obvious fractures or dislocations. Hip arthroplasty components in place without obvious failure, complication, fracture, or dislocation. Mild left hip degenerative changes.    Impression: 62 year old female seen for postoperative evaluation of right total hip arthroplasty. It has been 1 year since surgery. Doing well.    Plan:   * reviewed xrays with patient today showing total hip arthroplasty implants. Stable alignment, well fixed.  * discomfort in buttock likely muscular and SI joint in nature.    * return to clinic 4 years, 7/2021 for 5 year followup.  * all questions addressed and answered prior to discharge from clinic today to patient's satisfaction.  * patient will need longterm prophylactic antibiotics use with dental procedures or other invasive procedures (2 g amoxicilin or 450mg ([id number]) clindamycin) 1 hour prior to dental procedures.    The information in this document, created by a scribe for me, accurately reflects the services I personally performed and the decisions made by me. I have reviewed and approved this document for accuracy.      Nir Massey M.D., M.S.  Dept. of Orthopaedic Surgery  [facility]

## 2017-08-16 NOTE — MR AVS SNAPSHOT
After Visit Summary   8/16/2017    Miri Naylor    MRN: [id number]           Patient Information     Date Of Birth          1955        Visit Information        Provider Department      8/16/2017 1:00 PM Nir Massey MD [facility]        Today's Diagnoses     S/P total hip arthroplasty, right    -  1      Care Instructions    Please remember to call and schedule a follow up appointment if one was recommended at your earliest convenience.  Orthopedics CLINIC HOURS TELEPHONE NUMBER   Dr. Bryson Smith  Certified Medical Assistant   Monday & Wednesday   8am - 5pm  Thursday 1pm - 5pm  Friday 8am -11:30am Specialty schedulers:   [phone] to schedule your surgery.  Main Clinic:   [phone] to make an appointment with any provider.    Urgent Care locations:    [facility] Monday-Friday Closed  Saturday-Sunday 9am-5pm      Monday-Friday 12pm - 8pm  Saturday-Sunday 9am-5pm [phone]     If SURGERY has been recommended, please call our Specialty Schedulers at [phone] to schedule your procedure.    If you need a medication refill, please contact your pharmacy. Please allow 3 business days for your refill to be completed.    If an MRI or CT scan has been recommended, please call [facility] Schedulers at [phone] to schedule your appointment.  Use [employer] (secure e-mail communication and access to your chart) to send a message or to make an appointment. Please ask how you can sign up for [facility].  Your care team's suggested websites for health information:   [email or web address] : Up to date and easily searchable information on multiple topics.   [email or web address] : [place] dept of heat, public health issues in [place], N1N1              Follow-ups after your visit        Follow-up notes from your care team     Return in about 4 years (around 8/16/2021).      Who to contact     If you have  "questions or need follow up information about today's clinic visit or your schedule please contact [facility] KARLA CHOUDHARY directly at [phone].  Normal or non-critical lab and imaging results will be communicated to you by MyChart, letter or phone within 4 business days after the clinic has received the results. If you do not hear from us within 7 days, please contact the clinic through MyChart or phone. If you have a critical or abnormal lab result, we will notify you by phone as soon as possible.  Submit refill requests through [employer] or call your pharmacy and they will forward the refill request to us. Please allow 3 business days for your refill to be completed.          Additional Information About Your Visit        [employer] Information     [employer] gives you secure access to your electronic health record. If you see a primary care provider, you can also send messages to your care team and make appointments. If you have questions, please call your primary care clinic.  If you do not have a primary care provider, please call [phone] and they will assist you.        Care EveryWhere ID     This is your Care EveryWhere ID. This could be used by other organizations to access your [facility] medical records  [id number]        Your Vitals Were     Respirations Height BMI (Body Mass Index)             16 5' 11\" (1.803 m) 28.12 kg/m2          Blood Pressure from Last 3 Encounters:   07/24/17 145/80   06/29/17 (!) 147/98   04/17/17 150/74    Weight from Last 3 Encounters:   08/16/17 201 lb 9.6 oz (91.4 kg)   07/24/17 202 lb 6.4 oz (91.8 kg)   06/29/17 198 lb (89.8 kg)               Primary Care Provider Office Phone # Fax #    Elizabeth Corley [phone] [address] [place] [address]        Equal Access to Services     CHARLENE SANDERS AH: Day simonso Sofe, waaxda luqadaha, qaybta kaalmada adeegyada, waxay jordy espinoza. So waallen " [phone].    ATENCIÓN: Si joey sorenson, tiene a dao disposición servicios gratuitos de asistencia lingüística. Evangelista brooke [phone].    We comply with applicable federal civil rights laws and [place] laws. We do not discriminate on the basis of race, color, national origin, age, disability sex, sexual orientation or gender identity.            Thank you!     Thank you for choosing [facility]  for your care. Our goal is always to provide you with excellent care. Hearing back from our patients is one way we can continue to improve our services. Please take a few minutes to complete the written survey that you may receive in the mail after your visit with us. Thank you!             Your Updated Medication List - Protect others around you: Learn how to safely use, store and throw away your medicines at www.disposemymeds.org.          This list is accurate as of: 8/16/17  4:09 PM.  Always use your most recent med list.                   Brand Name Dispense Instructions for use Diagnosis    Acai 500 MG Caps      1 tablet daily        Acidophilus Tabs      1 tablet daily        albuterol 108 (90 BASE) MCG/ACT Inhaler    albuterol    1 Inhaler    Inhale 2 puffs into the lungs every 4 hours as needed for shortness of breath / dyspnea or wheezing    Moderate persistent asthma without complication       ascorbic acid 500 MG tablet    VITAMIN C     2 TABLET DAILY        atenolol 25 MG tablet    TENORMIN    90 tablet    TAKE 1 TABLET DAILY    Essential hypertension with goal blood pressure less than 140/90       beclomethasone 80 MCG/ACT Inhaler    QVAR    1 Inhaler    Inhale 2 puffs into the lungs 2 times daily    Moderate persistent asthma without complication       budesonide-formoterol 160-4.5 MCG/ACT Inhaler    SYMBICORT    1 Inhaler    Inhale 2 puffs into the lungs 2 times daily    Moderate persistent asthma without complication       CALCIUM 600+D PO      250 mg vitamin d- 3 tablets daily         DAILY MULTIVITAMIN PO      1 tablet daily        doxycycline 100 MG capsule    VIBRAMYCIN    28 capsule    Take 1 capsule (100 mg) by mouth 2 times daily    Acute sinusitis with symptoms > 10 days       escitalopram 20 MG tablet    LEXAPRO    90 tablet    TAKE 1 TABLET DAILY    Major depression in complete remission (H)       flaxseed oil 1000 MG Caps      1 tablet daily        fluticasone 50 MCG/ACT spray    FLONASE    1 Bottle    Spray 1-2 sprays into both nostrils daily    Acute sinusitis with symptoms > 10 days       gabapentin 300 MG capsule    NEURONTIN    270 capsule    Take one capsule by mouth three times daily    Chronic nonintractable headache, unspecified headache type       Glucosamine-Chondroitin Tabs      1500 mg glucosamine and 1200mg chondroitin daily-2 tablets daily        hydrocortisone 2.5 % cream     60 g    Apply topically 2 times daily    Eyelid dermatitis, eczematous, unspecified laterality       ibuprofen 200 MG tablet    ADVIL/MOTRIN     take 1 tablet (200 mg) by oral route every 6 hours as needed with food        lisinopril 40 MG tablet    PRINIVIL/ZESTRIL    90 tablet    Take 1 tablet (40 mg) by mouth daily    Essential hypertension with goal blood pressure less than 140/90       montelukast 10 MG tablet    SINGULAIR    90 tablet    Take 1 tablet (10 mg) by mouth At Bedtime    Moderate persistent asthma without complication       pantoprazole 40 MG EC tablet    PROTONIX    90 tablet    Take 40 mg by mouth once daily, 30 to 60 minutes before a meal.    Gastroesophageal reflux disease without esophagitis       S-Adenosylmethionine 200 MG Tabs      2 tablets daily    Menopausal syndrome (hot flashes)       SUPER B COMPLEX Tabs      1 tablet daily        triamcinolone 0.1 % cream    KENALOG    60 g    Apply topically 2 times daily    Other atopic dermatitis       vitamin E 400 UNIT capsule   Generic drug:  vitamin E      Take one pill by mouth once daily        ZADITOR OP      Apply to eye  daily as needed        ZYRTEC 10 MG tablet   Generic drug:  cetirizine      Take 10 mg by mouth daily.

## 2017-08-16 NOTE — LETTER
8/16/2017         RE: Miri Naylor  [address]  [place] [address]        Dear Colleague,    Thank you for referring your patient, Miri Naylor, to [facility]. Please see a copy of my visit note below.    Chief Complaint   Patient presents with     Surgical Followup     Right WENCESLAO. DOS 7/19/16, 1 year 1 month PO. Patient states her hip is feeling ok. She still has some soreness in the piriformis area, otherwise it is functioning. She will take ibuprofen/tylenol for pain. She is now starting to have issues with her left hip.        SURGERY: Total hip arthroplasty, right hip ( [facility])  DATE OF SURGERY: 7/19/2016      HISTORY OF PRESENT ILLNESS: Miri Naylor is a 62 year old female seen for postoperative evaluation of right total hip arthroplasty. It has been 1 year since surgery. Returns today doing well. Her pain is mild today, rated a 2/10. She continues to have mild soreness with external rotation in the buttock area. She feels that with prolonged walking she will have some soreness. Minimal pain with sleeping on it. Pre-op symptoms in the groin area have resolved. She is having a colonoscopy in the near future and is wondering if she needs antibiotics.    She is also having some mild left hip pain. Notes that it is not of any concern at present, however would like to bring it up as she will consider surgery in the future.      PAST MEDICAL HISTORY:   Past Medical History:   Diagnosis Date     Depressive disorder, not elsewhere classified      Drug allergy, multiple 9/23/14--passed graded oral challenge for Zithromax.     7/3/14 POS PRE-PEN skin test. 7/30/14 NEG skin tests and oral challenge to Cefzil     Hx of abnormal Pap smear ?    no specifics given     lumbar degeneration      Raynaud's disease      Unspecified essential hypertension        PAST SURGICAL HISTORY:   Past Surgical History:   Procedure Laterality Date     BIOPSY OF BREAST,  NEEDLE CORE       C PELVIS/HIP JOINT SURGERY UNLISTED Right 7/19/16    total hip arthroplasty     COLONOSCOPY       HIP SURGERY Right 07/19/2016       Medications:   Current Outpatient Prescriptions:      doxycycline (VIBRAMYCIN) 100 MG capsule, Take 1 capsule (100 mg) by mouth 2 times daily, Disp: 28 capsule, Rfl: 0     fluticasone (FLONASE) 50 MCG/ACT spray, Spray 1-2 sprays into both nostrils daily, Disp: 1 Bottle, Rfl: 11     montelukast (SINGULAIR) 10 MG tablet, Take 1 tablet (10 mg) by mouth At Bedtime, Disp: 90 tablet, Rfl: 3     atenolol (TENORMIN) 25 MG tablet, TAKE 1 TABLET DAILY, Disp: 90 tablet, Rfl: 2     escitalopram (LEXAPRO) 20 MG tablet, TAKE 1 TABLET DAILY, Disp: 90 tablet, Rfl: 1     budesonide-formoterol (SYMBICORT) 160-4.5 MCG/ACT Inhaler, Inhale 2 puffs into the lungs 2 times daily, Disp: 1 Inhaler, Rfl: 11     lisinopril (PRINIVIL/ZESTRIL) 40 MG tablet, Take 1 tablet (40 mg) by mouth daily, Disp: 90 tablet, Rfl: 1     gabapentin (NEURONTIN) 300 MG capsule, Take one capsule by mouth three times daily, Disp: 270 capsule, Rfl: 1     Ketotifen Fumarate (ZADITOR OP), Apply to eye daily as needed, Disp: , Rfl:      hydrocortisone 2.5 % cream, Apply topically 2 times daily, Disp: 60 g, Rfl: 1     triamcinolone (KENALOG) 0.1 % cream, Apply topically 2 times daily, Disp: 60 g, Rfl: 3     beclomethasone (QVAR) 80 MCG/ACT Inhaler, Inhale 2 puffs into the lungs 2 times daily, Disp: 1 Inhaler, Rfl: 11     albuterol (ALBUTEROL) 108 (90 BASE) MCG/ACT Inhaler, Inhale 2 puffs into the lungs every 4 hours as needed for shortness of breath / dyspnea or wheezing, Disp: 1 Inhaler, Rfl: 1     pantoprazole (PROTONIX) 40 MG enteric coated tablet, Take 40 mg by mouth once daily, 30 to 60 minutes before a meal., Disp: 90 tablet, Rfl: 3     ibuprofen (ADVIL,MOTRIN) 200 MG tablet, take 1 tablet (200 mg) by oral route every 6 hours as needed with food, Disp: , Rfl:      vitamin E (E-400) 400 UNIT capsule, Take one pill by  "mouth once daily, Disp: , Rfl:      cetirizine (ZYRTEC) 10 MG tablet, Take 10 mg by mouth daily., Disp: , Rfl:      VITAMIN C 500 MG PO TABS, 2 TABLET DAILY, Disp: , Rfl:      ACIDOPHILUS OR TABS, 1 tablet daily, Disp: , Rfl:      SUPER B COMPLEX PO TABS, 1 tablet daily, Disp: , Rfl:      FLAXSEED OIL 1000 MG PO CAPS, 1 tablet daily, Disp: , Rfl:      ACAI 500 MG PO CAPS, 1 tablet daily, Disp: , Rfl:      GLUCOSAMINE-CHONDROITIN PO TABS, 1500 mg glucosamine and 1200mg chondroitin daily-2 tablets daily, Disp: , Rfl:      CALCIUM 600+D PO, 250 mg vitamin d- 3 tablets daily, Disp: , Rfl:      DAILY MULTIVITAMIN PO, 1 tablet daily, Disp: , Rfl:      S-ADENOSYLMETHIONINE 200 MG PO TABS, 2 tablets daily, Disp: , Rfl:     Allergies:   Allergies   Allergen Reactions     Penicillins Hives     Confirmed by skin prick testing 7/3/14     Amoxicillin Hives     Clindamycin Hcl Hives     Levaquin      tendonitis     Augmentin Rash     Azithromycin Rash     Cephalexin Rash         REVIEW OF SYSTEMS:  CONSTITUTIONAL:NEGATIVE for fever, chills, night sweats, change in weight  INTEGUMENTARY/SKIN: NEGATIVE for worrisome rashes, moles or lesions  MUSCULOSKELETAL:See HPI above  NEURO: NEGATIVE for weakness, dizziness or paresthesias    This document serves as a record of the services and decisions personally performed and made by Nir Massey MD. It was created on his behalf by Bree Mcbride, a trained medical scribe. The creation of this document is based the provider's statements to the medical scribe.    Scribe Bree Mcbride 1:04 PM 8/16/2017       PHYSICAL EXAM:  Resp 16  Ht 1.803 m (5' 11\")  Wt 91.4 kg (201 lb 9.6 oz)  BMI 28.12 kg/m2   GENERAL APPEARANCE: healthy, alert, no distress  SKIN: no suspicious lesions or rashes  NEURO: Normal strength and tone, mentation intact and speech normal  PSYCH:  mentation appears normal and affect normal/bright  RESPIRATORY: No increased work of breathing.    BILATERAL LOWER EXTREMITIES:  Gait: " normal.    Right lower extremity:  No gross deformities or masses.  mild Quad atrophy  Intact sensation deep peroneal nerve, superficial peroneal nerve, med/lat tibial nerve, sural nerve, saphenous nerve  Intact EHL, EDL, TA, FHL, GS, quadriceps hamstrings and hip flexors  Toes warm and well perfused, brisk capillary refill. Palpable 2+ dp pulses.  calf soft and minimal tender to squeeze.  Edema: trace      RIGHT HIP EXAM:    Palpation: tenderness over sacroiliac joint, buttock.  Nontender to palpation greater trochanter, iliotibial band, groin.  Strength:  Grossly intact, able to quickly flexion at the hip without discomfort.  Hip range of motion: within normal limits, no discomfort.  Leg lengths: grossly symmetric at medial malleolus    X-RAY:  AP pelvis, lateral views right hip from 8/16/2017 were reviewed in clinic today. No obvious fractures or dislocations. Hip arthroplasty components in place without obvious failure, complication, fracture, or dislocation. Mild left hip degenerative changes.    Impression: 62 year old female seen for postoperative evaluation of right total hip arthroplasty. It has been 1 year since surgery. Doing well.    Plan:   * reviewed xrays with patient today showing total hip arthroplasty implants. Stable alignment, well fixed.  * discomfort in buttock likely muscular and SI joint in nature.    * return to clinic 4 years, 7/2021 for 5 year followup.  * all questions addressed and answered prior to discharge from clinic today to patient's satisfaction.  * patient will need longterm prophylactic antibiotics use with dental procedures or other invasive procedures (2 g amoxicilin or 450mg ([id number]) clindamycin) 1 hour prior to dental procedures.    The information in this document, created by a scribe for me, accurately reflects the services I personally performed and the decisions made by me. I have reviewed and approved this document for accuracy.      Nir Massey M.D., M.S.  Dept.  of Orthopaedic Surgery  [facility]    Again, thank you for allowing me to participate in the care of your patient.        Sincerely,        Nir Massey MD

## 2017-08-16 NOTE — NURSING NOTE
"Chief Complaint   Patient presents with     Surgical Followup     Right WENCESLAO. DOS 7/19/16, 1 year 1 month PO. Patient states her hip is feeling ok. She still has some soreness in the piriformis area, otherwise it is functioning. She will take ibuprofen/tylenol for pain. She is now starting to have issues with her left hip.        Initial Resp 16  Ht 1.803 m (5' 11\")  Wt 91.4 kg (201 lb 9.6 oz)  BMI 28.12 kg/m2 Estimated body mass index is 28.12 kg/(m^2) as calculated from the following:    Height as of this encounter: 1.803 m (5' 11\").    Weight as of this encounter: 91.4 kg (201 lb 9.6 oz).  Medication Reconciliation: aminata Copeland Certified Medical Assistant    "

## 2017-08-26 DIAGNOSIS — E78.5 HYPERLIPIDEMIA LDL GOAL <130: ICD-10-CM

## 2017-08-26 DIAGNOSIS — I10 HYPERTENSION GOAL BP (BLOOD PRESSURE) < 140/90: ICD-10-CM

## 2017-08-26 PROCEDURE — 80061 LIPID PANEL: CPT | Performed by: FAMILY MEDICINE

## 2017-08-26 PROCEDURE — 36415 COLL VENOUS BLD VENIPUNCTURE: CPT | Performed by: FAMILY MEDICINE

## 2017-08-26 PROCEDURE — 80053 COMPREHEN METABOLIC PANEL: CPT | Performed by: FAMILY MEDICINE

## 2017-08-28 LAB
ALBUMIN SERPL-MCNC: 3.9 G/DL (ref 3.4–5)
ALP SERPL-CCNC: 55 U/L (ref 40–150)
ALT SERPL W P-5'-P-CCNC: 27 U/L (ref 0–50)
ANION GAP SERPL CALCULATED.3IONS-SCNC: 8 MMOL/L (ref 3–14)
AST SERPL W P-5'-P-CCNC: 20 U/L (ref 0–45)
BILIRUB SERPL-MCNC: 0.4 MG/DL (ref 0.2–1.3)
BUN SERPL-MCNC: 12 MG/DL (ref 7–30)
CALCIUM SERPL-MCNC: 9.1 MG/DL (ref 8.5–10.1)
CHLORIDE SERPL-SCNC: 105 MMOL/L (ref 94–109)
CHOLEST SERPL-MCNC: 271 MG/DL
CO2 SERPL-SCNC: 26 MMOL/L (ref 20–32)
CREAT SERPL-MCNC: 0.86 MG/DL (ref 0.52–1.04)
GFR SERPL CREATININE-BSD FRML MDRD: 67 ML/MIN/1.7M2
GLUCOSE SERPL-MCNC: 97 MG/DL (ref 70–99)
HDLC SERPL-MCNC: 71 MG/DL
LDLC SERPL CALC-MCNC: 170 MG/DL
NONHDLC SERPL-MCNC: 200 MG/DL
POTASSIUM SERPL-SCNC: 3.9 MMOL/L (ref 3.4–5.3)
PROT SERPL-MCNC: 7.1 G/DL (ref 6.8–8.8)
SODIUM SERPL-SCNC: 139 MMOL/L (ref 133–144)
TRIGL SERPL-MCNC: 150 MG/DL

## 2017-08-29 ENCOUNTER — MYC MEDICAL ADVICE (OUTPATIENT)
Dept: FAMILY MEDICINE | Facility: CLINIC | Age: 62
End: 2017-08-29

## 2017-09-16 ENCOUNTER — MYC MEDICAL ADVICE (OUTPATIENT)
Dept: FAMILY MEDICINE | Facility: CLINIC | Age: 62
End: 2017-09-16

## 2017-09-17 DIAGNOSIS — R51.9 CHRONIC NONINTRACTABLE HEADACHE, UNSPECIFIED HEADACHE TYPE: ICD-10-CM

## 2017-09-17 DIAGNOSIS — G89.29 CHRONIC NONINTRACTABLE HEADACHE, UNSPECIFIED HEADACHE TYPE: ICD-10-CM

## 2017-09-18 RX ORDER — GABAPENTIN 300 MG/1
CAPSULE ORAL
Qty: 270 CAPSULE | Refills: 1 | Status: SHIPPED | OUTPATIENT
Start: 2017-09-18 | End: 2018-05-16

## 2017-09-18 NOTE — TELEPHONE ENCOUNTER
Gabapentin 300mg      Last Written Prescription Date: 03/21/2017  #270 x 1  Last filled 06/20/2017  Last Office Visit with G, P or [facility] prescribing provider: 03/02/2017 PRADIP Corley       Creatinine   Date Value Ref Range Status   08/26/2017 0.86 0.52 - 1.04 mg/dL Final     Lab Results   Component Value Date    AST 20 08/26/2017     Lab Results   Component Value Date    ALT 27 08/26/2017     BP Readings from Last 3 Encounters:   07/24/17 145/80   06/29/17 (!) 147/98   04/17/17 150/74

## 2017-09-18 NOTE — TELEPHONE ENCOUNTER
Routing refill request to provider for review/approval because:  Drug not on the [facility] refill protocol     Misty Lopez RN

## 2017-09-27 DIAGNOSIS — I10 ESSENTIAL HYPERTENSION WITH GOAL BLOOD PRESSURE LESS THAN 140/90: ICD-10-CM

## 2017-09-27 NOTE — TELEPHONE ENCOUNTER
lisinopril (PRINIVIL/ZESTRIL) 40 MG tablet      Last Written Prescription Date: 3/28/17  Last Fill Quantity: 90, # refills: 1  Last Office Visit with G, P or [facility] prescribing provider: 3/2/17       Potassium   Date Value Ref Range Status   08/26/2017 3.9 3.4 - 5.3 mmol/L Final     Creatinine   Date Value Ref Range Status   08/26/2017 0.86 0.52 - 1.04 mg/dL Final     BP Readings from Last 3 Encounters:   07/24/17 145/80   06/29/17 (!) 147/98   04/17/17 150/74

## 2017-09-28 RX ORDER — LISINOPRIL 40 MG/1
TABLET ORAL
Qty: 90 TABLET | Refills: 1 | Status: SHIPPED | OUTPATIENT
Start: 2017-09-28 | End: 2018-03-26

## 2017-10-07 DIAGNOSIS — K21.9 GASTROESOPHAGEAL REFLUX DISEASE WITHOUT ESOPHAGITIS: ICD-10-CM

## 2017-10-09 NOTE — TELEPHONE ENCOUNTER
Pantoprazole DR 40mg      Last Written Prescription Date: 10/12/2016 #90 x 3  Last filled 07/11/2017  Last Office Visit with [facility], [facility] or [facility] prescribing provider: 03/02/2017 PRADIP Corley

## 2017-10-11 RX ORDER — PANTOPRAZOLE SODIUM 40 MG/1
TABLET, DELAYED RELEASE ORAL
Qty: 90 TABLET | Refills: 3 | Status: SHIPPED | OUTPATIENT
Start: 2017-10-11 | End: 2018-07-12

## 2017-10-11 NOTE — TELEPHONE ENCOUNTER
Prescription approved per [facility] [place]. Jhoana Russell RN

## 2017-10-29 ENCOUNTER — HEALTH MAINTENANCE LETTER (OUTPATIENT)
Age: 62
End: 2017-10-29

## 2017-11-03 ENCOUNTER — OFFICE VISIT (OUTPATIENT)
Dept: FAMILY MEDICINE | Facility: CLINIC | Age: 62
End: 2017-11-03
Payer: COMMERCIAL

## 2017-11-03 VITALS
DIASTOLIC BLOOD PRESSURE: 90 MMHG | WEIGHT: 201.4 LBS | HEART RATE: 52 BPM | OXYGEN SATURATION: 99 % | TEMPERATURE: 98.4 F | SYSTOLIC BLOOD PRESSURE: 146 MMHG | BODY MASS INDEX: 28.09 KG/M2

## 2017-11-03 DIAGNOSIS — L03.032 CELLULITIS OF TOE OF LEFT FOOT: Primary | ICD-10-CM

## 2017-11-03 PROCEDURE — 99213 OFFICE O/P EST LOW 20 MIN: CPT | Performed by: PHYSICIAN ASSISTANT

## 2017-11-03 RX ORDER — SULFAMETHOXAZOLE/TRIMETHOPRIM 800-160 MG
1 TABLET ORAL 2 TIMES DAILY
Qty: 14 TABLET | Refills: 0 | Status: SHIPPED | OUTPATIENT
Start: 2017-11-03 | End: 2017-11-10

## 2017-11-03 RX ORDER — MUPIROCIN CALCIUM 20 MG/G
CREAM TOPICAL 3 TIMES DAILY
Qty: 30 G | Refills: 1 | Status: SHIPPED | OUTPATIENT
Start: 2017-11-03 | End: 2017-11-10

## 2017-11-03 ASSESSMENT — PATIENT HEALTH QUESTIONNAIRE - PHQ9: SUM OF ALL RESPONSES TO PHQ QUESTIONS 1-9: 4

## 2017-11-03 NOTE — PATIENT INSTRUCTIONS
Cellulitis  Cellulitis is an infection of the deep layers of skin. A break in the skin, such as a cut or scratch, can let bacteria under the skin. If the bacteria get to deep layers of the skin, it can be serious. If not treated, cellulitis can get into the bloodstream and lymph nodes. The infection can then spread throughout the body. This causes serious illness.  Cellulitis causes the affected skin to become red, swollen, warm, and sore. The reddened areas have a visible border. An open sore may leak fluid (pus). You may have a fever, chills, and pain.  Cellulitis is treated with antibiotics taken for 7 to 10 days. An open sore may be cleaned and covered with cool wet gauze. Symptoms should get better 1 to 2 days after treatment is started. Make sure to take all the antibiotics for the full number of days until they are gone. Keep taking the medicine even if your symptoms go away.  Home care  Follow these tips:    Limit the use of the part of your body with cellulitis.     If the infection is on your leg, keep your leg raised while sitting. This will help to reduce swelling.    Take all of the antibiotic medicine exactly as directed until it is gone. Do not miss any doses, especially during the first 7 days. Don t stop taking the medicine when your symptoms get better.    Keep the affected area clean and dry.    Wash your hands with soap and warm water before and after touching your skin. Anyone else who touches your skin should also wash his or her hands. Don't share towels.  Follow-up care  Follow up with your healthcare provider, or as advised. If your infection does not go away on the first antibiotic, your healthcare provider will prescribe a different one.  When to seek medical advice  Call your healthcare provider right away if any of these occur:    Red areas that spread    Swelling or pain that gets worse    Fluid leaking from the skin (pus)    Fever higher of 100.4  F (38.0  C) or higher after 2 days  on antibiotics  Date Last Reviewed: 9/1/2016 2000-2017 [employer]. [address] [place] [address], [place] [address]. All rights reserved. This information is not intended as a substitute for professional medical care. Always follow your healthcare professional's instructions.

## 2017-11-03 NOTE — PROGRESS NOTES
SUBJECTIVE:   Miri Naylor is a 62 year old female who presents to clinic today for the following health issues:      Concern - Infected Toe  Onset:Last week Tuesday, was wearing in diana wearing sandels and had a blister which popped.      Description:     Left 4th toe    Intensity: moderate    Progression of Symptoms:  worsening    Accompanying Signs & Symptoms:  Swollen, red, warmth    Previous history of similar problem:   none    Precipitating factors:   Worsened by: none    Alleviating factors:  Improved by: OTC MED    Therapies Tried and outcome: OTC MED     Pt is going to make appointment on her own time for colonoscopy.    Did take BP meds today.        Allergies   Allergen Reactions     Penicillins Hives     Confirmed by skin prick testing 7/3/14     Amoxicillin Hives     Clindamycin Hcl Hives     Levaquin      tendonitis     Augmentin Rash     Azithromycin Rash     Cephalexin Rash       Patient Active Problem List   Diagnosis     Major depression in complete remission (H)     Essential hypertension     Raynaud's disease     Backache     Hypertension goal BP (blood pressure) < 140/90     Allergic state     Psoriasis     Advanced directives, counseling/discussion     Abnormal Pap smear of cervix     24 hour contact handout given     Hyperlipidemia LDL goal <130     GERD (gastroesophageal reflux disease)     Family history of colon cancer     Penicillin allergy     Drug allergy, multiple     Chronic nonintractable headache, unspecified headache type     Moderate persistent asthma without complication     Eyelid dermatitis, eczematous, unspecified laterality     Rhinoconjunctivitis     Other atopic dermatitis     Acute sinusitis with symptoms > 10 days       Past Medical History:   Diagnosis Date     Depressive disorder, not elsewhere classified      Drug allergy, multiple 9/23/14--passed graded oral challenge for Zithromax.     7/3/14 POS PRE-PEN skin test. 7/30/14 NEG skin tests and oral challenge to  Cefzil     Hx of abnormal Pap smear ?    no specifics given     lumbar degeneration      Raynaud's disease      Unspecified essential hypertension          Current Outpatient Prescriptions on File Prior to Visit:  pantoprazole (PROTONIX) 40 MG EC tablet TAKE 1 TABLET DAILY 30 TO 60 MINUTES BEFORE A MEAL   lisinopril (PRINIVIL/ZESTRIL) 40 MG tablet TAKE 1 TABLET DAILY   gabapentin (NEURONTIN) 300 MG capsule TAKE 1 CAPSULE THREE TIMES A DAY   doxycycline (VIBRAMYCIN) 100 MG capsule Take 1 capsule (100 mg) by mouth 2 times daily   fluticasone (FLONASE) 50 MCG/ACT spray Spray 1-2 sprays into both nostrils daily   montelukast (SINGULAIR) 10 MG tablet Take 1 tablet (10 mg) by mouth At Bedtime   atenolol (TENORMIN) 25 MG tablet TAKE 1 TABLET DAILY   escitalopram (LEXAPRO) 20 MG tablet TAKE 1 TABLET DAILY   budesonide-formoterol (SYMBICORT) 160-4.5 MCG/ACT Inhaler Inhale 2 puffs into the lungs 2 times daily   Ketotifen Fumarate (ZADITOR OP) Apply to eye daily as needed   hydrocortisone 2.5 % cream Apply topically 2 times daily   triamcinolone (KENALOG) 0.1 % cream Apply topically 2 times daily   beclomethasone (QVAR) 80 MCG/ACT Inhaler Inhale 2 puffs into the lungs 2 times daily   albuterol (ALBUTEROL) 108 (90 BASE) MCG/ACT Inhaler Inhale 2 puffs into the lungs every 4 hours as needed for shortness of breath / dyspnea or wheezing   ibuprofen (ADVIL,MOTRIN) 200 MG tablet take 1 tablet (200 mg) by oral route every 6 hours as needed with food   vitamin E (E-400) 400 UNIT capsule Take one pill by mouth once daily   cetirizine (ZYRTEC) 10 MG tablet Take 10 mg by mouth daily.   VITAMIN C 500 MG PO TABS 2 TABLET DAILY   ACIDOPHILUS OR TABS 1 tablet daily   SUPER B COMPLEX PO TABS 1 tablet daily   FLAXSEED OIL 1000 MG PO CAPS 1 tablet daily   ACAI 500 MG PO CAPS 1 tablet daily   GLUCOSAMINE-CHONDROITIN PO TABS 1500 mg glucosamine and 1200mg chondroitin daily-2 tablets daily   CALCIUM 600+D  mg vitamin d- 3 tablets daily    DAILY MULTIVITAMIN PO 1 tablet daily   S-ADENOSYLMETHIONINE 200 MG PO TABS 2 tablets daily     No current facility-administered medications on file prior to visit.     Social History   Substance Use Topics     Smoking status: Never Smoker     Smokeless tobacco: Never Used     Alcohol use Yes      Comment: one glass of wine daily       ROS:  GEN: no fever  SKIN: as above  Musculoskel: + as above    OBJECTIVE:  /90 (BP Location: Left arm, Patient Position: Chair, Cuff Size: Adult Regular)  Pulse 52  Temp 98.4  F (36.9  C) (Oral)  Wt 201 lb 6.4 oz (91.4 kg)  SpO2 99%  BMI 28.09 kg/m2   General:   awake, alert, and cooperative.  NAD.   Head: Normocephalic, atraumatic.  Eyes: Conjunctiva clear,   MS:  LEFT 4th toe. NATASHA. Sensory intact.   Cap refill intact.   Toe minimal swollen, faintly red, nonttp no fluctuance, there is a roughly round red macular lesion at the base of the toe.    Neuro: Alert and oriented - normal speech.         ASSESSMENT:well appearing but plan complicated by many drug allergies. Will start with topical given pretty benign exam and try bactrim as needed if worsening or not improving.  May need ID referral if continues to worsen as no clear PO options with approp coverage available.      ICD-10-CM    1. Cellulitis of toe of left foot L03.032 mupirocin (BACTROBAN) 2 % cream     sulfamethoxazole-trimethoprim (BACTRIM DS) 800-160 MG per tablet           PLAN:   Advised about symptoms which might herald more serious problems.

## 2017-11-07 DIAGNOSIS — F32.5 MAJOR DEPRESSION IN COMPLETE REMISSION (H): ICD-10-CM

## 2017-11-08 RX ORDER — ESCITALOPRAM OXALATE 20 MG/1
TABLET ORAL
Qty: 90 TABLET | Refills: 1 | Status: SHIPPED | OUTPATIENT
Start: 2017-11-08 | End: 2018-08-24 | Stop reason: DRUGHIGH

## 2017-11-10 ENCOUNTER — OFFICE VISIT (OUTPATIENT)
Dept: FAMILY MEDICINE | Facility: CLINIC | Age: 62
End: 2017-11-10
Payer: COMMERCIAL

## 2017-11-10 ENCOUNTER — RADIANT APPOINTMENT (OUTPATIENT)
Dept: GENERAL RADIOLOGY | Facility: CLINIC | Age: 62
End: 2017-11-10
Attending: FAMILY MEDICINE
Payer: COMMERCIAL

## 2017-11-10 VITALS
TEMPERATURE: 98.2 F | HEART RATE: 64 BPM | DIASTOLIC BLOOD PRESSURE: 78 MMHG | SYSTOLIC BLOOD PRESSURE: 128 MMHG | HEIGHT: 71 IN | WEIGHT: 203.4 LBS | BODY MASS INDEX: 28.48 KG/M2

## 2017-11-10 DIAGNOSIS — L53.9 ERYTHEMA OF FOOT: ICD-10-CM

## 2017-11-10 DIAGNOSIS — L98.9 SKIN LESION: ICD-10-CM

## 2017-11-10 DIAGNOSIS — Z00.00 ENCOUNTER FOR ROUTINE ADULT HEALTH EXAMINATION WITHOUT ABNORMAL FINDINGS: Primary | ICD-10-CM

## 2017-11-10 LAB
BASOPHILS # BLD AUTO: 0 10E9/L (ref 0–0.2)
BASOPHILS NFR BLD AUTO: 0.7 %
CRP SERPL-MCNC: <2.9 MG/L (ref 0–8)
DIFFERENTIAL METHOD BLD: NORMAL
EOSINOPHIL # BLD AUTO: 0.1 10E9/L (ref 0–0.7)
EOSINOPHIL NFR BLD AUTO: 2.8 %
ERYTHROCYTE [DISTWIDTH] IN BLOOD BY AUTOMATED COUNT: 12.4 % (ref 10–15)
ERYTHROCYTE [SEDIMENTATION RATE] IN BLOOD BY WESTERGREN METHOD: 20 MM/H (ref 0–30)
HCT VFR BLD AUTO: 36.2 % (ref 35–47)
HGB BLD-MCNC: 12.1 G/DL (ref 11.7–15.7)
LYMPHOCYTES # BLD AUTO: 1.5 10E9/L (ref 0.8–5.3)
LYMPHOCYTES NFR BLD AUTO: 32.9 %
MCH RBC QN AUTO: 31.3 PG (ref 26.5–33)
MCHC RBC AUTO-ENTMCNC: 33.4 G/DL (ref 31.5–36.5)
MCV RBC AUTO: 94 FL (ref 78–100)
MONOCYTES # BLD AUTO: 0.4 10E9/L (ref 0–1.3)
MONOCYTES NFR BLD AUTO: 9.2 %
NEUTROPHILS # BLD AUTO: 2.5 10E9/L (ref 1.6–8.3)
NEUTROPHILS NFR BLD AUTO: 54.4 %
PLATELET # BLD AUTO: 321 10E9/L (ref 150–450)
RBC # BLD AUTO: 3.86 10E12/L (ref 3.8–5.2)
WBC # BLD AUTO: 4.6 10E9/L (ref 4–11)

## 2017-11-10 PROCEDURE — 99396 PREV VISIT EST AGE 40-64: CPT | Performed by: FAMILY MEDICINE

## 2017-11-10 PROCEDURE — 85025 COMPLETE CBC W/AUTO DIFF WBC: CPT | Performed by: FAMILY MEDICINE

## 2017-11-10 PROCEDURE — 99213 OFFICE O/P EST LOW 20 MIN: CPT | Mod: 25 | Performed by: FAMILY MEDICINE

## 2017-11-10 PROCEDURE — 36415 COLL VENOUS BLD VENIPUNCTURE: CPT | Performed by: FAMILY MEDICINE

## 2017-11-10 PROCEDURE — 85652 RBC SED RATE AUTOMATED: CPT | Performed by: FAMILY MEDICINE

## 2017-11-10 PROCEDURE — 86140 C-REACTIVE PROTEIN: CPT | Performed by: FAMILY MEDICINE

## 2017-11-10 PROCEDURE — 73630 X-RAY EXAM OF FOOT: CPT | Mod: LT

## 2017-11-10 NOTE — MR AVS SNAPSHOT
After Visit Summary   11/10/2017    Miri Naylor    MRN: [id number]           Patient Information     Date Of Birth          1955        Visit Information        Provider Department      11/10/2017 11:00 AM Elizabeth Corley DO [facility]        Today's Diagnoses     Skin lesion    -  1    Erythema of foot          Care Instructions      Preventive Health Recommendations  Female Ages 50 - 64    Yearly exam: See your health care provider every year in order to  o Review health changes.   o Discuss preventive care.    o Review your medicines if your doctor has prescribed any.      Get a Pap test every three years (unless you have an abnormal result and your provider advises testing more often).    If you get Pap tests with HPV test, you only need to test every 5 years, unless you have an abnormal result.     You do not need a Pap test if your uterus was removed (hysterectomy) and you have not had cancer.    You should be tested each year for STDs (sexually transmitted diseases) if you're at risk.     Have a mammogram every 1 to 2 years.    Have a colonoscopy at age 50, or have a yearly FIT test (stool test). These exams screen for colon cancer.      Have a cholesterol test every 5 years, or more often if advised.    Have a diabetes test (fasting glucose) every three years. If you are at risk for diabetes, you should have this test more often.     If you are at risk for osteoporosis (brittle bone disease), think about having a bone density scan (DEXA).    Shots: Get a flu shot each year. Get a tetanus shot every 10 years.    Nutrition:     Eat at least 5 servings of fruits and vegetables each day.    Eat whole-grain bread, whole-wheat pasta and brown rice instead of white grains and rice.    Talk to your provider about Calcium and Vitamin D.     Lifestyle    Exercise at least 150 minutes a week (30 minutes a day, 5 days a week). This will help you control your weight and  prevent disease.    Limit alcohol to one drink per day.    No smoking.     Wear sunscreen to prevent skin cancer.     See your dentist every six months for an exam and cleaning.    See your eye doctor every 1 to 2 years.            Follow-ups after your visit        Additional Services     DERMATOLOGY REFERRAL       Your provider has referred you to: TANGELA: [facility] [phone]   [email or web address]    Please be aware that coverage of these services is subject to the terms and limitations of your health insurance plan.  Call member services at your health plan with any benefit or coverage questions.      Please bring the following with you to your appointment:    (1) Any X-Rays, CTs or MRIs which have been performed.  Contact the facility where they were done to arrange for  prior to your scheduled appointment.    (2) List of current medications  (3) This referral request   (4) Any documents/labs given to you for this referral                  Who to contact     Normal or non-critical lab and imaging results will be communicated to you by [employer], letter or phone within 4 business days after the clinic has received the results. If you do not hear from us within 7 days, please contact the clinic through [employer] or phone. If you have a critical or abnormal lab result, we will notify you by phone as soon as possible.  Submit refill requests through "[employer] or call your pharmacy and they will forward the refill request to us. Please allow 3 business days for your refill to be completed.          If you need to speak with a  for additional information , please call: [phone]           Additional Information About Your Visit        "[employer] Information     "[employer] gives you secure access to your electronic health record. If you see a primary care provider, you can also send messages to your care team and make appointments. If you have  "questions, please call your primary care clinic.  If you do not have a primary care provider, please call [phone] and they will assist you.        Care EveryWhere ID     This is your Care EveryWhere ID. This could be used by other organizations to access your [place] medical records  [id number]        Your Vitals Were     Pulse Temperature Height Breastfeeding? BMI (Body Mass Index)       64 98.2  F (36.8  C) (Tympanic) 5' 11\" (1.803 m) No 28.37 kg/m2        Blood Pressure from Last 3 Encounters:   11/10/17 128/78   11/03/17 146/90   07/24/17 145/80    Weight from Last 3 Encounters:   11/10/17 203 lb 6.4 oz (92.3 kg)   11/03/17 201 lb 6.4 oz (91.4 kg)   08/16/17 201 lb 9.6 oz (91.4 kg)              We Performed the Following     CBC with platelets and differential     CRP, inflammation     DERMATOLOGY REFERRAL     ESR: Erythrocyte sedimentation rate          Today's Medication Changes          These changes are accurate as of: 11/10/17 12:25 PM.  If you have any questions, ask your nurse or doctor.               These medicines have changed or have updated prescriptions.        Dose/Directions    triamcinolone 0.1 % cream   Commonly known as:  KENALOG   This may have changed:    - when to take this  - reasons to take this   Used for:  Other atopic dermatitis        Apply topically 2 times daily   Quantity:  60 g   Refills:  3         Stop taking these medicines if you haven't already. Please contact your care team if you have questions.     Acai 500 MG Caps   Stopped by:  Elizabeth Corley DO           beclomethasone 80 MCG/ACT Inhaler   Commonly known as:  QVAR   Stopped by:  Elizabeth Corley DO                    Primary Care Provider Office Phone # Fax #    Elizabeth Corley [phone] [address] [place] [address]        Equal Access to Services     SONIA SAND[employer] AH: Day dickens Sofe, waaxda luqadaha, qaybta kaalmada adeegrosalee, bj gonzales " la'divyan olga. So [facility] [phone].    ATENCIÓN: Si habla yas, tiene a dao disposición servicios gratuitos de asistencia lingüística. Evangelista al [phone].    We comply with applicable federal civil rights laws and [place] laws. We do not discriminate on the basis of race, color, national origin, age, disability, sex, sexual orientation, or gender identity.            Thank you!     Thank you for choosing [facility]  for your care. Our goal is always to provide you with excellent care. Hearing back from our patients is one way we can continue to improve our services. Please take a few minutes to complete the written survey that you may receive in the mail after your visit with us. Thank you!             Your Updated Medication List - Protect others around you: Learn how to safely use, store and throw away your medicines at www.disposemymeds.org.          This list is accurate as of: 11/10/17 12:25 PM.  Always use your most recent med list.                   Brand Name Dispense Instructions for use Diagnosis    Acidophilus Tabs      1 tablet daily        albuterol 108 (90 BASE) MCG/ACT Inhaler    PROAIR HFA    1 Inhaler    Inhale 2 puffs into the lungs every 4 hours as needed for shortness of breath / dyspnea or wheezing    Moderate persistent asthma without complication       ascorbic acid 500 MG tablet    VITAMIN C     2 TABLET DAILY        atenolol 25 MG tablet    TENORMIN    90 tablet    TAKE 1 TABLET DAILY    Essential hypertension with goal blood pressure less than 140/90       budesonide-formoterol 160-4.5 MCG/ACT Inhaler    SYMBICORT    1 Inhaler    Inhale 2 puffs into the lungs 2 times daily    Moderate persistent asthma without complication       CALCIUM 600+D PO      250 mg vitamin d- 3 tablets daily        DAILY MULTIVITAMIN PO      1 tablet daily        escitalopram 20 MG tablet    LEXAPRO    90 tablet    TAKE 1 TABLET DAILY    Major depression in complete remission (H)       flaxseed  oil 1000 MG Caps      1 tablet daily        fluticasone 50 MCG/ACT spray    FLONASE    1 Bottle    Spray 1-2 sprays into both nostrils daily    Acute sinusitis with symptoms > 10 days       gabapentin 300 MG capsule    NEURONTIN    270 capsule    TAKE 1 CAPSULE THREE TIMES A DAY    Chronic nonintractable headache, unspecified headache type       Glucosamine-Chondroitin Tabs      1500 mg glucosamine and 1200mg chondroitin daily-2 tablets daily        hydrocortisone 2.5 % cream     60 g    Apply topically 2 times daily    Eyelid dermatitis, eczematous, unspecified laterality       ibuprofen 200 MG tablet    ADVIL/MOTRIN     take 1 tablet (200 mg) by oral route every 6 hours as needed with food        lisinopril 40 MG tablet    PRINIVIL/ZESTRIL    90 tablet    TAKE 1 TABLET DAILY    Essential hypertension with goal blood pressure less than 140/90       montelukast 10 MG tablet    SINGULAIR    90 tablet    Take 1 tablet (10 mg) by mouth At Bedtime    Moderate persistent asthma without complication       mupirocin 2 % cream    BACTROBAN    30 g    Apply topically 3 times daily for 7 days May change to ointment if cheaper.    Cellulitis of toe of left foot       pantoprazole 40 MG EC tablet    PROTONIX    90 tablet    TAKE 1 TABLET DAILY 30 TO 60 MINUTES BEFORE A MEAL    Gastroesophageal reflux disease without esophagitis       S-Adenosylmethionine 200 MG Tabs      2 tablets daily    Menopausal syndrome (hot flashes)       SUPER B COMPLEX Tabs      1 tablet daily        triamcinolone 0.1 % cream    KENALOG    60 g    Apply topically 2 times daily    Other atopic dermatitis       vitamin E 400 UNIT capsule   Generic drug:  vitamin E      Take one pill by mouth once daily        ZADITOR OP      Apply to eye daily as needed        ZYRTEC 10 MG tablet   Generic drug:  cetirizine      Take 10 mg by mouth daily.

## 2017-11-10 NOTE — PROGRESS NOTES
"   SUBJECTIVE:   CC: Miri Naylor is an 62 year old woman who presents for preventive health visit.     Healthy Habits:    Do you get at least three servings of calcium containing foods daily (dairy, green leafy vegetables, etc.)? yes    Amount of exercise or daily activities, outside of work: 3-4 day(s) per week    Problems taking medications regularly No    Medication side effects: Yes upset stomach, dizziness, fatigue    Have you had an eye exam in the past two years? yes    Do you see a dentist twice per year? yes    Do you have sleep apnea, excessive snoring or daytime drowsiness?yes      Concerns:  * mole on back  * recheck left toe infection, saw BRIANNA Hubbard in [place] 11/3/17, has spread  Started 16 days ago.  Was in [place] just prior for 2.5 weeks.  Got a blister and was walking in yucky water.  Got a blister on 4th toe, then that resolved the developed on the 3rd toe, treated with antibiotic ointment from bactroban with resolution    Now over the course of the day yesterday developed a red spot on the 5th toe as well    Just finished bactrim course, last dose today.    No fever.  Swelling in the 4th toe persistent, not worse.  Toes feel sensitive and \"burning\".        Today's PHQ-2 Score:   PHQ-2 ( 1999 [employer]) 11/10/2017 7/2/2015   Q1: Little interest or pleasure in doing things 1 1   Q2: Feeling down, depressed or hopeless 1 0   PHQ-2 Score 2 1       Abuse: Current or Past(Physical, Sexual or Emotional)- No  Do you feel safe in your environment - Yes    Social History   Substance Use Topics     Smoking status: Never Smoker     Smokeless tobacco: Never Used     Alcohol use Yes      Comment: one glass of wine daily     The patient does not drink >3 drinks per day nor >7 drinks per week.    Reviewed orders with patient.  Reviewed health maintenance and updated orders accordingly - Yes    Patient over age 50, mutual decision to screen reflected in health maintenance.      Pertinent " "mammograms are reviewed under the imaging tab.  History of abnormal Pap smear:   YES - updated in Problem List and Health Maintenance accordingly  Last 3 Pap Results:   PAP (no units)   Date Value   07/02/2015 NIL   12/03/2013 NIL   07/27/2012 NIL       Reviewed and updated as needed this visit by clinical staffTobacco  Allergies  Meds  Med Hx  Surg Hx  Fam Hx  Soc Hx        Reviewed and updated as needed this visit by Provider  Tobacco  Allergies  Med Hx  Surg Hx  Fam Hx  Soc Hx       Past Medical History:   Diagnosis Date     Depressive disorder, not elsewhere classified      Drug allergy, multiple 9/23/14--passed graded oral challenge for Zithromax.     7/3/14 POS PRE-PEN skin test. 7/30/14 NEG skin tests and oral challenge to Cefzil     Hx of abnormal Pap smear ?    no specifics given     lumbar degeneration      Raynaud's disease      Unspecified essential hypertension       Past Surgical History:   Procedure Laterality Date     BIOPSY OF BREAST, NEEDLE CORE       C PELVIS/HIP JOINT SURGERY UNLISTED Right 7/19/16    total hip arthroplasty     COLONOSCOPY       HIP SURGERY Right 07/19/2016       ROS:  C: NEGATIVE for fever, chills, change in weight  INTEGUMENTARY/SKIN: as above  E: NEGATIVE for vision changes or irritation  ENT: NEGATIVE for ear, mouth and throat problems  R: NEGATIVE for significant cough or SOB  B: NEGATIVE for masses, tenderness or discharge  CV: NEGATIVE for chest pain, palpitations or peripheral edema  GI: NEGATIVE for nausea, abdominal pain, heartburn, or change in bowel habits  : NEGATIVE for unusual urinary or vaginal symptoms. No vaginal bleeding.  M: NEGATIVE for significant arthralgias or myalgia  N: NEGATIVE for weakness, dizziness or paresthesias  P: NEGATIVE for changes in mood or affect     OBJECTIVE:   /78  Pulse 64  Temp 98.2  F (36.8  C) (Tympanic)  Ht 5' 11\" (1.803 m)  Wt 203 lb 6.4 oz (92.3 kg)  Breastfeeding? No  BMI 28.37 kg/m2  EXAM:  GENERAL: " healthy, alert and no distress  EYES: Eyes grossly normal to inspection, PERRL and conjunctivae and sclerae normal  HENT: ear canals and TM's normal, nose and mouth without ulcers or lesions  NECK: no adenopathy, no asymmetry, masses, or scars and thyroid normal to palpation  RESP: lungs clear to auscultation - no rales, rhonchi or wheezes  BREAST: normal without masses, tenderness or nipple discharge and no palpable axillary masses or adenopathy  CV: regular rate and rhythm, normal S1 S2, no S3 or S4, no murmur, click or rub, no peripheral edema and peripheral pulses strong  ABDOMEN: soft, nontender, no hepatosplenomegaly, no masses and bowel sounds normal  MS: no gross musculoskeletal defects noted, no edema  L foot with erythema on 2nd and 5th digits, c/w burn rather then cellulitis.  Mild diffuse erythema on 4th toe with mils swelling, no warmth noted.  Cap refill <3 seconds.  SKIN: large and somewhat irritate probable SK on R back.    NEURO: Normal strength and tone, mentation intact and speech normal  PSYCH: mentation appears normal, affect normal/bright    X-ray l foot appears normal per my visualization    Sed rate and CBC wnl, CRP pending.    ASSESSMENT/PLAN:       ICD-10-CM    1. Encounter for routine adult health examination without abnormal findings Z00.00    2. Skin lesion L98.9 DERMATOLOGY REFERRAL   3. Erythema of foot L53.9 CBC with platelets and differential     ESR: Erythrocyte sedimentation rate     CRP, inflammation     XR Foot Left G/E 3 Views     Unclear etiology of foot erythema, appears more burn like then infectious.  Has continue to evolve on antibiotic but not really spread (like cellulitis).  CBC and sed rate wnl.  Will plan ice, elevation over the weekend and montioring.  Reviewed reasons to seek care over the weekend.  Update with symptoms Monday.    COUNSELING:   Reviewed preventive health counseling, as reflected in patient instructions  Special attention given to:        Regular  "exercise       Healthy diet/nutrition       Osteoporosis Prevention/Bone Health       Colon cancer screening      BP Screening:   Last 3 BP Readings:    BP Readings from Last 3 Encounters:   11/10/17 128/78   11/03/17 146/90   07/24/17 145/80          reports that she has never smoked. She has never used smokeless tobacco.    Estimated body mass index is 28.37 kg/(m^2) as calculated from the following:    Height as of this encounter: 5' 11\" (1.803 m).    Weight as of this encounter: 203 lb 6.4 oz (92.3 kg).   Weight management plan: Discussed healthy diet and exercise guidelines and patient will follow up in 12 months in clinic to re-evaluate.    Counseling Resources:  ATP IV Guidelines  Pooled Cohorts Equation Calculator  Breast Cancer Risk Calculator  FRAX Risk Assessment  ICSI Preventive Guidelines  Dietary Guidelines for Americans, 2010  [employer]'s My[employer]ate  ASA Prophylaxis  Lung CA Screening    Elizabeth Corley,   [facility]    Patient Instructions     Preventive Health Recommendations  Female Ages 50 - 64    Yearly exam: See your health care provider every year in order to  o Review health changes.   o Discuss preventive care.    o Review your medicines if your doctor has prescribed any.      Get a Pap test every three years (unless you have an abnormal result and your provider advises testing more often).    If you get Pap tests with HPV test, you only need to test every 5 years, unless you have an abnormal result.     You do not need a Pap test if your uterus was removed (hysterectomy) and you have not had cancer.    You should be tested each year for STDs (sexually transmitted diseases) if you're at risk.     Have a mammogram every 1 to 2 years.    Have a colonoscopy at age 50, or have a yearly FIT test (stool test). These exams screen for colon cancer.      Have a cholesterol test every 5 years, or more often if advised.    Have a diabetes test (fasting glucose) every three years. If you are " at risk for diabetes, you should have this test more often.     If you are at risk for osteoporosis (brittle bone disease), think about having a bone density scan (DEXA).    Shots: Get a flu shot each year. Get a tetanus shot every 10 years.    Nutrition:     Eat at least 5 servings of fruits and vegetables each day.    Eat whole-grain bread, whole-wheat pasta and brown rice instead of white grains and rice.    Talk to your provider about Calcium and Vitamin D.     Lifestyle    Exercise at least 150 minutes a week (30 minutes a day, 5 days a week). This will help you control your weight and prevent disease.    Limit alcohol to one drink per day.    No smoking.     Wear sunscreen to prevent skin cancer.     See your dentist every six months for an exam and cleaning.    See your eye doctor every 1 to 2 years.

## 2017-11-10 NOTE — NURSING NOTE
"Initial /78  Pulse 64  Temp 98.2  F (36.8  C) (Tympanic)  Ht 5' 11\" (1.803 m)  Wt 203 lb 6.4 oz (92.3 kg)  Breastfeeding? No  BMI 28.37 kg/m2 Estimated body mass index is 28.37 kg/(m^2) as calculated from the following:    Height as of this encounter: 5' 11\" (1.803 m).    Weight as of this encounter: 203 lb 6.4 oz (92.3 kg). .      "

## 2017-11-13 ENCOUNTER — MYC MEDICAL ADVICE (OUTPATIENT)
Dept: FAMILY MEDICINE | Facility: CLINIC | Age: 62
End: 2017-11-13

## 2017-12-18 ENCOUNTER — OFFICE VISIT (OUTPATIENT)
Dept: DERMATOLOGY | Facility: CLINIC | Age: 62
End: 2017-12-18
Payer: COMMERCIAL

## 2017-12-18 VITALS — OXYGEN SATURATION: 95 % | SYSTOLIC BLOOD PRESSURE: 148 MMHG | HEART RATE: 63 BPM | DIASTOLIC BLOOD PRESSURE: 95 MMHG

## 2017-12-18 DIAGNOSIS — L82.0 INFLAMED SEBORRHEIC KERATOSIS: Primary | ICD-10-CM

## 2017-12-18 DIAGNOSIS — D18.00 ANGIOMA: ICD-10-CM

## 2017-12-18 DIAGNOSIS — L82.1 SK (SEBORRHEIC KERATOSIS): ICD-10-CM

## 2017-12-18 DIAGNOSIS — L81.4 LENTIGO: ICD-10-CM

## 2017-12-18 PROCEDURE — 99203 OFFICE O/P NEW LOW 30 MIN: CPT | Mod: 25 | Performed by: DERMATOLOGY

## 2017-12-18 PROCEDURE — 17110 DESTRUCTION B9 LES UP TO 14: CPT | Performed by: DERMATOLOGY

## 2017-12-18 NOTE — NURSING NOTE
"Initial BP (!) 148/95  Pulse 63  SpO2 95% Estimated body mass index is 28.37 kg/(m^2) as calculated from the following:    Height as of 11/10/17: 1.803 m (5' 11\").    Weight as of 11/10/17: 92.3 kg (203 lb 6.4 oz). .    Lisa Fernandez LPN    "

## 2017-12-18 NOTE — PATIENT INSTRUCTIONS
WOUND CARE INSTRUCTIONS   FOR CRYOSURGERY   Back and lower abdomen  This area treated with liquid nitrogen will form a blister. You do not need to bandage the area until after the blister forms and breaks (which may be a few days). When the blister breaks, begin daily dressing changes as follows:   1) Clean and dry the area with tap water using clean Q-tip or sterile gauze pad.   2) Apply Polysporin ointment or Bacitracin ointment over entire wound. Do NOT use Neosporin ointment.   3) Cover the wound with a band-aid or sterile non-stick gauze pad and micropore paper tape.   REPEAT THESE INSTRUCTIONS AT LEAST ONCE A DAY UNTIL THE WOUND HAS COMPLETELY HEALED.   It is an old wives tale that a wound heals better when it is exposed to air and allowed to dry out. The wound will heal faster with a better cosmetic result if it is kept moist with ointment and covered with a bandage.   Do not let the wound dry out.   IMPORTANT INFORMATION ON REVERSE SIDE   Supplies Needed:   *Cotton tipped applicators (Q-tips)   *Polysporin ointment or Bacitracin ointment (NOT NEOSPORIN)   *Band-aids, or non stick gauze pads and micropore paper tape   PATIENT INFORMATION   During the healing process you will notice a number of changes. All wounds develop a small halo of redness surrounding the wound. This means healing is occurring. Severe itching with extensive redness usually indicates sensitivity to the ointment or bandage tape used to dress the wound. You should call our office if this develops.   Swelling and/or discoloration around your surgical site is common, particularly when performed around the eye.   All wounds normally drain. The larger the wound the more drainage there will be. After 7-10 days, you will notice the wound beginning to shrink and new skin will begin to grow. The wound is healed when you can see skin has formed over the entire area. A healed wound has a healthy, shiny look to the surface and is red to dark pink in  color to normalize. Wounds may take approximately 4-6 weeks to heal. Larger wounds may take 6-8 weeks. After the wound is healed you may discontinue dressing changes.   You may experience a sensation of tightness as your wound heals. This is normal and will gradually subside.   Your healed wound may be sensitive to temperature changes. This sensitivity improves with time, but if you re having a lot of discomfort, try to avoid temperature extremes.   Patients frequently experience itching after their wound appears to have healed because of the continue healing under the skin. Plain Vaseline will help relieve the itching.

## 2017-12-18 NOTE — PROGRESS NOTES
Miri Naylor is a 62 year old year old female patient here today for itching spot on back and r ab.   .  Patient states this has been present for a while.  Patient reports the following symptoms:  itching .  Patient reports the following previous treatments none.  Patient reports the following modifying factors none.  Associated symptoms: none.  Patient has no other skin complaints today.  Remainder of the HPI, Meds, PMH, Allergies, FH, and SH was reviewed in chart.      Past Medical History:   Diagnosis Date     Depressive disorder, not elsewhere classified      Drug allergy, multiple 9/23/14--passed graded oral challenge for Zithromax.     7/3/14 POS PRE-PEN skin test. 7/30/14 NEG skin tests and oral challenge to Cefzil     Hx of abnormal Pap smear ?    no specifics given     lumbar degeneration      Raynaud's disease      Unspecified essential hypertension        Past Surgical History:   Procedure Laterality Date     BIOPSY OF BREAST, NEEDLE CORE       C PELVIS/HIP JOINT SURGERY UNLISTED Right 7/19/16    total hip arthroplasty     COLONOSCOPY       HIP SURGERY Right 07/19/2016        Family History   Problem Relation Age of Onset     Arthritis Mother      Hyperlipidemia Mother      Other Cancer Mother      Glioblastoma Multiforme     OSTEOPOROSIS Mother      Blood Disease Father      Hypertension Father      Hyperlipidemia Father      Other Cancer Father      lung cancer     Depression Son      Depression Daughter      Hyperlipidemia Brother      Anxiety Disorder Son      Skin Cancer No family hx of        Social History     Social History     Marital status:      Spouse name: N/A     Number of children: N/A     Years of education: N/A     Occupational History     Not on file.     Social History Main Topics     Smoking status: Never Smoker     Smokeless tobacco: Never Used     Alcohol use Yes      Comment: one glass of wine daily     Drug use: No     Sexual activity: Yes     Partners: Male     Other  Topics Concern     Parent/Sibling W/ Cabg, Mi Or Angioplasty Before 65f 55m? No     Social History Narrative       Outpatient Encounter Prescriptions as of 12/18/2017   Medication Sig Dispense Refill     escitalopram (LEXAPRO) 20 MG tablet TAKE 1 TABLET DAILY 90 tablet 1     pantoprazole (PROTONIX) 40 MG EC tablet TAKE 1 TABLET DAILY 30 TO 60 MINUTES BEFORE A MEAL 90 tablet 3     lisinopril (PRINIVIL/ZESTRIL) 40 MG tablet TAKE 1 TABLET DAILY 90 tablet 1     gabapentin (NEURONTIN) 300 MG capsule TAKE 1 CAPSULE THREE TIMES A  capsule 1     fluticasone (FLONASE) 50 MCG/ACT spray Spray 1-2 sprays into both nostrils daily 1 Bottle 11     montelukast (SINGULAIR) 10 MG tablet Take 1 tablet (10 mg) by mouth At Bedtime 90 tablet 3     atenolol (TENORMIN) 25 MG tablet TAKE 1 TABLET DAILY 90 tablet 2     budesonide-formoterol (SYMBICORT) 160-4.5 MCG/ACT Inhaler Inhale 2 puffs into the lungs 2 times daily 1 Inhaler 11     Ketotifen Fumarate (ZADITOR OP) Apply to eye daily as needed       hydrocortisone 2.5 % cream Apply topically 2 times daily 60 g 1     triamcinolone (KENALOG) 0.1 % cream Apply topically 2 times daily (Patient taking differently: Apply topically 2 times daily as needed ) 60 g 3     albuterol (ALBUTEROL) 108 (90 BASE) MCG/ACT Inhaler Inhale 2 puffs into the lungs every 4 hours as needed for shortness of breath / dyspnea or wheezing 1 Inhaler 1     ibuprofen (ADVIL,MOTRIN) 200 MG tablet take 1 tablet (200 mg) by oral route every 6 hours as needed with food       vitamin E (E-400) 400 UNIT capsule Take one pill by mouth once daily       cetirizine (ZYRTEC) 10 MG tablet Take 10 mg by mouth daily.       VITAMIN C 500 MG PO TABS 2 TABLET DAILY       ACIDOPHILUS OR TABS 1 tablet daily       SUPER B COMPLEX PO TABS 1 tablet daily       FLAXSEED OIL 1000 MG PO CAPS 1 tablet daily       GLUCOSAMINE-CHONDROITIN PO TABS 1500 mg glucosamine and 1200mg chondroitin daily-2 tablets daily       CALCIUM 600+D  mg  vitamin d- 3 tablets daily       DAILY MULTIVITAMIN PO 1 tablet daily       S-ADENOSYLMETHIONINE 200 MG PO TABS 2 tablets daily       No facility-administered encounter medications on file as of 12/18/2017.              Review Of Systems  Skin: As above  Eyes: negative  Ears/Nose/Throat: negative  Respiratory: No shortness of breath, dyspnea on exertion, cough, or hemoptysis  Cardiovascular: negative  Gastrointestinal: negative  Genitourinary: negative  Musculoskeletal: negative  Neurologic: negative  Psychiatric: negative  Hematologic/Lymphatic/Immunologic: negative  Endocrine: negative      O:   NAD, WDWN, Alert & Oriented, Mood & Affect wnl, Vitals stable   Here today alone   BP (!) 148/95  Pulse 63  SpO2 95%   General appearance normal   Vitals stable   Alert, oriented and in no acute distress      Following lymph nodes palpated: Occipital, Cervical, Supraclavicular no lad   Stuck on papules and brown macules on trunk and ext    Red papules on trunk   Inflamed seborrheic keratosis r back and r ab         The remainder of the full exam was unremarkable; the following areas were examined:  conjunctiva/lids, oral mucosa, neck, peripheral vascular system, abdomen, lymph nodes, digits/nails, eccrine and apocrine glands, scalp/hair, face, neck, chest, abdomen, buttocks, back, RUE, LUE, RLE, LLE       Eyes: Conjunctivae/lids:Normal     ENT: Lips, buccal mucosa, tongue: normal    MSK:Normal    Cardiovascular: peripheral edema none    Pulm: Breathing Normal    Lymph Nodes: No Head and Neck Lymphadenopathy     Neuro/Psych: Orientation:Normal; Mood/Affect:Normal      A/P:  1. Seborrheic keratosis, lentigo, angioma  2. Inflamed seborrheic keratosis x2  LN2:  Treated with LN2 for 5s for 1-2 cycles. Warned risks of blistering, pain, pigment change, scarring, and incomplete resolution.  Advised patient to return if lesions do not completely resolve.  Wound care sheet given.  BENIGN LESIONS DISCUSSED WITH PATIENT:  I  discussed the specifics of tumor, prognosis, and genetics of benign lesions.  I explained that treatment of these lesions would be purely cosmetic and not medically neccessary.  I discussed with patient different removal options including excision, cautery and /or laser.      Nature and genetics of benign skin lesions dicussed with patient.  Signs and Symptoms of skin cancer discussed with patient.  Patient encouraged to perform monthly skin exams.  UV precautions reviewed with patient.  Skin care regimen reviewed with patient: Eliminate harsh soaps, i.e. Dial, zest, irsih spring; Mild soaps such as Cetaphil or Dove sensitive skin, avoid hot or cold showers, aggressive use of emollients including vanicream, cetaphil or cerave discussed with patient.    Risks of non-melanoma skin cancer discussed with patient   Return to clinic 12 months

## 2017-12-18 NOTE — LETTER
12/18/2017         RE: Miri Naylor  [address]  [place] [address]        Dear Colleague,    Thank you for referring your patient, Miri Naylor, to the [facility]. Please see a copy of my visit note below.    Miri Naylor is a 62 year old year old female patient here today for itching spot on back and r ab.   .  Patient states this has been present for a while.  Patient reports the following symptoms:  itching .  Patient reports the following previous treatments none.  Patient reports the following modifying factors none.  Associated symptoms: none.  Patient has no other skin complaints today.  Remainder of the HPI, Meds, PMH, Allergies, FH, and SH was reviewed in chart.      Past Medical History:   Diagnosis Date     Depressive disorder, not elsewhere classified      Drug allergy, multiple 9/23/14--passed graded oral challenge for Zithromax.     7/3/14 POS PRE-PEN skin test. 7/30/14 NEG skin tests and oral challenge to Cefzil     Hx of abnormal Pap smear ?    no specifics given     lumbar degeneration      Raynaud's disease      Unspecified essential hypertension        Past Surgical History:   Procedure Laterality Date     BIOPSY OF BREAST, NEEDLE CORE       C PELVIS/HIP JOINT SURGERY UNLISTED Right 7/19/16    total hip arthroplasty     COLONOSCOPY       HIP SURGERY Right 07/19/2016        Family History   Problem Relation Age of Onset     Arthritis Mother      Hyperlipidemia Mother      Other Cancer Mother      Glioblastoma Multiforme     OSTEOPOROSIS Mother      Blood Disease Father      Hypertension Father      Hyperlipidemia Father      Other Cancer Father      lung cancer     Depression Son      Depression Daughter      Hyperlipidemia Brother      Anxiety Disorder Son      Skin Cancer No family hx of        Social History     Social History     Marital status:      Spouse name: N/A     Number of children: N/A     Years of education: N/A      Occupational History     Not on file.     Social History Main Topics     Smoking status: Never Smoker     Smokeless tobacco: Never Used     Alcohol use Yes      Comment: one glass of wine daily     Drug use: No     Sexual activity: Yes     Partners: Male     Other Topics Concern     Parent/Sibling W/ Cabg, Mi Or Angioplasty Before 65f 55m? No     Social History Narrative       Outpatient Encounter Prescriptions as of 12/18/2017   Medication Sig Dispense Refill     escitalopram (LEXAPRO) 20 MG tablet TAKE 1 TABLET DAILY 90 tablet 1     pantoprazole (PROTONIX) 40 MG EC tablet TAKE 1 TABLET DAILY 30 TO 60 MINUTES BEFORE A MEAL 90 tablet 3     lisinopril (PRINIVIL/ZESTRIL) 40 MG tablet TAKE 1 TABLET DAILY 90 tablet 1     gabapentin (NEURONTIN) 300 MG capsule TAKE 1 CAPSULE THREE TIMES A  capsule 1     fluticasone (FLONASE) 50 MCG/ACT spray Spray 1-2 sprays into both nostrils daily 1 Bottle 11     montelukast (SINGULAIR) 10 MG tablet Take 1 tablet (10 mg) by mouth At Bedtime 90 tablet 3     atenolol (TENORMIN) 25 MG tablet TAKE 1 TABLET DAILY 90 tablet 2     budesonide-formoterol (SYMBICORT) 160-4.5 MCG/ACT Inhaler Inhale 2 puffs into the lungs 2 times daily 1 Inhaler 11     Ketotifen Fumarate (ZADITOR OP) Apply to eye daily as needed       hydrocortisone 2.5 % cream Apply topically 2 times daily 60 g 1     triamcinolone (KENALOG) 0.1 % cream Apply topically 2 times daily (Patient taking differently: Apply topically 2 times daily as needed ) 60 g 3     albuterol (ALBUTEROL) 108 (90 BASE) MCG/ACT Inhaler Inhale 2 puffs into the lungs every 4 hours as needed for shortness of breath / dyspnea or wheezing 1 Inhaler 1     ibuprofen (ADVIL,MOTRIN) 200 MG tablet take 1 tablet (200 mg) by oral route every 6 hours as needed with food       vitamin E (E-400) 400 UNIT capsule Take one pill by mouth once daily       cetirizine (ZYRTEC) 10 MG tablet Take 10 mg by mouth daily.       VITAMIN C 500 MG PO TABS 2 TABLET DAILY        ACIDOPHILUS OR TABS 1 tablet daily       SUPER B COMPLEX PO TABS 1 tablet daily       FLAXSEED OIL 1000 MG PO CAPS 1 tablet daily       GLUCOSAMINE-CHONDROITIN PO TABS 1500 mg glucosamine and 1200mg chondroitin daily-2 tablets daily       CALCIUM 600+D  mg vitamin d- 3 tablets daily       DAILY MULTIVITAMIN PO 1 tablet daily       S-ADENOSYLMETHIONINE 200 MG PO TABS 2 tablets daily       No facility-administered encounter medications on file as of 12/18/2017.              Review Of Systems  Skin: As above  Eyes: negative  Ears/Nose/Throat: negative  Respiratory: No shortness of breath, dyspnea on exertion, cough, or hemoptysis  Cardiovascular: negative  Gastrointestinal: negative  Genitourinary: negative  Musculoskeletal: negative  Neurologic: negative  Psychiatric: negative  Hematologic/Lymphatic/Immunologic: negative  Endocrine: negative      O:   NAD, WDWN, Alert & Oriented, Mood & Affect wnl, Vitals stable   Here today alone   BP (!) 148/95  Pulse 63  SpO2 95%   General appearance normal   Vitals stable   Alert, oriented and in no acute distress      Following lymph nodes palpated: Occipital, Cervical, Supraclavicular no lad   Stuck on papules and brown macules on trunk and ext    Red papules on trunk   Inflamed seborrheic keratosis r back and r ab         The remainder of the full exam was unremarkable; the following areas were examined:  conjunctiva/lids, oral mucosa, neck, peripheral vascular system, abdomen, lymph nodes, digits/nails, eccrine and apocrine glands, scalp/hair, face, neck, chest, abdomen, buttocks, back, RUE, LUE, RLE, LLE       Eyes: Conjunctivae/lids:Normal     ENT: Lips, buccal mucosa, tongue: normal    MSK:Normal    Cardiovascular: peripheral edema none    Pulm: Breathing Normal    Lymph Nodes: No Head and Neck Lymphadenopathy     Neuro/Psych: Orientation:Normal; Mood/Affect:Normal      A/P:  1. Seborrheic keratosis, lentigo, angioma  2. Inflamed seborrheic keratosis x2  LN2:   Treated with LN2 for 5s for 1-2 cycles. Warned risks of blistering, pain, pigment change, scarring, and incomplete resolution.  Advised patient to return if lesions do not completely resolve.  Wound care sheet given.  BENIGN LESIONS DISCUSSED WITH PATIENT:  I discussed the specifics of tumor, prognosis, and genetics of benign lesions.  I explained that treatment of these lesions would be purely cosmetic and not medically neccessary.  I discussed with patient different removal options including excision, cautery and /or laser.      Nature and genetics of benign skin lesions dicussed with patient.  Signs and Symptoms of skin cancer discussed with patient.  Patient encouraged to perform monthly skin exams.  UV precautions reviewed with patient.  Skin care regimen reviewed with patient: Eliminate harsh soaps, i.e. Dial, zest, irsih spring; Mild soaps such as Cetaphil or Dove sensitive skin, avoid hot or cold showers, aggressive use of emollients including vanicream, cetaphil or cerave discussed with patient.    Risks of non-melanoma skin cancer discussed with patient   Return to clinic 12 months      Again, thank you for allowing me to participate in the care of your patient.        Sincerely,        Erik Uribe MD

## 2017-12-18 NOTE — MR AVS SNAPSHOT
After Visit Summary   12/18/2017    Miri Naylor    MRN: [id number]           Patient Information     Date Of Birth          1955        Visit Information        Provider Department      12/18/2017 1:15 PM Erik Uribe MD [facility]        Care Instructions    WOUND CARE INSTRUCTIONS   FOR CRYOSURGERY   Back and lower abdomen  This area treated with liquid nitrogen will form a blister. You do not need to bandage the area until after the blister forms and breaks (which may be a few days). When the blister breaks, begin daily dressing changes as follows:   1) Clean and dry the area with tap water using clean Q-tip or sterile gauze pad.   2) Apply Polysporin ointment or Bacitracin ointment over entire wound. Do NOT use Neosporin ointment.   3) Cover the wound with a band-aid or sterile non-stick gauze pad and micropore paper tape.   REPEAT THESE INSTRUCTIONS AT LEAST ONCE A DAY UNTIL THE WOUND HAS COMPLETELY HEALED.   It is an old wives tale that a wound heals better when it is exposed to air and allowed to dry out. The wound will heal faster with a better cosmetic result if it is kept moist with ointment and covered with a bandage.   Do not let the wound dry out.   IMPORTANT INFORMATION ON REVERSE SIDE   Supplies Needed:   *Cotton tipped applicators (Q-tips)   *Polysporin ointment or Bacitracin ointment (NOT NEOSPORIN)   *Band-aids, or non stick gauze pads and micropore paper tape   PATIENT INFORMATION   During the healing process you will notice a number of changes. All wounds develop a small halo of redness surrounding the wound. This means healing is occurring. Severe itching with extensive redness usually indicates sensitivity to the ointment or bandage tape used to dress the wound. You should call our office if this develops.   Swelling and/or discoloration around your surgical site is common, particularly when performed around the eye.   All wounds normally  drain. The larger the wound the more drainage there will be. After 7-10 days, you will notice the wound beginning to shrink and new skin will begin to grow. The wound is healed when you can see skin has formed over the entire area. A healed wound has a healthy, shiny look to the surface and is red to dark pink in color to normalize. Wounds may take approximately 4-6 weeks to heal. Larger wounds may take 6-8 weeks. After the wound is healed you may discontinue dressing changes.   You may experience a sensation of tightness as your wound heals. This is normal and will gradually subside.   Your healed wound may be sensitive to temperature changes. This sensitivity improves with time, but if you re having a lot of discomfort, try to avoid temperature extremes.   Patients frequently experience itching after their wound appears to have healed because of the continue healing under the skin. Plain Vaseline will help relieve the itching.                 Follow-ups after your visit        Who to contact     If you have questions or need follow up information about today's clinic visit or your schedule please contact [facility] directly at [phone].  Normal or non-critical lab and imaging results will be communicated to you by [employer], letter or phone within 4 business days after the clinic has received the results. If you do not hear from us within 7 days, please contact the clinic through [employer] or phone. If you have a critical or abnormal lab result, we will notify you by phone as soon as possible.  Submit refill requests through [employer] or call your pharmacy and they will forward the refill request to us. Please allow 3 business days for your refill to be completed.          Additional Information About Your Visit        [employer] Information     [employer] gives you secure access to your electronic health record. If you see a primary care provider, you can also send messages to your care team and make  appointments. If you have questions, please call your primary care clinic.  If you do not have a primary care provider, please call [phone] and they will assist you.        Care EveryWhere ID     This is your Care EveryWhere ID. This could be used by other organizations to access your [facility] medical records  [id number]        Your Vitals Were     Pulse Pulse Oximetry                63 95%           Blood Pressure from Last 3 Encounters:   12/18/17 (!) 148/95   11/10/17 128/78   11/03/17 146/90    Weight from Last 3 Encounters:   11/10/17 92.3 kg (203 lb 6.4 oz)   11/03/17 91.4 kg (201 lb 6.4 oz)   08/16/17 91.4 kg (201 lb 9.6 oz)              Today, you had the following     No orders found for display         Today's Medication Changes          These changes are accurate as of: 12/18/17  1:51 PM.  If you have any questions, ask your nurse or doctor.               These medicines have changed or have updated prescriptions.        Dose/Directions    triamcinolone 0.1 % cream   Commonly known as:  KENALOG   This may have changed:    - when to take this  - reasons to take this   Used for:  Other atopic dermatitis        Apply topically 2 times daily   Quantity:  60 g   Refills:  3                Primary Care Provider Office Phone # Fax #    Elizabeth Wilkinsonsabas Corley [phone] [address] [place] [address]        Equal Access to Services     SONIA SANDERS AH: Hadii alesha Ferguson, waaxda luchioma, qaybta kaalmada lindy, bj espinoza. So [facility] [phone].    ATENCIÓN: Si habla español, tiene a dao disposición servicios gratuitos de asistencia lingüística. Llame al [phone].    We comply with applicable federal civil rights laws and [place] laws. We do not discriminate on the basis of race, color, national origin, age, disability, sex, sexual orientation, or gender identity.            Thank you!     Thank you for choosing [facility]  [place]  for your care. Our goal is always to provide you with excellent care. Hearing back from our patients is one way we can continue to improve our services. Please take a few minutes to complete the written survey that you may receive in the mail after your visit with us. Thank you!             Your Updated Medication List - Protect others around you: Learn how to safely use, store and throw away your medicines at www.disposemymeds.org.          This list is accurate as of: 12/18/17  1:51 PM.  Always use your most recent med list.                   Brand Name Dispense Instructions for use Diagnosis    Acidophilus Tabs      1 tablet daily        albuterol 108 (90 BASE) MCG/ACT Inhaler    PROAIR HFA    1 Inhaler    Inhale 2 puffs into the lungs every 4 hours as needed for shortness of breath / dyspnea or wheezing    Moderate persistent asthma without complication       ascorbic acid 500 MG tablet    VITAMIN C     2 TABLET DAILY        atenolol 25 MG tablet    TENORMIN    90 tablet    TAKE 1 TABLET DAILY    Essential hypertension with goal blood pressure less than 140/90       budesonide-formoterol 160-4.5 MCG/ACT Inhaler    SYMBICORT    1 Inhaler    Inhale 2 puffs into the lungs 2 times daily    Moderate persistent asthma without complication       CALCIUM 600+D PO      250 mg vitamin d- 3 tablets daily        DAILY MULTIVITAMIN PO      1 tablet daily        escitalopram 20 MG tablet    LEXAPRO    90 tablet    TAKE 1 TABLET DAILY    Major depression in complete remission (H)       flaxseed oil 1000 MG Caps      1 tablet daily        fluticasone 50 MCG/ACT spray    FLONASE    1 Bottle    Spray 1-2 sprays into both nostrils daily    Acute sinusitis with symptoms > 10 days       gabapentin 300 MG capsule    NEURONTIN    270 capsule    TAKE 1 CAPSULE THREE TIMES A DAY    Chronic nonintractable headache, unspecified headache type       Glucosamine-Chondroitin Tabs      1500 mg glucosamine and 1200mg chondroitin  daily-2 tablets daily        hydrocortisone 2.5 % cream     60 g    Apply topically 2 times daily    Eyelid dermatitis, eczematous, unspecified laterality       ibuprofen 200 MG tablet    ADVIL/MOTRIN     take 1 tablet (200 mg) by oral route every 6 hours as needed with food        lisinopril 40 MG tablet    PRINIVIL/ZESTRIL    90 tablet    TAKE 1 TABLET DAILY    Essential hypertension with goal blood pressure less than 140/90       montelukast 10 MG tablet    SINGULAIR    90 tablet    Take 1 tablet (10 mg) by mouth At Bedtime    Moderate persistent asthma without complication       pantoprazole 40 MG EC tablet    PROTONIX    90 tablet    TAKE 1 TABLET DAILY 30 TO 60 MINUTES BEFORE A MEAL    Gastroesophageal reflux disease without esophagitis       S-Adenosylmethionine 200 MG Tabs      2 tablets daily    Menopausal syndrome (hot flashes)       SUPER B COMPLEX Tabs      1 tablet daily        triamcinolone 0.1 % cream    KENALOG    60 g    Apply topically 2 times daily    Other atopic dermatitis       vitamin E 400 UNIT capsule   Generic drug:  vitamin E      Take one pill by mouth once daily        ZADITOR OP      Apply to eye daily as needed        ZYRTEC 10 MG tablet   Generic drug:  cetirizine      Take 10 mg by mouth daily.

## 2018-01-14 ENCOUNTER — MYC MEDICAL ADVICE (OUTPATIENT)
Dept: FAMILY MEDICINE | Facility: CLINIC | Age: 63
End: 2018-01-14

## 2018-01-14 DIAGNOSIS — E78.5 HYPERLIPIDEMIA LDL GOAL <130: Primary | ICD-10-CM

## 2018-01-15 RX ORDER — PRAVASTATIN SODIUM 40 MG
40 TABLET ORAL DAILY
Qty: 90 TABLET | Refills: 3 | Status: SHIPPED | OUTPATIENT
Start: 2018-01-15 | End: 2018-08-30

## 2018-01-15 NOTE — TELEPHONE ENCOUNTER
Dr. Corley -   It looks like patient was restarted on the pravastatin 40 mg daily on 8/29/17. I do not see it is active on her medication list. Please resend a new script if appropriate. Thank you.  Ne Gardiner RN

## 2018-01-18 ENCOUNTER — ANESTHESIA EVENT (OUTPATIENT)
Dept: GASTROENTEROLOGY | Facility: CLINIC | Age: 63
End: 2018-01-18
Payer: COMMERCIAL

## 2018-01-18 NOTE — ANESTHESIA PREPROCEDURE EVALUATION
Anesthesia Evaluation     . Pt has had prior anesthetic.            ROS/MED HX    ENT/Pulmonary: Comment: sinusitis    (+)Moderate Persistent asthma , . .    Neurologic: Comment: Trigeminal neuritis  Headaches  Raynaud's      Cardiovascular:     (+) Dyslipidemia, hypertension----. : . . . :. . Previous cardiac testing date:results:date: results:ECG reviewed date:7-12-16 results:Sinus Bradycardia (56)  WITHIN NORMAL LIMITS date: results:          METS/Exercise Tolerance:     Hematologic:         Musculoskeletal: Comment: Mandibular osteomyelitis  Lumbar degeneration        GI/Hepatic:     (+) GERD       Renal/Genitourinary:         Endo:         Psychiatric:     (+) psychiatric history depression      Infectious Disease:         Malignancy:         Other: Comment: Dermatitis  psoriasis                    Physical Exam  Normal systems: cardiovascular, pulmonary and dental    Airway   Mallampati: II  TM distance: >3 FB  Neck ROM: full    Dental     Cardiovascular       Pulmonary                     Anesthesia Plan      History & Physical Review  History and physical reviewed and following examination; no interval change.    ASA Status:  3 .    NPO Status:  > 6 hours    Plan for MAC Reason for MAC:  Deep or markedly invasive procedure (G8)         Postoperative Care      Consents  Anesthetic plan, risks, benefits and alternatives discussed with:  Patient..                          .

## 2018-01-23 ENCOUNTER — SURGERY (OUTPATIENT)
Age: 63
End: 2018-01-23

## 2018-01-23 ENCOUNTER — ANESTHESIA (OUTPATIENT)
Dept: GASTROENTEROLOGY | Facility: CLINIC | Age: 63
End: 2018-01-23
Payer: COMMERCIAL

## 2018-01-23 ENCOUNTER — HOSPITAL ENCOUNTER (OUTPATIENT)
Facility: CLINIC | Age: 63
Discharge: HOME | End: 2018-01-23
Attending: SURGERY | Admitting: SURGERY
Payer: COMMERCIAL

## 2018-01-23 VITALS
DIASTOLIC BLOOD PRESSURE: 85 MMHG | HEIGHT: 71 IN | OXYGEN SATURATION: 100 % | TEMPERATURE: 97.7 F | BODY MASS INDEX: 28.7 KG/M2 | WEIGHT: 205 LBS | RESPIRATION RATE: 16 BRPM | SYSTOLIC BLOOD PRESSURE: 155 MMHG

## 2018-01-23 LAB — COLONOSCOPY: NORMAL

## 2018-01-23 PROCEDURE — 25000128 H RX IP 250 OP 636: Performed by: NURSE ANESTHETIST, CERTIFIED REGISTERED

## 2018-01-23 PROCEDURE — 88305 TISSUE EXAM BY PATHOLOGIST: CPT | Performed by: SURGERY

## 2018-01-23 PROCEDURE — 43250 EGD CAUTERY TUMOR POLYP: CPT | Performed by: SURGERY

## 2018-01-23 PROCEDURE — 25000125 ZZHC RX 250: Performed by: NURSE ANESTHETIST, CERTIFIED REGISTERED

## 2018-01-23 PROCEDURE — 25000128 H RX IP 250 OP 636: Performed by: SURGERY

## 2018-01-23 PROCEDURE — 37000008 ZZH ANESTHESIA TECHNICAL FEE, 1ST 30 MIN: Performed by: SURGERY

## 2018-01-23 PROCEDURE — 45385 COLONOSCOPY W/LESION REMOVAL: CPT | Mod: PT | Performed by: SURGERY

## 2018-01-23 PROCEDURE — 88305 TISSUE EXAM BY PATHOLOGIST: CPT | Mod: 26 | Performed by: SURGERY

## 2018-01-23 RX ORDER — LIDOCAINE 40 MG/G
CREAM TOPICAL
Status: DISCONTINUED | OUTPATIENT
Start: 2018-01-23 | End: 2018-01-23 | Stop reason: HOSPADM

## 2018-01-23 RX ORDER — SODIUM CHLORIDE, SODIUM LACTATE, POTASSIUM CHLORIDE, CALCIUM CHLORIDE 600; 310; 30; 20 MG/100ML; MG/100ML; MG/100ML; MG/100ML
INJECTION, SOLUTION INTRAVENOUS CONTINUOUS
Status: DISCONTINUED | OUTPATIENT
Start: 2018-01-23 | End: 2018-01-23 | Stop reason: HOSPADM

## 2018-01-23 RX ORDER — PROPOFOL 10 MG/ML
INJECTION, EMULSION INTRAVENOUS PRN
Status: DISCONTINUED | OUTPATIENT
Start: 2018-01-23 | End: 2018-01-23

## 2018-01-23 RX ORDER — PROPOFOL 10 MG/ML
INJECTION, EMULSION INTRAVENOUS CONTINUOUS PRN
Status: DISCONTINUED | OUTPATIENT
Start: 2018-01-23 | End: 2018-01-23

## 2018-01-23 RX ORDER — ONDANSETRON 2 MG/ML
4 INJECTION INTRAMUSCULAR; INTRAVENOUS
Status: DISCONTINUED | OUTPATIENT
Start: 2018-01-23 | End: 2018-01-23 | Stop reason: HOSPADM

## 2018-01-23 RX ADMIN — SODIUM CHLORIDE, POTASSIUM CHLORIDE, SODIUM LACTATE AND CALCIUM CHLORIDE: 600; 310; 30; 20 INJECTION, SOLUTION INTRAVENOUS at 07:39

## 2018-01-23 RX ADMIN — LIDOCAINE HYDROCHLORIDE 0.2 ML: 10 INJECTION, SOLUTION EPIDURAL; INFILTRATION; INTRACAUDAL; PERINEURAL at 07:38

## 2018-01-23 RX ADMIN — PROPOFOL 200 MCG/KG/MIN: 10 INJECTION, EMULSION INTRAVENOUS at 08:32

## 2018-01-23 RX ADMIN — SODIUM CHLORIDE, POTASSIUM CHLORIDE, SODIUM LACTATE AND CALCIUM CHLORIDE: 600; 310; 30; 20 INJECTION, SOLUTION INTRAVENOUS at 08:29

## 2018-01-23 RX ADMIN — PROPOFOL 50 MG: 10 INJECTION, EMULSION INTRAVENOUS at 08:31

## 2018-01-23 RX ADMIN — PROPOFOL 50 MG: 10 INJECTION, EMULSION INTRAVENOUS at 08:32

## 2018-01-23 NOTE — BRIEF OP NOTE
[facility]   Brief Operative Note    Pre-operative diagnosis: screening   Post-operative diagnosis single polyp, otherwise normal   Procedure: Procedure(s):  Colonoscopy - Wound Class: II-Clean Contaminated   Surgeon(s): Surgeon(s) and Role:     * Harley Weiner MD - Primary   Estimated blood loss: * No values recorded between 1/23/2018 12:00 AM and 1/23/2018  8:57 AM *    Specimens:   ID Type Source Tests Collected by Time Destination   A : polyp at 20 cm Polyp Colon SURGICAL PATHOLOGY EXAM Harley Weiner MD 1/23/2018  8:38 AM       Findings: 1. Single 4 mm polyp at 20 cm  2. Colon otherwise normal

## 2018-01-23 NOTE — H&P
62 year old year old female here for colonoscopy for screening.    Patient Active Problem List   Diagnosis     Major depression in complete remission (H)     Essential hypertension     Raynaud's disease     Backache     Hypertension goal BP (blood pressure) < 140/90     Allergic state     Psoriasis     Advanced directives, counseling/discussion     Abnormal Pap smear of cervix     24 hour contact handout given     Hyperlipidemia LDL goal <130     GERD (gastroesophageal reflux disease)     Family history of colon cancer     Penicillin allergy     Drug allergy, multiple     Chronic nonintractable headache, unspecified headache type     Moderate persistent asthma without complication     Eyelid dermatitis, eczematous, unspecified laterality     Rhinoconjunctivitis     Other atopic dermatitis     Acute sinusitis with symptoms > 10 days       Past Medical History:   Diagnosis Date     Depressive disorder, not elsewhere classified      Drug allergy, multiple 9/23/14--passed graded oral challenge for Zithromax.     7/3/14 POS PRE-PEN skin test. 7/30/14 NEG skin tests and oral challenge to Cefzil     Hx of abnormal Pap smear ?    no specifics given     lumbar degeneration      Raynaud's disease      Unspecified essential hypertension        Past Surgical History:   Procedure Laterality Date     BIOPSY OF BREAST, NEEDLE CORE       C PELVIS/HIP JOINT SURGERY UNLISTED Right 7/19/16    total hip arthroplasty     COLONOSCOPY       [facility]IP SURGERY Right 07/19/2016       [facility]    No current outpatient prescriptions on file.       Allergies   Allergen Reactions     Levofloxacin Rash     Other reaction(s): Contact Dermatitis     Penicillins Hives and Rash     Confirmed by skin prick testing 7/3/14     Amoxicillin Hives and Rash     Amoxicillin-Pot Clavulanate Rash     Azithromycin Rash     Cephalexin Rash     Clindamycin Rash and Hives     Atorvastatin Other (See Comments)     Felt sick, intolerance     Clindamycin Hcl Hives      "Levaquin      tendonitis     Augmentin Rash       Pt reports that she has never smoked. She has never used smokeless tobacco. She reports that she drinks alcohol. She reports that she does not use illicit drugs.    Exam:  /86  Temp 97.7  F (36.5  C) (Oral)  Resp 18  Ht 1.803 m (5' 11\")  Wt 93 kg (205 lb)  SpO2 96%  BMI 28.59 kg/m2    Awake, Alert OX3  Lungs - CTA bilaterally  CV - RRR, no murmurs, distal pulses intact  Abd - soft, non-distended, non-tender, +BS  Extr - No cyanosis or edema    A/P 62 year old year old female in need of colonoscopy for screening. Risks, benefits, alternatives, and complications were discussed including the possibility of perforation and the patient agreed to proceed    Harley Weiner MD   "

## 2018-01-23 NOTE — ANESTHESIA CARE TRANSFER NOTE
Patient: Miri Naylor    Procedure(s):  Colonoscopy - Wound Class: II-Clean Contaminated    Diagnosis: screening  Diagnosis Additional Information: No value filed.    Anesthesia Type:   MAC     Note:    Patient transferred to:Phase II  Handoff Report: Identifed the Patient, Identified the Reponsible Provider, Reviewed the pertinent medical history, Discussed the surgical course, Reviewed Intra-OP anesthesia mangement and issues during anesthesia, Set expectations for post-procedure period and Allowed opportunity for questions and acknowledgement of understanding      Vitals: (Last set prior to Anesthesia Care Transfer)    CRNA VITALS  1/23/2018 0826 - 1/23/2018 0856      1/23/2018             Pulse: 62    Ht Rate: 66    SpO2: 98 %                Electronically Signed By: Ivis Alcala CRNA, APRN CRNA  January 23, 2018  8:56 AM

## 2018-01-24 LAB — COPATH REPORT: NORMAL

## 2018-02-01 ENCOUNTER — MYC MEDICAL ADVICE (OUTPATIENT)
Dept: FAMILY MEDICINE | Facility: CLINIC | Age: 63
End: 2018-02-01

## 2018-02-01 DIAGNOSIS — Z12.31 ENCOUNTER FOR SCREENING MAMMOGRAM FOR BREAST CANCER: Primary | ICD-10-CM

## 2018-02-09 ENCOUNTER — OFFICE VISIT (OUTPATIENT)
Dept: ALLERGY | Facility: CLINIC | Age: 63
End: 2018-02-09
Payer: COMMERCIAL

## 2018-02-09 VITALS
DIASTOLIC BLOOD PRESSURE: 81 MMHG | OXYGEN SATURATION: 98 % | SYSTOLIC BLOOD PRESSURE: 138 MMHG | TEMPERATURE: 97.7 F | HEART RATE: 60 BPM | BODY MASS INDEX: 29.26 KG/M2 | HEIGHT: 71 IN | WEIGHT: 209 LBS | RESPIRATION RATE: 16 BRPM

## 2018-02-09 DIAGNOSIS — H10.9 RHINOCONJUNCTIVITIS: ICD-10-CM

## 2018-02-09 DIAGNOSIS — J31.0 RHINOCONJUNCTIVITIS: ICD-10-CM

## 2018-02-09 DIAGNOSIS — K21.9 GASTROESOPHAGEAL REFLUX DISEASE, ESOPHAGITIS PRESENCE NOT SPECIFIED: ICD-10-CM

## 2018-02-09 DIAGNOSIS — J45.40 MODERATE PERSISTENT ASTHMA WITHOUT COMPLICATION: Primary | ICD-10-CM

## 2018-02-09 PROBLEM — J01.90 ACUTE SINUSITIS WITH SYMPTOMS > 10 DAYS: Status: RESOLVED | Noted: 2017-07-24 | Resolved: 2018-02-09

## 2018-02-09 LAB
FEF 25/75: NORMAL
FEV-1: NORMAL
FEV1/FVC: NORMAL
FVC: NORMAL

## 2018-02-09 PROCEDURE — 99214 OFFICE O/P EST MOD 30 MIN: CPT | Mod: 25 | Performed by: ALLERGY & IMMUNOLOGY

## 2018-02-09 PROCEDURE — 94010 BREATHING CAPACITY TEST: CPT | Performed by: ALLERGY & IMMUNOLOGY

## 2018-02-09 RX ORDER — AZELASTINE 1 MG/ML
1-2 SPRAY, METERED NASAL 2 TIMES DAILY
Qty: 1 BOTTLE | Refills: 11 | Status: SHIPPED | OUTPATIENT
Start: 2018-02-09 | End: 2018-10-15

## 2018-02-09 RX ORDER — BUDESONIDE AND FORMOTEROL FUMARATE DIHYDRATE 160; 4.5 UG/1; UG/1
2 AEROSOL RESPIRATORY (INHALATION) 2 TIMES DAILY
Qty: 1 INHALER | Refills: 3 | Status: SHIPPED | OUTPATIENT
Start: 2018-02-09 | End: 2018-06-11

## 2018-02-09 NOTE — PATIENT INSTRUCTIONS
Allergy Staff Appt Hours Shot Hours Locations    Physician     Nish Stockton DO       Support Staff     Kerri CHAUDHRY RN      Angie LEAL MA  Monday:                      [place] 8-7     Tuesday:         [place] 8-5     Wednesday:        [place]: 7-5     Friday:        [place] 7-5   [place]        Monday: 9-5:50        Wednesday: 2-5:50        Friday: 7-12:50     [place]        Tuesday: 7-10:50        Thursday: 1:30-6:30     [place] Monday: 7:10-4:50        Tuesday: 12:30-6:30        Thursday: 7-11:50 [facility]  [address] [place] [address]  Appt Line: [phone]  Allergy RN (Monday):  [phone]    [facility]  [address] [place] [address]  Appt Line: [phone]  Allergy RN (Tues & Wed):  [phone]    [facility]  [address] [place] [address]  Appt Line: [phone]  Allergy RN (Friday):  [phone]       Important Scheduling Information  Aspirin Desensitization: Appt will last 2 clinic days. Please call the Allergy RN line for your clinic to schedule. Discontinue antihistamines 7 days prior to the appointment.     Food Challenges: Appt will last 3-4 hours. Please call the Allergy RN line for your clinic to schedule. Discontinue antihistamines 7 days prior to the appointment.     Penicillin Testing: Appt will last 2-3 hours. Please call the Allergy RN line for your clinic to schedule. Discontinue antihistamines 7 days prior to the appointment.     Skin Testing: Appt will about 40 minutes. Call the appointment line for your clinic to schedule. Discontinue antihistamines 7 days prior to the appointment.     Venom Testing: Appt will last 2-3 hours. Please call the Allergy RN line for your clinic to schedule. Discontinue antihistamines 7 days prior to the appointment.     Thank you for trusting us with your Allergy, Asthma, and Immunology care. Please feel free to contact us with any questions or concerns you may have.      - Flonase 50mcg 2  sprays/nostril q day  - Azelastine 2 sprays/nostril twice daily as needed.   - Symbicort 160/4.5mcg 2 puffs inhaled twice daily with a spacer.   - Singulair 10mg by mouth daily at night.   - Albuterol 2-4 puffs inhaled (use a spacer unless using a Proair Respiclick device) every 4 hours as needed for chest tightness, wheezing, shortness of breath and/or coughing.   - Return every 4 months

## 2018-02-09 NOTE — LETTER
2/9/2018         RE: Miri Naylor  [address]  [place] [address]        Dear Colleague,    Thank you for referring your patient, Miri Naylor, to the [facility]. Please see a copy of my visit note below.    Miri Naylor is a 62 year old White female with previous medical history significant for moderate persistent asthma and nonallergic rhinitis who returns for a follow up visit. Miri Naylor is being seen today for asthma and seasonal allergies.     Patient was last seen in July 2017.  She had been on Symbicort 160/4.5 mcg 2 puffs inhaled twice daily.  She was not yet controlled and therefore montelukast 10 mg by mouth daily was started.  She additionally had been having sinusitis and this was treated with doxycycline.  She reports that with these treatment she has had significant improvement in chest symptoms.  She has had to use albuterol on 3-4 occasions over the last 6 months.  Albuterol when used has been beneficial.  She is compliant with her medications.  Chest symptoms flare with upper respiratory tract infections and cold air.    Patient has a history of perennial nasal and ocular symptoms or get worse in the spring and fall.  She has been on Flonase 2 sprays per nostril daily.  This has been beneficial.  If she misses Flonase she will have increased rhinorrhea and nasal congestion she is additionally on Zyrtec 10 mg by mouth daily.  If she misses Zyrtec she will have increased nasal congestion and rhinorrhea.  She underwent allergy skin testing which was negative.  Thought symptoms were suggestive of local allergic rhinitis as she had improved significantly with oral antihistamines.  ACT Total Scores 2/9/2018   ACT TOTAL SCORE (Goal Greater than or Equal to 20) 21   In the past 12 months, how many times did you visit the emergency room for your asthma without being admitted to the hospital? 0   In the past 12 months, how many times were you  hospitalized overnight because of your asthma? 0         Past Medical History:   Diagnosis Date     Depressive disorder, not elsewhere classified      Drug allergy, multiple 9/23/14--passed graded oral challenge for Zithromax.     7/3/14 POS PRE-PEN skin test. 7/30/14 NEG skin tests and oral challenge to Cefzil     Hx of abnormal Pap smear ?    no specifics given     lumbar degeneration      Raynaud's disease      Unspecified essential hypertension      Family History   Problem Relation Age of Onset     Arthritis Mother      Hyperlipidemia Mother      Other Cancer Mother      Glioblastoma Multiforme     OSTEOPOROSIS Mother      Blood Disease Father      Hypertension Father      Hyperlipidemia Father      Other Cancer Father      lung cancer     Depression Son      Depression Daughter      Hyperlipidemia Brother      Anxiety Disorder Son      Skin Cancer No family hx of      Past Surgical History:   Procedure Laterality Date     BIOPSY OF BREAST, NEEDLE CORE       C PELVIS/HIP JOINT SURGERY UNLISTED Right 7/19/16    total hip arthroplasty     COLONOSCOPY       HIP SURGERY Right 07/19/2016       REVIEW OF SYSTEMS:  General: positive  for weight gain. negative for weight loss. negative for changes in sleep.   Ears: negative for fullness. negative for hearing loss. negative for dizziness.   Nose: positive  for snoring.negative for changes in smell. negative for drainage.   Throat: positive  for hoarseness. positive  for sore throat. negative for trouble swallowing.   Lungs: negative for shortness of breath.negative for wheezing. positive for sputum production.   Cardiovascular: negative for chest pain. negative for swelling of ankles. negative for fast or irregular heartbeat.   Gastrointestinal: positive  for nausea. positive  for heartburn. positive  for acid reflux.   Musculoskeletal: positive  for joint pain. positive  for joint stiffness. positive  for joint swelling.   Neurologic: negative for seizures. negative  for fainting. negative for weakness.   Psychiatric: negative for changes in mood. negative for anxiety.   Endocrine: negative for cold intolerance. negative for heat intolerance. negative for tremors.   Hematologic: positive  for easy bruising. negative for easy bleeding.  Integumentary: negative for rash. negative for scaling. negative for nail changes.       Current Outpatient Prescriptions:      azelastine (ASTELIN) 0.1 % spray, Spray 1-2 sprays into both nostrils 2 times daily, Disp: 1 Bottle, Rfl: 11     budesonide-formoterol (SYMBICORT) 160-4.5 MCG/ACT Inhaler, Inhale 2 puffs into the lungs 2 times daily, Disp: 1 Inhaler, Rfl: 3     pravastatin (PRAVACHOL) 40 MG tablet, Take 1 tablet (40 mg) by mouth daily, Disp: 90 tablet, Rfl: 3     escitalopram (LEXAPRO) 20 MG tablet, TAKE 1 TABLET DAILY, Disp: 90 tablet, Rfl: 1     pantoprazole (PROTONIX) 40 MG EC tablet, TAKE 1 TABLET DAILY 30 TO 60 MINUTES BEFORE A MEAL, Disp: 90 tablet, Rfl: 3     lisinopril (PRINIVIL/ZESTRIL) 40 MG tablet, TAKE 1 TABLET DAILY, Disp: 90 tablet, Rfl: 1     gabapentin (NEURONTIN) 300 MG capsule, TAKE 1 CAPSULE THREE TIMES A DAY, Disp: 270 capsule, Rfl: 1     fluticasone (FLONASE) 50 MCG/ACT spray, Spray 1-2 sprays into both nostrils daily, Disp: 1 Bottle, Rfl: 11     montelukast (SINGULAIR) 10 MG tablet, Take 1 tablet (10 mg) by mouth At Bedtime, Disp: 90 tablet, Rfl: 3     atenolol (TENORMIN) 25 MG tablet, TAKE 1 TABLET DAILY, Disp: 90 tablet, Rfl: 2     Ketotifen Fumarate (ZADITOR OP), Apply to eye daily as needed, Disp: , Rfl:      hydrocortisone 2.5 % cream, Apply topically 2 times daily, Disp: 60 g, Rfl: 1     triamcinolone (KENALOG) 0.1 % cream, Apply topically 2 times daily (Patient taking differently: Apply topically 2 times daily as needed ), Disp: 60 g, Rfl: 3     albuterol (ALBUTEROL) 108 (90 BASE) MCG/ACT Inhaler, Inhale 2 puffs into the lungs every 4 hours as needed for shortness of breath / dyspnea or wheezing, Disp: 1  Inhaler, Rfl: 1     ibuprofen (ADVIL,MOTRIN) 200 MG tablet, take 1 tablet (200 mg) by oral route every 6 hours as needed with food, Disp: , Rfl:      vitamin E (E-400) 400 UNIT capsule, Take one pill by mouth once daily, Disp: , Rfl:      cetirizine (ZYRTEC) 10 MG tablet, Take 10 mg by mouth daily., Disp: , Rfl:      VITAMIN C 500 MG PO TABS, 2 TABLET DAILY, Disp: , Rfl:      ACIDOPHILUS OR TABS, 1 tablet daily, Disp: , Rfl:      SUPER B COMPLEX PO TABS, 1 tablet daily, Disp: , Rfl:      FLAXSEED OIL 1000 MG PO CAPS, 1 tablet daily, Disp: , Rfl:      GLUCOSAMINE-CHONDROITIN PO TABS, 1500 mg glucosamine and 1200mg chondroitin daily-2 tablets daily, Disp: , Rfl:      CALCIUM 600+D PO, 250 mg vitamin d- 3 tablets daily, Disp: , Rfl:      DAILY MULTIVITAMIN PO, 1 tablet daily, Disp: , Rfl:      S-ADENOSYLMETHIONINE 200 MG PO TABS, 2 tablets daily, Disp: , Rfl:      [DISCONTINUED] budesonide-formoterol (SYMBICORT) 160-4.5 MCG/ACT Inhaler, Inhale 2 puffs into the lungs 2 times daily, Disp: 1 Inhaler, Rfl: 11  Immunization History   Administered Date(s) Administered     Influenza Vaccine IM 3yrs+ 4 Valent IIV4 12/03/2013, 11/16/2015, 10/01/2017     TD (ADULT, 7+) 03/06/2008     TDAP Vaccine (Adacel) 08/03/2012     Allergies   Allergen Reactions     Levofloxacin Rash     Other reaction(s): Contact Dermatitis     Penicillins Hives and Rash     Confirmed by skin prick testing 7/3/14     Amoxicillin Hives and Rash     Amoxicillin-Pot Clavulanate Rash     Azithromycin Rash     Cephalexin Rash     Clindamycin Rash and Hives     Atorvastatin Other (See Comments)     Felt sick, intolerance     Clindamycin Hcl Hives     Levaquin      tendonitis     Augmentin Rash         EXAM:   Constitutional:  Appears well-developed and well-nourished. No distress.   HEENT:   Head: Normocephalic.   Mouth/Throat: No oropharyngeal exudate present.   No cobblestoning of posterior oropharynx.   Nasal tissue pink and normal appearing.  No rhinorrhea  noted.    Eyes: Conjunctivae are non-erythematous   No maxillary or frontal sinus tenderness to palpation.   Cardiovascular: Normal rate, regular rhythm and normal heart sounds. Exam reveals no gallop and no friction rub.   No murmur heard.  Respiratory: Effort normal and breath sounds normal. No respiratory distress. No wheezes. No rales.   Musculoskeletal: Normal range of motion.   Neuro: Oriented to person, place, and time.  Skin: Skin is warm and dry. No rash noted.   Psychiatric: Normal mood and affect.     Nursing note and vitals reviewed.      WORKUP:   Spirometry  FVC % pred:75  FEV1 % pred:59  FEV1/FVC % act:61  FEF 25-75% pred:54    Moderate obstruction. Stable to previous values.     ASSESSMENT/PLAN:  Problem List Items Addressed This Visit        Respiratory    Moderate persistent asthma without complication - Primary     Well controlled. Symptoms flare with URI and cold air.  Symbicort 160/4.5mcg 2 puffs inhaled twice daily with a spacer. Singulair 10mg PO daily     Spirometry today with an FEV1 of 59%. Stable to prior visit.   ACT 21      - Albuterol 2-4 puffs inhaled (use a spacer unless using a Proair Respiclick device) every 4 hours as needed for chest tightness, wheezing, shortness of breath and/or coughing.   - Albuterol 2-4 puffs inhaled (use spacer if not using Proair Respiclick device) 15-20 minutes prior to physical activity.    - Please ensure warm up period prior to exercise.    - Avoid asthma triggers.  - Symbicort 160/4.5mcg 2 puffs inhaled twice daily with a spacer.   - Singulair 10mg by mouth daily at night.   - Discussed tapering medications given well controlled asthma but she did not want to taper today. Will address at next visit if well controlled.                  Relevant Medications    azelastine (ASTELIN) 0.1 % spray    budesonide-formoterol (SYMBICORT) 160-4.5 MCG/ACT Inhaler    Other Relevant Orders    Spirometry, Breathing Capacity (Completed)       Digestive    GERD  (gastroesophageal reflux disease)       Infectious/Inflammatory    Rhinoconjunctivitis     History of perennial nasal and ocular symptoms that get worse in the spring and fall. Symptoms include ocular watering, ocular itching, rhinorrhea, sneezing, congestion and postnasal drip. Symptoms increase around cats. Stopped using Flonase and increased symptoms. Stopped Zyrtec and increased symptoms. While taking Zyrtec and Flonase she has minimal congestion and rhinorrhea. Symptoms do worsen in spring and fall.      Allergy skin testing was negative.      - Continue Flonase 2 sprays per nostril daily.  - Likely local allergic rhinitis as symptoms improved with treatment with antihistamines.  - Given some persistence of symptoms adding azelastine 2 sprays/nostril twice daily. She can use in place or in addition to oral antihistamine as needed.          Relevant Medications    azelastine (ASTELIN) 0.1 % spray        Return in 4 months.     Chart documentation with Dragon Voice recognition Software. Although reviewed after completion, some words and grammatical errors may remain.    Nish Stockton,    Allergy/Immunology  [facility] and [facility]      Again, thank you for allowing me to participate in the care of your patient.        Sincerely,        Nish Stockton, DO

## 2018-02-09 NOTE — MR AVS SNAPSHOT
After Visit Summary   2/9/2018    Miri Naylor    MRN: [id number]           Patient Information     Date Of Birth          1955        Visit Information        Provider Department      2/9/2018 4:00 PM Nish Stockton DO [facility]        Today's Diagnoses     Moderate persistent asthma without complication    -  1    Rhinoconjunctivitis          Care Instructions    Allergy Staff Appt Hours Shot Hours Locations    Physician     Nish Stockton DO       Support Staff     MEMO Levy MA  Monday:                      [place] 8-7     Tuesday:         [place] 8-5     Wednesday:        [place]: 7-5     Friday:        [place] 7-5   [place]        Monday: 9-5:50        Wednesday: 2-5:50        Friday: 7-12:50     [place]        Tuesday: 7-10:50        Thursday: 1:30-6:30     [place] Monday: 7:10-4:50        Tuesday: 12:30-6:30        Thursday: 7-11:50 [facility]  [address] [place] [id number]  Appt Line: [phone]  Allergy RN (Monday):  [phone]    [facility]  [address] [place] [address]  Appt Line: [phone]  Allergy RN (Tues & Wed):  [phone]    [facility]  [address] [place] [address]  Appt Line: [phone]  Allergy RN (Friday):  [phone]       Important Scheduling Information  Aspirin Desensitization: Appt will last 2 clinic days. Please call the Allergy RN line for your clinic to schedule. Discontinue antihistamines 7 days prior to the appointment.     Food Challenges: Appt will last 3-4 hours. Please call the Allergy RN line for your clinic to schedule. Discontinue antihistamines 7 days prior to the appointment.     Penicillin Testing: Appt will last 2-3 hours. Please call the Allergy RN line for your clinic to schedule. Discontinue antihistamines 7 days prior to the appointment.     Skin Testing: Appt will about 40 minutes. Call the appointment line for your clinic to  schedule. Discontinue antihistamines 7 days prior to the appointment.     Venom Testing: Appt will last 2-3 hours. Please call the Allergy RN line for your clinic to schedule. Discontinue antihistamines 7 days prior to the appointment.     Thank you for trusting us with your Allergy, Asthma, and Immunology care. Please feel free to contact us with any questions or concerns you may have.      - Flonase 50mcg 2 sprays/nostril q day  - Azelastine 2 sprays/nostril twice daily as needed.   - Symbicort 160/4.5mcg 2 puffs inhaled twice daily with a spacer.   - Singulair 10mg by mouth daily at night.   - Albuterol 2-4 puffs inhaled (use a spacer unless using a Proair Respiclick device) every 4 hours as needed for chest tightness, wheezing, shortness of breath and/or coughing.   - Return every 4 months          Follow-ups after your visit        Follow-up notes from your care team     Return in about 4 months (around 6/9/2018).      Your next 10 appointments already scheduled     Mar 02, 2018 10:15 AM CST   (Arrive by 10:00 AM)   MA SCREENING DIGITAL BILATERAL with [id number]   [facility]    [address] [place] [id number] [phone]           Do not use any powder, lotion or deodorant under your arms or on your breast. If you do, we will ask you to remove it before your exam.  Wear comfortable, two-piece clothing.  If you have any allergies, tell your care team.  Bring any previous mammograms from other facilities or have them mailed to the breast center. Three-dimensional (3D) mammograms are available at [place] locations in [facility], [facility], [facility], [place], [place], and [place]. [facility] locations include [facility] in [place]. Benefits of 3D mammograms include: - Improved rate of cancer detection - Decreases your chance of having to go back for more tests, which means fewer: -  "\"False-positive\" results (This means that there is an abnormal area but it isn't cancer.) - Invasive testing procedures, such as a biopsy or surgery - Can provide clearer images of the breast if you have dense breast tissue. 3D mammography is an optional exam that anyone can have with a 2D mammogram. It doesn't replace or take the place of a 2D mammogram. 2D mammograms remain an effective screening test for all women.  Not all insurance companies cover the cost of a 3D mammogram. Check with your insurance.              Who to contact     If you have questions or need follow up information about today's clinic visit or your schedule please contact [facility] directly at [phone].  Normal or non-critical lab and imaging results will be communicated to you by Mozhart, letter or phone within 4 business days after the clinic has received the results. If you do not hear from us within 7 days, please contact the clinic through Gynesonicst or phone. If you have a critical or abnormal lab result, we will notify you by phone as soon as possible.  Submit refill requests through Experticity or call your pharmacy and they will forward the refill request to us. Please allow 3 business days for your refill to be completed.          Additional Information About Your Visit        Experticity Information     Experticity gives you secure access to your electronic health record. If you see a primary care provider, you can also send messages to your care team and make appointments. If you have questions, please call your primary care clinic.  If you do not have a primary care provider, please call [phone] and they will assist you.        Care EveryWhere ID     This is your Care EveryWhere ID. This could be used by other organizations to access your [place] medical records  [id number]        Your Vitals Were     Pulse Temperature Respirations Height Pulse Oximetry BMI (Body Mass Index)    60 97.7  F (36.5  C) (Oral) 16 1.803 m " "(5' 11\") 98% 29.15 kg/m2       Blood Pressure from Last 3 Encounters:   02/09/18 138/81   01/23/18 155/85   12/18/17 (!) 148/95    Weight from Last 3 Encounters:   02/09/18 94.8 kg (209 lb)   01/23/18 93 kg (205 lb)   11/10/17 92.3 kg (203 lb 6.4 oz)              We Performed the Following     Spirometry, Breathing Capacity          Today's Medication Changes          These changes are accurate as of 2/9/18  4:31 PM.  If you have any questions, ask your nurse or doctor.               Start taking these medicines.        Dose/Directions    azelastine 0.1 % spray   Commonly known as:  ASTELIN   Used for:  Rhinoconjunctivitis   Started by:  Nish Stockton DO        Dose:  1-2 spray   Spray 1-2 sprays into both nostrils 2 times daily   Quantity:  1 Bottle   Refills:  11         These medicines have changed or have updated prescriptions.        Dose/Directions    triamcinolone 0.1 % cream   Commonly known as:  KENALOG   This may have changed:    - when to take this  - reasons to take this   Used for:  Other atopic dermatitis        Apply topically 2 times daily   Quantity:  60 g   Refills:  3            Where to get your medicines      These medications were sent to [facility] - [address] [address]     Phone:  [phone]     azelastine 0.1 % spray    budesonide-formoterol 160-4.5 MCG/ACT Inhaler                Primary Care Provider Office Phone # Fax #    Elizabeth Corley [phone] [address] [place] [address]        Equal Access to Services     CHARLENE SANDERS AH: Day Ferguson, corinne charles, bj decker. So [facility] [phone].    ATENCIÓN: Si habla español, tiene a dao disposición servicios gratuitos de asistencia lingüística. Evangelista al [phone].    We comply with applicable federal civil rights laws and [place] laws. We do not " discriminate on the basis of race, color, national origin, age, disability, sex, sexual orientation, or gender identity.            Thank you!     Thank you for choosing [facility]  for your care. Our goal is always to provide you with excellent care. Hearing back from our patients is one way we can continue to improve our services. Please take a few minutes to complete the written survey that you may receive in the mail after your visit with us. Thank you!             Your Updated Medication List - Protect others around you: Learn how to safely use, store and throw away your medicines at www.disposemymeds.org.          This list is accurate as of 2/9/18  4:31 PM.  Always use your most recent med list.                   Brand Name Dispense Instructions for use Diagnosis    Acidophilus Tabs      1 tablet daily        albuterol 108 (90 BASE) MCG/ACT Inhaler    PROAIR HFA    1 Inhaler    Inhale 2 puffs into the lungs every 4 hours as needed for shortness of breath / dyspnea or wheezing    Moderate persistent asthma without complication       ascorbic acid 500 MG tablet    VITAMIN C     2 TABLET DAILY        atenolol 25 MG tablet    TENORMIN    90 tablet    TAKE 1 TABLET DAILY    Essential hypertension with goal blood pressure less than 140/90       azelastine 0.1 % spray    ASTELIN    1 Bottle    Spray 1-2 sprays into both nostrils 2 times daily    Rhinoconjunctivitis       budesonide-formoterol 160-4.5 MCG/ACT Inhaler    SYMBICORT    1 Inhaler    Inhale 2 puffs into the lungs 2 times daily    Moderate persistent asthma without complication       CALCIUM 600+D PO      250 mg vitamin d- 3 tablets daily        DAILY MULTIVITAMIN PO      1 tablet daily        escitalopram 20 MG tablet    LEXAPRO    90 tablet    TAKE 1 TABLET DAILY    Major depression in complete remission (H)       flaxseed oil 1000 MG Caps      1 tablet daily        fluticasone 50 MCG/ACT spray    FLONASE    1 Bottle    Spray 1-2 sprays  into both nostrils daily    Acute sinusitis with symptoms > 10 days       gabapentin 300 MG capsule    NEURONTIN    270 capsule    TAKE 1 CAPSULE THREE TIMES A DAY    Chronic nonintractable headache, unspecified headache type       Glucosamine-Chondroitin Tabs      1500 mg glucosamine and 1200mg chondroitin daily-2 tablets daily        hydrocortisone 2.5 % cream     60 g    Apply topically 2 times daily    Eyelid dermatitis, eczematous, unspecified laterality       ibuprofen 200 MG tablet    ADVIL/MOTRIN     take 1 tablet (200 mg) by oral route every 6 hours as needed with food        lisinopril 40 MG tablet    PRINIVIL/ZESTRIL    90 tablet    TAKE 1 TABLET DAILY    Essential hypertension with goal blood pressure less than 140/90       montelukast 10 MG tablet    SINGULAIR    90 tablet    Take 1 tablet (10 mg) by mouth At Bedtime    Moderate persistent asthma without complication       pantoprazole 40 MG EC tablet    PROTONIX    90 tablet    TAKE 1 TABLET DAILY 30 TO 60 MINUTES BEFORE A MEAL    Gastroesophageal reflux disease without esophagitis       pravastatin 40 MG tablet    PRAVACHOL    90 tablet    Take 1 tablet (40 mg) by mouth daily    Hyperlipidemia LDL goal <130       S-Adenosylmethionine 200 MG Tabs      2 tablets daily    Menopausal syndrome (hot flashes)       SUPER B COMPLEX Tabs      1 tablet daily        triamcinolone 0.1 % cream    KENALOG    60 g    Apply topically 2 times daily    Other atopic dermatitis       vitamin E 400 UNIT capsule   Generic drug:  vitamin E      Take one pill by mouth once daily        ZADITOR OP      Apply to eye daily as needed        ZYRTEC 10 MG tablet   Generic drug:  cetirizine      Take 10 mg by mouth daily.

## 2018-02-09 NOTE — ASSESSMENT & PLAN NOTE
History of perennial nasal and ocular symptoms that get worse in the spring and fall. Symptoms include ocular watering, ocular itching, rhinorrhea, sneezing, congestion and postnasal drip. Symptoms increase around cats. Stopped using Flonase and increased symptoms. Stopped Zyrtec and increased symptoms. While taking Zyrtec and Flonase she has minimal congestion and rhinorrhea. Symptoms do worsen in spring and fall.      Allergy skin testing was negative.      - Continue Flonase 2 sprays per nostril daily.  - Likely local allergic rhinitis as symptoms improved with treatment with antihistamines.  - Given some persistence of symptoms adding azelastine 2 sprays/nostril twice daily. She can use in place or in addition to oral antihistamine as needed.

## 2018-02-09 NOTE — PROGRESS NOTES
Miri Naylor is a 62 year old White female with previous medical history significant for moderate persistent asthma and nonallergic rhinitis who returns for a follow up visit. Miri Naylor is being seen today for asthma and seasonal allergies.     Patient was last seen in July 2017.  She had been on Symbicort 160/4.5 mcg 2 puffs inhaled twice daily.  She was not yet controlled and therefore montelukast 10 mg by mouth daily was started.  She additionally had been having sinusitis and this was treated with doxycycline.  She reports that with these treatment she has had significant improvement in chest symptoms.  She has had to use albuterol on 3-4 occasions over the last 6 months.  Albuterol when used has been beneficial.  She is compliant with her medications.  Chest symptoms flare with upper respiratory tract infections and cold air.    Patient has a history of perennial nasal and ocular symptoms or get worse in the spring and fall.  She has been on Flonase 2 sprays per nostril daily.  This has been beneficial.  If she misses Flonase she will have increased rhinorrhea and nasal congestion she is additionally on Zyrtec 10 mg by mouth daily.  If she misses Zyrtec she will have increased nasal congestion and rhinorrhea.  She underwent allergy skin testing which was negative.  Thought symptoms were suggestive of local allergic rhinitis as she had improved significantly with oral antihistamines.  ACT Total Scores 2/9/2018   ACT TOTAL SCORE (Goal Greater than or Equal to 20) 21   In the past 12 months, how many times did you visit the emergency room for your asthma without being admitted to the hospital? 0   In the past 12 months, how many times were you hospitalized overnight because of your asthma? 0         Past Medical History:   Diagnosis Date     Depressive disorder, not elsewhere classified      Drug allergy, multiple 9/23/14--passed graded oral challenge for Zithromax.     7/3/14 POS PRE-PEN skin test.  7/30/14 NEG skin tests and oral challenge to Cefzil     Hx of abnormal Pap smear ?    no specifics given     lumbar degeneration      Raynaud's disease      Unspecified essential hypertension      Family History   Problem Relation Age of Onset     Arthritis Mother      Hyperlipidemia Mother      Other Cancer Mother      Glioblastoma Multiforme     OSTEOPOROSIS Mother      Blood Disease Father      Hypertension Father      Hyperlipidemia Father      Other Cancer Father      lung cancer     Depression Son      Depression Daughter      Hyperlipidemia Brother      Anxiety Disorder Son      Skin Cancer No family hx of      Past Surgical History:   Procedure Laterality Date     BIOPSY OF BREAST, NEEDLE CORE       C PELVIS/HIP JOINT SURGERY UNLISTED Right 7/19/16    total hip arthroplasty     COLONOSCOPY       HIP SURGERY Right 07/19/2016       REVIEW OF SYSTEMS:  General: positive  for weight gain. negative for weight loss. negative for changes in sleep.   Ears: negative for fullness. negative for hearing loss. negative for dizziness.   Nose: positive  for snoring.negative for changes in smell. negative for drainage.   Throat: positive  for hoarseness. positive  for sore throat. negative for trouble swallowing.   Lungs: negative for shortness of breath.negative for wheezing. positive for sputum production.   Cardiovascular: negative for chest pain. negative for swelling of ankles. negative for fast or irregular heartbeat.   Gastrointestinal: positive  for nausea. positive  for heartburn. positive  for acid reflux.   Musculoskeletal: positive  for joint pain. positive  for joint stiffness. positive  for joint swelling.   Neurologic: negative for seizures. negative for fainting. negative for weakness.   Psychiatric: negative for changes in mood. negative for anxiety.   Endocrine: negative for cold intolerance. negative for heat intolerance. negative for tremors.   Hematologic: positive  for easy bruising. negative for easy  bleeding.  Integumentary: negative for rash. negative for scaling. negative for nail changes.       Current Outpatient Prescriptions:      azelastine (ASTELIN) 0.1 % spray, Spray 1-2 sprays into both nostrils 2 times daily, Disp: 1 Bottle, Rfl: 11     budesonide-formoterol (SYMBICORT) 160-4.5 MCG/ACT Inhaler, Inhale 2 puffs into the lungs 2 times daily, Disp: 1 Inhaler, Rfl: 3     pravastatin (PRAVACHOL) 40 MG tablet, Take 1 tablet (40 mg) by mouth daily, Disp: 90 tablet, Rfl: 3     escitalopram (LEXAPRO) 20 MG tablet, TAKE 1 TABLET DAILY, Disp: 90 tablet, Rfl: 1     pantoprazole (PROTONIX) 40 MG EC tablet, TAKE 1 TABLET DAILY 30 TO 60 MINUTES BEFORE A MEAL, Disp: 90 tablet, Rfl: 3     lisinopril (PRINIVIL/ZESTRIL) 40 MG tablet, TAKE 1 TABLET DAILY, Disp: 90 tablet, Rfl: 1     gabapentin (NEURONTIN) 300 MG capsule, TAKE 1 CAPSULE THREE TIMES A DAY, Disp: 270 capsule, Rfl: 1     fluticasone (FLONASE) 50 MCG/ACT spray, Spray 1-2 sprays into both nostrils daily, Disp: 1 Bottle, Rfl: 11     montelukast (SINGULAIR) 10 MG tablet, Take 1 tablet (10 mg) by mouth At Bedtime, Disp: 90 tablet, Rfl: 3     atenolol (TENORMIN) 25 MG tablet, TAKE 1 TABLET DAILY, Disp: 90 tablet, Rfl: 2     Ketotifen Fumarate (ZADITOR OP), Apply to eye daily as needed, Disp: , Rfl:      hydrocortisone 2.5 % cream, Apply topically 2 times daily, Disp: 60 g, Rfl: 1     triamcinolone (KENALOG) 0.1 % cream, Apply topically 2 times daily (Patient taking differently: Apply topically 2 times daily as needed ), Disp: 60 g, Rfl: 3     albuterol (ALBUTEROL) 108 (90 BASE) MCG/ACT Inhaler, Inhale 2 puffs into the lungs every 4 hours as needed for shortness of breath / dyspnea or wheezing, Disp: 1 Inhaler, Rfl: 1     ibuprofen (ADVIL,MOTRIN) 200 MG tablet, take 1 tablet (200 mg) by oral route every 6 hours as needed with food, Disp: , Rfl:      vitamin E (E-400) 400 UNIT capsule, Take one pill by mouth once daily, Disp: , Rfl:      cetirizine (ZYRTEC) 10 MG  tablet, Take 10 mg by mouth daily., Disp: , Rfl:      VITAMIN C 500 MG PO TABS, 2 TABLET DAILY, Disp: , Rfl:      ACIDOPHILUS OR TABS, 1 tablet daily, Disp: , Rfl:      SUPER B COMPLEX PO TABS, 1 tablet daily, Disp: , Rfl:      FLAXSEED OIL 1000 MG PO CAPS, 1 tablet daily, Disp: , Rfl:      GLUCOSAMINE-CHONDROITIN PO TABS, 1500 mg glucosamine and 1200mg chondroitin daily-2 tablets daily, Disp: , Rfl:      CALCIUM 600+D PO, 250 mg vitamin d- 3 tablets daily, Disp: , Rfl:      DAILY MULTIVITAMIN PO, 1 tablet daily, Disp: , Rfl:      S-ADENOSYLMETHIONINE 200 MG PO TABS, 2 tablets daily, Disp: , Rfl:      [DISCONTINUED] budesonide-formoterol (SYMBICORT) 160-4.5 MCG/ACT Inhaler, Inhale 2 puffs into the lungs 2 times daily, Disp: 1 Inhaler, Rfl: 11  Immunization History   Administered Date(s) Administered     Influenza Vaccine IM 3yrs+ 4 Valent IIV4 12/03/2013, 11/16/2015, 10/01/2017     TD (ADULT, 7+) 03/06/2008     TDAP Vaccine (Adacel) 08/03/2012     Allergies   Allergen Reactions     Levofloxacin Rash     Other reaction(s): Contact Dermatitis     Penicillins Hives and Rash     Confirmed by skin prick testing 7/3/14     Amoxicillin Hives and Rash     Amoxicillin-Pot Clavulanate Rash     Azithromycin Rash     Cephalexin Rash     Clindamycin Rash and Hives     Atorvastatin Other (See Comments)     Felt sick, intolerance     Clindamycin Hcl Hives     Levaquin      tendonitis     Augmentin Rash         EXAM:   Constitutional:  Appears well-developed and well-nourished. No distress.   HEENT:   Head: Normocephalic.   Mouth/Throat: No oropharyngeal exudate present.   No cobblestoning of posterior oropharynx.   Nasal tissue pink and normal appearing.  No rhinorrhea noted.    Eyes: Conjunctivae are non-erythematous   No maxillary or frontal sinus tenderness to palpation.   Cardiovascular: Normal rate, regular rhythm and normal heart sounds. Exam reveals no gallop and no friction rub.   No murmur heard.  Respiratory: Effort  normal and breath sounds normal. No respiratory distress. No wheezes. No rales.   Musculoskeletal: Normal range of motion.   Neuro: Oriented to person, place, and time.  Skin: Skin is warm and dry. No rash noted.   Psychiatric: Normal mood and affect.     Nursing note and vitals reviewed.      WORKUP:   Spirometry  FVC % pred:75  FEV1 % pred:59  FEV1/FVC % act:61  FEF 25-75% pred:54    Moderate obstruction. Stable to previous values.     ASSESSMENT/PLAN:  Problem List Items Addressed This Visit        Respiratory    Moderate persistent asthma without complication - Primary     Well controlled. Symptoms flare with URI and cold air.  Symbicort 160/4.5mcg 2 puffs inhaled twice daily with a spacer. Singulair 10mg PO daily     Spirometry today with an FEV1 of 59%. Stable to prior visit.   ACT 21      - Albuterol 2-4 puffs inhaled (use a spacer unless using a Proair Respiclick device) every 4 hours as needed for chest tightness, wheezing, shortness of breath and/or coughing.   - Albuterol 2-4 puffs inhaled (use spacer if not using Proair Respiclick device) 15-20 minutes prior to physical activity.    - Please ensure warm up period prior to exercise.    - Avoid asthma triggers.  - Symbicort 160/4.5mcg 2 puffs inhaled twice daily with a spacer.   - Singulair 10mg by mouth daily at night.   - Discussed tapering medications given well controlled asthma but she did not want to taper today. Will address at next visit if well controlled.                  Relevant Medications    azelastine (ASTELIN) 0.1 % spray    budesonide-formoterol (SYMBICORT) 160-4.5 MCG/ACT Inhaler    Other Relevant Orders    Spirometry, Breathing Capacity (Completed)       Digestive    GERD (gastroesophageal reflux disease)       Infectious/Inflammatory    Rhinoconjunctivitis     History of perennial nasal and ocular symptoms that get worse in the spring and fall. Symptoms include ocular watering, ocular itching, rhinorrhea, sneezing, congestion and  postnasal drip. Symptoms increase around cats. Stopped using Flonase and increased symptoms. Stopped Zyrtec and increased symptoms. While taking Zyrtec and Flonase she has minimal congestion and rhinorrhea. Symptoms do worsen in spring and fall.      Allergy skin testing was negative.      - Continue Flonase 2 sprays per nostril daily.  - Likely local allergic rhinitis as symptoms improved with treatment with antihistamines.  - Given some persistence of symptoms adding azelastine 2 sprays/nostril twice daily. She can use in place or in addition to oral antihistamine as needed.          Relevant Medications    azelastine (ASTELIN) 0.1 % spray        Return in 4 months.     Chart documentation with Dragon Voice recognition Software. Although reviewed after completion, some words and grammatical errors may remain.    Nish Stockton DO   Allergy/Immunology  [facility], [place] and [place]

## 2018-02-09 NOTE — ASSESSMENT & PLAN NOTE
Well controlled. Symptoms flare with URI and cold air.  Symbicort 160/4.5mcg 2 puffs inhaled twice daily with a spacer. Singulair 10mg PO daily     Spirometry today with an FEV1 of 59%. Stable to prior visit.   ACT 21      - Albuterol 2-4 puffs inhaled (use a spacer unless using a Proair Respiclick device) every 4 hours as needed for chest tightness, wheezing, shortness of breath and/or coughing.   - Albuterol 2-4 puffs inhaled (use spacer if not using Proair Respiclick device) 15-20 minutes prior to physical activity.    - Please ensure warm up period prior to exercise.    - Avoid asthma triggers.  - Symbicort 160/4.5mcg 2 puffs inhaled twice daily with a spacer.   - Singulair 10mg by mouth daily at night.   - Discussed tapering medications given well controlled asthma but she did not want to taper today. Will address at next visit if well controlled.

## 2018-02-10 ASSESSMENT — ASTHMA QUESTIONNAIRES: ACT_TOTALSCORE: 21

## 2018-02-19 DIAGNOSIS — I10 ESSENTIAL HYPERTENSION WITH GOAL BLOOD PRESSURE LESS THAN 140/90: ICD-10-CM

## 2018-02-20 RX ORDER — ATENOLOL 25 MG/1
TABLET ORAL
Qty: 90 TABLET | Refills: 1 | Status: SHIPPED | OUTPATIENT
Start: 2018-02-20 | End: 2018-08-19

## 2018-02-20 NOTE — TELEPHONE ENCOUNTER
"Requested Prescriptions   Pending Prescriptions Disp Refills     atenolol (TENORMIN) 25 MG tablet [Pharmacy Med Name: ATENOLOL TABS 25MG] 90 tablet 2    Last Written Prescription Date:  05/26/2017 #90 x 2  Last filled 11/22/2017  Last office visit: 11/10/2017 PRADIP Corley   Future Office Visit: None    Sig: TAKE 1 TABLET DAILY    Beta-Blockers Protocol Passed    2/19/2018 11:49 PM       Passed - Blood pressure under 140/90 in past 12 months    BP Readings from Last 3 Encounters:   02/09/18 138/81   01/23/18 155/85   12/18/17 (!) 148/95                Passed - Patient is age 6 or older       Passed - Recent or future visit with authorizing provider's specialty    Patient had office visit in the last year or has a visit in the next 30 days with authorizing provider.  See \"Patient Info\" tab in inbasket, or \"Choose Columns\" in Meds & Orders section of the refill encounter.               "

## 2018-03-02 ENCOUNTER — RADIANT APPOINTMENT (OUTPATIENT)
Dept: MAMMOGRAPHY | Facility: CLINIC | Age: 63
End: 2018-03-02
Attending: FAMILY MEDICINE
Payer: COMMERCIAL

## 2018-03-02 DIAGNOSIS — Z12.31 ENCOUNTER FOR SCREENING MAMMOGRAM FOR BREAST CANCER: ICD-10-CM

## 2018-03-02 PROCEDURE — 77067 SCR MAMMO BI INCL CAD: CPT | Mod: TC

## 2018-03-26 DIAGNOSIS — I10 ESSENTIAL HYPERTENSION WITH GOAL BLOOD PRESSURE LESS THAN 140/90: ICD-10-CM

## 2018-03-27 NOTE — TELEPHONE ENCOUNTER
"Requested Prescriptions   Pending Prescriptions Disp Refills     lisinopril (PRINIVIL/ZESTRIL) 40 MG tablet [Pharmacy Med Name: LISINOPRIL TABS 40MG] 90 tablet 1    Last Written Prescription Date:  09/28/2017 #90 x 1  Last filled 12/28/2017  Last office visit: 11/10/2017 PRADIP Corley  Future Office Visit:  None   Sig: TAKE 1 TABLET DAILY    ACE Inhibitors (Including Combos) Protocol Passed    3/26/2018 11:25 PM       Passed - Blood pressure under 140/90 in past 12 months    BP Readings from Last 3 Encounters:   02/09/18 138/81   01/23/18 155/85   12/18/17 (!) 148/95                Passed - Recent (12 mo) or future (30 days) visit within the authorizing provider's specialty    Patient had office visit in the last 12 months or has a visit in the next 30 days with authorizing provider or within the authorizing provider's specialty.  See \"Patient Info\" tab in inbasket, or \"Choose Columns\" in Meds & Orders section of the refill encounter.           Passed - Patient is age 18 or older       Passed - No active pregnancy on record       Passed - Normal serum creatinine on file in past 12 months    Recent Labs   Lab Test  08/26/17   1056   CR  0.86            Passed - Normal serum potassium on file in past 12 months    Recent Labs   Lab Test  08/26/17   1056   POTASSIUM  3.9            Passed - No positive pregnancy test in past 12 months          "

## 2018-03-29 RX ORDER — LISINOPRIL 40 MG/1
TABLET ORAL
Qty: 90 TABLET | Refills: 1 | Status: SHIPPED | OUTPATIENT
Start: 2018-03-29 | End: 2018-08-24

## 2018-03-29 NOTE — TELEPHONE ENCOUNTER
Prescription approved per [facility] Refill Protocol.  Jhoana Russell RN

## 2018-04-04 ENCOUNTER — OFFICE VISIT (OUTPATIENT)
Dept: URGENT CARE | Facility: URGENT CARE | Age: 63
End: 2018-04-04
Payer: COMMERCIAL

## 2018-04-04 VITALS
TEMPERATURE: 97.8 F | HEART RATE: 69 BPM | SYSTOLIC BLOOD PRESSURE: 128 MMHG | BODY MASS INDEX: 28.34 KG/M2 | OXYGEN SATURATION: 97 % | WEIGHT: 203.2 LBS | DIASTOLIC BLOOD PRESSURE: 79 MMHG

## 2018-04-04 DIAGNOSIS — J20.9 ACUTE BRONCHITIS WITH COEXISTING CONDITION REQUIRING PROPHYLACTIC TREATMENT: Primary | ICD-10-CM

## 2018-04-04 DIAGNOSIS — R06.2 WHEEZING: ICD-10-CM

## 2018-04-04 PROCEDURE — 99214 OFFICE O/P EST MOD 30 MIN: CPT | Performed by: NURSE PRACTITIONER

## 2018-04-04 RX ORDER — DOXYCYCLINE 100 MG/1
100 CAPSULE ORAL 2 TIMES DAILY
Qty: 14 CAPSULE | Refills: 0 | Status: SHIPPED | OUTPATIENT
Start: 2018-04-04 | End: 2018-04-11

## 2018-04-04 RX ORDER — PREDNISONE 20 MG/1
20 TABLET ORAL 2 TIMES DAILY
Qty: 10 TABLET | Refills: 0 | Status: SHIPPED | OUTPATIENT
Start: 2018-04-04 | End: 2018-04-09

## 2018-04-04 ASSESSMENT — ENCOUNTER SYMPTOMS
HEADACHES: 1
CHILLS: 1
SORE THROAT: 0
SHORTNESS OF BREATH: 1
MUSCULOSKELETAL NEGATIVE: 1
RHINORRHEA: 0
NAUSEA: 0
DIAPHORESIS: 1
COUGH: 1
FATIGUE: 1
VOMITING: 0
FEVER: 0
WHEEZING: 1
EYES NEGATIVE: 1
DIARRHEA: 0

## 2018-04-04 NOTE — NURSING NOTE
"Chief Complaint   Patient presents with     Cough     Patient complains of cough       Initial /79 (BP Location: Left arm, Patient Position: Chair, Cuff Size: Adult Regular)  Pulse 69  Temp 97.8  F (36.6  C) (Oral)  Wt 203 lb 3.2 oz (92.2 kg)  SpO2 97%  BMI 28.34 kg/m2 Estimated body mass index is 28.34 kg/(m^2) as calculated from the following:    Height as of 2/9/18: 5' 11\" (1.803 m).    Weight as of this encounter: 203 lb 3.2 oz (92.2 kg).  Medication Reconciliation: complete         Betite Huynh    "

## 2018-04-04 NOTE — PATIENT INSTRUCTIONS
Bronchitis with Wheezing (Viral or Bacterial: Adult)    Bronchitis is an infection of the air passages. It often occurs during a cold and is usually caused by a virus. Symptoms include cough with mucus (phlegm) and low-grade fever. This illness is contagious during the first few days and is spread through the air by coughing and sneezing, or by direct contact (touching the sick person and then touching your own eyes, nose, or mouth).  If there is a lot of inflammation, air flow is restricted. The air passages may also go into spasm, especially if you have asthma. This causes wheezing and difficulty breathing even in people who do not have asthma.  Bronchitis usually lasts 7 to 14 days. The wheezing should improve with treatment during the first week. An inhaler is often prescribed to relax the air passages and stop wheezing. Antibiotics will be prescribed if your doctor thinks there is also a secondary bacterial infection.  Home care    If symptoms are severe, rest at home for the first 2 to 3 days. When you go back to your usual activities, don't let yourself get too tired.    Do not smoke. Also avoid being exposed to secondhand smoke.    You may use over-the-counter medicine to control fever or pain, unless another medicine was prescribed. Note: If you have chronic liver or kidney disease or have ever had a stomach ulcer or gastrointestinal bleeding, talk with your healthcare provider before using these medicines. Also talk to your provider if you are taking medicine to prevent blood clots.) Aspirin should never be given to anyone younger than 18 years of age who is ill with a viral infection or fever. It may cause severe liver or brain damage.    Your appetite may be poor, so a light diet is fine. Avoid dehydration by drinking 6 to 8 glasses of fluids per day (such as water, soft drinks, sports drinks, juices, tea, or soup). Extra fluids will help loosen secretions in the nose and lungs.    Over-the-counter  cough, cold, and sore-throat medicines will not shorten the length of the illness, but they may be helpful to reduce symptoms. (Note: Do not use decongestants if you have high blood pressure.)    If you were given an inhaler, use it exactly as directed. If you need to use it more often than prescribed, your condition may be worsening. If this happens, contact your healthcare provider.    If prescribed, finish all antibiotic medicine, even if you are feeling better after only a few days.  Follow-up care  Follow up with your healthcare provider, or as advised. If you had an X-ray or ECG (electrocardiogram), a specialist will review it. You will be notified of any new findings that may affect your care.  Note: If you are age 65 or older, or if you have a chronic lung disease or condition that affects your immune system, or you smoke, talk to your healthcare provider about having a pneumococcal vaccinations and a yearly influenza vaccination (flu shot).  When to seek medical advice  Call your healthcare provider right away if any of these occur:    Fever of 100.4 F (38 C) or higher    Coughing up increasing amounts of colored sputum    Weakness, drowsiness, headache, facial pain, ear pain, or a stiff neck  Call 911, or get immediate medical care  Contact emergency services right away if any of these occur.    Coughing up blood    Worsening weakness, drowsiness, headache, or stiff neck    Increased wheezing not helped with medication, shortness of breath, or pain with breathing  Date Last Reviewed: 9/13/2015 2000-2017 [employer]. [address] [place] [address], [place] [address]. All rights reserved. This information is not intended as a substitute for professional medical care. Always follow your healthcare professional's instructions.

## 2018-04-04 NOTE — PROGRESS NOTES
SUBJECTIVE:   Miri Naylor is a 62 year old female presenting with a chief complaint of   Chief Complaint   Patient presents with     Cough     Patient complains of cough       She is an established patient of [facility].    Onset of symptoms was 9 day(s) ago.  Course of illness is worsening.    Severity moderate  Current and Associated symptoms: chills, cough, headache, body aches, fatigue, sweaty, wheezing, congestion  Treatment measures tried include None tried.  Predisposing factors include None.        Review of Systems   Constitutional: Positive for chills, diaphoresis and fatigue. Negative for fever.   HENT: Positive for congestion. Negative for ear pain, rhinorrhea and sore throat.    Eyes: Negative.    Respiratory: Positive for cough, shortness of breath and wheezing.    Gastrointestinal: Negative for diarrhea, nausea and vomiting.   Musculoskeletal: Negative.    Skin: Negative.    Neurological: Positive for headaches.       Past Medical History:   Diagnosis Date     Depressive disorder, not elsewhere classified      Drug allergy, multiple 9/23/14--passed graded oral challenge for Zithromax.     7/3/14 POS PRE-PEN skin test. 7/30/14 NEG skin tests and oral challenge to Cefzil     Hx of abnormal Pap smear ?    no specifics given     lumbar degeneration      Raynaud's disease      Unspecified essential hypertension      Family History   Problem Relation Age of Onset     Arthritis Mother      Hyperlipidemia Mother      Other Cancer Mother      Glioblastoma Multiforme     OSTEOPOROSIS Mother      Blood Disease Father      Hypertension Father      Hyperlipidemia Father      Other Cancer Father      lung cancer     Depression Son      Depression Daughter      Hyperlipidemia Brother      Anxiety Disorder Son      Skin Cancer No family hx of      Current Outpatient Prescriptions   Medication Sig Dispense Refill     doxycycline (VIBRAMYCIN) 100 MG capsule Take 1 capsule (100 mg) by mouth 2 times daily for 7 days  14 capsule 0     predniSONE (DELTASONE) 20 MG tablet Take 1 tablet (20 mg) by mouth 2 times daily for 5 days 10 tablet 0     lisinopril (PRINIVIL/ZESTRIL) 40 MG tablet TAKE 1 TABLET DAILY 90 tablet 1     atenolol (TENORMIN) 25 MG tablet TAKE 1 TABLET DAILY 90 tablet 1     azelastine (ASTELIN) 0.1 % spray Spray 1-2 sprays into both nostrils 2 times daily 1 Bottle 11     budesonide-formoterol (SYMBICORT) 160-4.5 MCG/ACT Inhaler Inhale 2 puffs into the lungs 2 times daily 1 Inhaler 3     pravastatin (PRAVACHOL) 40 MG tablet Take 1 tablet (40 mg) by mouth daily 90 tablet 3     escitalopram (LEXAPRO) 20 MG tablet TAKE 1 TABLET DAILY 90 tablet 1     pantoprazole (PROTONIX) 40 MG EC tablet TAKE 1 TABLET DAILY 30 TO 60 MINUTES BEFORE A MEAL 90 tablet 3     gabapentin (NEURONTIN) 300 MG capsule TAKE 1 CAPSULE THREE TIMES A  capsule 1     fluticasone (FLONASE) 50 MCG/ACT spray Spray 1-2 sprays into both nostrils daily 1 Bottle 11     montelukast (SINGULAIR) 10 MG tablet Take 1 tablet (10 mg) by mouth At Bedtime 90 tablet 3     Ketotifen Fumarate (ZADITOR OP) Apply to eye daily as needed       hydrocortisone 2.5 % cream Apply topically 2 times daily 60 g 1     triamcinolone (KENALOG) 0.1 % cream Apply topically 2 times daily (Patient taking differently: Apply topically 2 times daily as needed ) 60 g 3     albuterol (ALBUTEROL) 108 (90 BASE) MCG/ACT Inhaler Inhale 2 puffs into the lungs every 4 hours as needed for shortness of breath / dyspnea or wheezing 1 Inhaler 1     ibuprofen (ADVIL,MOTRIN) 200 MG tablet take 1 tablet (200 mg) by oral route every 6 hours as needed with food       vitamin E (E-400) 400 UNIT capsule Take one pill by mouth once daily       cetirizine (ZYRTEC) 10 MG tablet Take 10 mg by mouth daily.       VITAMIN C 500 MG PO TABS 2 TABLET DAILY       ACIDOPHILUS OR TABS 1 tablet daily       SUPER B COMPLEX PO TABS 1 tablet daily       FLAXSEED OIL 1000 MG PO CAPS 1 tablet daily        GLUCOSAMINE-CHONDROITIN PO TABS 1500 mg glucosamine and 1200mg chondroitin daily-2 tablets daily       CALCIUM 600+D  mg vitamin d- 3 tablets daily       DAILY MULTIVITAMIN PO 1 tablet daily       S-ADENOSYLMETHIONINE 200 MG PO TABS 2 tablets daily       Social History   Substance Use Topics     Smoking status: Never Smoker     Smokeless tobacco: Never Used     Alcohol use Yes      Comment: one glass of wine daily       OBJECTIVE  /79 (BP Location: Left arm, Patient Position: Chair, Cuff Size: Adult Regular)  Pulse 69  Temp 97.8  F (36.6  C) (Oral)  Wt 203 lb 3.2 oz (92.2 kg)  SpO2 97%  BMI 28.34 kg/m2    Physical Exam   Constitutional: She appears well-developed and well-nourished. No distress.   HENT:   Head: Normocephalic and atraumatic.   Right Ear: Tympanic membrane and external ear normal.   Left Ear: Tympanic membrane and external ear normal.   Nose: Mucosal edema and rhinorrhea present.   Mouth/Throat: Oropharynx is clear and moist.   Eyes: EOM are normal. Pupils are equal, round, and reactive to light.   Neck: Normal range of motion. Neck supple.   Pulmonary/Chest: Effort normal. No respiratory distress. She has wheezes in the right upper field, the right middle field, the left upper field and the left middle field. She exhibits tenderness (on coughing).   Lymphadenopathy:     She has no cervical adenopathy.   Neurological: She is alert. No cranial nerve deficit.   Skin: Skin is warm and dry. She is not diaphoretic.   Psychiatric: She has a normal mood and affect.   Nursing note and vitals reviewed.      Labs:  No results found for this or any previous visit (from the past 24 hour(s)).        ASSESSMENT:      ICD-10-CM    1. Acute bronchitis with coexisting condition requiring prophylactic treatment J20.9 doxycycline (VIBRAMYCIN) 100 MG capsule   2. Wheezing R06.2 predniSONE (DELTASONE) 20 MG tablet          Differential Diagnosis:  URI Adult/Peds:  Asthma, Influenza, Pneumonia and Viral  upper respiratory illness    Serious Comorbid Conditions:  Adult:  Asthma    PLAN:    URI Adult:  Rx bronchitis Patient has multiple antibiotic allergies, she reports that doxycycline has worked well for her before. I added prednisone and advised Albuterol every 4 hours for the next 48 hours, then as needed.      Followup:    If not improving or if condition worsens, follow up with your Primary Care Provider    Patient Instructions     Bronchitis with Wheezing (Viral or Bacterial: Adult)    Bronchitis is an infection of the air passages. It often occurs during a cold and is usually caused by a virus. Symptoms include cough with mucus (phlegm) and low-grade fever. This illness is contagious during the first few days and is spread through the air by coughing and sneezing, or by direct contact (touching the sick person and then touching your own eyes, nose, or mouth).  If there is a lot of inflammation, air flow is restricted. The air passages may also go into spasm, especially if you have asthma. This causes wheezing and difficulty breathing even in people who do not have asthma.  Bronchitis usually lasts 7 to 14 days. The wheezing should improve with treatment during the first week. An inhaler is often prescribed to relax the air passages and stop wheezing. Antibiotics will be prescribed if your doctor thinks there is also a secondary bacterial infection.  Home care    If symptoms are severe, rest at home for the first 2 to 3 days. When you go back to your usual activities, don't let yourself get too tired.    Do not smoke. Also avoid being exposed to secondhand smoke.    You may use over-the-counter medicine to control fever or pain, unless another medicine was prescribed. Note: If you have chronic liver or kidney disease or have ever had a stomach ulcer or gastrointestinal bleeding, talk with your healthcare provider before using these medicines. Also talk to your provider if you are taking medicine to prevent  blood clots.) Aspirin should never be given to anyone younger than 18 years of age who is ill with a viral infection or fever. It may cause severe liver or brain damage.    Your appetite may be poor, so a light diet is fine. Avoid dehydration by drinking 6 to 8 glasses of fluids per day (such as water, soft drinks, sports drinks, juices, tea, or soup). Extra fluids will help loosen secretions in the nose and lungs.    Over-the-counter cough, cold, and sore-throat medicines will not shorten the length of the illness, but they may be helpful to reduce symptoms. (Note: Do not use decongestants if you have high blood pressure.)    If you were given an inhaler, use it exactly as directed. If you need to use it more often than prescribed, your condition may be worsening. If this happens, contact your healthcare provider.    If prescribed, finish all antibiotic medicine, even if you are feeling better after only a few days.  Follow-up care  Follow up with your healthcare provider, or as advised. If you had an X-ray or ECG (electrocardiogram), a specialist will review it. You will be notified of any new findings that may affect your care.  Note: If you are age 65 or older, or if you have a chronic lung disease or condition that affects your immune system, or you smoke, talk to your healthcare provider about having a pneumococcal vaccinations and a yearly influenza vaccination (flu shot).  When to seek medical advice  Call your healthcare provider right away if any of these occur:    Fever of 100.4 F (38 C) or higher    Coughing up increasing amounts of colored sputum    Weakness, drowsiness, headache, facial pain, ear pain, or a stiff neck  Call 911, or get immediate medical care  Contact emergency services right away if any of these occur.    Coughing up blood    Worsening weakness, drowsiness, headache, or stiff neck    Increased wheezing not helped with medication, shortness of breath, or pain with breathing  Date Last  Reviewed: 9/13/2015 2000-2017 [employer]. [address] [place] [address], [place] [address]. All rights reserved. This information is not intended as a substitute for professional medical care. Always follow your healthcare professional's instructions.

## 2018-04-04 NOTE — MR AVS SNAPSHOT
After Visit Summary   4/4/2018    Miri Naylor    MRN: [id number]           Patient Information     Date Of Birth          1955        Visit Information        Provider Department      4/4/2018 12:45 PM Ce Hamm NP [facility]        Today's Diagnoses     Acute bronchitis with coexisting condition requiring prophylactic treatment    -  1    Wheezing          Care Instructions      Bronchitis with Wheezing (Viral or Bacterial: Adult)    Bronchitis is an infection of the air passages. It often occurs during a cold and is usually caused by a virus. Symptoms include cough with mucus (phlegm) and low-grade fever. This illness is contagious during the first few days and is spread through the air by coughing and sneezing, or by direct contact (touching the sick person and then touching your own eyes, nose, or mouth).  If there is a lot of inflammation, air flow is restricted. The air passages may also go into spasm, especially if you have asthma. This causes wheezing and difficulty breathing even in people who do not have asthma.  Bronchitis usually lasts 7 to 14 days. The wheezing should improve with treatment during the first week. An inhaler is often prescribed to relax the air passages and stop wheezing. Antibiotics will be prescribed if your doctor thinks there is also a secondary bacterial infection.  Home care    If symptoms are severe, rest at home for the first 2 to 3 days. When you go back to your usual activities, don't let yourself get too tired.    Do not smoke. Also avoid being exposed to secondhand smoke.    You may use over-the-counter medicine to control fever or pain, unless another medicine was prescribed. Note: If you have chronic liver or kidney disease or have ever had a stomach ulcer or gastrointestinal bleeding, talk with your healthcare provider before using these medicines. Also talk to your provider if you are taking medicine to prevent blood  clots.) Aspirin should never be given to anyone younger than 18 years of age who is ill with a viral infection or fever. It may cause severe liver or brain damage.    Your appetite may be poor, so a light diet is fine. Avoid dehydration by drinking 6 to 8 glasses of fluids per day (such as water, soft drinks, sports drinks, juices, tea, or soup). Extra fluids will help loosen secretions in the nose and lungs.    Over-the-counter cough, cold, and sore-throat medicines will not shorten the length of the illness, but they may be helpful to reduce symptoms. (Note: Do not use decongestants if you have high blood pressure.)    If you were given an inhaler, use it exactly as directed. If you need to use it more often than prescribed, your condition may be worsening. If this happens, contact your healthcare provider.    If prescribed, finish all antibiotic medicine, even if you are feeling better after only a few days.  Follow-up care  Follow up with your healthcare provider, or as advised. If you had an X-ray or ECG (electrocardiogram), a specialist will review it. You will be notified of any new findings that may affect your care.  Note: If you are age 65 or older, or if you have a chronic lung disease or condition that affects your immune system, or you smoke, talk to your healthcare provider about having a pneumococcal vaccinations and a yearly influenza vaccination (flu shot).  When to seek medical advice  Call your healthcare provider right away if any of these occur:    Fever of 100.4 F (38 C) or higher    Coughing up increasing amounts of colored sputum    Weakness, drowsiness, headache, facial pain, ear pain, or a stiff neck  Call 911, or get immediate medical care  Contact emergency services right away if any of these occur.    Coughing up blood    Worsening weakness, drowsiness, headache, or stiff neck    Increased wheezing not helped with medication, shortness of breath, or pain with breathing  Date Last  Reviewed: 9/13/2015 2000-2017 [employer]. [address] [place] [address], [place] [address]. All rights reserved. This information is not intended as a substitute for professional medical care. Always follow your healthcare professional's instructions.                Follow-ups after your visit        Your next 10 appointments already scheduled     May 16, 2018 12:45 PM CDT   [facility]   [facility]    [address] [id number] [phone]              Who to contact     If you have questions or need follow up information about today's clinic visit or your schedule please contact [facility] directly at [phone].  Normal or non-critical lab and imaging results will be communicated to you by MyChart, letter or phone within 4 business days after the clinic has received the results. If you do not hear from us within 7 days, please contact the clinic through [employer] or phone. If you have a critical or abnormal lab result, we will notify you by phone as soon as possible.  Submit refill requests through clypd or call your pharmacy and they will forward the refill request to us. Please allow 3 business days for your refill to be completed.          Additional Information About Your Visit        [email or web address] Information     [employer]pd gives you secure access to your electronic health record. If you see a primary care provider, you can also send messages to your care team and make appointments. If you have questions, please call your primary care clinic.  If you do not have a primary care provider, please call [phone] and they will assist you.        Care EveryWhere ID     This is your Care EveryWhere ID. This could be used by other organizations to access your [facility] medical records  [id number]        Your Vitals Were     Pulse Temperature Pulse Oximetry BMI (Body Mass Index)          69  97.8  F (36.6  C) (Oral) 97% 28.34 kg/m2         Blood Pressure from Last 3 Encounters:   04/04/18 128/79   02/09/18 138/81   01/23/18 155/85    Weight from Last 3 Encounters:   04/04/18 203 lb 3.2 oz (92.2 kg)   02/09/18 209 lb (94.8 kg)   01/23/18 205 lb (93 kg)              Today, you had the following     No orders found for display         Today's Medication Changes          These changes are accurate as of 4/4/18  1:25 PM.  If you have any questions, ask your nurse or doctor.               Start taking these medicines.        Dose/Directions    doxycycline 100 MG capsule   Commonly known as:  VIBRAMYCIN   Used for:  Acute bronchitis with coexisting condition requiring prophylactic treatment   Started by:  Ce Hamm NP        Dose:  100 mg   Take 1 capsule (100 mg) by mouth 2 times daily for 7 days   Quantity:  14 capsule   Refills:  0       predniSONE 20 MG tablet   Commonly known as:  DELTASONE   Used for:  Wheezing   Started by:  Ce Hamm NP        Dose:  20 mg   Take 1 tablet (20 mg) by mouth 2 times daily for 5 days   Quantity:  10 tablet   Refills:  0         These medicines have changed or have updated prescriptions.        Dose/Directions    triamcinolone 0.1 % cream   Commonly known as:  KENALOG   This may have changed:    - when to take this  - reasons to take this   Used for:  Other atopic dermatitis        Apply topically 2 times daily   Quantity:  60 g   Refills:  3            Where to get your medicines      These medications were sent to [facility] - [place], [place] - [address]  [address], [facility] [address]     Phone:  [phone]     doxycycline 100 MG capsule    predniSONE 20 MG tablet                Primary Care Provider Office Phone # Fax #    Elizabeth Corley [phone] [address] [place] [address]        Equal Access to Services     SONIA SANDERS AH: Day Ferguson, corinne charles, joao barajas  bj israelmnado burnettaan ah. So [facility] [phone].    ATENCIÓN: Si habla yas, tiene a dao disposición servicios gratuitos de asistencia lingüística. Evangelista al [phone].    We comply with applicable federal civil rights laws and [place] laws. We do not discriminate on the basis of race, color, national origin, age, disability, sex, sexual orientation, or gender identity.            Thank you!     Thank you for choosing [facility]  for your care. Our goal is always to provide you with excellent care. Hearing back from our patients is one way we can continue to improve our services. Please take a few minutes to complete the written survey that you may receive in the mail after your visit with us. Thank you!             Your Updated Medication List - Protect others around you: Learn how to safely use, store and throw away your medicines at www.disposemymeds.org.          This list is accurate as of 4/4/18  1:25 PM.  Always use your most recent med list.                   Brand Name Dispense Instructions for use Diagnosis    Acidophilus Tabs      1 tablet daily        albuterol 108 (90 BASE) MCG/ACT Inhaler    PROAIR HFA    1 Inhaler    Inhale 2 puffs into the lungs every 4 hours as needed for shortness of breath / dyspnea or wheezing    Moderate persistent asthma without complication       ascorbic acid 500 MG tablet    VITAMIN C     2 TABLET DAILY        atenolol 25 MG tablet    TENORMIN    90 tablet    TAKE 1 TABLET DAILY    Essential hypertension with goal blood pressure less than 140/90       azelastine 0.1 % spray    ASTELIN    1 Bottle    Spray 1-2 sprays into both nostrils 2 times daily    Rhinoconjunctivitis       budesonide-formoterol 160-4.5 MCG/ACT Inhaler    SYMBICORT    1 Inhaler    Inhale 2 puffs into the lungs 2 times daily    Moderate persistent asthma without complication       CALCIUM 600+D PO      250 mg vitamin d- 3 tablets daily        DAILY  MULTIVITAMIN PO      1 tablet daily        doxycycline 100 MG capsule    VIBRAMYCIN    14 capsule    Take 1 capsule (100 mg) by mouth 2 times daily for 7 days    Acute bronchitis with coexisting condition requiring prophylactic treatment       escitalopram 20 MG tablet    LEXAPRO    90 tablet    TAKE 1 TABLET DAILY    Major depression in complete remission (H)       flaxseed oil 1000 MG Caps      1 tablet daily        fluticasone 50 MCG/ACT spray    FLONASE    1 Bottle    Spray 1-2 sprays into both nostrils daily    Acute sinusitis with symptoms > 10 days       gabapentin 300 MG capsule    NEURONTIN    270 capsule    TAKE 1 CAPSULE THREE TIMES A DAY    Chronic nonintractable headache, unspecified headache type       Glucosamine-Chondroitin Tabs      1500 mg glucosamine and 1200mg chondroitin daily-2 tablets daily        hydrocortisone 2.5 % cream     60 g    Apply topically 2 times daily    Eyelid dermatitis, eczematous, unspecified laterality       ibuprofen 200 MG tablet    ADVIL/MOTRIN     take 1 tablet (200 mg) by oral route every 6 hours as needed with food        lisinopril 40 MG tablet    PRINIVIL/ZESTRIL    90 tablet    TAKE 1 TABLET DAILY    Essential hypertension with goal blood pressure less than 140/90       montelukast 10 MG tablet    SINGULAIR    90 tablet    Take 1 tablet (10 mg) by mouth At Bedtime    Moderate persistent asthma without complication       pantoprazole 40 MG EC tablet    PROTONIX    90 tablet    TAKE 1 TABLET DAILY 30 TO 60 MINUTES BEFORE A MEAL    Gastroesophageal reflux disease without esophagitis       pravastatin 40 MG tablet    PRAVACHOL    90 tablet    Take 1 tablet (40 mg) by mouth daily    Hyperlipidemia LDL goal <130       predniSONE 20 MG tablet    DELTASONE    10 tablet    Take 1 tablet (20 mg) by mouth 2 times daily for 5 days    Wheezing       S-Adenosylmethionine 200 MG Tabs      2 tablets daily    Menopausal syndrome (hot flashes)       SUPER B COMPLEX Tabs      1 tablet  daily        triamcinolone 0.1 % cream    KENALOG    60 g    Apply topically 2 times daily    Other atopic dermatitis       vitamin E 400 UNIT capsule   Generic drug:  vitamin E      Take one pill by mouth once daily        ZADITOR OP      Apply to eye daily as needed        ZYRTEC 10 MG tablet   Generic drug:  cetirizine      Take 10 mg by mouth daily.

## 2018-05-16 ENCOUNTER — OFFICE VISIT (OUTPATIENT)
Dept: NEUROLOGY | Facility: CLINIC | Age: 63
End: 2018-05-16
Payer: COMMERCIAL

## 2018-05-16 VITALS
TEMPERATURE: 98.2 F | SYSTOLIC BLOOD PRESSURE: 118 MMHG | BODY MASS INDEX: 28.59 KG/M2 | RESPIRATION RATE: 12 BRPM | WEIGHT: 205 LBS | HEART RATE: 54 BPM | DIASTOLIC BLOOD PRESSURE: 72 MMHG

## 2018-05-16 DIAGNOSIS — R51.9 CHRONIC NONINTRACTABLE HEADACHE, UNSPECIFIED HEADACHE TYPE: ICD-10-CM

## 2018-05-16 DIAGNOSIS — G43.009 MIGRAINE WITHOUT AURA AND WITHOUT STATUS MIGRAINOSUS, NOT INTRACTABLE: ICD-10-CM

## 2018-05-16 DIAGNOSIS — R51.9 CHRONIC DAILY HEADACHE: Primary | ICD-10-CM

## 2018-05-16 DIAGNOSIS — Z87.820 HISTORY OF TRAUMATIC BRAIN INJURY: ICD-10-CM

## 2018-05-16 DIAGNOSIS — G89.29 CHRONIC NONINTRACTABLE HEADACHE, UNSPECIFIED HEADACHE TYPE: ICD-10-CM

## 2018-05-16 PROCEDURE — 99243 OFF/OP CNSLTJ NEW/EST LOW 30: CPT | Performed by: PSYCHIATRY & NEUROLOGY

## 2018-05-16 RX ORDER — GABAPENTIN 300 MG/1
CAPSULE ORAL
Qty: 300 CAPSULE | Refills: 1 | Status: SHIPPED | OUTPATIENT
Start: 2018-05-16 | End: 2018-06-07

## 2018-05-16 NOTE — LETTER
"    5/16/2018         RE: Miri Naylor  [address]  [place] [address]        Dear Colleague,    Thank you for referring your patient, Miri Naylor, to the [facility]. Please see a copy of my visit note below.    INITIAL NEUROLOGY CONSULTATION    DATE OF VISIT: 5/16/2018  MRN: [id number]  PATIENT NAME: Miri Naylor  YOB: 1955    REFERRING PROVIDER: Referred Self    Chief Complaint   Patient presents with     Headache     New patient.  Referral by Nimisha Luna MD       SUBJECTIVE:                                                      HPI: Miri Naylor is a 62 year old female whom I was asked by Nimisha Luna MD, to see on consultation for headaches. The patient is actually a patient of Dr. Barney valenzuela, who requested the neurology referral from primary clinic for the headaches. I am unable to find information (symptoms or treatments) in the past couple of years of primary clinic notes. I do note a normal ESR in 11.2017/   I do see notes by Dr. aNvarrete for trigeminal neuralgia back in 2014. This occurred after some dental problems and surgery in the region. This was treated with gabapentin and later Carbatrol. MRI brain around that time was unremarkable.    The patient tells me that she had a head injury about 16 years ago. She says that she has been on gabapentin since, by her previous primary. These headache continue. She first saw Dr. Navarrete when she developed the facial pain on the Left along with the tooth issue. She says that \"everything Dr. Navarrete tried\" did not work. She continues to have some residual dull pain in the Left face and sensory changes in the region, but this is not really bothersome for her at this point.     She says that she has a continued underlying headache since the traumatic brain injury, the reason for the gabapentin in the first place. She says she does have some more severe pains with visual changes, R>L. She tends to get nausea and " fatigue with these. No tearing up or rhinorrhea. The pain is throbbing in character. These occur only if she does not take the gabapentin or with weather changes.  Dark room and rest help. Sometimes light and sound bother her. She says that these more severe headaches occur about once per month. Her brother told her to check in to cannabinoid oil. She has not tried marijuana for the headaches.    The migraine type of pain is new over the past one year. The dull pain is not new, and more chronic.   She says that she had worsening of her headaches when her gabapentin was taken regularly at higher doses.     She says that the acupuncture worsened her headaches. She has it done just for other purposes (hip and back pain), and it helps with this. She recently restarted yoga, and she thinks this helps overall. She mentions that she is not really interested in another medication. The daily headaches are manageable. She occasionally takes Tylenol or ibuprofen. She has not tried a triptan. No personal history of CAD or stroke.     She does take medication for allergies (Flonase, Astelin).     Paternal grandmother had Alzheimer's Disease, mother had a brain tumor.     Past Medical History:   Diagnosis Date     Depressive disorder, not elsewhere classified      Drug allergy, multiple 9/23/14--passed graded oral challenge for Zithromax.     7/3/14 POS PRE-PEN skin test. 7/30/14 NEG skin tests and oral challenge to Cefzil     Hx of abnormal Pap smear ?    no specifics given     lumbar degeneration      Raynaud's disease      Unspecified essential hypertension      Past Surgical History:   Procedure Laterality Date     BIOPSY OF BREAST, NEEDLE CORE       C PELVIS/HIP JOINT SURGERY UNLISTED Right 7/19/16    total hip arthroplasty     COLONOSCOPY       HIP SURGERY Right 07/19/2016         Current Outpatient Prescriptions on File Prior to Visit:  ACIDOPHILUS OR TABS 1 tablet daily   albuterol (ALBUTEROL) 108 (90 BASE) MCG/ACT  Inhaler Inhale 2 puffs into the lungs every 4 hours as needed for shortness of breath / dyspnea or wheezing   atenolol (TENORMIN) 25 MG tablet TAKE 1 TABLET DAILY   azelastine (ASTELIN) 0.1 % spray Spray 1-2 sprays into both nostrils 2 times daily   budesonide-formoterol (SYMBICORT) 160-4.5 MCG/ACT Inhaler Inhale 2 puffs into the lungs 2 times daily   CALCIUM 600+D  mg vitamin d- 3 tablets daily   cetirizine (ZYRTEC) 10 MG tablet Take 10 mg by mouth daily.   DAILY MULTIVITAMIN PO 1 tablet daily   escitalopram (LEXAPRO) 20 MG tablet TAKE 1 TABLET DAILY (Patient taking differently: 10 mg daily)   FLAXSEED OIL 1000 MG PO CAPS 1 tablet daily   fluticasone (FLONASE) 50 MCG/ACT spray Spray 1-2 sprays into both nostrils daily   GLUCOSAMINE-CHONDROITIN PO TABS 1500 mg glucosamine and 1200mg chondroitin daily-2 tablets daily   hydrocortisone 2.5 % cream Apply topically 2 times daily (Patient taking differently: Apply topically 2 times daily as needed )   ibuprofen (ADVIL,MOTRIN) 200 MG tablet take 1 tablet (200 mg) by oral route every 6 hours as needed with food   Ketotifen Fumarate (ZADITOR OP) Apply to eye daily as needed   lisinopril (PRINIVIL/ZESTRIL) 40 MG tablet TAKE 1 TABLET DAILY   montelukast (SINGULAIR) 10 MG tablet Take 1 tablet (10 mg) by mouth At Bedtime   pantoprazole (PROTONIX) 40 MG EC tablet TAKE 1 TABLET DAILY 30 TO 60 MINUTES BEFORE A MEAL   pravastatin (PRAVACHOL) 40 MG tablet Take 1 tablet (40 mg) by mouth daily   S-ADENOSYLMETHIONINE 200 MG PO TABS 2 tablets daily   SUPER B COMPLEX PO TABS 1 tablet daily   triamcinolone (KENALOG) 0.1 % cream Apply topically 2 times daily (Patient taking differently: Apply topically 2 times daily as needed )   VITAMIN C 500 MG PO TABS 2 TABLET DAILY   vitamin E (E-400) 400 UNIT capsule Take one pill by mouth once daily   [DISCONTINUED] gabapentin (NEURONTIN) 300 MG capsule TAKE 1 CAPSULE THREE TIMES A DAY     No current facility-administered medications on file  prior to visit.   Allergies   Allergen Reactions     Levofloxacin Rash     Other reaction(s): Contact Dermatitis     Penicillins Hives and Rash     Confirmed by skin prick testing 7/3/14     Amoxicillin Hives and Rash     Amoxicillin-Pot Clavulanate Rash     Azithromycin Rash     Cephalexin Rash     Clindamycin Rash and Hives     Atorvastatin Other (See Comments)     Felt sick, intolerance     Clindamycin Hcl Hives     Levaquin      tendonitis     Augmentin Rash        Problem (# of Occurrences) Relation (Name,Age of Onset)    Anxiety Disorder (1) Son    Arthritis (1) Mother    Blood Disease (1) Father    Depression (2) Son, Daughter    Hyperlipidemia (3) Mother, Father, Brother    Hypertension (1) Father    OSTEOPOROSIS (1) Mother    Other Cancer (2) Mother: Glioblastoma Multiforme, Father: lung cancer       Negative family history of: Skin Cancer        Social History   Substance Use Topics     Smoking status: Never Smoker     Smokeless tobacco: Never Used     Alcohol use Yes      Comment: one glass of wine daily       REVIEW OF SYSTEMS:                                                      10-point review of systems is negative except as mentioned above in HPI.     EXAM:                                                      Physical Exam:   Vitals: /72 (BP Location: Right arm, Patient Position: Sitting, Cuff Size: Adult Regular)  Pulse 54  Temp 98.2  F (36.8  C) (Oral)  Resp 12  Wt 93 kg (205 lb)  BMI 28.59 kg/m2  BMI= Body mass index is 28.59 kg/(m^2).  GENERAL: NAD.  HEENT: Slight TTP to Right scalp. No TTP of neck.   Neurologic:  MENTAL STATUS: Alert, attentive. Speech is fluent. Normal  comprehension. Normal concentration. Adequate fund of knowledge.   CRANIAL NERVES: Discs flat. Visual fields intact to confrontation. Pupils equally, round and reactive to light. Facial sensation and movement normal. EOM full. Hearing intact to conversation. Trapezius strength intact. Palate moves symmetrically.  Tongue midline.  MOTOR: 5/5 in proximal and distal muscle groups of upper and lower extremities. Tone and bulk normal.   DTRs: Intact and symmetric. Babinski down-going bilaterally.   SENSATION: Normal light touch and pinprick. Intact proprioception. Vibration: Normal at both ankles.   COORDINATION: Normal finger nose finger. Finger tapping normal. Knee heel shin normal.  STATION AND GAIT: Romberg negative. Tandem normal.  CV: RRR. S1, S2.   NECK: No bruits.    Relevant Data:  MRI Brain (3.11.14):  IMPRESSION:   1. The trigeminal nerves appear normal. No abnormal enhancement is  seen in the region of the left mandible or along the course of the  proximal left inferior alveolar nerve.  2. Brain parenchyma appears normal.    Imaging reviewed by me. Agree with radiology read.     ASSESSMENT and PLAN:                                                      Assessment and Plan:     ICD-10-CM    1. Chronic daily headache R51 MR Brain w/o & w Contrast     gabapentin (NEURONTIN) 300 MG capsule   2. Migraine without aura and without status migrainosus, not intractable G43.009 MR Brain w/o & w Contrast     gabapentin (NEURONTIN) 300 MG capsule   3. History of traumatic brain injury Z87.820 MR Brain w/o & w Contrast     gabapentin (NEURONTIN) 300 MG capsule   4. Chronic nonintractable headache, unspecified headache type R51         Ms. Naylor is a 61 yo woman with asthma, GERD, HTN, HLD and Raynaud's seen in neurology for headaches. She has a long history of daily, dull headaches after a traumatic brain injury. She also has newer migraine type of headaches, rare. We discussed repeating the brain MRI since there has been a change in her headache characteristics. In terms of treatment, she is happy with the gabapentin and it does seem to be helpful in headache prevention. I suggest an additional 300mg-600mg on bad headache days. We also discussed adding a triptan if needed, for the migraines. She will keep a log of the  headaches and follow-up with me again in a few months. The patient understands and agrees with the plan.     Patient Instructions:  Repeat Brain MRI. We will notify you of the results.   Increase the gabapentin on severe headache days (extra capsule or two with your dose).  Keep a headache log.   Return to clinic in 2-3 months.     Total Time: 45 minutes were spent with the patient. More than 50% of the time spent on counseling (as described above in Assessment and Plan) /coordinating the care.    Tamela Cox MD  Neurology    CC: Elizabeth Corley, DO    Again, thank you for allowing me to participate in the care of your patient.        Sincerely,        Tamela Cox MD

## 2018-05-16 NOTE — PATIENT INSTRUCTIONS
Plan:    Repeat Brain MRI. We will notify you of the results.   Increase the gabapentin on severe headache days (extra capsule or two with your dose).  Keep a headache log.   Return to clinic in 2-3 months.

## 2018-05-16 NOTE — PROGRESS NOTES
"INITIAL NEUROLOGY CONSULTATION    DATE OF VISIT: 5/16/2018  MRN: [id number]  PATIENT NAME: Miri Naylor  YOB: 1955    REFERRING PROVIDER: Referred Self    Chief Complaint   Patient presents with     Headache     New patient.  Referral by Nimisha Luna MD       SUBJECTIVE:                                                      HPI: Miri Naylor is a 62 year old female whom I was asked by Nimisha Luna MD, to see on consultation for headaches. The patient is actually a patient of Dr. Barney valenzuela, who requested the neurology referral from primary clinic for the headaches. I am unable to find information (symptoms or treatments) in the past couple of years of primary clinic notes. I do note a normal ESR in 11.2017/   I do see notes by Dr. Navarrete for trigeminal neuralgia back in 2014. This occurred after some dental problems and surgery in the region. This was treated with gabapentin and later Carbatrol. MRI brain around that time was unremarkable.    The patient tells me that she had a head injury about 16 years ago. She says that she has been on gabapentin since, by her previous primary. These headache continue. She first saw Dr. Navarrete when she developed the facial pain on the Left along with the tooth issue. She says that \"everything Dr. Navarrete tried\" did not work. She continues to have some residual dull pain in the Left face and sensory changes in the region, but this is not really bothersome for her at this point.     She says that she has a continued underlying headache since the traumatic brain injury, the reason for the gabapentin in the first place. She says she does have some more severe pains with visual changes, R>L. She tends to get nausea and fatigue with these. No tearing up or rhinorrhea. The pain is throbbing in character. These occur only if she does not take the gabapentin or with weather changes.  Dark room and rest help. Sometimes light and sound bother her. She says that these more " severe headaches occur about once per month. Her brother told her to check in to cannabinoid oil. She has not tried marijuana for the headaches.    The migraine type of pain is new over the past one year. The dull pain is not new, and more chronic.   She says that she had worsening of her headaches when her gabapentin was taken regularly at higher doses.     She says that the acupuncture worsened her headaches. She has it done just for other purposes (hip and back pain), and it helps with this. She recently restarted yoga, and she thinks this helps overall. She mentions that she is not really interested in another medication. The daily headaches are manageable. She occasionally takes Tylenol or ibuprofen. She has not tried a triptan. No personal history of CAD or stroke.     She does take medication for allergies (Flonase, Astelin).     Paternal grandmother had Alzheimer's Disease, mother had a brain tumor.     Past Medical History:   Diagnosis Date     Depressive disorder, not elsewhere classified      Drug allergy, multiple 9/23/14--passed graded oral challenge for Zithromax.     7/3/14 POS PRE-PEN skin test. 7/30/14 NEG skin tests and oral challenge to Cefzil     Hx of abnormal Pap smear ?    no specifics given     lumbar degeneration      Raynaud's disease      Unspecified essential hypertension      Past Surgical History:   Procedure Laterality Date     BIOPSY OF BREAST, NEEDLE CORE       C PELVIS/HIP JOINT SURGERY UNLISTED Right 7/19/16    total hip arthroplasty     COLONOSCOPY       HIP SURGERY Right 07/19/2016         Current Outpatient Prescriptions on File Prior to Visit:  ACIDOPHILUS OR TABS 1 tablet daily   albuterol (ALBUTEROL) 108 (90 BASE) MCG/ACT Inhaler Inhale 2 puffs into the lungs every 4 hours as needed for shortness of breath / dyspnea or wheezing   atenolol (TENORMIN) 25 MG tablet TAKE 1 TABLET DAILY   azelastine (ASTELIN) 0.1 % spray Spray 1-2 sprays into both nostrils 2 times daily    budesonide-formoterol (SYMBICORT) 160-4.5 MCG/ACT Inhaler Inhale 2 puffs into the lungs 2 times daily   CALCIUM 600+D  mg vitamin d- 3 tablets daily   cetirizine (ZYRTEC) 10 MG tablet Take 10 mg by mouth daily.   DAILY MULTIVITAMIN PO 1 tablet daily   escitalopram (LEXAPRO) 20 MG tablet TAKE 1 TABLET DAILY (Patient taking differently: 10 mg daily)   FLAXSEED OIL 1000 MG PO CAPS 1 tablet daily   fluticasone (FLONASE) 50 MCG/ACT spray Spray 1-2 sprays into both nostrils daily   GLUCOSAMINE-CHONDROITIN PO TABS 1500 mg glucosamine and 1200mg chondroitin daily-2 tablets daily   hydrocortisone 2.5 % cream Apply topically 2 times daily (Patient taking differently: Apply topically 2 times daily as needed )   ibuprofen (ADVIL,MOTRIN) 200 MG tablet take 1 tablet (200 mg) by oral route every 6 hours as needed with food   Ketotifen Fumarate (ZADITOR OP) Apply to eye daily as needed   lisinopril (PRINIVIL/ZESTRIL) 40 MG tablet TAKE 1 TABLET DAILY   montelukast (SINGULAIR) 10 MG tablet Take 1 tablet (10 mg) by mouth At Bedtime   pantoprazole (PROTONIX) 40 MG EC tablet TAKE 1 TABLET DAILY 30 TO 60 MINUTES BEFORE A MEAL   pravastatin (PRAVACHOL) 40 MG tablet Take 1 tablet (40 mg) by mouth daily   S-ADENOSYLMETHIONINE 200 MG PO TABS 2 tablets daily   SUPER B COMPLEX PO TABS 1 tablet daily   triamcinolone (KENALOG) 0.1 % cream Apply topically 2 times daily (Patient taking differently: Apply topically 2 times daily as needed )   VITAMIN C 500 MG PO TABS 2 TABLET DAILY   vitamin E (E-400) 400 UNIT capsule Take one pill by mouth once daily   [DISCONTINUED] gabapentin (NEURONTIN) 300 MG capsule TAKE 1 CAPSULE THREE TIMES A DAY     No current facility-administered medications on file prior to visit.   Allergies   Allergen Reactions     Levofloxacin Rash     Other reaction(s): Contact Dermatitis     Penicillins Hives and Rash     Confirmed by skin prick testing 7/3/14     Amoxicillin Hives and Rash     Amoxicillin-Pot Clavulanate  Rash     Azithromycin Rash     Cephalexin Rash     Clindamycin Rash and Hives     Atorvastatin Other (See Comments)     Felt sick, intolerance     Clindamycin Hcl Hives     Levaquin      tendonitis     Augmentin Rash        Problem (# of Occurrences) Relation (Name,Age of Onset)    Anxiety Disorder (1) Son    Arthritis (1) Mother    Blood Disease (1) Father    Depression (2) Son, Daughter    Hyperlipidemia (3) Mother, Father, Brother    Hypertension (1) Father    OSTEOPOROSIS (1) Mother    Other Cancer (2) Mother: Glioblastoma Multiforme, Father: lung cancer       Negative family history of: Skin Cancer        Social History   Substance Use Topics     Smoking status: Never Smoker     Smokeless tobacco: Never Used     Alcohol use Yes      Comment: one glass of wine daily       REVIEW OF SYSTEMS:                                                      10-point review of systems is negative except as mentioned above in HPI.     EXAM:                                                      Physical Exam:   Vitals: /72 (BP Location: Right arm, Patient Position: Sitting, Cuff Size: Adult Regular)  Pulse 54  Temp 98.2  F (36.8  C) (Oral)  Resp 12  Wt 93 kg (205 lb)  BMI 28.59 kg/m2  BMI= Body mass index is 28.59 kg/(m^2).  GENERAL: NAD.  HEENT: Slight TTP to Right scalp. No TTP of neck.   Neurologic:  MENTAL STATUS: Alert, attentive. Speech is fluent. Normal  comprehension. Normal concentration. Adequate fund of knowledge.   CRANIAL NERVES: Discs flat. Visual fields intact to confrontation. Pupils equally, round and reactive to light. Facial sensation and movement normal. EOM full. Hearing intact to conversation. Trapezius strength intact. Palate moves symmetrically. Tongue midline.  MOTOR: 5/5 in proximal and distal muscle groups of upper and lower extremities. Tone and bulk normal.   DTRs: Intact and symmetric. Babinski down-going bilaterally.   SENSATION: Normal light touch and pinprick. Intact proprioception.  Vibration: Normal at both ankles.   COORDINATION: Normal finger nose finger. Finger tapping normal. Knee heel shin normal.  STATION AND GAIT: Romberg negative. Tandem normal.  CV: RRR. S1, S2.   NECK: No bruits.    Relevant Data:  MRI Brain (3.11.14):  IMPRESSION:   1. The trigeminal nerves appear normal. No abnormal enhancement is  seen in the region of the left mandible or along the course of the  proximal left inferior alveolar nerve.  2. Brain parenchyma appears normal.    Imaging reviewed by me. Agree with radiology read.     ASSESSMENT and PLAN:                                                      Assessment and Plan:     ICD-10-CM    1. Chronic daily headache R51 MR Brain w/o & w Contrast     gabapentin (NEURONTIN) 300 MG capsule   2. Migraine without aura and without status migrainosus, not intractable G43.009 MR Brain w/o & w Contrast     gabapentin (NEURONTIN) 300 MG capsule   3. History of traumatic brain injury Z87.820 MR Brain w/o & w Contrast     gabapentin (NEURONTIN) 300 MG capsule   4. Chronic nonintractable headache, unspecified headache type R51         Ms. Naylor is a 61 yo woman with asthma, GERD, HTN, HLD and Raynaud's seen in neurology for headaches. She has a long history of daily, dull headaches after a traumatic brain injury. She also has newer migraine type of headaches, rare. We discussed repeating the brain MRI since there has been a change in her headache characteristics. In terms of treatment, she is happy with the gabapentin and it does seem to be helpful in headache prevention. I suggest an additional 300mg-600mg on bad headache days. We also discussed adding a triptan if needed, for the migraines. She will keep a log of the headaches and follow-up with me again in a few months. The patient understands and agrees with the plan.     Patient Instructions:  Repeat Brain MRI. We will notify you of the results.   Increase the gabapentin on severe headache days (extra capsule or two  with your dose).  Keep a headache log.   Return to clinic in 2-3 months.     Total Time: 45 minutes were spent with the patient. More than 50% of the time spent on counseling (as described above in Assessment and Plan) /coordinating the care.    Tamela Cox MD  Neurology    CC: Elizabeth Corley DO

## 2018-05-16 NOTE — NURSING NOTE
Patient prefers to be contacted: Jeff Barriosay to leave detailed message on voicemail: n/a    Eliana HASSAN-MADELIN

## 2018-05-16 NOTE — MR AVS SNAPSHOT
After Visit Summary   5/16/2018    Miri Naylor    MRN: [id number]           Patient Information     Date Of Birth          1955        Visit Information        Provider Department      5/16/2018 12:45 PM Tamela Cox MD [facility]        Today's Diagnoses     Chronic daily headache    -  1    Migraine without aura and without status migrainosus, not intractable        History of traumatic brain injury        Chronic nonintractable headache, unspecified headache type          Care Instructions    Plan:    Repeat Brain MRI. We will notify you of the results.   Increase the gabapentin on severe headache days (extra capsule or two with your dose).  Keep a headache log.   Return to clinic in 2-3 months.           Follow-ups after your visit        Follow-up notes from your care team     Return in about 3 months (around 8/16/2018).      Future tests that were ordered for you today     Open Future Orders        Priority Expected Expires Ordered    MR Brain w/o & w Contrast Routine  5/16/2019 5/16/2018            Who to contact     If you have questions or need follow up information about today's clinic visit or your schedule please contact [facility] directly at [phone].  Normal or non-critical lab and imaging results will be communicated to you by MyChart, letter or phone within 4 business days after the clinic has received the results. If you do not hear from us within 7 days, please contact the clinic through ShoutEmhart or phone. If you have a critical or abnormal lab result, we will notify you by phone as soon as possible.  Submit refill requests through Pint Please or call your pharmacy and they will forward the refill request to us. Please allow 3 business days for your refill to be completed.          Additional Information About Your Visit        MyChart Information     Pint Please gives you secure access to your electronic health record. If you see a primary  care provider, you can also send messages to your care team and make appointments. If you have questions, please call your primary care clinic.  If you do not have a primary care provider, please call [phone] and they will assist you.        Care EveryWhere ID     This is your Care EveryWhere ID. This could be used by other organizations to access your [facility] medical records  [id number]        Your Vitals Were     Pulse Temperature Respirations BMI (Body Mass Index)          54 98.2  F (36.8  C) (Oral) 12 28.59 kg/m2         Blood Pressure from Last 3 Encounters:   05/16/18 118/72   04/04/18 128/79   02/09/18 138/81    Weight from Last 3 Encounters:   05/16/18 93 kg (205 lb)   04/04/18 92.2 kg (203 lb 3.2 oz)   02/09/18 94.8 kg (209 lb)                 Today's Medication Changes          These changes are accurate as of 5/16/18  1:47 PM.  If you have any questions, ask your nurse or doctor.               These medicines have changed or have updated prescriptions.        Dose/Directions    escitalopram 20 MG tablet   Commonly known as:  LEXAPRO   This may have changed:  See the new instructions.   Used for:  Major depression in complete remission (H)        TAKE 1 TABLET DAILY   Quantity:  90 tablet   Refills:  1       gabapentin 300 MG capsule   Commonly known as:  NEURONTIN   This may have changed:  See the new instructions.   Used for:  Chronic daily headache, Migraine without aura and without status migrainosus, not intractable, History of traumatic brain injury   Changed by:  Tamela Cox MD        1 capsule TID with additional 1-2 capsules if headache is severe.   Quantity:  300 capsule   Refills:  1       hydrocortisone 2.5 % cream   This may have changed:    - when to take this  - reasons to take this   Used for:  Eyelid dermatitis, eczematous, unspecified laterality        Apply topically 2 times daily   Quantity:  60 g   Refills:  1       triamcinolone 0.1 % cream   Commonly known as:  KENALOG    This may have changed:    - when to take this  - reasons to take this   Used for:  Other atopic dermatitis        Apply topically 2 times daily   Quantity:  60 g   Refills:  3            Where to get your medicines      [place] [facility] [place] - [address]  [address], [facility] [address]     Phone:  [phone]     gabapentin 300 MG capsule                Primary Care Provider Office Phone # Fax #    Elizabeth Castrejon Barney,  [phone] [address] [place] [address]        Equal Access to Services     [facility]: Hadii aad ku hadasho Soomaali, waaxda luqadaha, qaybta kaalmada adeegyada, bj cohen . So [facility] [phone].    ATENCIÓN: Si habla español, tiene a dao disposición servicios gratuitos de asistencia lingüística. [facility] [phone].    We comply with applicable federal civil rights laws and [place] laws. We do not discriminate on the basis of race, color, national origin, age, disability, sex, sexual orientation, or gender identity.            Thank you!     Thank you for choosing [facility]  for your care. Our goal is always to provide you with excellent care. Hearing back from our patients is one way we can continue to improve our services. Please take a few minutes to complete the written survey that you may receive in the mail after your visit with us. Thank you!             Your Updated Medication List - Protect others around you: Learn how to safely use, store and throw away your medicines at www.disposemymeds.org.          This list is accurate as of 5/16/18  1:47 PM.  Always use your most recent med list.                   Brand Name Dispense Instructions for use Diagnosis    Acidophilus Tabs      1 tablet daily        albuterol 108 (90 Base) MCG/ACT Inhaler    PROAIR HFA    1 Inhaler    Inhale 2 puffs into the lungs every 4 hours as needed for  shortness of breath / dyspnea or wheezing    Moderate persistent asthma without complication       ascorbic acid 500 MG tablet    VITAMIN C     2 TABLET DAILY        atenolol 25 MG tablet    TENORMIN    90 tablet    TAKE 1 TABLET DAILY    Essential hypertension with goal blood pressure less than 140/90       azelastine 0.1 % spray    ASTELIN    1 Bottle    Spray 1-2 sprays into both nostrils 2 times daily    Rhinoconjunctivitis       budesonide-formoterol 160-4.5 MCG/ACT Inhaler    SYMBICORT    1 Inhaler    Inhale 2 puffs into the lungs 2 times daily    Moderate persistent asthma without complication       CALCIUM 600+D PO      250 mg vitamin d- 3 tablets daily        DAILY MULTIVITAMIN PO      1 tablet daily        escitalopram 20 MG tablet    LEXAPRO    90 tablet    TAKE 1 TABLET DAILY    Major depression in complete remission (H)       flaxseed oil 1000 MG Caps      1 tablet daily        fluticasone 50 MCG/ACT spray    FLONASE    1 Bottle    Spray 1-2 sprays into both nostrils daily    Acute sinusitis with symptoms > 10 days       gabapentin 300 MG capsule    NEURONTIN    300 capsule    1 capsule TID with additional 1-2 capsules if headache is severe.    Chronic daily headache, Migraine without aura and without status migrainosus, not intractable, History of traumatic brain injury       Glucosamine-Chondroitin Tabs      1500 mg glucosamine and 1200mg chondroitin daily-2 tablets daily        hydrocortisone 2.5 % cream     60 g    Apply topically 2 times daily    Eyelid dermatitis, eczematous, unspecified laterality       ibuprofen 200 MG tablet    ADVIL/MOTRIN     take 1 tablet (200 mg) by oral route every 6 hours as needed with food        lisinopril 40 MG tablet    PRINIVIL/ZESTRIL    90 tablet    TAKE 1 TABLET DAILY    Essential hypertension with goal blood pressure less than 140/90       montelukast 10 MG tablet    SINGULAIR    90 tablet    Take 1 tablet (10 mg) by mouth At Bedtime    Moderate persistent  asthma without complication       pantoprazole 40 MG EC tablet    PROTONIX    90 tablet    TAKE 1 TABLET DAILY 30 TO 60 MINUTES BEFORE A MEAL    Gastroesophageal reflux disease without esophagitis       potassium 99 MG Tabs      Take 3 tablets by mouth 2 times daily        pravastatin 40 MG tablet    PRAVACHOL    90 tablet    Take 1 tablet (40 mg) by mouth daily    Hyperlipidemia LDL goal <130       S-Adenosylmethionine 200 MG Tabs      2 tablets daily    Menopausal syndrome (hot flashes)       SUPER B COMPLEX Tabs      1 tablet daily        triamcinolone 0.1 % cream    KENALOG    60 g    Apply topically 2 times daily    Other atopic dermatitis       vitamin E 400 UNIT capsule   Generic drug:  vitamin E      Take one pill by mouth once daily        ZADITOR OP      Apply to eye daily as needed        ZYRTEC 10 MG tablet   Generic drug:  cetirizine      Take 10 mg by mouth daily.

## 2018-05-30 ENCOUNTER — RADIANT APPOINTMENT (OUTPATIENT)
Dept: MRI IMAGING | Facility: CLINIC | Age: 63
End: 2018-05-30
Attending: PSYCHIATRY & NEUROLOGY
Payer: COMMERCIAL

## 2018-05-30 DIAGNOSIS — Z87.820 HISTORY OF TRAUMATIC BRAIN INJURY: ICD-10-CM

## 2018-05-30 DIAGNOSIS — G43.009 MIGRAINE WITHOUT AURA AND WITHOUT STATUS MIGRAINOSUS, NOT INTRACTABLE: ICD-10-CM

## 2018-05-30 DIAGNOSIS — R51.9 CHRONIC DAILY HEADACHE: ICD-10-CM

## 2018-05-30 PROCEDURE — A9585 GADOBUTROL INJECTION: HCPCS

## 2018-05-30 PROCEDURE — 70553 MRI BRAIN STEM W/O & W/DYE: CPT | Mod: TC

## 2018-05-30 RX ORDER — GADOBUTROL 604.72 MG/ML
10 INJECTION INTRAVENOUS ONCE
Status: COMPLETED | OUTPATIENT
Start: 2018-05-30 | End: 2018-05-30

## 2018-05-30 RX ADMIN — GADOBUTROL 9 ML: 604.72 INJECTION INTRAVENOUS at 09:20

## 2018-05-31 DIAGNOSIS — R51.9 CHRONIC DAILY HEADACHE: ICD-10-CM

## 2018-05-31 DIAGNOSIS — G43.009 MIGRAINE WITHOUT AURA AND WITHOUT STATUS MIGRAINOSUS, NOT INTRACTABLE: ICD-10-CM

## 2018-05-31 DIAGNOSIS — E78.5 HYPERLIPIDEMIA LDL GOAL <130: ICD-10-CM

## 2018-05-31 DIAGNOSIS — Z87.820 HISTORY OF TRAUMATIC BRAIN INJURY: ICD-10-CM

## 2018-05-31 NOTE — TELEPHONE ENCOUNTER
"Requested Prescriptions   Pending Prescriptions Disp Refills     atorvastatin (LIPITOR) 40 MG tablet [Pharmacy Med Name: ATORVASTATIN TABS 40MG] 90 tablet 3    Last Written Prescription Date:  03/02/2017 390 x 3  Last filled 05/05/2017  Last office visit: 11/10/2017 PRADIP Corley   Future Office Visit:  None    Note: Medication is not on the current med list.   Sig: TAKE 1 TABLET DAILY    Statins Protocol Passed    5/31/2018 11:23 AM       Passed - LDL on file in past 12 months    Recent Labs   Lab Test  08/26/17   1056   LDL  170*            Passed - No abnormal creatine kinase in past 12 months    No lab results found.            Passed - Recent (12 mo) or future (30 days) visit within the authorizing provider's specialty    Patient had office visit in the last 12 months or has a visit in the next 30 days with authorizing provider or within the authorizing provider's specialty.  See \"Patient Info\" tab in inbasket, or \"Choose Columns\" in Meds & Orders section of the refill encounter.           Passed - Patient is age 18 or older       Passed - No active pregnancy on record       Passed - No positive pregnancy test in past 12 months        gabapentin (NEURONTIN) 300 MG capsule [Pharmacy Med Name: GABAPENTIN CAPS 300MG] 270 capsule 1    Last Written Prescription Date:  05/16/20118 #300 x 1  Last filled 02/26/2018  Last office visit: 11/10/2017 PRADIP Corley   Future Office Visit:  None   Sig: TAKE 1 CAPSULE THREE TIMES A DAY    There is no refill protocol information for this order          "

## 2018-06-01 RX ORDER — GABAPENTIN 300 MG/1
CAPSULE ORAL
Qty: 270 CAPSULE | Refills: 1 | OUTPATIENT
Start: 2018-06-01

## 2018-06-01 RX ORDER — ATORVASTATIN CALCIUM 40 MG/1
TABLET, FILM COATED ORAL
Qty: 90 TABLET | Refills: 3 | Status: SHIPPED | OUTPATIENT
Start: 2018-06-01 | End: 2018-06-11 | Stop reason: ALTCHOICE

## 2018-06-01 NOTE — PROGRESS NOTES
Please advise Miri Naylor,  1955, that her brain MRI shows evidence of the old traumatic brain injury but is otherwise unremarkable.     Tamela Cox

## 2018-06-01 NOTE — TELEPHONE ENCOUNTER
Atorvastatin filled.  Gabapentin denied.  Was just filled by Dr Cox on 5/16/18    Elizabeth Corley

## 2018-06-06 ENCOUNTER — MYC MEDICAL ADVICE (OUTPATIENT)
Dept: FAMILY MEDICINE | Facility: CLINIC | Age: 63
End: 2018-06-06

## 2018-06-07 DIAGNOSIS — G43.009 MIGRAINE WITHOUT AURA AND WITHOUT STATUS MIGRAINOSUS, NOT INTRACTABLE: ICD-10-CM

## 2018-06-07 DIAGNOSIS — Z87.820 HISTORY OF TRAUMATIC BRAIN INJURY: ICD-10-CM

## 2018-06-07 DIAGNOSIS — R51.9 CHRONIC DAILY HEADACHE: ICD-10-CM

## 2018-06-07 NOTE — TELEPHONE ENCOUNTER
Routing refill request to provider for review/approval because:  Drug not on the [facility] refill protocol   Eliana ANDERSON RN

## 2018-06-08 RX ORDER — GABAPENTIN 300 MG/1
CAPSULE ORAL
Qty: 300 CAPSULE | Refills: 1 | Status: SHIPPED | OUTPATIENT
Start: 2018-06-08 | End: 2018-08-24

## 2018-06-11 ENCOUNTER — OFFICE VISIT (OUTPATIENT)
Dept: ALLERGY | Facility: CLINIC | Age: 63
End: 2018-06-11
Payer: COMMERCIAL

## 2018-06-11 VITALS — OXYGEN SATURATION: 97 % | SYSTOLIC BLOOD PRESSURE: 152 MMHG | HEART RATE: 58 BPM | DIASTOLIC BLOOD PRESSURE: 84 MMHG

## 2018-06-11 DIAGNOSIS — I10 ESSENTIAL HYPERTENSION: ICD-10-CM

## 2018-06-11 DIAGNOSIS — J45.40 MODERATE PERSISTENT ASTHMA WITHOUT COMPLICATION: Primary | ICD-10-CM

## 2018-06-11 DIAGNOSIS — J31.0 RHINOCONJUNCTIVITIS: ICD-10-CM

## 2018-06-11 DIAGNOSIS — K21.9 GASTROESOPHAGEAL REFLUX DISEASE, ESOPHAGITIS PRESENCE NOT SPECIFIED: ICD-10-CM

## 2018-06-11 DIAGNOSIS — R49.0 HOARSENESS: ICD-10-CM

## 2018-06-11 DIAGNOSIS — H10.9 RHINOCONJUNCTIVITIS: ICD-10-CM

## 2018-06-11 LAB
FEF 25/75: NORMAL
FEV-1: NORMAL
FEV1/FVC: NORMAL
FVC: NORMAL

## 2018-06-11 PROCEDURE — 99214 OFFICE O/P EST MOD 30 MIN: CPT | Mod: 25 | Performed by: ALLERGY & IMMUNOLOGY

## 2018-06-11 PROCEDURE — 94010 BREATHING CAPACITY TEST: CPT | Performed by: ALLERGY & IMMUNOLOGY

## 2018-06-11 RX ORDER — BUDESONIDE AND FORMOTEROL FUMARATE DIHYDRATE 80; 4.5 UG/1; UG/1
2 AEROSOL RESPIRATORY (INHALATION) 2 TIMES DAILY
Qty: 1 INHALER | Refills: 3 | Status: SHIPPED | OUTPATIENT
Start: 2018-06-11 | End: 2018-09-25

## 2018-06-11 NOTE — LETTER
6/11/2018         RE: Miri Naylor  [address]  [place] [address]        Dear Colleague,    Thank you for referring your patient, Miri Naylor, to the [facility]. Please see a copy of my visit note below.    Miri Naylor is a 63 year old White female with previous medical history significant for asthma and nonallergic rhinitis who returns for a follow up visit.     Patient returns for follow-up.  She was last seen in February 2018.  At the time she was continued on Symbicort 160/4.5 mcg 2 puffs inhaled twice daily.  In the interim she got sick with bronchitis and had wheezing, coughing and shortness of breath.  Albuterol was used and helpful.  The symptoms have subsequently passed.  She remains on Symbicort and montelukast daily.  She is using albuterol less than twice per week.  She denies wheezing, coughing and or shortness of breath.    Patient previously underwent allergy testing which was negative.  She had responded to traditional allergen therapy with Flonase, nasal lasting, Singulair and Zyrtec.  Thought she may have local allergic rhinitis.  She continues to have postnasal drainage, rhinorrhea, sneezing, ocular itching and ocular watering.  She has had a hoarseness which is been persistent.  No use of ipratropium.  She has not seen ENT recently.      ACT Total Scores 6/11/2018   ACT TOTAL SCORE (Goal Greater than or Equal to 20) 21   In the past 12 months, how many times did you visit the emergency room for your asthma without being admitted to the hospital? 0   In the past 12 months, how many times were you hospitalized overnight because of your asthma? 0     Past Medical History:   Diagnosis Date     Depressive disorder, not elsewhere classified      Drug allergy, multiple 9/23/14--passed graded oral challenge for Zithromax.     7/3/14 POS PRE-PEN skin test. 7/30/14 NEG skin tests and oral challenge to Cefzil     Hx of abnormal Pap smear ?    no  specifics given     lumbar degeneration      Raynaud's disease      Unspecified essential hypertension      Family History   Problem Relation Age of Onset     Arthritis Mother      Hyperlipidemia Mother      Other Cancer Mother      Glioblastoma Multiforme     OSTEOPOROSIS Mother      Blood Disease Father      Hypertension Father      Hyperlipidemia Father      Other Cancer Father      lung cancer     Depression Son      Depression Daughter      Hyperlipidemia Brother      Anxiety Disorder Son      Skin Cancer No family hx of      Past Surgical History:   Procedure Laterality Date     BIOPSY OF BREAST, NEEDLE CORE       C PELVIS/HIP JOINT SURGERY UNLISTED Right 7/19/16    total hip arthroplasty     COLONOSCOPY       HIP SURGERY Right 07/19/2016       REVIEW OF SYSTEMS:  General: positive  for weight gain. negative for weight loss. negative for changes in sleep.   Ears: negative for fullness. negative for hearing loss. negative for dizziness.   Nose: negative for snoring.negative for changes in smell. negative for drainage.   Throat: positive  for hoarseness. negative for sore throat. negative for trouble swallowing.   Lungs: negative for shortness of breath.negative for wheezing. negative for sputum production.   Cardiovascular: negative for chest pain. positive  for swelling of ankles. negative for fast or irregular heartbeat.   Gastrointestinal: negative for nausea. negative for heartburn. negative for acid reflux.   Musculoskeletal: positive  for joint pain. positive  for joint stiffness. negative for joint swelling.   Neurologic: negative for seizures. negative for fainting. negative for weakness.   Psychiatric: negative for changes in mood. negative for anxiety.   Endocrine: negative for cold intolerance. positive  for heat intolerance. negative for tremors.   Hematologic: positive  for easy bruising. negative for easy bleeding.  Integumentary: negative for rash. negative for scaling. negative for nail changes.        Current Outpatient Prescriptions:      ACIDOPHILUS OR TABS, 1 tablet daily, Disp: , Rfl:      albuterol (ALBUTEROL) 108 (90 BASE) MCG/ACT Inhaler, Inhale 2 puffs into the lungs every 4 hours as needed for shortness of breath / dyspnea or wheezing, Disp: 1 Inhaler, Rfl: 1     atenolol (TENORMIN) 25 MG tablet, TAKE 1 TABLET DAILY, Disp: 90 tablet, Rfl: 1     azelastine (ASTELIN) 0.1 % spray, Spray 1-2 sprays into both nostrils 2 times daily, Disp: 1 Bottle, Rfl: 11     budesonide-formoterol (SYMBICORT) 80-4.5 MCG/ACT Inhaler, Inhale 2 puffs into the lungs 2 times daily, Disp: 1 Inhaler, Rfl: 3     CALCIUM 600+D PO, 250 mg vitamin d- 3 tablets daily, Disp: , Rfl:      cetirizine (ZYRTEC) 10 MG tablet, Take 10 mg by mouth daily., Disp: , Rfl:      DAILY MULTIVITAMIN PO, 1 tablet daily, Disp: , Rfl:      escitalopram (LEXAPRO) 20 MG tablet, TAKE 1 TABLET DAILY (Patient taking differently: TAKE 1/2 TABLET DAILY), Disp: 90 tablet, Rfl: 1     FLAXSEED OIL 1000 MG PO CAPS, 1 tablet daily, Disp: , Rfl:      fluticasone (FLONASE) 50 MCG/ACT spray, Spray 1-2 sprays into both nostrils daily, Disp: 1 Bottle, Rfl: 11     gabapentin (NEURONTIN) 300 MG capsule, 1 capsule TID with additional 1-2 capsules if headache is severe., Disp: 300 capsule, Rfl: 1     GLUCOSAMINE-CHONDROITIN PO TABS, 1500 mg glucosamine and 1200mg chondroitin daily-2 tablets daily, Disp: , Rfl:      hydrocortisone 2.5 % cream, Apply topically 2 times daily (Patient taking differently: Apply topically 2 times daily as needed ), Disp: 60 g, Rfl: 1     ibuprofen (ADVIL,MOTRIN) 200 MG tablet, take 1 tablet (200 mg) by oral route every 6 hours as needed with food, Disp: , Rfl:      Ketotifen Fumarate (ZADITOR OP), Apply to eye daily as needed, Disp: , Rfl:      lisinopril (PRINIVIL/ZESTRIL) 40 MG tablet, TAKE 1 TABLET DAILY, Disp: 90 tablet, Rfl: 1     montelukast (SINGULAIR) 10 MG tablet, Take 1 tablet (10 mg) by mouth At Bedtime, Disp: 90 tablet, Rfl: 3      pantoprazole (PROTONIX) 40 MG EC tablet, TAKE 1 TABLET DAILY 30 TO 60 MINUTES BEFORE A MEAL, Disp: 90 tablet, Rfl: 3     potassium 99 MG TABS, Take 3 tablets by mouth 2 times daily, Disp: , Rfl:      pravastatin (PRAVACHOL) 40 MG tablet, Take 1 tablet (40 mg) by mouth daily, Disp: 90 tablet, Rfl: 3     S-ADENOSYLMETHIONINE 200 MG PO TABS, 2 tablets daily, Disp: , Rfl:      SUPER B COMPLEX PO TABS, 1 tablet daily, Disp: , Rfl:      triamcinolone (KENALOG) 0.1 % cream, Apply topically 2 times daily (Patient taking differently: Apply topically 2 times daily as needed ), Disp: 60 g, Rfl: 3     VITAMIN C 500 MG PO TABS, 2 TABLET DAILY, Disp: , Rfl:      vitamin E (E-400) 400 UNIT capsule, Take one pill by mouth once daily, Disp: , Rfl:      [DISCONTINUED] budesonide-formoterol (SYMBICORT) 160-4.5 MCG/ACT Inhaler, Inhale 2 puffs into the lungs 2 times daily, Disp: 1 Inhaler, Rfl: 3  Immunization History   Administered Date(s) Administered     Influenza Vaccine IM 3yrs+ 4 Valent IIV4 12/03/2013, 11/16/2015, 10/01/2017     TD (ADULT, 7+) 03/06/2008     TDAP Vaccine (Adacel) 08/03/2012     Allergies   Allergen Reactions     Levofloxacin Rash     Other reaction(s): Contact Dermatitis     Penicillins Hives and Rash     Confirmed by skin prick testing 7/3/14     Amoxicillin Hives and Rash     Amoxicillin-Pot Clavulanate Rash     Azithromycin Rash     Cephalexin Rash     Clindamycin Rash and Hives     Atorvastatin Other (See Comments)     Felt sick, intolerance     Clindamycin Hcl Hives     Levaquin      tendonitis     Augmentin Rash         EXAM:   Constitutional:  Appears well-developed and well-nourished. No distress.   HEENT:   Head: Normocephalic.   Mouth/Throat: No oropharyngeal exudate present.   No cobblestoning of posterior oropharynx.   Nasal tissue pink and normal appearing.  No rhinorrhea noted.    Eyes: Conjunctivae are non-erythematous    Cardiovascular: Normal rate, regular rhythm and normal heart sounds. Exam  reveals no gallop and no friction rub.   No murmur heard.  Respiratory: Effort normal and breath sounds normal. No respiratory distress. No wheezes. No rales.   Musculoskeletal: Normal range of motion.   Neuro: Oriented to person, place, and time.  Skin: Skin is warm and dry. No rash noted.   Psychiatric: Normal mood and affect.     Nursing note and vitals reviewed.      WORKUP:   Spirometry  FVC % pred:84  FEV1 % pred:82  FEV1/FVC % ac76t:  FEF 25-75% pred:84    Normal spirometry    ASSESSMENT/PLAN:  Problem List Items Addressed This Visit        Respiratory    Moderate persistent asthma without complication - Primary     Well controlled. Symptoms flare with URI and cold air.  Symbicort 160/4.5mcg 2 puffs inhaled twice daily with a spacer. Singulair 10mg PO daily      Spirometry today with an FEV1 of 59%. Stable to prior visit.   ACT 21      - Albuterol 2-4 puffs inhaled (use a spacer unless using a Proair Respiclick device) every 4 hours as needed for chest tightness, wheezing, shortness of breath and/or coughing.   - Albuterol 2-4 puffs inhaled (use spacer if not using Proair Respiclick device) 15-20 minutes prior to physical activity.    - Please ensure warm up period prior to exercise.    - Avoid asthma triggers.  -Stop Symbicort 160/4.5mcg 2 puffs inhaled twice daily with a spacer.   -Start Symbicort 80/4.5 mcg 2 puffs inhaled twice daily  - Singulair 10mg by mouth daily at night.            Relevant Medications    budesonide-formoterol (SYMBICORT) 80-4.5 MCG/ACT Inhaler    Other Relevant Orders    BREATHING CAPACITY TEST [11227] (Completed)       Digestive    GERD (gastroesophageal reflux disease)     Continue protonix.                      Circulatory    Essential hypertension     Patient to follow up with Primary Care provider regarding elevated blood pressure.              Infectious/Inflammatory    Rhinoconjunctivitis     History of perennial nasal and ocular symptoms that get worse in the spring and  fall. Symptoms include ocular watering, ocular itching, rhinorrhea, sneezing, congestion and postnasal drip. Symptoms increase around cats. Stopped using Flonase and increased symptoms. Stopped Zyrtec and increased symptoms.      Allergy skin testing was negative.      - Continue Flonase 2 sprays per nostril daily.  - Likely local allergic rhinitis as symptoms improved with treatment with antihistamines.  - Azelastine 2 sprays/nostril twice daily as needed.   - Zyrtec as needed.   - Singulair 10mg by mouth daily at night.   - ENT referral.              Other    Hoarseness     Hoarseness and post nasal drainage.     Non-allergic    - ENT referral.          Relevant Orders    OTOLARYNGOLOGY REFERRAL        Return in 4 months.   Chart documentation with Dragon Voice recognition Software. Although reviewed after completion, some words and grammatical errors may remain.    Nish Stockton,    Allergy/Immunology  [facility], [place] and [facility]      Again, thank you for allowing me to participate in the care of your patient.        Sincerely,        Nish Stockton, DO

## 2018-06-11 NOTE — MR AVS SNAPSHOT
After Visit Summary   6/11/2018    Miri Naylor    MRN: [id number]           Patient Information     Date Of Birth          1955        Visit Information        Provider Department      6/11/2018 6:20 PM Nish Stockton DO [facility]        Today's Diagnoses     Moderate persistent asthma without complication    -  1    Hoarseness          Care Instructions    Allergy Staff Appt Hours Shot Hours Locations    Physician     Nish Stockton DO       Support Staff     Kerri CHAUDHRY RN      Jackie CHAUDHRY, [facility]  Monday:                      [place] 8-7     Tuesday:         [place] 8-5     Wednesday:        [place]: 7-5     Friday:        [place] 7-5   [place]        Monday: 9-5:50        Wednesday: 2-5:50        Friday: 7-12:50     [place]        Tuesday: 7-10:50        Thursday: 1:30-6:30        Friday: 8:00-3:50     [place]        Monday: 7:10-4:50        Tuesday: 12:30-6:30        Thursday: 7-11:50 [facility]  [address] [place] [address]  Appt Line: [phone]  Allergy RN (Monday):  [phone]    [facility]  [address] [place] [address]  Appt Line: [phone]  Allergy RN (Tues & Wed):  [phone]    [facility]  [address] [place] [address]  Appt Line: [phone]  Allergy RN (Friday):  [phone]       Important Scheduling Information  Aspirin Desensitization: Appt will last 2 clinic days. Please call the Allergy RN line for your clinic to schedule. Discontinue antihistamines 7 days prior to the appointment.     Food Challenges: Appt will last 3-4 hours. Please call the Allergy RN line for your clinic to schedule. Discontinue antihistamines 7 days prior to the appointment.     Penicillin Testing: Appt will last 2-3 hours. Please call the Allergy RN line for your clinic to schedule. Discontinue antihistamines 7 days prior to the appointment.     Skin Testing: Appt will about 40 minutes. Call the appointment line  for your clinic to schedule. Discontinue antihistamines 7 days prior to the appointment.     Venom Testing: Appt will last 2-3 hours. Please call the Allergy RN line for your clinic to schedule. Discontinue antihistamines 7 days prior to the appointment.     Thank you for trusting us with your Allergy, Asthma, and Immunology care. Please feel free to contact us with any questions or concerns you may have.      - Stop Symbicort 160 and start Symbicort 80/4.5mcg 2 puffs inhaled twice daily.   - Singulair 10mg by mouth daily at night.   - Azelastine 2 sprays/nostril twice daily as needed.   - Flonase 2 sprays/nostril daily.   - ENT referral for hoarseness.           Follow-ups after your visit        Additional Services     OTOLARYNGOLOGY REFERRAL       Your provider has referred you to: [facility]: [facility] [phone]   [email or web address]    Please be aware that coverage of these services is subject to the terms and limitations of your health insurance plan.  Call member services at your health plan with any benefit or coverage questions.      Please bring the following with you to your appointment:    (1) Any X-Rays, CTs or MRIs which have been performed.  Contact the facility where they were done to arrange for  prior to your scheduled appointment.   (2) List of current medications  (3) This referral request   (4) Any documents/labs given to you for this referral                  Your next 10 appointments already scheduled     Jul 25, 2018  4:15 PM CDT   [facility] Return with Nir Massey MD   [facility]    [address] [address] [phone]            Aug 22, 2018  2:15 PM CDT   [facility] RETURN with Tamela Cox MD   [facility] ([facility]    [address] [place] [address]    [phone]              Who to contact     If you have questions or need follow up information about today's clinic visit or your schedule please contact [facility] directly at [phone].  Normal or non-critical lab and imaging results will be communicated to you by MyChart, letter or phone within 4 business days after the clinic has received the results. If you do not hear from us within 7 days, please contact the clinic through MyChart or phone. If you have a critical or abnormal lab result, we will notify you by phone as soon as possible.  Submit refill requests through [employer] or call your pharmacy and they will forward the refill request to us. Please allow 3 business days for your refill to be completed.          Additional Information About Your Visit        [employer] Information     [employer] gives you secure access to your electronic health record. If you see a primary care provider, you can also send messages to your care team and make appointments. If you have questions, please call your primary care clinic.  If you do not have a primary care provider, please call [phone] and they will assist you.        Care EveryWhere ID     This is your Care EveryWhere ID. This could be used by other organizations to access your [facility] medical records  [id number]        Your Vitals Were     Pulse Pulse Oximetry                58 97%           Blood Pressure from Last 3 Encounters:   06/11/18 152/84   05/16/18 118/72   04/04/18 128/79    Weight from Last 3 Encounters:   05/16/18 93 kg (205 lb)   04/04/18 92.2 kg (203 lb 3.2 oz)   02/09/18 94.8 kg (209 lb)              We Performed the Following     BREATHING CAPACITY TEST [12590]     OTOLARYNGOLOGY REFERRAL          Today's Medication Changes          These changes are accurate as of 6/11/18  7:02 PM.  If you have any questions, ask your nurse or doctor.               Start taking these medicines.        Dose/Directions    budesonide-formoterol  80-4.5 MCG/ACT Inhaler   Commonly known as:  SYMBICORT   Used for:  Moderate persistent asthma without complication   Replaces:  budesonide-formoterol 160-4.5 MCG/ACT Inhaler   Started by:  Nish Stockton DO        Dose:  2 puff   Inhale 2 puffs into the lungs 2 times daily   Quantity:  1 Inhaler   Refills:  3         These medicines have changed or have updated prescriptions.        Dose/Directions    escitalopram 20 MG tablet   Commonly known as:  LEXAPRO   This may have changed:  See the new instructions.   Used for:  Major depression in complete remission (H)        TAKE 1 TABLET DAILY   Quantity:  90 tablet   Refills:  1       hydrocortisone 2.5 % cream   This may have changed:    - when to take this  - reasons to take this   Used for:  Eyelid dermatitis, eczematous, unspecified laterality        Apply topically 2 times daily   Quantity:  60 g   Refills:  1       triamcinolone 0.1 % cream   Commonly known as:  KENALOG   This may have changed:    - when to take this  - reasons to take this   Used for:  Other atopic dermatitis        Apply topically 2 times daily   Quantity:  60 g   Refills:  3         Stop taking these medicines if you haven't already. Please contact your care team if you have questions.     budesonide-formoterol 160-4.5 MCG/ACT Inhaler   Commonly known as:  SYMBICORT   Replaced by:  budesonide-formoterol 80-4.5 MCG/ACT Inhaler   Stopped by:  Nish Stockton DO                Where to get your medicines      Call your pharmacy to confirm that your medication is ready for pickup. It may take up to 24 hours for them to receive the prescription. If the prescription is not ready within 3 business days, please contact your clinic or your provider.     We will let you know when these medications are ready. If you don't hear back within 3 business days, please contact us.     budesonide-formoterol 80-4.5 MCG/ACT Inhaler                Primary Care Provider Office Phone # Fax #    Elizabeth Castrejon  DO [place] [phone]       [address] [place] [address]        Equal Access to Services     SONIA SANDERS : Hadii aad ku hadchaim Ferguson, waestherda luqhussain, qaybta kaalmada lindy, bj duranpete olga. So [facility] [phone].    ATENCIÓN: Si habla español, tiene a dao disposición servicios gratuitos de asistencia lingüística. Llame al [phone].    We comply with applicable federal civil rights laws and [place] laws. We do not discriminate on the basis of race, color, national origin, age, disability, sex, sexual orientation, or gender identity.            Thank you!     Thank you for choosing [facility]  for your care. Our goal is always to provide you with excellent care. Hearing back from our patients is one way we can continue to improve our services. Please take a few minutes to complete the written survey that you may receive in the mail after your visit with us. Thank you!             Your Updated Medication List - Protect others around you: Learn how to safely use, store and throw away your medicines at www.disposemymeds.org.          This list is accurate as of 6/11/18  7:02 PM.  Always use your most recent med list.                   Brand Name Dispense Instructions for use Diagnosis    Acidophilus Tabs      1 tablet daily        albuterol 108 (90 Base) MCG/ACT Inhaler    PROAIR HFA    1 Inhaler    Inhale 2 puffs into the lungs every 4 hours as needed for shortness of breath / dyspnea or wheezing    Moderate persistent asthma without complication       ascorbic acid 500 MG tablet    VITAMIN C     2 TABLET DAILY        atenolol 25 MG tablet    TENORMIN    90 tablet    TAKE 1 TABLET DAILY    Essential hypertension with goal blood pressure less than 140/90       azelastine 0.1 % spray    ASTELIN    1 Bottle    Spray 1-2 sprays into both nostrils 2 times daily    Rhinoconjunctivitis       budesonide-formoterol 80-4.5 MCG/ACT Inhaler    SYMBICORT    1  Inhaler    Inhale 2 puffs into the lungs 2 times daily    Moderate persistent asthma without complication       CALCIUM 600+D PO      250 mg vitamin d- 3 tablets daily        DAILY MULTIVITAMIN PO      1 tablet daily        escitalopram 20 MG tablet    LEXAPRO    90 tablet    TAKE 1 TABLET DAILY    Major depression in complete remission (H)       flaxseed oil 1000 MG Caps      1 tablet daily        fluticasone 50 MCG/ACT spray    FLONASE    1 Bottle    Spray 1-2 sprays into both nostrils daily    Acute sinusitis with symptoms > 10 days       gabapentin 300 MG capsule    NEURONTIN    300 capsule    1 capsule TID with additional 1-2 capsules if headache is severe.    Chronic daily headache, Migraine without aura and without status migrainosus, not intractable, History of traumatic brain injury       Glucosamine-Chondroitin Tabs      1500 mg glucosamine and 1200mg chondroitin daily-2 tablets daily        hydrocortisone 2.5 % cream     60 g    Apply topically 2 times daily    Eyelid dermatitis, eczematous, unspecified laterality       ibuprofen 200 MG tablet    ADVIL/MOTRIN     take 1 tablet (200 mg) by oral route every 6 hours as needed with food        lisinopril 40 MG tablet    PRINIVIL/ZESTRIL    90 tablet    TAKE 1 TABLET DAILY    Essential hypertension with goal blood pressure less than 140/90       montelukast 10 MG tablet    SINGULAIR    90 tablet    Take 1 tablet (10 mg) by mouth At Bedtime    Moderate persistent asthma without complication       pantoprazole 40 MG EC tablet    PROTONIX    90 tablet    TAKE 1 TABLET DAILY 30 TO 60 MINUTES BEFORE A MEAL    Gastroesophageal reflux disease without esophagitis       potassium 99 MG Tabs      Take 3 tablets by mouth 2 times daily        pravastatin 40 MG tablet    PRAVACHOL    90 tablet    Take 1 tablet (40 mg) by mouth daily    Hyperlipidemia LDL goal <130       S-Adenosylmethionine 200 MG Tabs      2 tablets daily    Menopausal syndrome (hot flashes)       SUPER B  COMPLEX Tabs      1 tablet daily        triamcinolone 0.1 % cream    KENALOG    60 g    Apply topically 2 times daily    Other atopic dermatitis       vitamin E 400 UNIT capsule   Generic drug:  vitamin E      Take one pill by mouth once daily        ZADITOR OP      Apply to eye daily as needed        ZYRTEC 10 MG tablet   Generic drug:  cetirizine      Take 10 mg by mouth daily.

## 2018-06-11 NOTE — PATIENT INSTRUCTIONS
Allergy Staff Appt Hours Shot Hours Locations    Physician     Nish Stockton DO       Support Staff     Kerri CHAUDHRY, RN      Jackie CHAUDHRY, [facility]  Monday:                      [place] 8-7     Tuesday:         [place] 8-5     Wednesday:        [place]: 7-5     Friday:        [place] 7-5   [place]        Monday: 9-5:50        Wednesday: 2-5:50        Friday: 7-12:50     [place]        Tuesday: 7-10:50        Thursday: 1:30-6:30        Friday: 8:00-3:50     [place] Monday: 7:10-4:50        Tuesday: 12:30-6:30        Thursday: 7-11:50 [facility]  [address] [place] [address]  Appt Line: [phone]  Allergy RN (Monday):  [phone]    [facility]  [address] [place] [address]  Appt Line: [phone]  Allergy RN (Tues & Wed):  [phone]    [facility]  [address] [place] [address]  Appt Line: [phone]  Allergy RN (Friday):  [phone]       Important Scheduling Information  Aspirin Desensitization: Appt will last 2 clinic days. Please call the Allergy RN line for your clinic to schedule. Discontinue antihistamines 7 days prior to the appointment.     Food Challenges: Appt will last 3-4 hours. Please call the Allergy RN line for your clinic to schedule. Discontinue antihistamines 7 days prior to the appointment.     Penicillin Testing: Appt will last 2-3 hours. Please call the Allergy RN line for your clinic to schedule. Discontinue antihistamines 7 days prior to the appointment.     Skin Testing: Appt will about 40 minutes. Call the appointment line for your clinic to schedule. Discontinue antihistamines 7 days prior to the appointment.     Venom Testing: Appt will last 2-3 hours. Please call the Allergy RN line for your clinic to schedule. Discontinue antihistamines 7 days prior to the appointment.     Thank you for trusting us with your Allergy, Asthma, and Immunology care. Please feel free to contact us with any questions or concerns you may  have.      - Stop Symbicort 160 and start Symbicort 80/4.5mcg 2 puffs inhaled twice daily.   - Singulair 10mg by mouth daily at night.   - Azelastine 2 sprays/nostril twice daily as needed.   - Flonase 2 sprays/nostril daily.   - ENT referral for hoarseness.

## 2018-06-11 NOTE — PROGRESS NOTES
Miri Naylor is a 63 year old White female with previous medical history significant for asthma and nonallergic rhinitis who returns for a follow up visit.     Patient returns for follow-up.  She was last seen in February 2018.  At the time she was continued on Symbicort 160/4.5 mcg 2 puffs inhaled twice daily.  In the interim she got sick with bronchitis and had wheezing, coughing and shortness of breath.  Albuterol was used and helpful.  The symptoms have subsequently passed.  She remains on Symbicort and montelukast daily.  She is using albuterol less than twice per week.  She denies wheezing, coughing and or shortness of breath.    Patient previously underwent allergy testing which was negative.  She had responded to traditional allergen therapy with Flonase, nasal lasting, Singulair and Zyrtec.  Thought she may have local allergic rhinitis.  She continues to have postnasal drainage, rhinorrhea, sneezing, ocular itching and ocular watering.  She has had a hoarseness which is been persistent.  No use of ipratropium.  She has not seen ENT recently.      ACT Total Scores 6/11/2018   ACT TOTAL SCORE (Goal Greater than or Equal to 20) 21   In the past 12 months, how many times did you visit the emergency room for your asthma without being admitted to the hospital? 0   In the past 12 months, how many times were you hospitalized overnight because of your asthma? 0     Past Medical History:   Diagnosis Date     Depressive disorder, not elsewhere classified      Drug allergy, multiple 9/23/14--passed graded oral challenge for Zithromax.     7/3/14 POS PRE-PEN skin test. 7/30/14 NEG skin tests and oral challenge to Cefzil     Hx of abnormal Pap smear ?    no specifics given     lumbar degeneration      Raynaud's disease      Unspecified essential hypertension      Family History   Problem Relation Age of Onset     Arthritis Mother      Hyperlipidemia Mother      Other Cancer Mother      Glioblastoma Multiforme      OSTEOPOROSIS Mother      Blood Disease Father      Hypertension Father      Hyperlipidemia Father      Other Cancer Father      lung cancer     Depression Son      Depression Daughter      Hyperlipidemia Brother      Anxiety Disorder Son      Skin Cancer No family hx of      Past Surgical History:   Procedure Laterality Date     BIOPSY OF BREAST, NEEDLE CORE       C PELVIS/HIP JOINT SURGERY UNLISTED Right 7/19/16    total hip arthroplasty     COLONOSCOPY       HIP SURGERY Right 07/19/2016       REVIEW OF SYSTEMS:  General: positive  for weight gain. negative for weight loss. negative for changes in sleep.   Ears: negative for fullness. negative for hearing loss. negative for dizziness.   Nose: negative for snoring.negative for changes in smell. negative for drainage.   Throat: positive  for hoarseness. negative for sore throat. negative for trouble swallowing.   Lungs: negative for shortness of breath.negative for wheezing. negative for sputum production.   Cardiovascular: negative for chest pain. positive  for swelling of ankles. negative for fast or irregular heartbeat.   Gastrointestinal: negative for nausea. negative for heartburn. negative for acid reflux.   Musculoskeletal: positive  for joint pain. positive  for joint stiffness. negative for joint swelling.   Neurologic: negative for seizures. negative for fainting. negative for weakness.   Psychiatric: negative for changes in mood. negative for anxiety.   Endocrine: negative for cold intolerance. positive  for heat intolerance. negative for tremors.   Hematologic: positive  for easy bruising. negative for easy bleeding.  Integumentary: negative for rash. negative for scaling. negative for nail changes.       Current Outpatient Prescriptions:      ACIDOPHILUS OR TABS, 1 tablet daily, Disp: , Rfl:      albuterol (ALBUTEROL) 108 (90 BASE) MCG/ACT Inhaler, Inhale 2 puffs into the lungs every 4 hours as needed for shortness of breath / dyspnea or wheezing, Disp:  1 Inhaler, Rfl: 1     atenolol (TENORMIN) 25 MG tablet, TAKE 1 TABLET DAILY, Disp: 90 tablet, Rfl: 1     azelastine (ASTELIN) 0.1 % spray, Spray 1-2 sprays into both nostrils 2 times daily, Disp: 1 Bottle, Rfl: 11     budesonide-formoterol (SYMBICORT) 80-4.5 MCG/ACT Inhaler, Inhale 2 puffs into the lungs 2 times daily, Disp: 1 Inhaler, Rfl: 3     CALCIUM 600+D PO, 250 mg vitamin d- 3 tablets daily, Disp: , Rfl:      cetirizine (ZYRTEC) 10 MG tablet, Take 10 mg by mouth daily., Disp: , Rfl:      DAILY MULTIVITAMIN PO, 1 tablet daily, Disp: , Rfl:      escitalopram (LEXAPRO) 20 MG tablet, TAKE 1 TABLET DAILY (Patient taking differently: TAKE 1/2 TABLET DAILY), Disp: 90 tablet, Rfl: 1     FLAXSEED OIL 1000 MG PO CAPS, 1 tablet daily, Disp: , Rfl:      fluticasone (FLONASE) 50 MCG/ACT spray, Spray 1-2 sprays into both nostrils daily, Disp: 1 Bottle, Rfl: 11     gabapentin (NEURONTIN) 300 MG capsule, 1 capsule TID with additional 1-2 capsules if headache is severe., Disp: 300 capsule, Rfl: 1     GLUCOSAMINE-CHONDROITIN PO TABS, 1500 mg glucosamine and 1200mg chondroitin daily-2 tablets daily, Disp: , Rfl:      hydrocortisone 2.5 % cream, Apply topically 2 times daily (Patient taking differently: Apply topically 2 times daily as needed ), Disp: 60 g, Rfl: 1     ibuprofen (ADVIL,MOTRIN) 200 MG tablet, take 1 tablet (200 mg) by oral route every 6 hours as needed with food, Disp: , Rfl:      Ketotifen Fumarate (ZADITOR OP), Apply to eye daily as needed, Disp: , Rfl:      lisinopril (PRINIVIL/ZESTRIL) 40 MG tablet, TAKE 1 TABLET DAILY, Disp: 90 tablet, Rfl: 1     montelukast (SINGULAIR) 10 MG tablet, Take 1 tablet (10 mg) by mouth At Bedtime, Disp: 90 tablet, Rfl: 3     pantoprazole (PROTONIX) 40 MG EC tablet, TAKE 1 TABLET DAILY 30 TO 60 MINUTES BEFORE A MEAL, Disp: 90 tablet, Rfl: 3     potassium 99 MG TABS, Take 3 tablets by mouth 2 times daily, Disp: , Rfl:      pravastatin (PRAVACHOL) 40 MG tablet, Take 1 tablet (40 mg)  by mouth daily, Disp: 90 tablet, Rfl: 3     S-ADENOSYLMETHIONINE 200 MG PO TABS, 2 tablets daily, Disp: , Rfl:      SUPER B COMPLEX PO TABS, 1 tablet daily, Disp: , Rfl:      triamcinolone (KENALOG) 0.1 % cream, Apply topically 2 times daily (Patient taking differently: Apply topically 2 times daily as needed ), Disp: 60 g, Rfl: 3     VITAMIN C 500 MG PO TABS, 2 TABLET DAILY, Disp: , Rfl:      vitamin E (E-400) 400 UNIT capsule, Take one pill by mouth once daily, Disp: , Rfl:      [DISCONTINUED] budesonide-formoterol (SYMBICORT) 160-4.5 MCG/ACT Inhaler, Inhale 2 puffs into the lungs 2 times daily, Disp: 1 Inhaler, Rfl: 3  Immunization History   Administered Date(s) Administered     Influenza Vaccine IM 3yrs+ 4 Valent IIV4 12/03/2013, 11/16/2015, 10/01/2017     TD (ADULT, 7+) 03/06/2008     TDAP Vaccine (Adacel) 08/03/2012     Allergies   Allergen Reactions     Levofloxacin Rash     Other reaction(s): Contact Dermatitis     Penicillins Hives and Rash     Confirmed by skin prick testing 7/3/14     Amoxicillin Hives and Rash     Amoxicillin-Pot Clavulanate Rash     Azithromycin Rash     Cephalexin Rash     Clindamycin Rash and Hives     Atorvastatin Other (See Comments)     Felt sick, intolerance     Clindamycin Hcl Hives     Levaquin      tendonitis     Augmentin Rash         EXAM:   Constitutional:  Appears well-developed and well-nourished. No distress.   HEENT:   Head: Normocephalic.   Mouth/Throat: No oropharyngeal exudate present.   No cobblestoning of posterior oropharynx.   Nasal tissue pink and normal appearing.  No rhinorrhea noted.    Eyes: Conjunctivae are non-erythematous    Cardiovascular: Normal rate, regular rhythm and normal heart sounds. Exam reveals no gallop and no friction rub.   No murmur heard.  Respiratory: Effort normal and breath sounds normal. No respiratory distress. No wheezes. No rales.   Musculoskeletal: Normal range of motion.   Neuro: Oriented to person, place, and time.  Skin: Skin is  warm and dry. No rash noted.   Psychiatric: Normal mood and affect.     Nursing note and vitals reviewed.      WORKUP:   Spirometry  FVC % pred:84  FEV1 % pred:82  FEV1/FVC % ac76t:  FEF 25-75% pred:84    Normal spirometry    ASSESSMENT/PLAN:  Problem List Items Addressed This Visit        Respiratory    Moderate persistent asthma without complication - Primary     Well controlled. Symptoms flare with URI and cold air.  Symbicort 160/4.5mcg 2 puffs inhaled twice daily with a spacer. Singulair 10mg PO daily      Spirometry today with an FEV1 of 59%. Stable to prior visit.   ACT 21      - Albuterol 2-4 puffs inhaled (use a spacer unless using a Proair Respiclick device) every 4 hours as needed for chest tightness, wheezing, shortness of breath and/or coughing.   - Albuterol 2-4 puffs inhaled (use spacer if not using Proair Respiclick device) 15-20 minutes prior to physical activity.    - Please ensure warm up period prior to exercise.    - Avoid asthma triggers.  -Stop Symbicort 160/4.5mcg 2 puffs inhaled twice daily with a spacer.   -Start Symbicort 80/4.5 mcg 2 puffs inhaled twice daily  - Singulair 10mg by mouth daily at night.            Relevant Medications    budesonide-formoterol (SYMBICORT) 80-4.5 MCG/ACT Inhaler    Other Relevant Orders    BREATHING CAPACITY TEST [05525] (Completed)       Digestive    GERD (gastroesophageal reflux disease)     Continue protonix.                      Circulatory    Essential hypertension     Patient to follow up with Primary Care provider regarding elevated blood pressure.              Infectious/Inflammatory    Rhinoconjunctivitis     History of perennial nasal and ocular symptoms that get worse in the spring and fall. Symptoms include ocular watering, ocular itching, rhinorrhea, sneezing, congestion and postnasal drip. Symptoms increase around cats. Stopped using Flonase and increased symptoms. Stopped Zyrtec and increased symptoms.      Allergy skin testing was  negative.      - Continue Flonase 2 sprays per nostril daily.  - Likely local allergic rhinitis as symptoms improved with treatment with antihistamines.  - Azelastine 2 sprays/nostril twice daily as needed.   - Zyrtec as needed.   - Singulair 10mg by mouth daily at night.   - ENT referral.              Other    Hoarseness     Hoarseness and post nasal drainage.     Non-allergic    - ENT referral.          Relevant Orders    OTOLARYNGOLOGY REFERRAL        Return in 4 months.   Chart documentation with Dragon Voice recognition Software. Although reviewed after completion, some words and grammatical errors may remain.    Nish Stockton,    Allergy/Immunology  [facility], [place]

## 2018-06-12 ASSESSMENT — ASTHMA QUESTIONNAIRES: ACT_TOTALSCORE: 21

## 2018-06-12 NOTE — ASSESSMENT & PLAN NOTE
History of perennial nasal and ocular symptoms that get worse in the spring and fall. Symptoms include ocular watering, ocular itching, rhinorrhea, sneezing, congestion and postnasal drip. Symptoms increase around cats. Stopped using Flonase and increased symptoms. Stopped Zyrtec and increased symptoms.      Allergy skin testing was negative.      - Continue Flonase 2 sprays per nostril daily.  - Likely local allergic rhinitis as symptoms improved with treatment with antihistamines.  - Azelastine 2 sprays/nostril twice daily as needed.   - Zyrtec as needed.   - Singulair 10mg by mouth daily at night.   - ENT referral.

## 2018-06-12 NOTE — ASSESSMENT & PLAN NOTE
Well controlled. Symptoms flare with URI and cold air.  Symbicort 160/4.5mcg 2 puffs inhaled twice daily with a spacer. Singulair 10mg PO daily      Spirometry today with an FEV1 of 59%. Stable to prior visit.   ACT 21      - Albuterol 2-4 puffs inhaled (use a spacer unless using a Proair Respiclick device) every 4 hours as needed for chest tightness, wheezing, shortness of breath and/or coughing.   - Albuterol 2-4 puffs inhaled (use spacer if not using Proair Respiclick device) 15-20 minutes prior to physical activity.    - Please ensure warm up period prior to exercise.    - Avoid asthma triggers.  -Stop Symbicort 160/4.5mcg 2 puffs inhaled twice daily with a spacer.   -Start Symbicort 80/4.5 mcg 2 puffs inhaled twice daily  - Singulair 10mg by mouth daily at night.

## 2018-07-02 DIAGNOSIS — J45.40 MODERATE PERSISTENT ASTHMA WITHOUT COMPLICATION: ICD-10-CM

## 2018-07-02 RX ORDER — MONTELUKAST SODIUM 10 MG/1
10 TABLET ORAL AT BEDTIME
Qty: 90 TABLET | Refills: 1 | Status: SHIPPED | OUTPATIENT
Start: 2018-07-02 | End: 2019-01-07

## 2018-07-02 NOTE — TELEPHONE ENCOUNTER
Pending Prescriptions:                       Disp   Refills    montelukast (SINGULAIR) 10 MG tablet      90 tab*3            Sig: Take 1 tablet (10 mg) by mouth At Bedtime    Requesting 90 day supply.    Jackie Olmedo CMA on 7/2/2018 at 11:23 AM

## 2018-07-02 NOTE — TELEPHONE ENCOUNTER
Signed Prescriptions:                        Disp   Refills    montelukast (SINGULAIR) 10 MG tablet       90 tab*1        Sig: Take 1 tablet (10 mg) by mouth At Bedtime  Authorizing Provider: DEANGELO PIZARRO  Ordering User: ANGELO RAMIRES RN refilled medication per [facility] Refill Protocol.     Angelo Ramires RN

## 2018-07-03 ENCOUNTER — OFFICE VISIT (OUTPATIENT)
Dept: OTOLARYNGOLOGY | Facility: CLINIC | Age: 63
End: 2018-07-03
Payer: COMMERCIAL

## 2018-07-03 VITALS
OXYGEN SATURATION: 96 % | DIASTOLIC BLOOD PRESSURE: 79 MMHG | HEART RATE: 63 BPM | WEIGHT: 205 LBS | BODY MASS INDEX: 28.59 KG/M2 | SYSTOLIC BLOOD PRESSURE: 150 MMHG | RESPIRATION RATE: 12 BRPM

## 2018-07-03 DIAGNOSIS — J32.4 CHRONIC PANSINUSITIS: Primary | ICD-10-CM

## 2018-07-03 PROCEDURE — 99244 OFF/OP CNSLTJ NEW/EST MOD 40: CPT | Performed by: OTOLARYNGOLOGY

## 2018-07-03 NOTE — PATIENT INSTRUCTIONS
Scheduling Information  To schedule your CT/MRI scan, please contact [facility] at [phone] OR [facility] at [phone]    To schedule your Surgery, please contact our Specialty Schedulers at [phone]      ENT Clinic Locations Clinic Hours Telephone Number     [facility]  [address] [place] [address]   Monday:           1:00pm -- 5:00pm    Friday:              8:00am - 12:00pm   To schedule/reschedule an appointment with   Dr. Rogers,   please contact our   Specialty Scheduling Department at:     [phone]       [facility]  [address] [place] [address] Tuesday:          8:00am -- 2:00pm         Urgent Care Locations Clinic Hours Telephone Numbers     [facility]  [address] [place] [address]     Monday-Friday:     11:00am - 9:00pm    Saturday-Sunday:  9:00am - 5:00pm   [phone]     [facility]  [address] [place] [address]     Monday-Friday:      5:00pm - 9:00pm     Saturday-Sunday:  9:00am - 5:00pm   [phone]

## 2018-07-03 NOTE — PROGRESS NOTES
History of Present Illness - Miri Naylor is a 63 year old female here at the consutlation of Dr. Stockton with allergy.  The main issue is chronic nasal obstruction and fullness, as well as post nasal drainage that seems to contribute to hoarseness and occasionally cough.    She has been allergy tested in 1983 and was told she had many allergies.  She had shots started.  She also has a history of eczema as well.  Her shots were stopped and she treated things medically.  But then in the last several years things have just gotten worse.  She will get eye swelling and irritation, and has started to have recurrent bronchitis from sinus drainage.      Her asthma is being managed by Dr. Stockton.  Also, flonase and IPRATROPIUM BROMIDE NASAL SPRAY seem to be helping the drainage, but she still has it.    Past Medical History -   Patient Active Problem List   Diagnosis     Major depression in complete remission (H)     Essential hypertension     Raynaud's disease     Backache     Hypertension goal BP (blood pressure) < 140/90     Allergic state     Psoriasis     Advanced directives, counseling/discussion     Abnormal Pap smear of cervix     24 hour contact handout given     Hyperlipidemia LDL goal <130     GERD (gastroesophageal reflux disease)     Family history of colon cancer     Penicillin allergy     Drug allergy, multiple     Chronic nonintractable headache, unspecified headache type     Moderate persistent asthma without complication     Eyelid dermatitis, eczematous, unspecified laterality     Rhinoconjunctivitis     Other atopic dermatitis     Hoarseness       Current Medications -   Current Outpatient Prescriptions:      ACIDOPHILUS OR TABS, 1 tablet daily, Disp: , Rfl:      albuterol (ALBUTEROL) 108 (90 BASE) MCG/ACT Inhaler, Inhale 2 puffs into the lungs every 4 hours as needed for shortness of breath / dyspnea or wheezing, Disp: 1 Inhaler, Rfl: 1     atenolol (TENORMIN) 25 MG tablet, TAKE 1 TABLET DAILY,  Disp: 90 tablet, Rfl: 1     azelastine (ASTELIN) 0.1 % spray, Spray 1-2 sprays into both nostrils 2 times daily, Disp: 1 Bottle, Rfl: 11     budesonide-formoterol (SYMBICORT) 80-4.5 MCG/ACT Inhaler, Inhale 2 puffs into the lungs 2 times daily, Disp: 1 Inhaler, Rfl: 3     CALCIUM 600+D PO, 250 mg vitamin d- 3 tablets daily, Disp: , Rfl:      cetirizine (ZYRTEC) 10 MG tablet, Take 10 mg by mouth daily., Disp: , Rfl:      DAILY MULTIVITAMIN PO, 1 tablet daily, Disp: , Rfl:      escitalopram (LEXAPRO) 20 MG tablet, TAKE 1 TABLET DAILY (Patient taking differently: TAKE 1/2 TABLET DAILY), Disp: 90 tablet, Rfl: 1     FLAXSEED OIL 1000 MG PO CAPS, 1 tablet daily, Disp: , Rfl:      fluticasone (FLONASE) 50 MCG/ACT spray, Spray 1-2 sprays into both nostrils daily, Disp: 1 Bottle, Rfl: 11     gabapentin (NEURONTIN) 300 MG capsule, 1 capsule TID with additional 1-2 capsules if headache is severe., Disp: 300 capsule, Rfl: 1     GLUCOSAMINE-CHONDROITIN PO TABS, 1500 mg glucosamine and 1200mg chondroitin daily-2 tablets daily, Disp: , Rfl:      hydrocortisone 2.5 % cream, Apply topically 2 times daily (Patient taking differently: Apply topically 2 times daily as needed ), Disp: 60 g, Rfl: 1     ibuprofen (ADVIL,MOTRIN) 200 MG tablet, take 1 tablet (200 mg) by oral route every 6 hours as needed with food, Disp: , Rfl:      Ketotifen Fumarate (ZADITOR OP), Apply to eye daily as needed, Disp: , Rfl:      lisinopril (PRINIVIL/ZESTRIL) 40 MG tablet, TAKE 1 TABLET DAILY, Disp: 90 tablet, Rfl: 1     montelukast (SINGULAIR) 10 MG tablet, Take 1 tablet (10 mg) by mouth At Bedtime, Disp: 90 tablet, Rfl: 1     pantoprazole (PROTONIX) 40 MG EC tablet, TAKE 1 TABLET DAILY 30 TO 60 MINUTES BEFORE A MEAL, Disp: 90 tablet, Rfl: 3     potassium 99 MG TABS, Take 3 tablets by mouth 2 times daily, Disp: , Rfl:      pravastatin (PRAVACHOL) 40 MG tablet, Take 1 tablet (40 mg) by mouth daily, Disp: 90 tablet, Rfl: 3     S-ADENOSYLMETHIONINE 200 MG PO  TABS, 2 tablets daily, Disp: , Rfl:      SUPER B COMPLEX PO TABS, 1 tablet daily, Disp: , Rfl:      triamcinolone (KENALOG) 0.1 % cream, Apply topically 2 times daily (Patient taking differently: Apply topically 2 times daily as needed ), Disp: 60 g, Rfl: 3     VITAMIN C 500 MG PO TABS, 2 TABLET DAILY, Disp: , Rfl:      vitamin E (E-400) 400 UNIT capsule, Take one pill by mouth once daily, Disp: , Rfl:     Allergies -   Allergies   Allergen Reactions     Levofloxacin Rash     Other reaction(s): Contact Dermatitis     Penicillins Hives and Rash     Confirmed by skin prick testing 7/3/14     Amoxicillin Hives and Rash     Amoxicillin-Pot Clavulanate Rash     Azithromycin Rash     Cephalexin Rash     Clindamycin Rash and Hives     Atorvastatin Other (See Comments)     Felt sick, intolerance     Clindamycin Hcl Hives     Levaquin      tendonitis     Augmentin Rash       Social History -   Social History     Social History     Marital status:      Spouse name: N/A     Number of children: N/A     Years of education: N/A     Social History Main Topics     Smoking status: Never Smoker     Smokeless tobacco: Never Used     Alcohol use Yes      Comment: one glass of wine daily     Drug use: No     Sexual activity: Yes     Partners: Male     Other Topics Concern     Parent/Sibling W/ Cabg, Mi Or Angioplasty Before 65f 55m? No     Social History Narrative       Family History -   Family History   Problem Relation Age of Onset     Arthritis Mother      Hyperlipidemia Mother      Other Cancer Mother      Glioblastoma Multiforme     Osteoperosis Mother      Blood Disease Father      Hypertension Father      Hyperlipidemia Father      Other Cancer Father      lung cancer     Depression Son      Depression Daughter      Hyperlipidemia Brother      Anxiety Disorder Son      Skin Cancer No family hx of        Review of Systems - As per HPI and PMHx, otherwise 10+ system review of the head and neck, and general constitution is  negative.    Physical Exam  /79  Pulse 63  Resp 12  Wt 93 kg (205 lb)  SpO2 96%  BMI 28.59 kg/m2    General - The patient is well nourished and well developed, and appears to have good nutritional status.  Alert and oriented to person and place, answers questions and cooperates with examination appropriately.   Head and Face - Normocephalic and atraumatic, with no gross asymmetry noted of the contour of the facial features.  The facial nerve is intact, with strong symmetric movements.  Voice and Breathing - The patient was breathing comfortably without the use of accessory muscles. There was no wheezing, stridor, or stertor.  The patients voice was clear and strong, and had appropriate pitch and quality.  Ears - The tympanic membranes are normal in appearance, bony landmarks are intact.  No retraction, perforation, or masses.  No fluid or purulence was seen in the external canal or the middle ear. No evidence of infection of the middle ear or external canal, cerumen was normal in appearance.  Eyes - Extraocular movements intact, and the pupils were reactive to light.  Sclera were not icteric or injected, conjunctiva were pink and moist.  Mouth - Examination of the oral cavity showed pink, healthy oral mucosa. No lesions or ulcerations noted.  The tongue was mobile and midline, and the dentition were in good condition.    Throat - The walls of the oropharynx were smooth, pink, moist, symmetric, and had no lesions or ulcerations.  The tonsillar pillars and soft palate were symmetric.  The uvula was midline on elevation.    Neck - Normal midline excursion of the laryngotracheal complex during swallowing.  Full range of motion on passive movement.  Palpation of the occipital, submental, submandibular, internal jugular chain, and supraclavicular nodes did not demonstrate any abnormal lymph nodes or masses.  The carotid pulse was palpable bilaterally.  Palpation of the thyroid was soft and smooth, with no  nodules or goiter appreciated.  The trachea was mobile and midline.  Nose - External contour is symmetric, no gross deflection or scars.  Nasal mucosa is pink and moist with no abnormal mucus.  The septum was midline and non-obstructive, turbinates of normal size and position.  No polyps, masses, or purulence noted on examination.      A/P - Miri Naylor is a 63 year old female  (J32.4) Chronic pansinusitis  (primary encounter diagnosis)    The clinical exam was fairly benign today, but I will get a CT scan to rule out polyposis or chronic infection.  IN the event the anatomy looks good and there is no evidence of sinusitis, I will favor allergic rhinitis as being the dominant issue her and defer back to Dr. Stockton for continued management.

## 2018-07-03 NOTE — LETTER
7/3/2018         RE: Miri Naylor  [address]  [facility] [id number]        Dear Colleague,    Thank you for referring your patient, Miri Naylor, to the [facility]. Please see a copy of my visit note below.    History of Present Illness - Miri Naylor is a 63 year old female here at the consutlation of Dr. Stockton with allergy.  The main issue is chronic nasal obstruction and fullness, as well as post nasal drainage that seems to contribute to hoarseness and occasionally cough.    She has been allergy tested in 1983 and was told she had many allergies.  She had shots started.  She also has a history of eczema as well.  Her shots were stopped and she treated things medically.  But then in the last several years things have just gotten worse.  She will get eye swelling and irritation, and has started to have recurrent bronchitis from sinus drainage.      Her asthma is being managed by Dr. Stockton.  Also, flonase and IPRATROPIUM BROMIDE NASAL SPRAY seem to be helping the drainage, but she still has it.    Past Medical History -   Patient Active Problem List   Diagnosis     Major depression in complete remission (H)     Essential hypertension     Raynaud's disease     Backache     Hypertension goal BP (blood pressure) < 140/90     Allergic state     Psoriasis     Advanced directives, counseling/discussion     Abnormal Pap smear of cervix     24 hour contact handout given     Hyperlipidemia LDL goal <130     GERD (gastroesophageal reflux disease)     Family history of colon cancer     Penicillin allergy     Drug allergy, multiple     Chronic nonintractable headache, unspecified headache type     Moderate persistent asthma without complication     Eyelid dermatitis, eczematous, unspecified laterality     Rhinoconjunctivitis     Other atopic dermatitis     Hoarseness       Current Medications -   Current Outpatient Prescriptions:      ACIDOPHILUS OR TABS, 1 tablet  daily, Disp: , Rfl:      albuterol (ALBUTEROL) 108 (90 BASE) MCG/ACT Inhaler, Inhale 2 puffs into the lungs every 4 hours as needed for shortness of breath / dyspnea or wheezing, Disp: 1 Inhaler, Rfl: 1     atenolol (TENORMIN) 25 MG tablet, TAKE 1 TABLET DAILY, Disp: 90 tablet, Rfl: 1     azelastine (ASTELIN) 0.1 % spray, Spray 1-2 sprays into both nostrils 2 times daily, Disp: 1 Bottle, Rfl: 11     budesonide-formoterol (SYMBICORT) 80-4.5 MCG/ACT Inhaler, Inhale 2 puffs into the lungs 2 times daily, Disp: 1 Inhaler, Rfl: 3     CALCIUM 600+D PO, 250 mg vitamin d- 3 tablets daily, Disp: , Rfl:      cetirizine (ZYRTEC) 10 MG tablet, Take 10 mg by mouth daily., Disp: , Rfl:      DAILY MULTIVITAMIN PO, 1 tablet daily, Disp: , Rfl:      escitalopram (LEXAPRO) 20 MG tablet, TAKE 1 TABLET DAILY (Patient taking differently: TAKE 1/2 TABLET DAILY), Disp: 90 tablet, Rfl: 1     FLAXSEED OIL 1000 MG PO CAPS, 1 tablet daily, Disp: , Rfl:      fluticasone (FLONASE) 50 MCG/ACT spray, Spray 1-2 sprays into both nostrils daily, Disp: 1 Bottle, Rfl: 11     gabapentin (NEURONTIN) 300 MG capsule, 1 capsule TID with additional 1-2 capsules if headache is severe., Disp: 300 capsule, Rfl: 1     GLUCOSAMINE-CHONDROITIN PO TABS, 1500 mg glucosamine and 1200mg chondroitin daily-2 tablets daily, Disp: , Rfl:      hydrocortisone 2.5 % cream, Apply topically 2 times daily (Patient taking differently: Apply topically 2 times daily as needed ), Disp: 60 g, Rfl: 1     ibuprofen (ADVIL,MOTRIN) 200 MG tablet, take 1 tablet (200 mg) by oral route every 6 hours as needed with food, Disp: , Rfl:      Ketotifen Fumarate (ZADITOR OP), Apply to eye daily as needed, Disp: , Rfl:      lisinopril (PRINIVIL/ZESTRIL) 40 MG tablet, TAKE 1 TABLET DAILY, Disp: 90 tablet, Rfl: 1     montelukast (SINGULAIR) 10 MG tablet, Take 1 tablet (10 mg) by mouth At Bedtime, Disp: 90 tablet, Rfl: 1     pantoprazole (PROTONIX) 40 MG EC tablet, TAKE 1 TABLET DAILY 30 TO 60  MINUTES BEFORE A MEAL, Disp: 90 tablet, Rfl: 3     potassium 99 MG TABS, Take 3 tablets by mouth 2 times daily, Disp: , Rfl:      pravastatin (PRAVACHOL) 40 MG tablet, Take 1 tablet (40 mg) by mouth daily, Disp: 90 tablet, Rfl: 3     S-ADENOSYLMETHIONINE 200 MG PO TABS, 2 tablets daily, Disp: , Rfl:      SUPER B COMPLEX PO TABS, 1 tablet daily, Disp: , Rfl:      triamcinolone (KENALOG) 0.1 % cream, Apply topically 2 times daily (Patient taking differently: Apply topically 2 times daily as needed ), Disp: 60 g, Rfl: 3     VITAMIN C 500 MG PO TABS, 2 TABLET DAILY, Disp: , Rfl:      vitamin E (E-400) 400 UNIT capsule, Take one pill by mouth once daily, Disp: , Rfl:     Allergies -   Allergies   Allergen Reactions     Levofloxacin Rash     Other reaction(s): Contact Dermatitis     Penicillins Hives and Rash     Confirmed by skin prick testing 7/3/14     Amoxicillin Hives and Rash     Amoxicillin-Pot Clavulanate Rash     Azithromycin Rash     Cephalexin Rash     Clindamycin Rash and Hives     Atorvastatin Other (See Comments)     Felt sick, intolerance     Clindamycin Hcl Hives     Levaquin      tendonitis     Augmentin Rash       Social History -   Social History     Social History     Marital status:      Spouse name: N/A     Number of children: N/A     Years of education: N/A     Social History Main Topics     Smoking status: Never Smoker     Smokeless tobacco: Never Used     Alcohol use Yes      Comment: one glass of wine daily     Drug use: No     Sexual activity: Yes     Partners: Male     Other Topics Concern     Parent/Sibling W/ Cabg, Mi Or Angioplasty Before 65f 55m? No     Social History Narrative       Family History -   Family History   Problem Relation Age of Onset     Arthritis Mother      Hyperlipidemia Mother      Other Cancer Mother      Glioblastoma Multiforme     Osteoperosis Mother      Blood Disease Father      Hypertension Father      Hyperlipidemia Father      Other Cancer Father      lung  cancer     Depression Son      Depression Daughter      Hyperlipidemia Brother      Anxiety Disorder Son      Skin Cancer No family hx of        Review of Systems - As per HPI and PMHx, otherwise 10+ system review of the head and neck, and general constitution is negative.    Physical Exam  /79  Pulse 63  Resp 12  Wt 93 kg (205 lb)  SpO2 96%  BMI 28.59 kg/m2    General - The patient is well nourished and well developed, and appears to have good nutritional status.  Alert and oriented to person and place, answers questions and cooperates with examination appropriately.   Head and Face - Normocephalic and atraumatic, with no gross asymmetry noted of the contour of the facial features.  The facial nerve is intact, with strong symmetric movements.  Voice and Breathing - The patient was breathing comfortably without the use of accessory muscles. There was no wheezing, stridor, or stertor.  The patients voice was clear and strong, and had appropriate pitch and quality.  Ears - The tympanic membranes are normal in appearance, bony landmarks are intact.  No retraction, perforation, or masses.  No fluid or purulence was seen in the external canal or the middle ear. No evidence of infection of the middle ear or external canal, cerumen was normal in appearance.  Eyes - Extraocular movements intact, and the pupils were reactive to light.  Sclera were not icteric or injected, conjunctiva were pink and moist.  Mouth - Examination of the oral cavity showed pink, healthy oral mucosa. No lesions or ulcerations noted.  The tongue was mobile and midline, and the dentition were in good condition.    Throat - The walls of the oropharynx were smooth, pink, moist, symmetric, and had no lesions or ulcerations.  The tonsillar pillars and soft palate were symmetric.  The uvula was midline on elevation.    Neck - Normal midline excursion of the laryngotracheal complex during swallowing.  Full range of motion on passive movement.   Palpation of the occipital, submental, submandibular, internal jugular chain, and supraclavicular nodes did not demonstrate any abnormal lymph nodes or masses.  The carotid pulse was palpable bilaterally.  Palpation of the thyroid was soft and smooth, with no nodules or goiter appreciated.  The trachea was mobile and midline.  Nose - External contour is symmetric, no gross deflection or scars.  Nasal mucosa is pink and moist with no abnormal mucus.  The septum was midline and non-obstructive, turbinates of normal size and position.  No polyps, masses, or purulence noted on examination.      A/P - Miri Naylor is a 63 year old female  (J32.4) Chronic pansinusitis  (primary encounter diagnosis)    The clinical exam was fairly benign today, but I will get a CT scan to rule out polyposis or chronic infection.  IN the event the anatomy looks good and there is no evidence of sinusitis, I will favor allergic rhinitis as being the dominant issue her and defer back to Dr. Stockton for continued management.      Again, thank you for allowing me to participate in the care of your patient.        Sincerely,        Lorenzo Rogers MD

## 2018-07-03 NOTE — MR AVS SNAPSHOT
After Visit Summary   7/3/2018    Miri Naylor    MRN: [id number]           Patient Information     Date Of Birth          1955        Visit Information        Provider Department      7/3/2018 10:00 AM Loernzo Rogers MD [facility]        Today's Diagnoses     Chronic pansinusitis    -  1      Care Instructions    Scheduling Information  To schedule your CT/MRI scan, please contact [facility] at [phone] OR [facility] at [phone]    To schedule your Surgery, please contact our Specialty Schedulers at [phone]      ENT Clinic Locations Clinic Hours Telephone Number     [facility]  [address]  [place] [address]   Monday:           1:00pm -- 5:00pm    Friday:              8:00am - 12:00pm   To schedule/reschedule an appointment with   Dr. Rogers,   please contact our   Specialty Scheduling Department at:     [phone]       [facility]  [address] [place] [address] Tuesday:          8:00am -- 2:00pm         Urgent Care Locations Clinic Hours Telephone Numbers     [facility]  [address] [place] [address]     Monday-Friday:     11:00am - 9:00pm    Saturday-Sunday:  9:00am - 5:00pm   [phone]     [facility]  [address] [place] [address]. [place] [address]     Monday-Friday:      5:00pm - 9:00pm     Saturday-Sunday:  9:00am - 5:00pm   [phone]                 Follow-ups after your visit        Your next 10 appointments already scheduled     Jul 25, 2018  4:15 PM CDT   MyChart Orthopedics Surgery Return with Nir Massey MD   [facility] ([facility]    [address] [id number] [phone]            Aug 22, 2018  2:15 PM CDT   MYCHART NEUROLOGY RETURN with Tamela Cox MD   [facility] ([facility])    [address] [address] [phone]               Future tests that were ordered for you today     Open Future Orders        Priority Expected Expires Ordered    CT Maxillofacial w/o Contrast Routine  8/17/2018 7/3/2018            Who to contact     If you have questions or need follow up information about today's clinic visit or your schedule please contact [facility] KARLA CHOUDHARY directly at [phone].  Normal or non-critical lab and imaging results will be communicated to you by MyChart, letter or phone within 4 business days after the clinic has received the results. If you do not hear from us within 7 days, please contact the clinic through General Bloodhart or phone. If you have a critical or abnormal lab result, we will notify you by phone as soon as possible.  Submit refill requests through Voicendo or call your pharmacy and they will forward the refill request to us. Please allow 3 business days for your refill to be completed.          Additional Information About Your Visit        General Bloodhart Information     Vo[employer] gives you secure access to your electronic health record. If you see a primary care provider, you can also send messages to your care team and make appointments. If you have questions, please call your primary care clinic.  If you do not have a primary care provider, please call [phone] and they will assist you.        Care EveryWhere ID     This is your Care EveryWhere ID. This could be used by other organizations to access your [facility] medical records  [id number]        Your Vitals Were     Pulse Respirations Pulse Oximetry BMI (Body Mass Index)          63 12 96% 28.59 kg/m2         Blood Pressure from Last 3 Encounters:   07/03/18 150/79   06/11/18 152/84   05/16/18 118/72    Weight from Last 3 Encounters:   07/03/18 93 kg (205 lb)   05/16/18 93 kg (205 lb)   04/04/18 92.2 kg (203 lb 3.2 oz)                 Today's Medication Changes          These changes are accurate as of 7/3/18 10:27 AM.  If you have any questions, ask  your nurse or doctor.               These medicines have changed or have updated prescriptions.        Dose/Directions    escitalopram 20 MG tablet   Commonly known as:  LEXAPRO   This may have changed:  See the new instructions.   Used for:  Major depression in complete remission (H)        TAKE 1 TABLET DAILY   Quantity:  90 tablet   Refills:  1       hydrocortisone 2.5 % cream   This may have changed:    - when to take this  - reasons to take this   Used for:  Eyelid dermatitis, eczematous, unspecified laterality        Apply topically 2 times daily   Quantity:  60 g   Refills:  1       triamcinolone 0.1 % cream   Commonly known as:  KENALOG   This may have changed:    - when to take this  - reasons to take this   Used for:  Other atopic dermatitis        Apply topically 2 times daily   Quantity:  60 g   Refills:  3                Primary Care Provider Office Phone # Fax #    Elizabeth Castrejon DO [facility] [phone] [address] [place] [address]        Equal Access to Services     [facility]: Hadii aad ku hadasho Sofe, waaxda luqadaha, qaybta kaalmada adeegyada, bj cohen . So [facility] [phone].    ATENCIÓN: Si habla español, tiene a dao disposición servicios gratuitos de asistencia lingüística. Angieame al [phone].    We comply with applicable federal civil rights laws and [place] laws. We do not discriminate on the basis of race, color, national origin, age, disability, sex, sexual orientation, or gender identity.            Thank you!     Thank you for choosing [facility]  for your care. Our goal is always to provide you with excellent care. Hearing back from our patients is one way we can continue to improve our services. Please take a few minutes to complete the written survey that you may receive in the mail after your visit with us. Thank you!             Your Updated Medication List - Protect others around you: Learn how to  safely use, store and throw away your medicines at www.disposemymeds.org.          This list is accurate as of 7/3/18 10:27 AM.  Always use your most recent med list.                   Brand Name Dispense Instructions for use Diagnosis    Acidophilus Tabs      1 tablet daily        albuterol 108 (90 Base) MCG/ACT Inhaler    PROAIR HFA    1 Inhaler    Inhale 2 puffs into the lungs every 4 hours as needed for shortness of breath / dyspnea or wheezing    Moderate persistent asthma without complication       ascorbic acid 500 MG tablet    VITAMIN C     2 TABLET DAILY        atenolol 25 MG tablet    TENORMIN    90 tablet    TAKE 1 TABLET DAILY    Essential hypertension with goal blood pressure less than 140/90       azelastine 0.1 % spray    ASTELIN    1 Bottle    Spray 1-2 sprays into both nostrils 2 times daily    Rhinoconjunctivitis       budesonide-formoterol 80-4.5 MCG/ACT Inhaler    SYMBICORT    1 Inhaler    Inhale 2 puffs into the lungs 2 times daily    Moderate persistent asthma without complication       CALCIUM 600+D PO      250 mg vitamin d- 3 tablets daily        DAILY MULTIVITAMIN PO      1 tablet daily        escitalopram 20 MG tablet    LEXAPRO    90 tablet    TAKE 1 TABLET DAILY    Major depression in complete remission (H)       flaxseed oil 1000 MG Caps      1 tablet daily        fluticasone 50 MCG/ACT spray    FLONASE    1 Bottle    Spray 1-2 sprays into both nostrils daily    Acute sinusitis with symptoms > 10 days       gabapentin 300 MG capsule    NEURONTIN    300 capsule    1 capsule TID with additional 1-2 capsules if headache is severe.    Chronic daily headache, Migraine without aura and without status migrainosus, not intractable, History of traumatic brain injury       Glucosamine-Chondroitin Tabs      1500 mg glucosamine and 1200mg chondroitin daily-2 tablets daily        hydrocortisone 2.5 % cream     60 g    Apply topically 2 times daily    Eyelid dermatitis, eczematous, unspecified  laterality       ibuprofen 200 MG tablet    ADVIL/MOTRIN     take 1 tablet (200 mg) by oral route every 6 hours as needed with food        lisinopril 40 MG tablet    PRINIVIL/ZESTRIL    90 tablet    TAKE 1 TABLET DAILY    Essential hypertension with goal blood pressure less than 140/90       montelukast 10 MG tablet    SINGULAIR    90 tablet    Take 1 tablet (10 mg) by mouth At Bedtime    Moderate persistent asthma without complication       pantoprazole 40 MG EC tablet    PROTONIX    90 tablet    TAKE 1 TABLET DAILY 30 TO 60 MINUTES BEFORE A MEAL    Gastroesophageal reflux disease without esophagitis       potassium 99 MG Tabs      Take 3 tablets by mouth 2 times daily        pravastatin 40 MG tablet    PRAVACHOL    90 tablet    Take 1 tablet (40 mg) by mouth daily    Hyperlipidemia LDL goal <130       S-Adenosylmethionine 200 MG Tabs      2 tablets daily    Menopausal syndrome (hot flashes)       SUPER B COMPLEX Tabs      1 tablet daily        triamcinolone 0.1 % cream    KENALOG    60 g    Apply topically 2 times daily    Other atopic dermatitis       vitamin E 400 UNIT capsule   Generic drug:  vitamin E      Take one pill by mouth once daily        ZADITOR OP      Apply to eye daily as needed        ZYRTEC 10 MG tablet   Generic drug:  cetirizine      Take 10 mg by mouth daily.

## 2018-07-11 ENCOUNTER — RADIANT APPOINTMENT (OUTPATIENT)
Dept: CT IMAGING | Facility: CLINIC | Age: 63
End: 2018-07-11
Attending: OTOLARYNGOLOGY
Payer: COMMERCIAL

## 2018-07-11 ENCOUNTER — TRANSFERRED RECORDS (OUTPATIENT)
Dept: HEALTH INFORMATION MANAGEMENT | Facility: CLINIC | Age: 63
End: 2018-07-11

## 2018-07-11 DIAGNOSIS — J32.4 CHRONIC PANSINUSITIS: ICD-10-CM

## 2018-07-11 PROCEDURE — 70486 CT MAXILLOFACIAL W/O DYE: CPT | Mod: TC

## 2018-07-12 DIAGNOSIS — R05.3 CHRONIC COUGH: ICD-10-CM

## 2018-07-12 DIAGNOSIS — K21.9 LPRD (LARYNGOPHARYNGEAL REFLUX DISEASE): Primary | ICD-10-CM

## 2018-07-12 RX ORDER — OMEPRAZOLE 40 MG/1
40 CAPSULE, DELAYED RELEASE ORAL DAILY
Qty: 90 CAPSULE | Refills: 3 | Status: SHIPPED | OUTPATIENT
Start: 2018-07-12 | End: 2018-08-24

## 2018-07-12 NOTE — PROGRESS NOTES
Called and spoke with patient, sinus CT was totally clear.  Therefore, I do think that her nasal symptoms and post nasal drainage are allergic rhinitis.    However, I noted that she has been on protonix for over ten years.  It is possible that it is no longer effective, and therefore I will change her to omeprazole and see if that helps the globus sensation and chronic dry cough.

## 2018-07-27 DIAGNOSIS — J01.90 ACUTE SINUSITIS WITH SYMPTOMS > 10 DAYS: ICD-10-CM

## 2018-07-27 RX ORDER — FLUTICASONE PROPIONATE 50 MCG
1-2 SPRAY, SUSPENSION (ML) NASAL DAILY
Qty: 1 BOTTLE | Refills: 11 | Status: SHIPPED | OUTPATIENT
Start: 2018-07-27 | End: 2019-08-15

## 2018-07-27 NOTE — TELEPHONE ENCOUNTER
Pending Prescriptions:                       Disp   Refills    fluticasone (FLONASE) 50 MCG/ACT spray    1 Radha*11           Sig: Spray 1-2 sprays into both nostrils daily    Pharmacy requesting 90 day supply.     Jackie Olmedo CMA on 7/27/2018 at 11:43 AM

## 2018-07-27 NOTE — TELEPHONE ENCOUNTER
Pending Prescriptions:                       Disp   Refills    fluticasone (FLONASE) 50 MCG/ACT spray    1 Radha*11           Sig: Spray 1-2 sprays into both nostrils daily    Routing refill request to provider for review/approval because:  Drug not on the G refill protocol. All nasal steroids must be routed to provider.     Vanessa Adkins RN

## 2018-08-15 ENCOUNTER — RADIANT APPOINTMENT (OUTPATIENT)
Dept: GENERAL RADIOLOGY | Facility: CLINIC | Age: 63
End: 2018-08-15
Attending: ORTHOPAEDIC SURGERY
Payer: COMMERCIAL

## 2018-08-15 ENCOUNTER — OFFICE VISIT (OUTPATIENT)
Dept: ORTHOPEDICS | Facility: CLINIC | Age: 63
End: 2018-08-15
Payer: COMMERCIAL

## 2018-08-15 VITALS
SYSTOLIC BLOOD PRESSURE: 129 MMHG | WEIGHT: 207 LBS | BODY MASS INDEX: 28.87 KG/M2 | HEART RATE: 66 BPM | OXYGEN SATURATION: 98 % | DIASTOLIC BLOOD PRESSURE: 81 MMHG

## 2018-08-15 DIAGNOSIS — M25.552 HIP PAIN, LEFT: ICD-10-CM

## 2018-08-15 DIAGNOSIS — M16.12 PRIMARY OSTEOARTHRITIS OF LEFT HIP: ICD-10-CM

## 2018-08-15 DIAGNOSIS — M25.552 HIP PAIN, LEFT: Primary | ICD-10-CM

## 2018-08-15 DIAGNOSIS — Z96.641 STATUS POST TOTAL HIP REPLACEMENT, RIGHT: ICD-10-CM

## 2018-08-15 PROCEDURE — 99213 OFFICE O/P EST LOW 20 MIN: CPT | Performed by: ORTHOPAEDIC SURGERY

## 2018-08-15 PROCEDURE — 73502 X-RAY EXAM HIP UNI 2-3 VIEWS: CPT | Mod: FY

## 2018-08-15 ASSESSMENT — PAIN SCALES - GENERAL: PAINLEVEL: MILD PAIN (3)

## 2018-08-15 NOTE — NURSING NOTE
"Chief Complaint   Patient presents with     Right Hip - Total Joint Post-op     Right WENCESLAO. DOS 7/19/16. 2 year 1 month PO. No pain today.     Left Hip - Pain     Left hip pain started about 10 years ago, and now is starting to increase with pain. Pain is a 3 today.       Initial /81  Pulse 66  Wt 93.9 kg (207 lb)  SpO2 98%  BMI 28.87 kg/m2 Estimated body mass index is 28.87 kg/(m^2) as calculated from the following:    Height as of 2/9/18: 1.803 m (5' 11\").    Weight as of this encounter: 93.9 kg (207 lb)..  BP completed using cuff size: ana Garcia CMA    "

## 2018-08-15 NOTE — LETTER
8/15/2018         RE: Miri Naylor  [address]  [facility] [address]        Dear Colleague,    Thank you for referring your patient, Miri Naylor, to the [facility]. Please see a copy of my visit note below.    Chief Complaint   Patient presents with     Right Hip - Total Joint Post-op     Right WENCESLAO. DOS 7/19/16. 2 year 1 month PO. No pain today.     Left Hip - Pain     Left hip pain started about 10 years ago, and now is starting to increase with pain. Pain is a 3 today.       SURGERY: Total hip arthroplasty, right hip ( [facility])  DATE OF SURGERY: 7/19/2016      HISTORY OF PRESENT ILLNESS: Miri Naylor is a 62 year old female seen for postoperative evaluation of right total hip arthroplasty. It has been 2 years since surgery. Returns today doing well. She has no pain in the right hip, 0/10. Doing well, no problems or concerns.     She reports mild left hip pain. Started 10 years ago. Gradually started worsening. Today she has mild pain, 3/10. Pain is located over anterolateral hip pain. Pain is worsened with hip range of motion, such as with pulling the leg up, external rotation and stairs. Occasional shooting pain into the groin.     Known low back pain.       PAST MEDICAL HISTORY:   Past Medical History:   Diagnosis Date     Depressive disorder, not elsewhere classified      Drug allergy, multiple 9/23/14--passed graded oral challenge for Zithromax.     7/3/14 POS PRE-PEN skin test. 7/30/14 NEG skin tests and oral challenge to Cefzil     Hx of abnormal Pap smear ?    no specifics given     lumbar degeneration      Raynaud's disease      Unspecified essential hypertension        PAST SURGICAL HISTORY:   Past Surgical History:   Procedure Laterality Date     BIOPSY OF BREAST, NEEDLE CORE       C PELVIS/HIP JOINT SURGERY UNLISTED Right 7/19/16    total hip arthroplasty     COLONOSCOPY       HIP SURGERY Right 07/19/2016       Medications:   Current  Outpatient Prescriptions:      ACIDOPHILUS OR TABS, 1 tablet daily, Disp: , Rfl:      albuterol (ALBUTEROL) 108 (90 BASE) MCG/ACT Inhaler, Inhale 2 puffs into the lungs every 4 hours as needed for shortness of breath / dyspnea or wheezing, Disp: 1 Inhaler, Rfl: 1     atenolol (TENORMIN) 25 MG tablet, TAKE 1 TABLET DAILY, Disp: 90 tablet, Rfl: 1     azelastine (ASTELIN) 0.1 % spray, Spray 1-2 sprays into both nostrils 2 times daily, Disp: 1 Bottle, Rfl: 11     budesonide-formoterol (SYMBICORT) 80-4.5 MCG/ACT Inhaler, Inhale 2 puffs into the lungs 2 times daily, Disp: 1 Inhaler, Rfl: 3     CALCIUM 600+D PO, 250 mg vitamin d- 3 tablets daily, Disp: , Rfl:      cetirizine (ZYRTEC) 10 MG tablet, Take 10 mg by mouth daily., Disp: , Rfl:      DAILY MULTIVITAMIN PO, 1 tablet daily, Disp: , Rfl:      escitalopram (LEXAPRO) 20 MG tablet, TAKE 1 TABLET DAILY (Patient taking differently: TAKE 1/2 TABLET DAILY), Disp: 90 tablet, Rfl: 1     FLAXSEED OIL 1000 MG PO CAPS, 1 tablet daily, Disp: , Rfl:      fluticasone (FLONASE) 50 MCG/ACT spray, Spray 1-2 sprays into both nostrils daily, Disp: 1 Bottle, Rfl: 11     gabapentin (NEURONTIN) 300 MG capsule, 1 capsule TID with additional 1-2 capsules if headache is severe., Disp: 300 capsule, Rfl: 1     GLUCOSAMINE-CHONDROITIN PO TABS, 1500 mg glucosamine and 1200mg chondroitin daily-2 tablets daily, Disp: , Rfl:      hydrocortisone 2.5 % cream, Apply topically 2 times daily (Patient taking differently: Apply topically 2 times daily as needed ), Disp: 60 g, Rfl: 1     ibuprofen (ADVIL,MOTRIN) 200 MG tablet, take 1 tablet (200 mg) by oral route every 6 hours as needed with food, Disp: , Rfl:      Ketotifen Fumarate (ZADITOR OP), Apply to eye daily as needed, Disp: , Rfl:      lisinopril (PRINIVIL/ZESTRIL) 40 MG tablet, TAKE 1 TABLET DAILY, Disp: 90 tablet, Rfl: 1     montelukast (SINGULAIR) 10 MG tablet, Take 1 tablet (10 mg) by mouth At Bedtime, Disp: 90 tablet, Rfl: 1     omeprazole  (PRILOSEC) 40 MG capsule, Take 1 capsule (40 mg) by mouth daily Take 30-60 minutes before a meal., Disp: 90 capsule, Rfl: 3     potassium 99 MG TABS, Take 3 tablets by mouth 2 times daily, Disp: , Rfl:      pravastatin (PRAVACHOL) 40 MG tablet, Take 1 tablet (40 mg) by mouth daily, Disp: 90 tablet, Rfl: 3     S-ADENOSYLMETHIONINE 200 MG PO TABS, 2 tablets daily, Disp: , Rfl:      SUPER B COMPLEX PO TABS, 1 tablet daily, Disp: , Rfl:      triamcinolone (KENALOG) 0.1 % cream, Apply topically 2 times daily (Patient taking differently: Apply topically 2 times daily as needed ), Disp: 60 g, Rfl: 3     VITAMIN C 500 MG PO TABS, 2 TABLET DAILY, Disp: , Rfl:      vitamin E (E-400) 400 UNIT capsule, Take one pill by mouth once daily, Disp: , Rfl:     Allergies:   Allergies   Allergen Reactions     Levofloxacin Rash     Other reaction(s): Contact Dermatitis     Penicillins Hives and Rash     Confirmed by skin prick testing 7/3/14     Amoxicillin Hives and Rash     Amoxicillin-Pot Clavulanate Rash     Azithromycin Rash     Cephalexin Rash     Clindamycin Rash and Hives     Atorvastatin Other (See Comments)     Felt sick, intolerance     Clindamycin Hcl Hives     Levaquin      tendonitis     Augmentin Rash         REVIEW OF SYSTEMS:  CONSTITUTIONAL:NEGATIVE for fever, chills, night sweats, change in weight  INTEGUMENTARY/SKIN: NEGATIVE for worrisome rashes, moles or lesions  MUSCULOSKELETAL:See HPI above  NEURO: NEGATIVE for weakness, dizziness or paresthesias    This document serves as a record of the services and decisions personally performed and made by Nir Massey MD. It was created on his behalf by Bree Mcbride, a trained medical scribe. The creation of this document is based the provider's statements to the medical scribe.    Carinaibsabas Mcbride 2:44 PM 8/15/2018          PHYSICAL EXAM:  /81  Pulse 66  Wt 207 lb (93.9 kg)  SpO2 98%  BMI 28.87 kg/m2   GENERAL APPEARANCE: healthy, alert, no distress  SKIN: no  suspicious lesions or rashes  NEURO: Normal strength and tone, mentation intact and speech normal  PSYCH:  mentation appears normal and affect normal  RESPIRATORY: No increased work of breathing.    BILATERAL LOWER EXTREMITIES:  Gait: normal.    Right lower extremity:  No gross deformities or masses.  mild Quad atrophy  Intact sensation deep peroneal nerve, superficial peroneal nerve, med/lat tibial nerve, sural nerve, saphenous nerve  Intact EHL, EDL, TA, FHL, GS, quadriceps hamstrings and hip flexors  Toes warm and well perfused, brisk capillary refill. Palpable 2+ dp pulses.  calf soft and minimal tender to squeeze.  Edema: trace    RIGHT HIP EXAM:    Palpation: nontender to palpation .  Strength:  Grossly intact, able to quickly flexion at the hip without discomfort.  Hip range of motion: grossly intact and painfree.   Leg lengths: grossly symmetric at medial malleolus    LEFT HIP EXAM:    Palpation: Tender:   iliotibial band, greater trochanter  Strength:  full strength  Special tests:  Irritability (flexion/adduction/internal rotation) mild  Hip range of motion: External rotation 35, internal rotation 10          X-RAY:  AP pelvis, AP/lateral views left hip from 8/15/2018 were reviewed in clinic today. No obvious fractures or dislocations. Mild-moderate left hip degenerative changes. Right total hip arthroplasty in place, stable alignment.    Impression: 63 year old female seen for postoperative evaluation of right total hip arthroplasty. It has been 2 year since surgery. Doing well. Left hip chronic pain, mild to moderate primary osteoarthritis.    Plan:   RIGHT HIP  * reviewed xrays with patient today showing total hip arthroplasty implants. Stable alignment, well fixed    * return to clinic 3 years, 7/2021 for 5 year followup.  * all questions addressed and answered prior to discharge from clinic today to patient's satisfaction.      LEFT HIP  * discussed pain in groin/hip likely from mild-moderate  hip  "arthritis. This is wearing of the cartilage within the hip joint either due to normal \"wear and tear\" or following an injury. Any low back / buttock / radiating pain likely coming from the low back.  *  treatment options for hip arthritis and pain include: do nothing, NSAIDS, activity modification, Physical Therapy, injections, total hip arthroplasty. Risks and benefits of each discussed.     * at this time, patient would like to continue with non op treatment.   * Rest  * Activity modification - avoid activities that aggravate symptoms.  * NSAIDS - regular use for inflammation, with food, as long as no contra-indications. Please discuss with pcp if needed.  * Consider physical therapy for strengthening, stretching and range of motion exercises  * Tylenol as needed for pain  * Injections: consider in future should pain persist despite trial of mentioned recommendations. Patient will like to hold off injections at this time.   * Return to clinic as needed           The information in this document, created by a scribe for me, accurately reflects the services I personally performed and the decisions made by me. I have reviewed and approved this document for accuracy.         Nir Massey M.D., M.S.  Dept. of Orthopaedic Surgery  [facility]    Again, thank you for allowing me to participate in the care of your patient.        Sincerely,        Nir Massey MD    "

## 2018-08-15 NOTE — PROGRESS NOTES
Chief Complaint   Patient presents with     Right Hip - Total Joint Post-op     Right WENCESLAO. DOS 7/19/16. 2 year 1 month PO. No pain today.     Left Hip - Pain     Left hip pain started about 10 years ago, and now is starting to increase with pain. Pain is a 3 today.       SURGERY: Total hip arthroplasty, right hip ( [facility])  DATE OF SURGERY: 7/19/2016      HISTORY OF PRESENT ILLNESS: Miri Naylor is a 62 year old female seen for postoperative evaluation of right total hip arthroplasty. It has been 2 years since surgery. Returns today doing well. She has no pain in the right hip, 0/10. Doing well, no problems or concerns.     She reports mild left hip pain. Started 10 years ago. Gradually started worsening. Today she has mild pain, 3/10. Pain is located over anterolateral hip pain. Pain is worsened with hip range of motion, such as with pulling the leg up, external rotation and stairs. Occasional shooting pain into the groin.     Known low back pain.       PAST MEDICAL HISTORY:   Past Medical History:   Diagnosis Date     Depressive disorder, not elsewhere classified      Drug allergy, multiple 9/23/14--passed graded oral challenge for Zithromax.     7/3/14 POS PRE-PEN skin test. 7/30/14 NEG skin tests and oral challenge to Cefzil     Hx of abnormal Pap smear ?    no specifics given     lumbar degeneration      Raynaud's disease      Unspecified essential hypertension        PAST SURGICAL HISTORY:   Past Surgical History:   Procedure Laterality Date     BIOPSY OF BREAST, NEEDLE CORE       C PELVIS/HIP JOINT SURGERY UNLISTED Right 7/19/16    total hip arthroplasty     COLONOSCOPY       HIP SURGERY Right 07/19/2016       Medications:   Current Outpatient Prescriptions:      ACIDOPHILUS OR TABS, 1 tablet daily, Disp: , Rfl:      albuterol (ALBUTEROL) 108 (90 BASE) MCG/ACT Inhaler, Inhale 2 puffs into the lungs every 4 hours as needed for shortness of breath / dyspnea or wheezing, Disp: 1 Inhaler, Rfl:  1     atenolol (TENORMIN) 25 MG tablet, TAKE 1 TABLET DAILY, Disp: 90 tablet, Rfl: 1     azelastine (ASTELIN) 0.1 % spray, Spray 1-2 sprays into both nostrils 2 times daily, Disp: 1 Bottle, Rfl: 11     budesonide-formoterol (SYMBICORT) 80-4.5 MCG/ACT Inhaler, Inhale 2 puffs into the lungs 2 times daily, Disp: 1 Inhaler, Rfl: 3     CALCIUM 600+D PO, 250 mg vitamin d- 3 tablets daily, Disp: , Rfl:      cetirizine (ZYRTEC) 10 MG tablet, Take 10 mg by mouth daily., Disp: , Rfl:      DAILY MULTIVITAMIN PO, 1 tablet daily, Disp: , Rfl:      escitalopram (LEXAPRO) 20 MG tablet, TAKE 1 TABLET DAILY (Patient taking differently: TAKE 1/2 TABLET DAILY), Disp: 90 tablet, Rfl: 1     FLAXSEED OIL 1000 MG PO CAPS, 1 tablet daily, Disp: , Rfl:      fluticasone (FLONASE) 50 MCG/ACT spray, Spray 1-2 sprays into both nostrils daily, Disp: 1 Bottle, Rfl: 11     gabapentin (NEURONTIN) 300 MG capsule, 1 capsule TID with additional 1-2 capsules if headache is severe., Disp: 300 capsule, Rfl: 1     GLUCOSAMINE-CHONDROITIN PO TABS, 1500 mg glucosamine and 1200mg chondroitin daily-2 tablets daily, Disp: , Rfl:      hydrocortisone 2.5 % cream, Apply topically 2 times daily (Patient taking differently: Apply topically 2 times daily as needed ), Disp: 60 g, Rfl: 1     ibuprofen (ADVIL,MOTRIN) 200 MG tablet, take 1 tablet (200 mg) by oral route every 6 hours as needed with food, Disp: , Rfl:      Ketotifen Fumarate (ZADITOR OP), Apply to eye daily as needed, Disp: , Rfl:      lisinopril (PRINIVIL/ZESTRIL) 40 MG tablet, TAKE 1 TABLET DAILY, Disp: 90 tablet, Rfl: 1     montelukast (SINGULAIR) 10 MG tablet, Take 1 tablet (10 mg) by mouth At Bedtime, Disp: 90 tablet, Rfl: 1     omeprazole (PRILOSEC) 40 MG capsule, Take 1 capsule (40 mg) by mouth daily Take 30-60 minutes before a meal., Disp: 90 capsule, Rfl: 3     potassium 99 MG TABS, Take 3 tablets by mouth 2 times daily, Disp: , Rfl:      pravastatin (PRAVACHOL) 40 MG tablet, Take 1 tablet (40  mg) by mouth daily, Disp: 90 tablet, Rfl: 3     S-ADENOSYLMETHIONINE 200 MG PO TABS, 2 tablets daily, Disp: , Rfl:      SUPER B COMPLEX PO TABS, 1 tablet daily, Disp: , Rfl:      triamcinolone (KENALOG) 0.1 % cream, Apply topically 2 times daily (Patient taking differently: Apply topically 2 times daily as needed ), Disp: 60 g, Rfl: 3     VITAMIN C 500 MG PO TABS, 2 TABLET DAILY, Disp: , Rfl:      vitamin E (E-400) 400 UNIT capsule, Take one pill by mouth once daily, Disp: , Rfl:     Allergies:   Allergies   Allergen Reactions     Levofloxacin Rash     Other reaction(s): Contact Dermatitis     Penicillins Hives and Rash     Confirmed by skin prick testing 7/3/14     Amoxicillin Hives and Rash     Amoxicillin-Pot Clavulanate Rash     Azithromycin Rash     Cephalexin Rash     Clindamycin Rash and Hives     Atorvastatin Other (See Comments)     Felt sick, intolerance     Clindamycin Hcl Hives     Levaquin      tendonitis     Augmentin Rash         REVIEW OF SYSTEMS:  CONSTITUTIONAL:NEGATIVE for fever, chills, night sweats, change in weight  INTEGUMENTARY/SKIN: NEGATIVE for worrisome rashes, moles or lesions  MUSCULOSKELETAL:See HPI above  NEURO: NEGATIVE for weakness, dizziness or paresthesias    This document serves as a record of the services and decisions personally performed and made by Nir Massey MD. It was created on his behalf by Bree Mcbride, a trained medical scribe. The creation of this document is based the provider's statements to the medical scribe.    Scribe Bree Mcbride 2:44 PM 8/15/2018          PHYSICAL EXAM:  /81  Pulse 66  Wt 207 lb (93.9 kg)  SpO2 98%  BMI 28.87 kg/m2   GENERAL APPEARANCE: healthy, alert, no distress  SKIN: no suspicious lesions or rashes  NEURO: Normal strength and tone, mentation intact and speech normal  PSYCH:  mentation appears normal and affect normal  RESPIRATORY: No increased work of breathing.    BILATERAL LOWER EXTREMITIES:  Gait: normal.    Right lower  "extremity:  No gross deformities or masses.  mild Quad atrophy  Intact sensation deep peroneal nerve, superficial peroneal nerve, med/lat tibial nerve, sural nerve, saphenous nerve  Intact EHL, EDL, TA, FHL, GS, quadriceps hamstrings and hip flexors  Toes warm and well perfused, brisk capillary refill. Palpable 2+ dp pulses.  calf soft and minimal tender to squeeze.  Edema: trace    RIGHT HIP EXAM:    Palpation: nontender to palpation .  Strength:  Grossly intact, able to quickly flexion at the hip without discomfort.  Hip range of motion: grossly intact and painfree.   Leg lengths: grossly symmetric at medial malleolus    LEFT HIP EXAM:    Palpation: Tender:   iliotibial band, greater trochanter  Strength:  full strength  Special tests:  Irritability (flexion/adduction/internal rotation) mild  Hip range of motion: External rotation 35, internal rotation 10          X-RAY:  AP pelvis, AP/lateral views left hip from 8/15/2018 were reviewed in clinic today. No obvious fractures or dislocations. Mild-moderate left hip degenerative changes. Right total hip arthroplasty in place, stable alignment.    Impression: 63 year old female seen for postoperative evaluation of right total hip arthroplasty. It has been 2 year since surgery. Doing well. Left hip chronic pain, mild to moderate primary osteoarthritis.    Plan:   RIGHT HIP  * reviewed xrays with patient today showing total hip arthroplasty implants. Stable alignment, well fixed    * return to clinic 3 years, 7/2021 for 5 year followup.  * all questions addressed and answered prior to discharge from clinic today to patient's satisfaction.      LEFT HIP  * discussed pain in groin/hip likely from mild-moderate  hip arthritis. This is wearing of the cartilage within the hip joint either due to normal \"wear and tear\" or following an injury. Any low back / buttock / radiating pain likely coming from the low back.  *  treatment options for hip arthritis and pain include: do " nothing, NSAIDS, activity modification, Physical Therapy, injections, total hip arthroplasty. Risks and benefits of each discussed.     * at this time, patient would like to continue with non op treatment.   * Rest  * Activity modification - avoid activities that aggravate symptoms.  * NSAIDS - regular use for inflammation, with food, as long as no contra-indications. Please discuss with pcp if needed.  * Consider physical therapy for strengthening, stretching and range of motion exercises  * Tylenol as needed for pain  * Injections: consider in future should pain persist despite trial of mentioned recommendations. Patient will like to hold off injections at this time.   * Return to clinic as needed           The information in this document, created by a scribe for me, accurately reflects the services I personally performed and the decisions made by me. I have reviewed and approved this document for accuracy.         Nir Massey M.D., M.S.  Dept. of Orthopaedic Surgery  [facility]

## 2018-08-15 NOTE — MR AVS SNAPSHOT
After Visit Summary   8/15/2018    Miri Naylor    MRN: [id number]           Patient Information     Date Of Birth          1955        Visit Information        Provider Department      8/15/2018 1:45 PM Nir Massey MD [facility]        Today's Diagnoses     Hip pain, left    -  1    Primary osteoarthritis of left hip        Status post total hip replacement, right           Follow-ups after your visit        Follow-up notes from your care team     Return if symptoms worsen or fail to improve.      Your next 10 appointments already scheduled     Aug 24, 2018  2:00 PM CDT   [place] [facility] with Elizabeth Lucía Barney, DO   [facility]    [address] [address] [phone]            Sep 24, 2018  6:40 PM CDT   [place] Allergy Care Return with Nish Stockton, DO   [facility] ([facility])    [address] [address] [phone]              Who to contact     If you have questions or need follow up information about today's clinic visit or your schedule please contact [facility] directly at [phone].  Normal or non-critical lab and imaging results will be communicated to you by MyChart, letter or phone within 4 business days after the clinic has received the results. If you do not hear from us within 7 days, please contact the clinic through MessagePartyhart or phone. If you have a critical or abnormal lab result, we will notify you by phone as soon as possible.  Submit refill requests through Bespoke or call your pharmacy and they will forward the refill request to us. Please allow 3 business days for your refill to be completed.          Additional Information About Your Visit        MyChart Information     [employer] gives you secure access to your electronic health record. If you see a primary care provider, you can also send  messages to your care team and make appointments. If you have questions, please call your primary care clinic.  If you do not have a primary care provider, please call [phone] and they will assist you.        Care EveryWhere ID     This is your Care EveryWhere ID. This could be used by other organizations to access your [facility] [place] medical records  [id number]        Your Vitals Were     Pulse Pulse Oximetry BMI (Body Mass Index)             66 98% 28.87 kg/m2          Blood Pressure from Last 3 Encounters:   08/15/18 129/81   07/03/18 150/79   06/11/18 152/84    Weight from Last 3 Encounters:   08/15/18 207 lb (93.9 kg)   07/03/18 205 lb (93 kg)   05/16/18 205 lb (93 kg)                 Today's Medication Changes          These changes are accurate as of 8/15/18  3:59 PM.  If you have any questions, ask your nurse or doctor.               These medicines have changed or have updated prescriptions.        Dose/Directions    escitalopram 20 MG tablet   Commonly known as:  LEXAPRO   This may have changed:  See the new instructions.   Used for:  Major depression in complete remission (H)        TAKE 1 TABLET DAILY   Quantity:  90 tablet   Refills:  1       hydrocortisone 2.5 % cream   This may have changed:    - when to take this  - reasons to take this   Used for:  Eyelid dermatitis, eczematous, unspecified laterality        Apply topically 2 times daily   Quantity:  60 g   Refills:  1       triamcinolone 0.1 % cream   Commonly known as:  KENALOG   This may have changed:    - when to take this  - reasons to take this   Used for:  Other atopic dermatitis        Apply topically 2 times daily   Quantity:  60 g   Refills:  3                Primary Care Provider Office Phone # Fax #    Elizabeth Lucía Corley [phone] [address] [place] [address]        Equal Access to Services     SONIA SANDERS AH: Dya Ferguson, corinne charles, bj decker  prabhamando miguelaltonlavelle blanco'aan ah. So [facility] [phone].    ATENCIÓN: Si habla yas, tiene a dao disposición servicios gratuitos de asistencia lingüística. Evangelista brooke [phone].    We comply with applicable federal civil rights laws and [place] laws. We do not discriminate on the basis of race, color, national origin, age, disability, sex, sexual orientation, or gender identity.            Thank you!     Thank you for choosing [facility]  for your care. Our goal is always to provide you with excellent care. Hearing back from our patients is one way we can continue to improve our services. Please take a few minutes to complete the written survey that you may receive in the mail after your visit with us. Thank you!             Your Updated Medication List - Protect others around you: Learn how to safely use, store and throw away your medicines at www.disposemymeds.org.          This list is accurate as of 8/15/18  3:59 PM.  Always use your most recent med list.                   Brand Name Dispense Instructions for use Diagnosis    Acidophilus Tabs      1 tablet daily        albuterol 108 (90 Base) MCG/ACT inhaler    PROAIR HFA    1 Inhaler    Inhale 2 puffs into the lungs every 4 hours as needed for shortness of breath / dyspnea or wheezing    Moderate persistent asthma without complication       ascorbic acid 500 MG tablet    VITAMIN C     2 TABLET DAILY        atenolol 25 MG tablet    TENORMIN    90 tablet    TAKE 1 TABLET DAILY    Essential hypertension with goal blood pressure less than 140/90       azelastine 0.1 % nasal spray    ASTELIN    1 Bottle    Spray 1-2 sprays into both nostrils 2 times daily    Rhinoconjunctivitis       budesonide-formoterol 80-4.5 MCG/ACT Inhaler    SYMBICORT    1 Inhaler    Inhale 2 puffs into the lungs 2 times daily    Moderate persistent asthma without complication       CALCIUM 600+D PO      250 mg vitamin d- 3 tablets daily        DAILY MULTIVITAMIN PO      1 tablet  daily        escitalopram 20 MG tablet    LEXAPRO    90 tablet    TAKE 1 TABLET DAILY    Major depression in complete remission (H)       flaxseed oil 1000 MG Caps      1 tablet daily        fluticasone 50 MCG/ACT spray    FLONASE    1 Bottle    Spray 1-2 sprays into both nostrils daily    Acute sinusitis with symptoms > 10 days       gabapentin 300 MG capsule    NEURONTIN    300 capsule    1 capsule TID with additional 1-2 capsules if headache is severe.    Chronic daily headache, Migraine without aura and without status migrainosus, not intractable, History of traumatic brain injury       Glucosamine-Chondroitin Tabs      1500 mg glucosamine and 1200mg chondroitin daily-2 tablets daily        hydrocortisone 2.5 % cream     60 g    Apply topically 2 times daily    Eyelid dermatitis, eczematous, unspecified laterality       ibuprofen 200 MG tablet    ADVIL/MOTRIN     take 1 tablet (200 mg) by oral route every 6 hours as needed with food        lisinopril 40 MG tablet    PRINIVIL/ZESTRIL    90 tablet    TAKE 1 TABLET DAILY    Essential hypertension with goal blood pressure less than 140/90       montelukast 10 MG tablet    SINGULAIR    90 tablet    Take 1 tablet (10 mg) by mouth At Bedtime    Moderate persistent asthma without complication       omeprazole 40 MG capsule    priLOSEC    90 capsule    Take 1 capsule (40 mg) by mouth daily Take 30-60 minutes before a meal.    LPRD (laryngopharyngeal reflux disease), Chronic cough       potassium 99 MG Tabs      Take 3 tablets by mouth 2 times daily        pravastatin 40 MG tablet    PRAVACHOL    90 tablet    Take 1 tablet (40 mg) by mouth daily    Hyperlipidemia LDL goal <130       S-Adenosylmethionine 200 MG Tabs      2 tablets daily    Menopausal syndrome (hot flashes)       SUPER B COMPLEX Tabs      1 tablet daily        triamcinolone 0.1 % cream    KENALOG    60 g    Apply topically 2 times daily    Other atopic dermatitis       vitamin E 400 UNIT capsule   Generic  drug:  vitamin E      Take one pill by mouth once daily        ZADITOR OP      Apply to eye daily as needed        ZYRTEC 10 MG tablet   Generic drug:  cetirizine      Take 10 mg by mouth daily.

## 2018-08-19 DIAGNOSIS — I10 ESSENTIAL HYPERTENSION WITH GOAL BLOOD PRESSURE LESS THAN 140/90: ICD-10-CM

## 2018-08-20 NOTE — TELEPHONE ENCOUNTER
"Requested Prescriptions   Pending Prescriptions Disp Refills     atenolol (TENORMIN) 25 MG tablet [Pharmacy Med Name: ATENOLOL TABS 25MG] 90 tablet 1    Last Written Prescription Date:  02/20/2018 #90 x 1  Last filled 05/23/2018  Last office visit: 11/10/2017 PRADIP Corley   Future Office Visit:   Next 5 appointments (look out 90 days)     Aug 24, 2018  2:00 PM CDT   MyChart Physical Adult with Elizabeth Corley,    [facility] ([facility]    [address] [address]   [phone]                  Sig: TAKE 1 TABLET DAILY    Beta-Blockers Protocol Passed    8/19/2018  5:41 AM       Passed - Blood pressure under 140/90 in past 12 months    BP Readings from Last 3 Encounters:   08/15/18 129/81   07/03/18 150/79   06/11/18 152/84                Passed - Patient is age 6 or older       Passed - Recent (12 mo) or future (30 days) visit within the authorizing provider's specialty    Patient had office visit in the last 12 months or has a visit in the next 30 days with authorizing provider or within the authorizing provider's specialty.  See \"Patient Info\" tab in inbasket, or \"Choose Columns\" in Meds & Orders section of the refill encounter.              "

## 2018-08-21 RX ORDER — ATENOLOL 25 MG/1
TABLET ORAL
Qty: 90 TABLET | Refills: 0 | Status: SHIPPED | OUTPATIENT
Start: 2018-08-21 | End: 2018-08-24

## 2018-08-24 ENCOUNTER — OFFICE VISIT (OUTPATIENT)
Dept: FAMILY MEDICINE | Facility: CLINIC | Age: 63
End: 2018-08-24
Payer: COMMERCIAL

## 2018-08-24 VITALS
TEMPERATURE: 97.8 F | HEIGHT: 71 IN | BODY MASS INDEX: 28.73 KG/M2 | HEART RATE: 56 BPM | WEIGHT: 205.2 LBS | DIASTOLIC BLOOD PRESSURE: 82 MMHG | SYSTOLIC BLOOD PRESSURE: 142 MMHG

## 2018-08-24 DIAGNOSIS — Z01.419 ENCOUNTER FOR GYNECOLOGICAL EXAMINATION WITHOUT ABNORMAL FINDING: Primary | ICD-10-CM

## 2018-08-24 DIAGNOSIS — G43.009 MIGRAINE WITHOUT AURA AND WITHOUT STATUS MIGRAINOSUS, NOT INTRACTABLE: ICD-10-CM

## 2018-08-24 DIAGNOSIS — Z87.820 HISTORY OF TRAUMATIC BRAIN INJURY: ICD-10-CM

## 2018-08-24 DIAGNOSIS — Z12.4 SCREENING FOR CERVICAL CANCER: ICD-10-CM

## 2018-08-24 DIAGNOSIS — F32.5 MAJOR DEPRESSION IN COMPLETE REMISSION (H): ICD-10-CM

## 2018-08-24 DIAGNOSIS — E78.5 HYPERLIPIDEMIA LDL GOAL <130: ICD-10-CM

## 2018-08-24 DIAGNOSIS — K21.9 GASTROESOPHAGEAL REFLUX DISEASE, ESOPHAGITIS PRESENCE NOT SPECIFIED: ICD-10-CM

## 2018-08-24 DIAGNOSIS — Z11.51 SCREENING FOR HUMAN PAPILLOMAVIRUS: ICD-10-CM

## 2018-08-24 DIAGNOSIS — R51.9 CHRONIC DAILY HEADACHE: ICD-10-CM

## 2018-08-24 DIAGNOSIS — I10 HYPERTENSION GOAL BP (BLOOD PRESSURE) < 140/90: ICD-10-CM

## 2018-08-24 LAB
ALBUMIN SERPL-MCNC: 4 G/DL (ref 3.4–5)
ALP SERPL-CCNC: 63 U/L (ref 40–150)
ALT SERPL W P-5'-P-CCNC: 32 U/L (ref 0–50)
ANION GAP SERPL CALCULATED.3IONS-SCNC: 7 MMOL/L (ref 3–14)
AST SERPL W P-5'-P-CCNC: 27 U/L (ref 0–45)
BILIRUB SERPL-MCNC: 0.3 MG/DL (ref 0.2–1.3)
BUN SERPL-MCNC: 10 MG/DL (ref 7–30)
CALCIUM SERPL-MCNC: 8.5 MG/DL (ref 8.5–10.1)
CHLORIDE SERPL-SCNC: 101 MMOL/L (ref 94–109)
CHOLEST SERPL-MCNC: 246 MG/DL
CO2 SERPL-SCNC: 29 MMOL/L (ref 20–32)
CREAT SERPL-MCNC: 0.85 MG/DL (ref 0.52–1.04)
GFR SERPL CREATININE-BSD FRML MDRD: 68 ML/MIN/1.7M2
GLUCOSE SERPL-MCNC: 97 MG/DL (ref 70–99)
HDLC SERPL-MCNC: 58 MG/DL
LDLC SERPL CALC-MCNC: 151 MG/DL
NONHDLC SERPL-MCNC: 188 MG/DL
POTASSIUM SERPL-SCNC: 3.7 MMOL/L (ref 3.4–5.3)
PROT SERPL-MCNC: 7.4 G/DL (ref 6.8–8.8)
SODIUM SERPL-SCNC: 137 MMOL/L (ref 133–144)
TRIGL SERPL-MCNC: 183 MG/DL

## 2018-08-24 PROCEDURE — 80061 LIPID PANEL: CPT | Performed by: FAMILY MEDICINE

## 2018-08-24 PROCEDURE — 99396 PREV VISIT EST AGE 40-64: CPT | Performed by: FAMILY MEDICINE

## 2018-08-24 PROCEDURE — G0123 SCREEN CERV/VAG THIN LAYER: HCPCS | Performed by: FAMILY MEDICINE

## 2018-08-24 PROCEDURE — 99214 OFFICE O/P EST MOD 30 MIN: CPT | Mod: 25 | Performed by: FAMILY MEDICINE

## 2018-08-24 PROCEDURE — 36415 COLL VENOUS BLD VENIPUNCTURE: CPT | Performed by: FAMILY MEDICINE

## 2018-08-24 PROCEDURE — 80053 COMPREHEN METABOLIC PANEL: CPT | Performed by: FAMILY MEDICINE

## 2018-08-24 PROCEDURE — 87624 HPV HI-RISK TYP POOLED RSLT: CPT | Performed by: FAMILY MEDICINE

## 2018-08-24 RX ORDER — PANTOPRAZOLE SODIUM 40 MG/1
40 TABLET, DELAYED RELEASE ORAL DAILY
Qty: 90 TABLET | Refills: 3 | Status: SHIPPED | OUTPATIENT
Start: 2018-08-24 | End: 2019-09-10

## 2018-08-24 RX ORDER — GABAPENTIN 300 MG/1
CAPSULE ORAL
Qty: 300 CAPSULE | Refills: 1 | Status: SHIPPED | OUTPATIENT
Start: 2018-08-24 | End: 2019-04-10

## 2018-08-24 RX ORDER — ESCITALOPRAM OXALATE 10 MG/1
10 TABLET ORAL DAILY
Qty: 90 TABLET | Refills: 1 | Status: SHIPPED | OUTPATIENT
Start: 2018-08-24 | End: 2019-09-10

## 2018-08-24 RX ORDER — FERROUS SULFATE 325(65) MG
325 TABLET ORAL
COMMUNITY
End: 2021-01-27

## 2018-08-24 RX ORDER — ATENOLOL 25 MG/1
25 TABLET ORAL DAILY
Qty: 90 TABLET | Refills: 3 | Status: SHIPPED | OUTPATIENT
Start: 2018-08-24 | End: 2019-09-10

## 2018-08-24 RX ORDER — CHLORTHALIDONE 25 MG/1
12.5 TABLET ORAL DAILY
Qty: 15 TABLET | Refills: 0 | Status: SHIPPED | OUTPATIENT
Start: 2018-08-24 | End: 2018-09-11 | Stop reason: SINTOL

## 2018-08-24 RX ORDER — LISINOPRIL 40 MG/1
40 TABLET ORAL DAILY
Qty: 90 TABLET | Refills: 3 | Status: SHIPPED | OUTPATIENT
Start: 2018-08-24 | End: 2019-08-27

## 2018-08-24 RX ORDER — BIOTIN 1 MG
1000 TABLET ORAL DAILY
COMMUNITY
End: 2020-05-19

## 2018-08-24 NOTE — LETTER
My Asthma Action Plan  Name: Miri Naylor   YOB: 1955  Date: 8/24/2018   My doctor: Elizabeth Corley, DO   My clinic: [facility]        My Control Medicine: Budesonide + formoterol (Symbicort) -  80/4.5 mcg 2 puffs twice daily  My Rescue Medicine: Albuterol (Proair/Ventolin/Proventil) inhaler 2 puffs every 4 hours as needed   My Asthma Severity: moderate persistent  Avoid your asthma triggers:   exercise or sports  Singing            GREEN ZONE   Good Control    I feel good    No cough or wheeze    Can work, sleep and play without asthma symptoms       Take your asthma control medicine every day.     1. If exercise triggers your asthma, take your rescue medication    15 minutes before exercise or sports, and    During exercise if you have asthma symptoms  2. Spacer to use with inhaler: If you have a spacer, make sure to use it with your inhaler             YELLOW ZONE Getting Worse  I have ANY of these:    I do not feel good    Cough or wheeze    Chest feels tight    Wake up at night   1. Keep taking your Green Zone medications  2. Start taking your rescue medicine:    every 20 minutes for up to 1 hour. Then every 4 hours for 24-48 hours.  3. If you stay in the Yellow Zone for more than 12-24 hours, contact your doctor.  4. If you do not return to the Green Zone in 12-24 hours or you get worse, start taking your oral steroid medicine if prescribed by your provider.           RED ZONE Medical Alert - Get Help  I have ANY of these:    I feel awful    Medicine is not helping    Breathing getting harder    Trouble walking or talking    Nose opens wide to breathe       1. Take your rescue medicine NOW  2. If your provider has prescribed an oral steroid medicine, start taking it NOW  3. Call your doctor NOW  4. If you are still in the Red Zone after 20 minutes and you have not reached your doctor:    Take your rescue medicine again and    Call 911 or go to the emergency room right  away    See your regular doctor within 2 weeks of an Emergency Room or Urgent Care visit for follow-up treatment.          Annual Reminders:  Meet with Asthma Educator,  Flu Shot in the Fall, consider Pneumonia Vaccination for patients with asthma (aged 19 and older).    Pharmacy:    [facility] - [place], [place] - [address] [facility] - [place], [place] - [address]                      Asthma Triggers  How To Control Things That Make Your Asthma Worse    Triggers are things that make your asthma worse.  Look at the list below to help you find your triggers and what you can do about them.  You can help prevent asthma flare-ups by staying away from your triggers.      Trigger                                                          What you can do   Cigarette Smoke  Tobacco smoke can make asthma worse. Do not allow smoking in your home, car or around you.  Be sure no one smokes at a child s day care or school.  If you smoke, ask your health care provider for ways to help you quit.  Ask family members to quit too.  Ask your health care provider for a referral to Quit Plan to help you quit smoking, or call 3-760-812-PLAN.     Colds, Flu, Bronchitis  These are common triggers of asthma. Wash your hands often.  Don t touch your eyes, nose or mouth.  Get a flu shot every year.     Dust Mites  These are tiny bugs that live in cloth or carpet. They are too small to see. Wash sheets and blankets in hot water every week.   Encase pillows and mattress in dust mite proof covers.  Avoid having carpet if you can. If you have carpet, vacuum weekly.   Use a dust mask and HEPA vacuum.   Pollen and Outdoor Mold  Some people are allergic to trees, grass, or weed pollen, or molds. Try to keep your windows closed.  Limit time out doors when pollen count is high.   Ask you health care provider about taking medicine during allergy season.     Animal Dander  Some people are  allergic to skin flakes, urine or saliva from pets with fur or feathers. Keep pets with fur or feathers out of your home.    If you can t keep the pet outdoors, then keep the pet out of your bedroom.  Keep the bedroom door closed.  Keep pets off cloth furniture and away from stuffed toys.     Mice, Rats, and Cockroaches  Some people are allergic to the waste from these pests.   Cover food and garbage.  Clean up spills and food crumbs.  Store grease in the refrigerator.   Keep food out of the bedroom.   Indoor Mold  This can be a trigger if your home has high moisture. Fix leaking faucets, pipes, or other sources of water.   Clean moldy surfaces.  Dehumidify basement if it is damp and smelly.   Smoke, Strong Odors, and Sprays  These can reduce air quality. Stay away from strong odors and sprays, such as perfume, powder, hair spray, paints, smoke incense, paint, cleaning products, candles and new carpet.   Exercise or Sports  Some people with asthma have this trigger. Be active!  Ask your doctor about taking medicine before sports or exercise to prevent symptoms.    Warm up for 5-10 minutes before and after sports or exercise.     Other Triggers of Asthma  Cold air:  Cover your nose and mouth with a scarf.  Sometimes laughing or crying can be a trigger.  Some medicines and food can trigger asthma.

## 2018-08-24 NOTE — PROGRESS NOTES
SUBJECTIVE:   CC: Miri Naylor is an 63 year old woman who presents for preventive health visit.     Healthy Habits:    Do you get at least three servings of calcium containing foods daily (dairy, green leafy vegetables, etc.)? yes    Amount of exercise or daily activities, outside of work: 5 day(s) per week    Problems taking medications regularly No    Medication side effects: No    Have you had an eye exam in the past two years? yes    Do you see a dentist twice per year? yes    Do you have sleep apnea, excessive snoring or daytime drowsiness? yes      Concerns:  * headaches   Feels like HA have been worse over the last year.  Seem to be related to weather.  Went to see a neurologist at [facility].    Had a sleep study, does not have sleep apnea.   Will be following up there again to discuss different medication options for her headaches.  Had CBD oil suggested by a family member, wondering about this    *  Saw ENT and started on omeprazole instead of protonix but feels it is not working as well for the gerd.  Following with allergy for the rhinitis.  Would liek to switch back to her protonix for improved control.     *  BP has been high. Often in the low 140's systolic.  Taking her medication regularly.  No CP, SOB or change in exercise tolerance.        Today's PHQ-2 Score:   PHQ-2 ( 1999 Pfizer) 8/24/2018 11/10/2017   Q1: Little interest or pleasure in doing things 0 1   Q2: Feeling down, depressed or hopeless 0 1   PHQ-2 Score 0 2       Abuse: Current or Past(Physical, Sexual or Emotional)- No  Do you feel safe in your environment - Yes    Social History   Substance Use Topics     Smoking status: Never Smoker     Smokeless tobacco: Never Used     Alcohol use Yes      Comment: one glass of wine daily     If you drink alcohol do you typically have >3 drinks per day or >7 drinks per week? No                     Reviewed orders with patient.  Reviewed health maintenance and updated orders accordingly -  Yes    Patient over age 50, mutual decision to screen reflected in health maintenance.    Pertinent mammograms are reviewed under the imaging tab.  History of abnormal Pap smear:   YES - updated in Problem List and Health Maintenance accordingly  Last 3 Pap Results:   PAP (no units)   Date Value   07/02/2015 NIL   12/03/2013 NIL   07/27/2012 NIL     PAP / HPV Latest Ref Rng & Units 7/2/2015 12/3/2013 7/27/2012   PAP - NIL NIL NIL   HPV 16 DNA NEG Negative - -   HPV 18 DNA NEG Negative - -   OTHER HR HPV NEG Negative - -     Reviewed and updated as needed this visit by clinical staff  Tobacco  Allergies  Meds  Med Hx  Surg Hx  Fam Hx  Soc Hx        Reviewed and updated as needed this visit by Provider        Past Medical History:   Diagnosis Date     Depressive disorder, not elsewhere classified      Drug allergy, multiple 9/23/14--passed graded oral challenge for Zithromax.     7/3/14 POS PRE-PEN skin test. 7/30/14 NEG skin tests and oral challenge to Cefzil     Hx of abnormal Pap smear ?    no specifics given     lumbar degeneration      Raynaud's disease      Unspecified essential hypertension       Past Surgical History:   Procedure Laterality Date     BIOPSY OF BREAST, NEEDLE CORE       C PELVIS/HIP JOINT SURGERY UNLISTED Right 7/19/16    total hip arthroplasty     COLONOSCOPY       HIP SURGERY Right 07/19/2016       ROS:  CONSTITUTIONAL: NEGATIVE for fever, chills, change in weight  INTEGUMENTARY/SKIN: NEGATIVE for worrisome rashes, moles or lesions  EYES: NEGATIVE for vision changes or irritation  ENT: as above, rhinitis followed by allergy  RESP: NEGATIVE for significant cough or SOB  BREAST: NEGATIVE for masses, tenderness or discharge  CV: NEGATIVE for chest pain, palpitations or peripheral edema  GI: as above, stop omeprazole and begin protonix  : NEGATIVE for unusual urinary or vaginal symptoms. No vaginal bleeding.  MUSCULOSKELETAL: NEGATIVE for significant arthralgias or myalgia  NEURO: NEGATIVE  for weakness, dizziness or paresthesias  PSYCHIATRIC: NEGATIVE for changes in mood or affect     OBJECTIVE:   There were no vitals taken for this visit.  EXAM:  GENERAL APPEARANCE: healthy, alert and no distress  EYES: Eyes grossly normal to inspection, PERRL and conjunctivae and sclerae normal  HENT: ear canals and TM's normal, nose and mouth without ulcers or lesions, oropharynx clear and oral mucous membranes moist  NECK: no adenopathy, no asymmetry, masses, or scars and thyroid normal to palpation  RESP: lungs clear to auscultation - no rales, rhonchi or wheezes  BREAST: normal without masses, tenderness or nipple discharge and no palpable axillary masses or adenopathy  CV: regular rate and rhythm, normal S1 S2, no S3 or S4, no murmur, click or rub, no peripheral edema and peripheral pulses strong  ABDOMEN: soft, nontender, no hepatosplenomegaly, no masses and bowel sounds normal   (female): normal female external genitalia, normal urethral meatus, vaginal mucosal atrophy noted, normal cervix, adnexae, and uterus without masses or abnormal discharge  MS: no musculoskeletal defects are noted and gait is age appropriate without ataxia  NEURO: Normal strength and tone, sensory exam grossly normal, mentation intact and speech normal  PSYCH: mentation appears normal and affect normal/bright    Diagnostic Test Results:  none     ASSESSMENT/PLAN:       ICD-10-CM    1. Encounter for gynecological examination without abnormal finding Z01.419    2. Hypertension goal BP (blood pressure) < 140/90 I10 chlorthalidone (HYGROTON) 25 MG tablet     Comprehensive metabolic panel     Basic metabolic panel     OFFICE/OUTPT VISIT,EST,LEVL III   3. Gastroesophageal reflux disease, esophagitis presence not specified K21.9 pantoprazole (PROTONIX) 40 MG EC tablet   4. Hyperlipidemia LDL goal <130 E78.5 Lipid panel reflex to direct LDL Fasting     Comprehensive metabolic panel   5. Major depression in complete remission (H) F32.5  "escitalopram (LEXAPRO) 10 MG tablet   6. Chronic daily headache R51 gabapentin (NEURONTIN) 300 MG capsule   7. Migraine without aura and without status migrainosus, not intractable G43.009 gabapentin (NEURONTIN) 300 MG capsule   8. History of traumatic brain injury Z87.820 gabapentin (NEURONTIN) 300 MG capsule   9. Screening for cervical cancer Z12.4 Pap imaged thin layer screen with HPV - recommended age 30 - 65 years (select HPV order below)   10. Screening for human papillomavirus Z11.51 HPV High Risk Types DNA Cervical     Plan addition of chlorthalidone for BP.  F/u 2 weeks for labs and BP check.  Reviewed possible side effects.    discontinue omeprazole, restart protonix for GERD>    Reviewed CBD oil, cannot recommend given lack of studies.  would advise following with neurology for headache management      COUNSELING:   Reviewed preventive health counseling, as reflected in patient instructions  Special attention given to:        Regular exercise       Healthy diet/nutrition       Osteoporosis Prevention/Bone Health       Colon cancer screening       (Tasia)menopause management    BP Readings from Last 1 Encounters:   08/15/18 129/81     Estimated body mass index is 28.87 kg/(m^2) as calculated from the following:    Height as of 2/9/18: 5' 11\" (1.803 m).    Weight as of 8/15/18: 207 lb (93.9 kg).      Weight management plan: Discussed healthy diet and exercise guidelines and patient will follow up in 12 months in clinic to re-evaluate.     reports that she has never smoked. She has never used smokeless tobacco.      Counseling Resources:  ATP IV Guidelines  Pooled Cohorts Equation Calculator  Breast Cancer Risk Calculator  FRAX Risk Assessment  ICSI Preventive Guidelines  Dietary Guidelines for Americans, 2010  USDA's MyPlate  ASA Prophylaxis  Lung CA Screening    Elizabeth Corley, DO  [facility]    Patient Instructions   We'll start the chlorthalidone for your blood pressure.  Please plan on " returning in 2 weeks for a blood pressure check and blood work.  No need to fast.    I'll let you know labs from today when available.    Enjoy the rest of your summer!    Preventive Health Recommendations  Female Ages 50 - 64    Yearly exam: See your health care provider every year in order to  o Review health changes.   o Discuss preventive care.    o Review your medicines if your doctor has prescribed any.      Get a Pap test every three years (unless you have an abnormal result and your provider advises testing more often).    If you get Pap tests with HPV test, you only need to test every 5 years, unless you have an abnormal result.     You do not need a Pap test if your uterus was removed (hysterectomy) and you have not had cancer.    You should be tested each year for STDs (sexually transmitted diseases) if you're at risk.     Have a mammogram every 1 to 2 years.    Have a colonoscopy at age 50, or have a yearly FIT test (stool test). These exams screen for colon cancer.      Have a cholesterol test every 5 years, or more often if advised.    Have a diabetes test (fasting glucose) every three years. If you are at risk for diabetes, you should have this test more often.     If you are at risk for osteoporosis (brittle bone disease), think about having a bone density scan (DEXA).    Shots: Get a flu shot each year. Get a tetanus shot every 10 years.    Nutrition:     Eat at least 5 servings of fruits and vegetables each day.    Eat whole-grain bread, whole-wheat pasta and brown rice instead of white grains and rice.    Get adequate Calcium and Vitamin D.     Lifestyle    Exercise at least 150 minutes a week (30 minutes a day, 5 days a week). This will help you control your weight and prevent disease.    Limit alcohol to one drink per day.    No smoking.     Wear sunscreen to prevent skin cancer.     See your dentist every six months for an exam and cleaning.    See your eye doctor every 1 to 2 years.

## 2018-08-24 NOTE — MR AVS SNAPSHOT
After Visit Summary   8/24/2018    Miri Naylor    MRN: [id number]           Patient Information     Date Of Birth          1955        Visit Information        Provider Department      8/24/2018 2:00 PM Elizabeth Corley DO [facility]        Today's Diagnoses     Encounter for gynecological examination without abnormal finding    -  1    Hyperlipidemia LDL goal <130        Gastroesophageal reflux disease, esophagitis presence not specified        Hypertension goal BP (blood pressure) < 140/90        Major depression in complete remission (H)        Essential hypertension with goal blood pressure less than 140/90        Chronic daily headache        Migraine without aura and without status migrainosus, not intractable        History of traumatic brain injury          Care Instructions    We'll start the chlorthalidone for your blood pressure.  Please plan on returning in 2 weeks for a blood pressure check and blood work.  No need to fast.    I'll let you know labs from today when available.    Enjoy the rest of your summer!    Preventive Health Recommendations  Female Ages 50 - 64    Yearly exam: See your health care provider every year in order to  o Review health changes.   o Discuss preventive care.    o Review your medicines if your doctor has prescribed any.      Get a Pap test every three years (unless you have an abnormal result and your provider advises testing more often).    If you get Pap tests with HPV test, you only need to test every 5 years, unless you have an abnormal result.     You do not need a Pap test if your uterus was removed (hysterectomy) and you have not had cancer.    You should be tested each year for STDs (sexually transmitted diseases) if you're at risk.     Have a mammogram every 1 to 2 years.    Have a colonoscopy at age 50, or have a yearly FIT test (stool test). These exams screen for colon cancer.      Have a cholesterol test every 5 years,  or more often if advised.    Have a diabetes test (fasting glucose) every three years. If you are at risk for diabetes, you should have this test more often.     If you are at risk for osteoporosis (brittle bone disease), think about having a bone density scan (DEXA).    Shots: Get a flu shot each year. Get a tetanus shot every 10 years.    Nutrition:     Eat at least 5 servings of fruits and vegetables each day.    Eat whole-grain bread, whole-wheat pasta and brown rice instead of white grains and rice.    Get adequate Calcium and Vitamin D.     Lifestyle    Exercise at least 150 minutes a week (30 minutes a day, 5 days a week). This will help you control your weight and prevent disease.    Limit alcohol to one drink per day.    No smoking.     Wear sunscreen to prevent skin cancer.     See your dentist every six months for an exam and cleaning.    See your eye doctor every 1 to 2 years.            Follow-ups after your visit        Your next 10 appointments already scheduled     Sep 24, 2018  6:40 PM CDT   [facility] Return with Nish Stockton,    [facility] ([facility])    [address] [address] [phone]              Future tests that were ordered for you today     Open Future Orders        Priority Expected Expires Ordered    Basic metabolic panel Routine 9/7/2018 8/24/2019 8/24/2018            Who to contact     Normal or non-critical lab and imaging results will be communicated to you by [employer], letter or phone within 4 business days after the clinic has received the results. If you do not hear from us within 7 days, please contact the clinic through [employer] or phone. If you have a critical or abnormal lab result, we will notify you by phone as soon as possible.  Submit refill requests through [employer] or call your pharmacy and they will forward the refill request to us. Please allow 3 business days for your refill to be completed.           "If you need to speak with a  for additional information , please call: [phone]           Additional Information About Your Visit        [employer] Information     [employer] gives you secure access to your electronic health record. If you see a primary care provider, you can also send messages to your care team and make appointments. If you have questions, please call your primary care clinic.  If you do not have a primary care provider, please call [phone] and they will assist you.        Care EveryWhere ID     This is your Care EveryWhere ID. This could be used by other organizations to access your [facility] medical records  [id number]        Your Vitals Were     Pulse Temperature Height Breastfeeding? BMI (Body Mass Index)       56 97.8  F (36.6  C) (Tympanic) 5' 11.1\" (1.806 m) No 28.54 kg/m2        Blood Pressure from Last 3 Encounters:   08/24/18 142/82   08/15/18 129/81   07/03/18 150/79    Weight from Last 3 Encounters:   08/24/18 205 lb 3.2 oz (93.1 kg)   08/15/18 207 lb (93.9 kg)   07/03/18 205 lb (93 kg)              We Performed the Following     Asthma Action Plan (AAP)     Comprehensive metabolic panel     DEPRESSION ACTION PLAN (DAP)     Lipid panel reflex to direct LDL Fasting          Today's Medication Changes          These changes are accurate as of 8/24/18  3:14 PM.  If you have any questions, ask your nurse or doctor.               Start taking these medicines.        Dose/Directions    chlorthalidone 25 MG tablet   Commonly known as:  HYGROTON   Used for:  Hypertension goal BP (blood pressure) < 140/90   Started by:  Elizabeth Corley DO        Dose:  12.5 mg   Take 0.5 tablets (12.5 mg) by mouth daily   Quantity:  15 tablet   Refills:  0       pantoprazole 40 MG EC tablet   Commonly known as:  PROTONIX   Used for:  Gastroesophageal reflux disease, esophagitis presence not specified   Started by:  Elizabeth Corley DO        Dose:  40 mg   Take 1 tablet (40 mg) by mouth " daily Take 30-60 minutes before a meal.   Quantity:  90 tablet   Refills:  3         These medicines have changed or have updated prescriptions.        Dose/Directions    atenolol 25 MG tablet   Commonly known as:  TENORMIN   This may have changed:  See the new instructions.   Used for:  Essential hypertension with goal blood pressure less than 140/90   Changed by:  Elizabeth Corley DO        Dose:  25 mg   Take 1 tablet (25 mg) by mouth daily   Quantity:  90 tablet   Refills:  3       escitalopram 10 MG tablet   Commonly known as:  LEXAPRO   This may have changed:    - medication strength  - See the new instructions.   Used for:  Major depression in complete remission (H)   Changed by:  Elizabeth Corley DO        Dose:  10 mg   Take 1 tablet (10 mg) by mouth daily   Quantity:  90 tablet   Refills:  1       hydrocortisone 2.5 % cream   This may have changed:    - when to take this  - reasons to take this   Used for:  Eyelid dermatitis, eczematous, unspecified laterality        Apply topically 2 times daily   Quantity:  60 g   Refills:  1       lisinopril 40 MG tablet   Commonly known as:  PRINIVIL/ZESTRIL   This may have changed:  See the new instructions.   Used for:  Essential hypertension with goal blood pressure less than 140/90   Changed by:  Elizabeth Corley DO        Dose:  40 mg   Take 1 tablet (40 mg) by mouth daily   Quantity:  90 tablet   Refills:  3       triamcinolone 0.1 % cream   Commonly known as:  KENALOG   This may have changed:    - when to take this  - reasons to take this   Used for:  Other atopic dermatitis        Apply topically 2 times daily   Quantity:  60 g   Refills:  3         Stop taking these medicines if you haven't already. Please contact your care team if you have questions.     omeprazole 40 MG capsule   Commonly known as:  priLOSEC   Stopped by:  Elizabeth Corley DO                Where to get your medicines      These medications were sent to [facility] -  [place], [place] - [address]  [address] [place] [address]     Phone:  [phone]     atenolol 25 MG tablet    escitalopram 10 MG tablet    gabapentin 300 MG capsule    lisinopril 40 MG tablet    pantoprazole 40 MG EC tablet         These medications were sent to [facility] [place] - [address]  [address], [place] [address]     Phone:  [phone]     chlorthalidone 25 MG tablet                Primary Care Provider Office Phone # Fax #    Elizabeth CorleyDO [phone] [address] [place] [address]        Equal Access to Services     [facility] MARILYN : Hadii aad ku hadasho Sofe, waaxda luqadaha, qaybta kaalmada adeegyada, bj cohen . So [facility] [phone].    ATENCIÓN: Si habla español, tiene a dao disposición servicios gratuitos de asistencia lingüística. [facility] [phone].    We comply with applicable federal civil rights laws and [place] laws. We do not discriminate on the basis of race, color, national origin, age, disability, sex, sexual orientation, or gender identity.            Thank you!     Thank you for choosing [facility]  for your care. Our goal is always to provide you with excellent care. Hearing back from our patients is one way we can continue to improve our services. Please take a few minutes to complete the written survey that you may receive in the mail after your visit with us. Thank you!             Your Updated Medication List - Protect others around you: Learn how to safely use, store and throw away your medicines at www.disposemymeds.org.          This list is accurate as of 8/24/18  3:14 PM.  Always use your most recent med list.                   Brand Name Dispense Instructions for use Diagnosis    Acidophilus Tabs      1 tablet daily        albuterol 108 (90 Base) MCG/ACT inhaler    PROAIR HFA    1 Inhaler    Inhale 2 puffs into the  lungs every 4 hours as needed for shortness of breath / dyspnea or wheezing    Moderate persistent asthma without complication       ascorbic acid 500 MG tablet    VITAMIN C     2 TABLET DAILY        atenolol 25 MG tablet    TENORMIN    90 tablet    Take 1 tablet (25 mg) by mouth daily    Essential hypertension with goal blood pressure less than 140/90       azelastine 0.1 % nasal spray    ASTELIN    1 Bottle    Spray 1-2 sprays into both nostrils 2 times daily    Rhinoconjunctivitis       biotin 1000 MCG Tabs tablet      Take 1,000 mcg by mouth daily        budesonide-formoterol 80-4.5 MCG/ACT Inhaler    SYMBICORT    1 Inhaler    Inhale 2 puffs into the lungs 2 times daily    Moderate persistent asthma without complication       CALCIUM 600+D PO      twice daily        chlorthalidone 25 MG tablet    HYGROTON    15 tablet    Take 0.5 tablets (12.5 mg) by mouth daily    Hypertension goal BP (blood pressure) < 140/90       DAILY MULTIVITAMIN PO      1 tablet daily        escitalopram 10 MG tablet    LEXAPRO    90 tablet    Take 1 tablet (10 mg) by mouth daily    Major depression in complete remission (H)       ferrous sulfate 325 (65 Fe) MG tablet    IRON     Take 325 mg by mouth daily (with breakfast)        flaxseed oil 1000 MG Caps      1 tablet daily        fluticasone 50 MCG/ACT spray    FLONASE    1 Bottle    Spray 1-2 sprays into both nostrils daily    Acute sinusitis with symptoms > 10 days       gabapentin 300 MG capsule    NEURONTIN    300 capsule    1 capsule TID with additional 1-2 capsules if headache is severe.    Chronic daily headache, Migraine without aura and without status migrainosus, not intractable, History of traumatic brain injury       Glucosamine-Chondroitin Tabs      1500 mg glucosamine and 1200mg chondroitin daily-2 tablets daily        hydrocortisone 2.5 % cream     60 g    Apply topically 2 times daily    Eyelid dermatitis, eczematous, unspecified laterality       ibuprofen 200 MG tablet     ADVIL/MOTRIN     take 1 tablet (200 mg) by oral route every 6 hours as needed with food        lisinopril 40 MG tablet    PRINIVIL/ZESTRIL    90 tablet    Take 1 tablet (40 mg) by mouth daily    Essential hypertension with goal blood pressure less than 140/90       Magnesium 400 MG Caps      Take by mouth daily        montelukast 10 MG tablet    SINGULAIR    90 tablet    Take 1 tablet (10 mg) by mouth At Bedtime    Moderate persistent asthma without complication       pantoprazole 40 MG EC tablet    PROTONIX    90 tablet    Take 1 tablet (40 mg) by mouth daily Take 30-60 minutes before a meal.    Gastroesophageal reflux disease, esophagitis presence not specified       potassium 99 MG Tabs      Take 4 tablets by mouth 2 times daily        pravastatin 40 MG tablet    PRAVACHOL    90 tablet    Take 1 tablet (40 mg) by mouth daily    Hyperlipidemia LDL goal <130       S-Adenosylmethionine 200 MG Tabs      2 tablets daily    Menopausal syndrome (hot flashes)       SUPER B COMPLEX Tabs      1 tablet daily        triamcinolone 0.1 % cream    KENALOG    60 g    Apply topically 2 times daily    Other atopic dermatitis       TURMERIC PO      Take 2,000 mg by mouth daily        vitamin B complex with vitamin C Tabs tablet      Take 3 tablets by mouth daily        VITAMIN D-3 PO      Take 2,000 Int'l Units by mouth daily        vitamin E 400 UNIT capsule   Generic drug:  vitamin E      Take one pill by mouth once daily        ZADITOR OP      Apply to eye daily as needed        ZYRTEC 10 MG tablet   Generic drug:  cetirizine      Take 10 mg by mouth daily.

## 2018-08-24 NOTE — PATIENT INSTRUCTIONS
We'll start the chlorthalidone for your blood pressure.  Please plan on returning in 2 weeks for a blood pressure check and blood work.  No need to fast.    I'll let you know labs from today when available.    Enjoy the rest of your summer!    Preventive Health Recommendations  Female Ages 50 - 64    Yearly exam: See your health care provider every year in order to  o Review health changes.   o Discuss preventive care.    o Review your medicines if your doctor has prescribed any.      Get a Pap test every three years (unless you have an abnormal result and your provider advises testing more often).    If you get Pap tests with HPV test, you only need to test every 5 years, unless you have an abnormal result.     You do not need a Pap test if your uterus was removed (hysterectomy) and you have not had cancer.    You should be tested each year for STDs (sexually transmitted diseases) if you're at risk.     Have a mammogram every 1 to 2 years.    Have a colonoscopy at age 50, or have a yearly FIT test (stool test). These exams screen for colon cancer.      Have a cholesterol test every 5 years, or more often if advised.    Have a diabetes test (fasting glucose) every three years. If you are at risk for diabetes, you should have this test more often.     If you are at risk for osteoporosis (brittle bone disease), think about having a bone density scan (DEXA).    Shots: Get a flu shot each year. Get a tetanus shot every 10 years.    Nutrition:     Eat at least 5 servings of fruits and vegetables each day.    Eat whole-grain bread, whole-wheat pasta and brown rice instead of white grains and rice.    Get adequate Calcium and Vitamin D.     Lifestyle    Exercise at least 150 minutes a week (30 minutes a day, 5 days a week). This will help you control your weight and prevent disease.    Limit alcohol to one drink per day.    No smoking.     Wear sunscreen to prevent skin cancer.     See your dentist every six months for an  exam and cleaning.    See your eye doctor every 1 to 2 years.

## 2018-08-24 NOTE — NURSING NOTE
"Initial /82  Pulse 56  Temp 97.8  F (36.6  C) (Tympanic)  Ht 5' 11.1\" (1.806 m)  Wt 205 lb 3.2 oz (93.1 kg)  Breastfeeding? No  BMI 28.54 kg/m2 Estimated body mass index is 28.54 kg/(m^2) as calculated from the following:    Height as of this encounter: 5' 11.1\" (1.806 m).    Weight as of this encounter: 205 lb 3.2 oz (93.1 kg). .      "

## 2018-08-25 ASSESSMENT — PATIENT HEALTH QUESTIONNAIRE - PHQ9: SUM OF ALL RESPONSES TO PHQ QUESTIONS 1-9: 3

## 2018-08-29 ENCOUNTER — MYC MEDICAL ADVICE (OUTPATIENT)
Dept: FAMILY MEDICINE | Facility: CLINIC | Age: 63
End: 2018-08-29

## 2018-08-29 DIAGNOSIS — E78.5 HYPERLIPIDEMIA LDL GOAL <130: ICD-10-CM

## 2018-08-29 LAB
COPATH REPORT: NORMAL
PAP: NORMAL

## 2018-08-29 NOTE — TELEPHONE ENCOUNTER
It looks like she also has protonix on her med list. I do see that the lipitor was discontinued. Jhoana Russell RN

## 2018-08-30 LAB
FINAL DIAGNOSIS: NORMAL
HPV HR 12 DNA CVX QL NAA+PROBE: NEGATIVE
HPV16 DNA SPEC QL NAA+PROBE: NEGATIVE
HPV18 DNA SPEC QL NAA+PROBE: NEGATIVE
SPECIMEN DESCRIPTION: NORMAL
SPECIMEN SOURCE CVX/VAG CYTO: NORMAL

## 2018-08-30 RX ORDER — PRAVASTATIN SODIUM 40 MG
40 TABLET ORAL DAILY
Qty: 90 TABLET | Refills: 3 | Status: SHIPPED | OUTPATIENT
Start: 2018-08-30 | End: 2019-08-25

## 2018-09-08 DIAGNOSIS — I10 HYPERTENSION GOAL BP (BLOOD PRESSURE) < 140/90: ICD-10-CM

## 2018-09-08 PROCEDURE — 36415 COLL VENOUS BLD VENIPUNCTURE: CPT | Performed by: FAMILY MEDICINE

## 2018-09-08 PROCEDURE — 80048 BASIC METABOLIC PNL TOTAL CA: CPT | Performed by: FAMILY MEDICINE

## 2018-09-10 ENCOUNTER — MYC MEDICAL ADVICE (OUTPATIENT)
Dept: FAMILY MEDICINE | Facility: CLINIC | Age: 63
End: 2018-09-10

## 2018-09-10 DIAGNOSIS — I10 HYPERTENSION GOAL BP (BLOOD PRESSURE) < 140/90: Primary | ICD-10-CM

## 2018-09-10 DIAGNOSIS — E87.1 HYPONATREMIA: Primary | ICD-10-CM

## 2018-09-10 LAB
ANION GAP SERPL CALCULATED.3IONS-SCNC: 9 MMOL/L (ref 3–14)
BUN SERPL-MCNC: 12 MG/DL (ref 7–30)
CALCIUM SERPL-MCNC: 9.2 MG/DL (ref 8.5–10.1)
CHLORIDE SERPL-SCNC: 95 MMOL/L (ref 94–109)
CO2 SERPL-SCNC: 26 MMOL/L (ref 20–32)
CREAT SERPL-MCNC: 0.82 MG/DL (ref 0.52–1.04)
GFR SERPL CREATININE-BSD FRML MDRD: 70 ML/MIN/1.7M2
GLUCOSE SERPL-MCNC: 86 MG/DL (ref 70–99)
POTASSIUM SERPL-SCNC: 4.1 MMOL/L (ref 3.4–5.3)
SODIUM SERPL-SCNC: 130 MMOL/L (ref 133–144)

## 2018-09-11 RX ORDER — AMLODIPINE BESYLATE 5 MG/1
5 TABLET ORAL DAILY
Qty: 30 TABLET | Refills: 0 | Status: SHIPPED | OUTPATIENT
Start: 2018-09-11 | End: 2019-04-23 | Stop reason: SINTOL

## 2018-09-13 ENCOUNTER — ALLIED HEALTH/NURSE VISIT (OUTPATIENT)
Dept: NURSING | Facility: CLINIC | Age: 63
End: 2018-09-13
Payer: COMMERCIAL

## 2018-09-13 VITALS — SYSTOLIC BLOOD PRESSURE: 108 MMHG | HEART RATE: 90 BPM | DIASTOLIC BLOOD PRESSURE: 68 MMHG

## 2018-09-13 DIAGNOSIS — Z23 NEED FOR PROPHYLACTIC VACCINATION AND INOCULATION AGAINST INFLUENZA: Primary | ICD-10-CM

## 2018-09-13 DIAGNOSIS — I10 ESSENTIAL HYPERTENSION: ICD-10-CM

## 2018-09-13 PROCEDURE — 90686 IIV4 VACC NO PRSV 0.5 ML IM: CPT

## 2018-09-13 PROCEDURE — 99207 ZZC NO CHARGE NURSE ONLY: CPT

## 2018-09-13 PROCEDURE — 90471 IMMUNIZATION ADMIN: CPT

## 2018-09-13 NOTE — MR AVS SNAPSHOT
After Visit Summary   9/13/2018    Miri Naylor    MRN: [id number]           Patient Information     Date Of Birth          1955        Visit Information        Provider Department      9/13/2018 10:40 AM [facility]        Today's Diagnoses     Need for prophylactic vaccination and inoculation against influenza    -  1    Essential hypertension           Follow-ups after your visit        Your next 10 appointments already scheduled     Sep 22, 2018  9:45 AM CDT   Lab visit with [facility] LAB   [facility] ([facility]    [address] [id number] [phone]           Please do not eat 10-12 hours before your appointment if you are coming in fasting for labs on lipids, cholesterol, or glucose (sugar). Does not apply to pregnant women.  Water with medications is okay. Do not drink coffee or other fluids.  If you have concerns about taking your medications, please send a message by clicking on Secure Messaging, Message Your Care Team.            Sep 24, 2018  6:40 PM CDT   [facility] Return with Nish Stockton,    [facility]    [address] [address] [phone]              Who to contact     If you have questions or need follow up information about today's clinic visit or your schedule please contact [facility] directly at [phone].  Normal or non-critical lab and imaging results will be communicated to you by [employer], letter or phone within 4 business days after the clinic has received the results. If you do not hear from us within 7 days, please contact the clinic through [employer] or phone. If you have a critical or abnormal lab result, we will notify you by phone as soon as possible.  Submit refill requests through [employer] or call your pharmacy and they will forward the refill request to us.  Please allow 3 business days for your refill to be completed.          Additional Information About Your Visit        MyChart Information     [employer] gives you secure access to your electronic health record. If you see a primary care provider, you can also send messages to your care team and make appointments. If you have questions, please call your primary care clinic.  If you do not have a primary care provider, please call [phone] and they will assist you.        Care EveryWhere ID     This is your Care EveryWhere ID. This could be used by other organizations to access your [place] medical records  [id number]        Your Vitals Were     Pulse                   90            Blood Pressure from Last 3 Encounters:   09/13/18 108/68   08/24/18 142/82   08/15/18 129/81    Weight from Last 3 Encounters:   08/24/18 205 lb 3.2 oz (93.1 kg)   08/15/18 207 lb (93.9 kg)   07/03/18 205 lb (93 kg)              We Performed the Following     FLU VACCINE, SPLIT VIRUS, IM (QUADRIVALENT) [40060]- >3 YRS     Vaccine Administration, Initial [55389]          Today's Medication Changes          These changes are accurate as of 9/13/18 12:05 PM.  If you have any questions, ask your nurse or doctor.               These medicines have changed or have updated prescriptions.        Dose/Directions    hydrocortisone 2.5 % cream   This may have changed:    - when to take this  - reasons to take this   Used for:  Eyelid dermatitis, eczematous, unspecified laterality        Apply topically 2 times daily   Quantity:  60 g   Refills:  1       triamcinolone 0.1 % cream   Commonly known as:  KENALOG   This may have changed:    - when to take this  - reasons to take this   Used for:  Other atopic dermatitis        Apply topically 2 times daily   Quantity:  60 g   Refills:  3                Primary Care Provider Office Phone # Fax #    Elizabeth Corley [phone] [address] [place] [address]        [facility]ly [facility]  Access to Services     [facility]: Hadii aad ku haddidieraleksandra Sanchezali, waestherda luqadaha, qaybta kaalmakiara adamssandrakiara, bj espinoza. So [facility] [phone].    ATENCIÓN: Si haleighla yas, tiene a dao disposición servicios gratuitos de asistencia lingüística. Llame al [phone].    We comply with applicable federal civil rights laws and [place] laws. We do not discriminate on the basis of race, color, national origin, age, disability, sex, sexual orientation, or gender identity.            Thank you!     Thank you for choosing [facility]  for your care. Our goal is always to provide you with excellent care. Hearing back from our patients is one way we can continue to improve our services. Please take a few minutes to complete the written survey that you may receive in the mail after your visit with us. Thank you!             Your Updated Medication List - Protect others around you: Learn how to safely use, store and throw away your medicines at www.disposemymeds.org.          This list is accurate as of 9/13/18 12:05 PM.  Always use your most recent med list.                   Brand Name Dispense Instructions for use Diagnosis    Acidophilus Tabs      1 tablet daily        albuterol 108 (90 Base) MCG/ACT inhaler    PROAIR HFA    1 Inhaler    Inhale 2 puffs into the lungs every 4 hours as needed for shortness of breath / dyspnea or wheezing    Moderate persistent asthma without complication       amLODIPine 5 MG tablet    NORVASC    30 tablet    Take 1 tablet (5 mg) by mouth daily    Hypertension goal BP (blood pressure) < 140/90       ascorbic acid 500 MG tablet    VITAMIN C     2 TABLET DAILY        atenolol 25 MG tablet    TENORMIN    90 tablet    Take 1 tablet (25 mg) by mouth daily        azelastine 0.1 % nasal spray    ASTELIN    1 Bottle    Spray 1-2 sprays into both nostrils 2 times daily    Rhinoconjunctivitis       biotin 1000 MCG Tabs tablet      Take 1,000 mcg  by mouth daily        budesonide-formoterol 80-4.5 MCG/ACT Inhaler    SYMBICORT    1 Inhaler    Inhale 2 puffs into the lungs 2 times daily    Moderate persistent asthma without complication       CALCIUM 600+D PO      twice daily        DAILY MULTIVITAMIN PO      1 tablet daily        escitalopram 10 MG tablet    LEXAPRO    90 tablet    Take 1 tablet (10 mg) by mouth daily    Major depression in complete remission (H)       ferrous sulfate 325 (65 Fe) MG tablet    IRON     Take 325 mg by mouth daily (with breakfast)        flaxseed oil 1000 MG Caps      1 tablet daily        fluticasone 50 MCG/ACT spray    FLONASE    1 Bottle    Spray 1-2 sprays into both nostrils daily    Acute sinusitis with symptoms > 10 days       gabapentin 300 MG capsule    NEURONTIN    300 capsule    1 capsule TID with additional 1-2 capsules if headache is severe.    Chronic daily headache, Migraine without aura and without status migrainosus, not intractable, History of traumatic brain injury       Glucosamine-Chondroitin Tabs      1500 mg glucosamine and 1200mg chondroitin daily-2 tablets daily        hydrocortisone 2.5 % cream     60 g    Apply topically 2 times daily    Eyelid dermatitis, eczematous, unspecified laterality       ibuprofen 200 MG tablet    ADVIL/MOTRIN     take 1 tablet (200 mg) by oral route every 6 hours as needed with food        lisinopril 40 MG tablet    PRINIVIL/ZESTRIL    90 tablet    Take 1 tablet (40 mg) by mouth daily        Magnesium 400 MG Caps      Take by mouth daily        montelukast 10 MG tablet    SINGULAIR    90 tablet    Take 1 tablet (10 mg) by mouth At Bedtime    Moderate persistent asthma without complication       pantoprazole 40 MG EC tablet    PROTONIX    90 tablet    Take 1 tablet (40 mg) by mouth daily Take 30-60 minutes before a meal.    Gastroesophageal reflux disease, esophagitis presence not specified       potassium 99 MG Tabs      Take 4 tablets by mouth 2 times daily        pravastatin  40 MG tablet    PRAVACHOL    90 tablet    Take 1 tablet (40 mg) by mouth daily    Hyperlipidemia LDL goal <130       S-Adenosylmethionine 200 MG Tabs      2 tablets daily    Menopausal syndrome (hot flashes)       SUPER B COMPLEX Tabs      1 tablet daily        triamcinolone 0.1 % cream    KENALOG    60 g    Apply topically 2 times daily    Other atopic dermatitis       TURMERIC PO      Take 2,000 mg by mouth daily        vitamin B complex with vitamin C Tabs tablet      Take 3 tablets by mouth daily        VITAMIN D-3 PO      Take 2,000 Int'l Units by mouth daily        vitamin E 400 UNIT capsule   Generic drug:  vitamin E      Take one pill by mouth once daily        ZADITOR OP      Apply to eye daily as needed        ZYRTEC 10 MG tablet   Generic drug:  cetirizine      Take 10 mg by mouth daily.

## 2018-09-13 NOTE — PROGRESS NOTES

## 2018-09-13 NOTE — NURSING NOTE
Ancillary BP check     HTN Guidelines:   Age 18-59 BP range: Less than 140/90   Age 60-85 with Diabetes: Less than 140/90   Age 60-85 without Diabetes: less than 150/90       Miri Naylor is a 63 year old female who comes in today for a Blood Pressure check.       Outpatient blood pressures are not being checked.     Low Salt Diet: no added salt     Problems taking medications regularly: No Symptoms: none      Medication side effects: none     BP goal: < 140/90mmHg (patient 18+ years of age with or without diabetes)   BP reading today: Passed   BP Readings from Last 1 Encounters:   08/24/18 142/82      A large cuff was used.   Pulse: 90     Vitals reviewed      If urgent criteria present from ancillary BP workflow, discussed with clinic RN or provider (elevated blood pressure and any of the following: dizziness, blurry vision, lightheadedness, severe shortness of breath, chest pain or visual disturbance): No     Plan: At goal follow up as directed by provider.     Completed by: CECILY Vegas MA     [facility]

## 2018-09-22 DIAGNOSIS — I10 ESSENTIAL HYPERTENSION: ICD-10-CM

## 2018-09-22 PROCEDURE — 80048 BASIC METABOLIC PNL TOTAL CA: CPT | Performed by: FAMILY MEDICINE

## 2018-09-22 PROCEDURE — 36415 COLL VENOUS BLD VENIPUNCTURE: CPT | Performed by: FAMILY MEDICINE

## 2018-09-24 LAB
ANION GAP SERPL CALCULATED.3IONS-SCNC: 7 MMOL/L (ref 3–14)
BUN SERPL-MCNC: 12 MG/DL (ref 7–30)
CALCIUM SERPL-MCNC: 8.9 MG/DL (ref 8.5–10.1)
CHLORIDE SERPL-SCNC: 103 MMOL/L (ref 94–109)
CO2 SERPL-SCNC: 27 MMOL/L (ref 20–32)
CREAT SERPL-MCNC: 0.79 MG/DL (ref 0.52–1.04)
GFR SERPL CREATININE-BSD FRML MDRD: 74 ML/MIN/1.7M2
GLUCOSE SERPL-MCNC: 91 MG/DL (ref 70–99)
POTASSIUM SERPL-SCNC: 4.1 MMOL/L (ref 3.4–5.3)
SODIUM SERPL-SCNC: 137 MMOL/L (ref 133–144)

## 2018-09-25 ENCOUNTER — TELEPHONE (OUTPATIENT)
Dept: ALLERGY | Facility: CLINIC | Age: 63
End: 2018-09-25

## 2018-09-25 DIAGNOSIS — J45.40 MODERATE PERSISTENT ASTHMA WITHOUT COMPLICATION: ICD-10-CM

## 2018-09-25 RX ORDER — BUDESONIDE AND FORMOTEROL FUMARATE DIHYDRATE 80; 4.5 UG/1; UG/1
2 AEROSOL RESPIRATORY (INHALATION) 2 TIMES DAILY
Qty: 1 INHALER | Refills: 0 | Status: SHIPPED | OUTPATIENT
Start: 2018-09-25 | End: 2018-10-15

## 2018-09-25 NOTE — TELEPHONE ENCOUNTER
Patient is calling asking for a refill on Rx budesonide-formoterol (SYMBICORT) 80-4.5 MCG/ACT Inhaler  Patient is going to be out with in two days.   Please call to advise  Thank you

## 2018-09-25 NOTE — TELEPHONE ENCOUNTER
Signed Prescriptions:                        Disp   Refills    budesonide-formoterol (SYMBICORT) 80-4.5 M*1 Inha*0        Sig: Inhale 2 puffs into the lungs 2 times daily  Authorizing Provider: DEANGELO PIZARRO  Ordering User: ANGELO RAMIRES RN refilled medication per [facility] Refill Protocol.  Left message for patient regarding same.    Angelo Ramires RN

## 2018-10-06 DIAGNOSIS — E87.1 HYPONATREMIA: ICD-10-CM

## 2018-10-06 PROCEDURE — 36415 COLL VENOUS BLD VENIPUNCTURE: CPT | Performed by: FAMILY MEDICINE

## 2018-10-06 PROCEDURE — 80048 BASIC METABOLIC PNL TOTAL CA: CPT | Performed by: FAMILY MEDICINE

## 2018-10-08 ENCOUNTER — MYC MEDICAL ADVICE (OUTPATIENT)
Dept: FAMILY MEDICINE | Facility: CLINIC | Age: 63
End: 2018-10-08

## 2018-10-08 DIAGNOSIS — I10 ESSENTIAL HYPERTENSION: ICD-10-CM

## 2018-10-08 LAB
ANION GAP SERPL CALCULATED.3IONS-SCNC: 8 MMOL/L (ref 3–14)
BUN SERPL-MCNC: 10 MG/DL (ref 7–30)
CALCIUM SERPL-MCNC: 8.8 MG/DL (ref 8.5–10.1)
CHLORIDE SERPL-SCNC: 99 MMOL/L (ref 94–109)
CO2 SERPL-SCNC: 27 MMOL/L (ref 20–32)
CREAT SERPL-MCNC: 0.84 MG/DL (ref 0.52–1.04)
GFR SERPL CREATININE-BSD FRML MDRD: 69 ML/MIN/1.7M2
GLUCOSE SERPL-MCNC: 109 MG/DL (ref 70–99)
POTASSIUM SERPL-SCNC: 4.3 MMOL/L (ref 3.4–5.3)
SODIUM SERPL-SCNC: 134 MMOL/L (ref 133–144)

## 2018-10-09 RX ORDER — HYDROCHLOROTHIAZIDE 12.5 MG/1
12.5 TABLET ORAL DAILY
Qty: 90 TABLET | Refills: 0 | Status: SHIPPED | OUTPATIENT
Start: 2018-10-09 | End: 2018-10-22

## 2018-10-09 NOTE — TELEPHONE ENCOUNTER
It looks like hydrochlorothiazide was started on 9/16/18.  Her last 2 chemistries were normal.  Malia ASHLEY/ROSY

## 2018-10-15 ENCOUNTER — OFFICE VISIT (OUTPATIENT)
Dept: ALLERGY | Facility: CLINIC | Age: 63
End: 2018-10-15
Payer: COMMERCIAL

## 2018-10-15 VITALS
SYSTOLIC BLOOD PRESSURE: 148 MMHG | WEIGHT: 210.4 LBS | HEART RATE: 50 BPM | OXYGEN SATURATION: 98 % | TEMPERATURE: 97.4 F | BODY MASS INDEX: 29.26 KG/M2 | DIASTOLIC BLOOD PRESSURE: 84 MMHG

## 2018-10-15 DIAGNOSIS — K21.9 GASTROESOPHAGEAL REFLUX DISEASE, ESOPHAGITIS PRESENCE NOT SPECIFIED: ICD-10-CM

## 2018-10-15 DIAGNOSIS — H10.9 RHINOCONJUNCTIVITIS: ICD-10-CM

## 2018-10-15 DIAGNOSIS — I10 HYPERTENSION GOAL BP (BLOOD PRESSURE) < 140/90: ICD-10-CM

## 2018-10-15 DIAGNOSIS — J31.0 RHINOCONJUNCTIVITIS: ICD-10-CM

## 2018-10-15 DIAGNOSIS — J45.40 MODERATE PERSISTENT ASTHMA WITHOUT COMPLICATION: Primary | ICD-10-CM

## 2018-10-15 LAB
FEF 25/75: NORMAL
FEV-1: NORMAL
FEV1/FVC: NORMAL
FVC: NORMAL

## 2018-10-15 PROCEDURE — 94010 BREATHING CAPACITY TEST: CPT | Performed by: ALLERGY & IMMUNOLOGY

## 2018-10-15 PROCEDURE — 99214 OFFICE O/P EST MOD 30 MIN: CPT | Mod: 25 | Performed by: ALLERGY & IMMUNOLOGY

## 2018-10-15 RX ORDER — IPRATROPIUM BROMIDE 42 UG/1
2 SPRAY, METERED NASAL 4 TIMES DAILY PRN
Qty: 1 BOX | Refills: 3 | Status: SHIPPED | OUTPATIENT
Start: 2018-10-15 | End: 2019-09-10 | Stop reason: ALTCHOICE

## 2018-10-15 NOTE — LETTER
My Asthma Action Plan  Name: Miri Naylor   YOB: 1955  Date: 10/15/2018   My doctor: Nish Stockton, DO   My clinic: [facility]        My Control Medicine: Beclomethasone (QVar) -  80 mcg 2 puffs twice daily  My Rescue Medicine:   Albuterol 2-4 puffs inhaled (use a spacer unless using a Proair Respiclick device) every 4 hours as needed for chest tightness, wheezing, shortness of breath and/or coughing.     Albuterol 2-4 puffs inhaled (use spacer if not using Proair Respiclick device) 15-20 minutes prior to physical activity.     Please ensure warm up period prior to exercise.      My Asthma Severity: moderate persistent  Avoid your asthma triggers:   exercise or sports  Singing            GREEN ZONE   Good Control    I feel good    No cough or wheeze    Can work, sleep and play without asthma symptoms       Take your asthma control medicine every day.     1. If exercise triggers your asthma, take your rescue medication    15 minutes before exercise or sports, and    During exercise if you have asthma symptoms  2. Spacer to use with inhaler: If you have a spacer, make sure to use it with your inhaler             YELLOW ZONE Getting Worse  I have ANY of these:    I do not feel good    Cough or wheeze    Chest feels tight    Wake up at night   1. Keep taking your Green Zone medications  2. Start taking your rescue medicine:    every 20 minutes for up to 1 hour. Then every 4 hours for 24-48 hours.  3. If you stay in the Yellow Zone for more than 12-24 hours, contact your doctor.  4. If you do not return to the Green Zone in 12-24 hours or you get worse, start taking your oral steroid medicine if prescribed by your provider.           RED ZONE Medical Alert - Get Help  I have ANY of these:    I feel awful    Medicine is not helping    Breathing getting harder    Trouble walking or talking    Nose opens wide to breathe       1. Take your rescue medicine NOW  2. If your provider has  prescribed an oral steroid medicine, start taking it NOW  3. Call your doctor NOW  4. If you are still in the Red Zone after 20 minutes and you have not reached your doctor:    Take your rescue medicine again and    Call 911 or go to the emergency room right away    See your regular doctor within 2 weeks of an Emergency Room or Urgent Care visit for follow-up treatment.          Annual Reminders:  Meet with Asthma Educator,  Flu Shot in the Fall, consider Pneumonia Vaccination for patients with asthma (aged 19 and older).    Pharmacy:    [facility] - [place], [place] - [address] [facility] - [place], [place] - [address]                      Asthma Triggers  How To Control Things That Make Your Asthma Worse    Triggers are things that make your asthma worse.  Look at the list below to help you find your triggers and what you can do about them.  You can help prevent asthma flare-ups by staying away from your triggers.      Trigger                                                          What you can do   Cigarette Smoke  Tobacco smoke can make asthma worse. Do not allow smoking in your home, car or around you.  Be sure no one smokes at a child s day care or school.  If you smoke, ask your health care provider for ways to help you quit.  Ask family members to quit too.  Ask your health care provider for a referral to Quit Plan to help you quit smoking, or call 8-191-850-PLAN.     Colds, Flu, Bronchitis  These are common triggers of asthma. Wash your hands often.  Don t touch your eyes, nose or mouth.  Get a flu shot every year.     Dust Mites  These are tiny bugs that live in cloth or carpet. They are too small to see. Wash sheets and blankets in hot water every week.   Encase pillows and mattress in dust mite proof covers.  Avoid having carpet if you can. If you have carpet, vacuum weekly.   Use a dust mask and HEPA vacuum.   Pollen and Outdoor Mold  Some  people are allergic to trees, grass, or weed pollen, or molds. Try to keep your windows closed.  Limit time out doors when pollen count is high.   Ask you health care provider about taking medicine during allergy season.     Animal Dander  Some people are allergic to skin flakes, urine or saliva from pets with fur or feathers. Keep pets with fur or feathers out of your home.    If you can t keep the pet outdoors, then keep the pet out of your bedroom.  Keep the bedroom door closed.  Keep pets off cloth furniture and away from stuffed toys.     Mice, Rats, and Cockroaches  Some people are allergic to the waste from these pests.   Cover food and garbage.  Clean up spills and food crumbs.  Store grease in the refrigerator.   Keep food out of the bedroom.   Indoor Mold  This can be a trigger if your home has high moisture. Fix leaking faucets, pipes, or other sources of water.   Clean moldy surfaces.  Dehumidify basement if it is damp and smelly.   Smoke, Strong Odors, and Sprays  These can reduce air quality. Stay away from strong odors and sprays, such as perfume, powder, hair spray, paints, smoke incense, paint, cleaning products, candles and new carpet.   Exercise or Sports  Some people with asthma have this trigger. Be active!  Ask your doctor about taking medicine before sports or exercise to prevent symptoms.    Warm up for 5-10 minutes before and after sports or exercise.     Other Triggers of Asthma  Cold air:  Cover your nose and mouth with a scarf.  Sometimes laughing or crying can be a trigger.  Some medicines and food can trigger asthma.

## 2018-10-15 NOTE — LETTER
10/15/2018         RE: Miri Naylor  [address]  [place] [address]        Dear Colleague,    Thank you for referring your patient, Miri Naylor, to the [facility]. Please see a copy of my visit note below.    Miri Naylor is a 63 year old White female with previous medical history significant for nonallergic rhinitis and moderate persistent asthma who returns for a follow up visit.     The patient returns for follow-up.  At last visit we decreased her Symbicort to 80/4.5 mcg 2 puffs inhaled twice daily.  She reports that 1-2 times per week she will have exertional shortness of breath.  Occasionally at night she will have wheezing.  She notices approximately 1 night per week.  Otherwise she is doing well from an asthma perspective.  She denies coughing or tightness in chest.  No interval prednisone use, ER visits or hospitalizations.  As you may recall patient has had negative allergy testing.  She has been on Flonase 2 sprays per nostril daily and Astelin 1 spray per nostril twice daily.  She complains of rhinorrhea with minimal postnasal drainage and congestion.  No use of Atrovent nasal  sprays.  Intervally she was seen by ENT.  She is otherwise on montelukast 10 mg by mouth daily.       ACT Total Scores 10/15/2018   ACT TOTAL SCORE (Goal Greater than or Equal to 20) 23   In the past 12 months, how many times did you visit the emergency room for your asthma without being admitted to the hospital? 0   In the past 12 months, how many times were you hospitalized overnight because of your asthma? 0             Past Medical History:   Diagnosis Date     Depressive disorder, not elsewhere classified      Drug allergy, multiple 9/23/14--passed graded oral challenge for Zithromax.     7/3/14 POS PRE-PEN skin test. 7/30/14 NEG skin tests and oral challenge to Cefzil     Hx of abnormal Pap smear ?    no specifics given     lumbar degeneration      Raynaud's disease       Unspecified essential hypertension      Family History   Problem Relation Age of Onset     Arthritis Mother      Hyperlipidemia Mother      Other Cancer Mother      Glioblastoma Multiforme     Osteoporosis Mother      Blood Disease Father      Hypertension Father      Hyperlipidemia Father      Other Cancer Father      lung cancer     Depression Son      Depression Daughter      Hyperlipidemia Brother      Anxiety Disorder Son      Skin Cancer No family hx of      Past Surgical History:   Procedure Laterality Date     BIOPSY OF BREAST, NEEDLE CORE       C PELVIS/HIP JOINT SURGERY UNLISTED Right 7/19/16    total hip arthroplasty     COLONOSCOPY       HIP SURGERY Right 07/19/2016       REVIEW OF SYSTEMS:  General: positive  for weight gain. negative for weight loss. negative for changes in sleep.   Ears: negative for fullness. negative for hearing loss. negative for dizziness.   Nose: positive  for snoring.negative for changes in smell. negative for drainage.   Throat: positive for hoarseness. negative for sore throat. negative for trouble swallowing.   Lungs: positive  for shortness of breath.negative for wheezing. negative for sputum production.   Cardiovascular: negative for chest pain. positive  for swelling of ankles. negative for fast or irregular heartbeat.   Gastrointestinal: positive for nausea. positive  for heartburn. negative for acid reflux.   Musculoskeletal: negative for joint pain. negative for joint stiffness. negative for joint swelling.   Neurologic: negative for seizures. negative for fainting. negative for weakness.   Psychiatric: negative for changes in mood. negative for anxiety.   Endocrine: negative for cold intolerance. positive  for heat intolerance. negative for tremors.   Hematologic: negative for easy bruising. negative for easy bleeding.  Integumentary: positive  for rash. negative for scaling. negative for nail changes.       Current Outpatient Prescriptions:      beclomethasone HFA  (QVAR REDIHALER) 80 MCG/ACT inhaler, Inhale 2 puffs into the lungs 2 times daily, Disp: 1 Inhaler, Rfl: 3     ipratropium (ATROVENT) 0.06 % spray, Spray 2 sprays into both nostrils 4 times daily as needed for rhinitis, Disp: 1 Box, Rfl: 3     ACIDOPHILUS OR TABS, 1 tablet daily, Disp: , Rfl:      albuterol (ALBUTEROL) 108 (90 BASE) MCG/ACT Inhaler, Inhale 2 puffs into the lungs every 4 hours as needed for shortness of breath / dyspnea or wheezing, Disp: 1 Inhaler, Rfl: 1     amLODIPine (NORVASC) 5 MG tablet, Take 1 tablet (5 mg) by mouth daily, Disp: 30 tablet, Rfl: 0     atenolol (TENORMIN) 25 MG tablet, Take 1 tablet (25 mg) by mouth daily, Disp: 90 tablet, Rfl: 3     biotin 1000 MCG TABS tablet, Take 1,000 mcg by mouth daily, Disp: , Rfl:      CALCIUM 600+D PO, twice daily, Disp: , Rfl:      cetirizine (ZYRTEC) 10 MG tablet, Take 10 mg by mouth daily., Disp: , Rfl:      Cholecalciferol (VITAMIN D-3 PO), Take 2,000 Int'l Units by mouth daily, Disp: , Rfl:      DAILY MULTIVITAMIN PO, 1 tablet daily, Disp: , Rfl:      escitalopram (LEXAPRO) 10 MG tablet, Take 1 tablet (10 mg) by mouth daily, Disp: 90 tablet, Rfl: 1     ferrous sulfate (IRON) 325 (65 Fe) MG tablet, Take 325 mg by mouth daily (with breakfast), Disp: , Rfl:      FLAXSEED OIL 1000 MG PO CAPS, 1 tablet daily, Disp: , Rfl:      fluticasone (FLONASE) 50 MCG/ACT spray, Spray 1-2 sprays into both nostrils daily, Disp: 1 Bottle, Rfl: 11     gabapentin (NEURONTIN) 300 MG capsule, 1 capsule TID with additional 1-2 capsules if headache is severe., Disp: 300 capsule, Rfl: 1     GLUCOSAMINE-CHONDROITIN PO TABS, 1500 mg glucosamine and 1200mg chondroitin daily-2 tablets daily, Disp: , Rfl:      hydrochlorothiazide 12.5 MG TABS tablet, Take 1 tablet (12.5 mg) by mouth daily, Disp: 90 tablet, Rfl: 0     hydrocortisone 2.5 % cream, Apply topically 2 times daily (Patient taking differently: Apply topically 2 times daily as needed ), Disp: 60 g, Rfl: 1     ibuprofen  (ADVIL,MOTRIN) 200 MG tablet, take 1 tablet (200 mg) by oral route every 6 hours as needed with food, Disp: , Rfl:      Ketotifen Fumarate (ZADITOR OP), Apply to eye daily as needed, Disp: , Rfl:      lisinopril (PRINIVIL/ZESTRIL) 40 MG tablet, Take 1 tablet (40 mg) by mouth daily, Disp: 90 tablet, Rfl: 3     Magnesium 400 MG CAPS, Take by mouth daily, Disp: , Rfl:      montelukast (SINGULAIR) 10 MG tablet, Take 1 tablet (10 mg) by mouth At Bedtime, Disp: 90 tablet, Rfl: 1     pantoprazole (PROTONIX) 40 MG EC tablet, Take 1 tablet (40 mg) by mouth daily Take 30-60 minutes before a meal., Disp: 90 tablet, Rfl: 3     potassium 99 MG TABS, Take 4 tablets by mouth 2 times daily , Disp: , Rfl:      pravastatin (PRAVACHOL) 40 MG tablet, Take 1 tablet (40 mg) by mouth daily, Disp: 90 tablet, Rfl: 3     S-ADENOSYLMETHIONINE 200 MG PO TABS, 2 tablets daily, Disp: , Rfl:      SUPER B COMPLEX PO TABS, 1 tablet daily, Disp: , Rfl:      triamcinolone (KENALOG) 0.1 % cream, Apply topically 2 times daily (Patient taking differently: Apply topically 2 times daily as needed ), Disp: 60 g, Rfl: 3     TURMERIC PO, Take 2,000 mg by mouth daily, Disp: , Rfl:      vitamin B complex with vitamin C (VITAMIN  B COMPLEX) TABS tablet, Take 3 tablets by mouth daily, Disp: , Rfl:      VITAMIN C 500 MG PO TABS, 2 TABLET DAILY, Disp: , Rfl:      vitamin E (E-400) 400 UNIT capsule, Take one pill by mouth once daily, Disp: , Rfl:   Immunization History   Administered Date(s) Administered     Influenza Vaccine IM 3yrs+ 4 Valent IIV4 12/03/2013, 11/16/2015, 10/01/2017, 09/13/2018     TD (ADULT, 7+) 03/06/2008     TDAP Vaccine (Adacel) 08/03/2012     Allergies   Allergen Reactions     Levofloxacin Rash     Other reaction(s): Contact Dermatitis     Penicillins Hives and Rash     Confirmed by skin prick testing 7/3/14     Amoxicillin Hives and Rash     Amoxicillin-Pot Clavulanate Rash     Azithromycin Rash     Cephalexin Rash     Clindamycin Rash and  Hives     Atorvastatin Other (See Comments)     Felt sick, intolerance     Clindamycin Hcl Hives     Levaquin      tendonitis     Augmentin Rash         EXAM:   Constitutional:  Appears well-developed and well-nourished. No distress.   HEENT:   Head: Normocephalic.   Mouth/Throat: No oropharyngeal exudate present.   No cobblestoning of posterior oropharynx.   Nasal tissue pink and normal appearing.  No rhinorrhea noted.    Eyes: Conjunctivae are non-erythematous   Cardiovascular: Normal rate, regular rhythm and normal heart sounds. Exam reveals no gallop and no friction rub.   No murmur heard.  Respiratory: Effort normal and breath sounds normal. No respiratory distress. No wheezes. No rales.   Musculoskeletal: Normal range of motion.   Neuro: Oriented to person, place, and time.  Skin: Skin is warm and dry. No rash noted.   Psychiatric: Normal mood and affect.     Nursing note and vitals reviewed.      WORKUP:   Spirometry  FVC % pred:80  FEV1 % pred:73  FEV1/FVC % act:70  FEF 25-75% pred:68    Mild obstruction.     ASSESSMENT/PLAN:  Problem List Items Addressed This Visit        Respiratory    Moderate persistent asthma without complication - Primary     Well controlled. Symptoms flare with URI and cold air.  Symbicort 80/4.5mcg 2 puffs inhaled twice daily with a spacer. Singulair 10mg PO daily      Spirometry today with an FEV1 of 73%. Stable to prior visit.   ACT 23      - Albuterol 2-4 puffs inhaled (use a spacer unless using a Proair Respiclick device) every 4 hours as needed for chest tightness, wheezing, shortness of breath and/or coughing.   - Albuterol 2-4 puffs inhaled (use spacer if not using Proair Respiclick device) 15-20 minutes prior to physical activity.    - Please ensure warm up period prior to exercise.    - Avoid asthma triggers.  -Stop Symbicort 80/4.5mcg 2 puffs inhaled twice daily with a spacer.   -Qvar 80mcg 2 puffs inhaled twice daily  - Singulair 10mg by mouth daily at night.          Relevant Medications    beclomethasone HFA (QVAR REDIHALER) 80 MCG/ACT inhaler    ipratropium (ATROVENT) 0.06 % spray    Other Relevant Orders    Spirometry, Breathing Capacity (Completed)       Digestive    GERD (gastroesophageal reflux disease)     On Protonix.  She will continue.            Circulatory    Hypertension goal BP (blood pressure) < 140/90     Patient to follow up with Primary Care provider regarding elevated blood pressure.              Infectious/Inflammatory    Rhinoconjunctivitis     History of perennial nasal and ocular symptoms that get worse in the spring and fall. Current symptoms are rhinorrhea. Symptoms increase around cats.  She is on Flonase and Astelin.  She is also on Singulair.  Either vasomotor rhinitis versus local allergic rhinitis. Follows with ENT.       Allergy skin testing was negative.      - Continue Flonase 2 sprays per nostril daily.  - Trial of ipratropium 2 sprays/nostril four times daily as needed.   - Stop azelastine 2 sprays/nostril twice daily as needed for now while trying ipratropium. If ipratropium not helpful or symptoms worsen would resume.   - Zyrtec as needed.   - Singulair 10mg by mouth daily at night.            Relevant Medications    ipratropium (ATROVENT) 0.06 % spray          Chart documentation with Dragon Voice recognition Software. Although reviewed after completion, some words and grammatical errors may remain.    Nish Stockton,    Allergy/Immunology  [facility] [place] CHIARA [place]        Again, thank you for allowing me to participate in the care of your patient.        Sincerely,        Nish Stockton, DO

## 2018-10-15 NOTE — MR AVS SNAPSHOT
After Visit Summary   10/15/2018    Miri Naylor    MRN: [id number]           Patient Information     Date Of Birth          1955        Visit Information        Provider Department      10/15/2018 6:40 PM Nish Stockton DO [facility]        Today's Diagnoses     Moderate persistent asthma without complication    -  1    Rhinoconjunctivitis          Care Instructions    Allergy Staff Appt Hours Shot Hours Locations    Physician     Nish Stockton DO       Support Staff     Kerri CHAUDHRY RN      Jackie CHAUDHRY, [facility]  Monday:                      [place] 8-7     Tuesday:         [place] 8-5     Wednesday:        [place]: 7-5     Friday:        [place] 7-5   [place]        Monday: 9-5:50        Wednesday: 2-5:50        Friday: 7-12:50     [place]        Tuesday: 7-10:50        Thursday: 1:30-6:30     [place] Monday: 7:10-4:50        Tuesday: 12:30-6:30        Thursday: 7-11:50 [facility]  [address] [place] [address]  Appt Line: [phone]  Allergy RN (Monday):  [phone]    [facility]  [address] [place] [address]  Appt Line: [phone]  Allergy RN (Tues & Wed):  [phone]    [facility]  [address] [place] [address]  Appt Line: [phone]  Allergy RN (Friday):  [phone]       Important Scheduling Information  Aspirin Desensitization: Appt will last 2 clinic days. Please call the Allergy RN line for your clinic to schedule. Discontinue antihistamines 7 days prior to the appointment.     Food Challenges: Appt will last 3-4 hours. Please call the Allergy RN line for your clinic to schedule. Discontinue antihistamines 7 days prior to the appointment.     Penicillin Testing: Appt will last 2-3 hours. Please call the Allergy RN line for your clinic to schedule. Discontinue antihistamines 7 days prior to the appointment.     Skin Testing: Appt will about 40 minutes. Call the appointment line for your clinic  to schedule. Discontinue antihistamines 7 days prior to the appointment.     Venom Testing: Appt will last 2-3 hours. Please call the Allergy RN line for your clinic to schedule. Discontinue antihistamines 7 days prior to the appointment.     Thank you for trusting us with your Allergy, Asthma, and Immunology care. Please feel free to contact us with any questions or concerns you may have.      - Stop Symbicort.   - Qvar 80mcg 2 puffs inhaled twice daily  - Albuterol 2-4 puffs inhaled (use a spacer unless using a Proair Respiclick device) every 4 hours as needed for chest tightness, wheezing, shortness of breath and/or coughing.   - Flonase 2 sprays/nostril daily.   - Ipratropium 2 sprays/nostril four times daily as needed.   - Return in 3 months.           Follow-ups after your visit        Follow-up notes from your care team     Return in about 3 months (around 1/15/2019).      Who to contact     If you have questions or need follow up information about today's clinic visit or your schedule please contact [facility] directly at [phone].  Normal or non-critical lab and imaging results will be communicated to you by [employer], letter or phone within 4 business days after the clinic has received the results. If you do not hear from us within 7 days, please contact the clinic through [employer] or phone. If you have a critical or abnormal lab result, we will notify you by phone as soon as possible.  Submit refill requests through [employer] or call your pharmacy and they will forward the refill request to us. Please allow 3 business days for your refill to be completed.          Additional Information About Your Visit        MyChart Information     [employer] gives you secure access to your electronic health record. If you see a primary care provider, you can also send messages to your care team and make appointments. If you have questions, please call your primary care clinic.  If you do not have a primary  care provider, please call [phone] and they will assist you.        Care EveryWhere ID     This is your Care EveryWhere ID. This could be used by other organizations to access your [facility] medical records  [id number]        Your Vitals Were     Pulse Temperature Pulse Oximetry BMI (Body Mass Index)          50 97.4  F (36.3  C) (Oral) 98% 29.26 kg/m2         Blood Pressure from Last 3 Encounters:   10/15/18 148/84   09/13/18 108/68   08/24/18 142/82    Weight from Last 3 Encounters:   10/15/18 95.4 kg (210 lb 6.4 oz)   08/24/18 93.1 kg (205 lb 3.2 oz)   08/15/18 93.9 kg (207 lb)              We Performed the Following     Spirometry, Breathing Capacity          Today's Medication Changes          These changes are accurate as of 10/15/18  7:05 PM.  If you have any questions, ask your nurse or doctor.               Start taking these medicines.        Dose/Directions    beclomethasone HFA 80 MCG/ACT inhaler   Commonly known as:  QVAR REDIHALER   Used for:  Moderate persistent asthma without complication   Started by:  Nish Stockton DO        Dose:  2 puff   Inhale 2 puffs into the lungs 2 times daily   Quantity:  1 Inhaler   Refills:  3       ipratropium 0.06 % spray   Commonly known as:  ATROVENT   Used for:  Rhinoconjunctivitis   Started by:  Nish Stockton DO        Dose:  2 spray   Spray 2 sprays into both nostrils 4 times daily as needed for rhinitis   Quantity:  1 Box   Refills:  3         These medicines have changed or have updated prescriptions.        Dose/Directions    hydrocortisone 2.5 % cream   This may have changed:    - when to take this  - reasons to take this   Used for:  Eyelid dermatitis, eczematous, unspecified laterality        Apply topically 2 times daily   Quantity:  60 g   Refills:  1       triamcinolone 0.1 % cream   Commonly known as:  KENALOG   This may have changed:    - when to take this  - reasons to take this   Used for:  Other atopic dermatitis        Apply  topically 2 times daily   Quantity:  60 g   Refills:  3         Stop taking these medicines if you haven't already. Please contact your care team if you have questions.     azelastine 0.1 % nasal spray   Commonly known as:  ASTELIN   Stopped by:  Nish Stockton,            budesonide-formoterol 80-4.5 MCG/ACT Inhaler   Commonly known as:  SYMBICORT   Stopped by:  Nish Stockton DO                Where to get your medicines      These medications were [place] [facility] [place] [address]  [address], [place] [address]     Phone:  [phone]     beclomethasone HFA 80 MCG/ACT inhaler    ipratropium 0.06 % spray                Primary Care Provider Office Phone # Fax #    Elizabeth Castrejon DO [place] [phone] [address] [place] [address]        Equal Access to Services     CHARLENE [facility] : Hadii aad ku hadasho Soomaali, waaxda luqadaha, qaybta kaalmada adeegyada, waxay idiin hayaan angie cohen . So [facility] [phone].    ATENCIÓN: Si joey espgail, tiene a dao disposición servicios gratuitos de asistencia lingüística. Llame al [phone].    We comply with applicable federal civil rights laws and [place] laws. We do not discriminate on the basis of race, color, national origin, age, disability, sex, sexual orientation, or gender identity.            Thank you!     Thank you for choosing [facility]  for your care. Our goal is always to provide you with excellent care. Hearing back from our patients is one way we can continue to improve our services. Please take a few minutes to complete the written survey that you may receive in the mail after your visit with us. Thank you!             Your Updated Medication List - Protect others around you: Learn how to safely use, store and throw away your medicines at www.disposemymeds.org.          This list is accurate as of 10/15/18  7:05 PM.  Always use your most recent  med list.                   Brand Name Dispense Instructions for use Diagnosis    Acidophilus Tabs      1 tablet daily        albuterol 108 (90 Base) MCG/ACT inhaler    PROAIR HFA    1 Inhaler    Inhale 2 puffs into the lungs every 4 hours as needed for shortness of breath / dyspnea or wheezing    Moderate persistent asthma without complication       amLODIPine 5 MG tablet    NORVASC    30 tablet    Take 1 tablet (5 mg) by mouth daily    Hypertension goal BP (blood pressure) < 140/90       ascorbic acid 500 MG tablet    VITAMIN C     2 TABLET DAILY        atenolol 25 MG tablet    TENORMIN    90 tablet    Take 1 tablet (25 mg) by mouth daily        beclomethasone HFA 80 MCG/ACT inhaler    QVAR REDIHALER    1 Inhaler    Inhale 2 puffs into the lungs 2 times daily    Moderate persistent asthma without complication       biotin 1000 MCG Tabs tablet      Take 1,000 mcg by mouth daily        CALCIUM 600+D PO      twice daily        DAILY MULTIVITAMIN PO      1 tablet daily        escitalopram 10 MG tablet    LEXAPRO    90 tablet    Take 1 tablet (10 mg) by mouth daily    Major depression in complete remission (H)       ferrous sulfate 325 (65 Fe) MG tablet    IRON     Take 325 mg by mouth daily (with breakfast)        flaxseed oil 1000 MG Caps      1 tablet daily        fluticasone 50 MCG/ACT spray    FLONASE    1 Bottle    Spray 1-2 sprays into both nostrils daily    Acute sinusitis with symptoms > 10 days       gabapentin 300 MG capsule    NEURONTIN    300 capsule    1 capsule TID with additional 1-2 capsules if headache is severe.    Chronic daily headache, Migraine without aura and without status migrainosus, not intractable, History of traumatic brain injury       Glucosamine-Chondroitin Tabs      1500 mg glucosamine and 1200mg chondroitin daily-2 tablets daily        hydrochlorothiazide 12.5 MG Tabs tablet     90 tablet    Take 1 tablet (12.5 mg) by mouth daily    Essential hypertension       hydrocortisone 2.5 %  cream     60 g    Apply topically 2 times daily    Eyelid dermatitis, eczematous, unspecified laterality       ibuprofen 200 MG tablet    ADVIL/MOTRIN     take 1 tablet (200 mg) by oral route every 6 hours as needed with food        ipratropium 0.06 % spray    ATROVENT    1 Box    Spray 2 sprays into both nostrils 4 times daily as needed for rhinitis    Rhinoconjunctivitis       lisinopril 40 MG tablet    PRINIVIL/ZESTRIL    90 tablet    Take 1 tablet (40 mg) by mouth daily        Magnesium 400 MG Caps      Take by mouth daily        montelukast 10 MG tablet    SINGULAIR    90 tablet    Take 1 tablet (10 mg) by mouth At Bedtime    Moderate persistent asthma without complication       pantoprazole 40 MG EC tablet    PROTONIX    90 tablet    Take 1 tablet (40 mg) by mouth daily Take 30-60 minutes before a meal.    Gastroesophageal reflux disease, esophagitis presence not specified       potassium 99 MG Tabs      Take 4 tablets by mouth 2 times daily        pravastatin 40 MG tablet    PRAVACHOL    90 tablet    Take 1 tablet (40 mg) by mouth daily    Hyperlipidemia LDL goal <130       S-Adenosylmethionine 200 MG Tabs      2 tablets daily    Menopausal syndrome (hot flashes)       SUPER B COMPLEX Tabs      1 tablet daily        triamcinolone 0.1 % cream    KENALOG    60 g    Apply topically 2 times daily    Other atopic dermatitis       TURMERIC PO      Take 2,000 mg by mouth daily        vitamin B complex with vitamin C Tabs tablet      Take 3 tablets by mouth daily        VITAMIN D-3 PO      Take 2,000 Int'l Units by mouth daily        vitamin E 400 UNIT capsule   Generic drug:  vitamin E      Take one pill by mouth once daily        ZADITOR OP      Apply to eye daily as needed        ZYRTEC 10 MG tablet   Generic drug:  cetirizine      Take 10 mg by mouth daily.

## 2018-10-15 NOTE — PROGRESS NOTES
Miri Naylor is a 63 year old White female with previous medical history significant for nonallergic rhinitis and moderate persistent asthma who returns for a follow up visit.     The patient returns for follow-up.  At last visit we decreased her Symbicort to 80/4.5 mcg 2 puffs inhaled twice daily.  She reports that 1-2 times per week she will have exertional shortness of breath.  Occasionally at night she will have wheezing.  She notices approximately 1 night per week.  Otherwise she is doing well from an asthma perspective.  She denies coughing or tightness in chest.  No interval prednisone use, ER visits or hospitalizations.  As you may recall patient has had negative allergy testing.  She has been on Flonase 2 sprays per nostril daily and Astelin 1 spray per nostril twice daily.  She complains of rhinorrhea with minimal postnasal drainage and congestion.  No use of Atrovent nasal  sprays.  Intervally she was seen by ENT.  She is otherwise on montelukast 10 mg by mouth daily.       ACT Total Scores 10/15/2018   ACT TOTAL SCORE (Goal Greater than or Equal to 20) 23   In the past 12 months, how many times did you visit the emergency room for your asthma without being admitted to the hospital? 0   In the past 12 months, how many times were you hospitalized overnight because of your asthma? 0             Past Medical History:   Diagnosis Date     Depressive disorder, not elsewhere classified      Drug allergy, multiple 9/23/14--passed graded oral challenge for Zithromax.     7/3/14 POS PRE-PEN skin test. 7/30/14 NEG skin tests and oral challenge to Cefzil     Hx of abnormal Pap smear ?    no specifics given     lumbar degeneration      Raynaud's disease      Unspecified essential hypertension      Family History   Problem Relation Age of Onset     Arthritis Mother      Hyperlipidemia Mother      Other Cancer Mother      Glioblastoma Multiforme     Osteoporosis Mother      Blood Disease Father      Hypertension  Father      Hyperlipidemia Father      Other Cancer Father      lung cancer     Depression Son      Depression Daughter      Hyperlipidemia Brother      Anxiety Disorder Son      Skin Cancer No family hx of      Past Surgical History:   Procedure Laterality Date     BIOPSY OF BREAST, NEEDLE CORE       C PELVIS/HIP JOINT SURGERY UNLISTED Right 7/19/16    total hip arthroplasty     COLONOSCOPY       HIP SURGERY Right 07/19/2016       REVIEW OF SYSTEMS:  General: positive  for weight gain. negative for weight loss. negative for changes in sleep.   Ears: negative for fullness. negative for hearing loss. negative for dizziness.   Nose: positive  for snoring.negative for changes in smell. negative for drainage.   Throat: positive for hoarseness. negative for sore throat. negative for trouble swallowing.   Lungs: positive  for shortness of breath.negative for wheezing. negative for sputum production.   Cardiovascular: negative for chest pain. positive  for swelling of ankles. negative for fast or irregular heartbeat.   Gastrointestinal: positive for nausea. positive  for heartburn. negative for acid reflux.   Musculoskeletal: negative for joint pain. negative for joint stiffness. negative for joint swelling.   Neurologic: negative for seizures. negative for fainting. negative for weakness.   Psychiatric: negative for changes in mood. negative for anxiety.   Endocrine: negative for cold intolerance. positive  for heat intolerance. negative for tremors.   Hematologic: negative for easy bruising. negative for easy bleeding.  Integumentary: positive  for rash. negative for scaling. negative for nail changes.       Current Outpatient Prescriptions:      beclomethasone HFA (QVAR REDIHALER) 80 MCG/ACT inhaler, Inhale 2 puffs into the lungs 2 times daily, Disp: 1 Inhaler, Rfl: 3     ipratropium (ATROVENT) 0.06 % spray, Spray 2 sprays into both nostrils 4 times daily as needed for rhinitis, Disp: 1 Box, Rfl: 3     ACIDOPHILUS OR  TABS, 1 tablet daily, Disp: , Rfl:      albuterol (ALBUTEROL) 108 (90 BASE) MCG/ACT Inhaler, Inhale 2 puffs into the lungs every 4 hours as needed for shortness of breath / dyspnea or wheezing, Disp: 1 Inhaler, Rfl: 1     amLODIPine (NORVASC) 5 MG tablet, Take 1 tablet (5 mg) by mouth daily, Disp: 30 tablet, Rfl: 0     atenolol (TENORMIN) 25 MG tablet, Take 1 tablet (25 mg) by mouth daily, Disp: 90 tablet, Rfl: 3     biotin 1000 MCG TABS tablet, Take 1,000 mcg by mouth daily, Disp: , Rfl:      CALCIUM 600+D PO, twice daily, Disp: , Rfl:      cetirizine (ZYRTEC) 10 MG tablet, Take 10 mg by mouth daily., Disp: , Rfl:      Cholecalciferol (VITAMIN D-3 PO), Take 2,000 Int'l Units by mouth daily, Disp: , Rfl:      DAILY MULTIVITAMIN PO, 1 tablet daily, Disp: , Rfl:      escitalopram (LEXAPRO) 10 MG tablet, Take 1 tablet (10 mg) by mouth daily, Disp: 90 tablet, Rfl: 1     ferrous sulfate (IRON) 325 (65 Fe) MG tablet, Take 325 mg by mouth daily (with breakfast), Disp: , Rfl:      FLAXSEED OIL 1000 MG PO CAPS, 1 tablet daily, Disp: , Rfl:      fluticasone (FLONASE) 50 MCG/ACT spray, Spray 1-2 sprays into both nostrils daily, Disp: 1 Bottle, Rfl: 11     gabapentin (NEURONTIN) 300 MG capsule, 1 capsule TID with additional 1-2 capsules if headache is severe., Disp: 300 capsule, Rfl: 1     GLUCOSAMINE-CHONDROITIN PO TABS, 1500 mg glucosamine and 1200mg chondroitin daily-2 tablets daily, Disp: , Rfl:      hydrochlorothiazide 12.5 MG TABS tablet, Take 1 tablet (12.5 mg) by mouth daily, Disp: 90 tablet, Rfl: 0     hydrocortisone 2.5 % cream, Apply topically 2 times daily (Patient taking differently: Apply topically 2 times daily as needed ), Disp: 60 g, Rfl: 1     ibuprofen (ADVIL,MOTRIN) 200 MG tablet, take 1 tablet (200 mg) by oral route every 6 hours as needed with food, Disp: , Rfl:      Ketotifen Fumarate (ZADITOR OP), Apply to eye daily as needed, Disp: , Rfl:      lisinopril (PRINIVIL/ZESTRIL) 40 MG tablet, Take 1 tablet  (40 mg) by mouth daily, Disp: 90 tablet, Rfl: 3     Magnesium 400 MG CAPS, Take by mouth daily, Disp: , Rfl:      montelukast (SINGULAIR) 10 MG tablet, Take 1 tablet (10 mg) by mouth At Bedtime, Disp: 90 tablet, Rfl: 1     pantoprazole (PROTONIX) 40 MG EC tablet, Take 1 tablet (40 mg) by mouth daily Take 30-60 minutes before a meal., Disp: 90 tablet, Rfl: 3     potassium 99 MG TABS, Take 4 tablets by mouth 2 times daily , Disp: , Rfl:      pravastatin (PRAVACHOL) 40 MG tablet, Take 1 tablet (40 mg) by mouth daily, Disp: 90 tablet, Rfl: 3     S-ADENOSYLMETHIONINE 200 MG PO TABS, 2 tablets daily, Disp: , Rfl:      SUPER B COMPLEX PO TABS, 1 tablet daily, Disp: , Rfl:      triamcinolone (KENALOG) 0.1 % cream, Apply topically 2 times daily (Patient taking differently: Apply topically 2 times daily as needed ), Disp: 60 g, Rfl: 3     TURMERIC PO, Take 2,000 mg by mouth daily, Disp: , Rfl:      vitamin B complex with vitamin C (VITAMIN  B COMPLEX) TABS tablet, Take 3 tablets by mouth daily, Disp: , Rfl:      VITAMIN C 500 MG PO TABS, 2 TABLET DAILY, Disp: , Rfl:      vitamin E (E-400) 400 UNIT capsule, Take one pill by mouth once daily, Disp: , Rfl:   Immunization History   Administered Date(s) Administered     Influenza Vaccine IM 3yrs+ 4 Valent IIV4 12/03/2013, 11/16/2015, 10/01/2017, 09/13/2018     TD (ADULT, 7+) 03/06/2008     TDAP Vaccine (Adacel) 08/03/2012     Allergies   Allergen Reactions     Levofloxacin Rash     Other reaction(s): Contact Dermatitis     Penicillins Hives and Rash     Confirmed by skin prick testing 7/3/14     Amoxicillin Hives and Rash     Amoxicillin-Pot Clavulanate Rash     Azithromycin Rash     Cephalexin Rash     Clindamycin Rash and Hives     Atorvastatin Other (See Comments)     Felt sick, intolerance     Clindamycin Hcl Hives     Levaquin      tendonitis     Augmentin Rash         EXAM:   Constitutional:  Appears well-developed and well-nourished. No distress.   HEENT:   Head:  Normocephalic.   Mouth/Throat: No oropharyngeal exudate present.   No cobblestoning of posterior oropharynx.   Nasal tissue pink and normal appearing.  No rhinorrhea noted.    Eyes: Conjunctivae are non-erythematous   Cardiovascular: Normal rate, regular rhythm and normal heart sounds. Exam reveals no gallop and no friction rub.   No murmur heard.  Respiratory: Effort normal and breath sounds normal. No respiratory distress. No wheezes. No rales.   Musculoskeletal: Normal range of motion.   Neuro: Oriented to person, place, and time.  Skin: Skin is warm and dry. No rash noted.   Psychiatric: Normal mood and affect.     Nursing note and vitals reviewed.      WORKUP:   Spirometry  FVC % pred:80  FEV1 % pred:73  FEV1/FVC % act:70  FEF 25-75% pred:68    Mild obstruction.     ASSESSMENT/PLAN:  Problem List Items Addressed This Visit        Respiratory    Moderate persistent asthma without complication - Primary     Well controlled. Symptoms flare with URI and cold air.  Symbicort 80/4.5mcg 2 puffs inhaled twice daily with a spacer. Singulair 10mg PO daily      Spirometry today with an FEV1 of 73%. Stable to prior visit.   ACT 23      - Albuterol 2-4 puffs inhaled (use a spacer unless using a Proair Respiclick device) every 4 hours as needed for chest tightness, wheezing, shortness of breath and/or coughing.   - Albuterol 2-4 puffs inhaled (use spacer if not using Proair Respiclick device) 15-20 minutes prior to physical activity.    - Please ensure warm up period prior to exercise.    - Avoid asthma triggers.  -Stop Symbicort 80/4.5mcg 2 puffs inhaled twice daily with a spacer.   -Qvar 80mcg 2 puffs inhaled twice daily  - Singulair 10mg by mouth daily at night.         Relevant Medications    beclomethasone HFA (QVAR REDIHALER) 80 MCG/ACT inhaler    ipratropium (ATROVENT) 0.06 % spray    Other Relevant Orders    Spirometry, Breathing Capacity (Completed)       Digestive    GERD (gastroesophageal reflux disease)     On  Protonix.  She will continue.            Circulatory    Hypertension goal BP (blood pressure) < 140/90     Patient to follow up with Primary Care provider regarding elevated blood pressure.              Infectious/Inflammatory    Rhinoconjunctivitis     History of perennial nasal and ocular symptoms that get worse in the spring and fall. Current symptoms are rhinorrhea. Symptoms increase around cats.  She is on Flonase and Astelin.  She is also on Singulair.  Either vasomotor rhinitis versus local allergic rhinitis. Follows with ENT.       Allergy skin testing was negative.      - Continue Flonase 2 sprays per nostril daily.  - Trial of ipratropium 2 sprays/nostril four times daily as needed.   - Stop azelastine 2 sprays/nostril twice daily as needed for now while trying ipratropium. If ipratropium not helpful or symptoms worsen would resume.   - Zyrtec as needed.   - Singulair 10mg by mouth daily at night.            Relevant Medications    ipratropium (ATROVENT) 0.06 % spray          Chart documentation with Dragon Voice recognition Software. Although reviewed after completion, some words and grammatical errors may remain.    Nish Stockton,    Allergy/Immunology  [facility] [place]

## 2018-10-15 NOTE — PATIENT INSTRUCTIONS
Allergy Staff Appt Hours Shot Hours Locations    Physician     Nish Stockton, DO       Support Staff     Kerri CHAUDHRY RN      Jackie CHAUDHRY, [facility]  Monday:                      [place] 8-7     Tuesday:         [place] 8-5     Wednesday:        [place]: 7-5     Friday:        [place] 7-5   [place]        Monday: 9-5:50        Wednesday: 2-5:50        Friday: 7-12:50     [place]        Tuesday: 7-10:50        Thursday: 1:30-6:30     [place] Monday: 7:10-4:50        Tuesday: 12:30-6:30        Thursday: 7-11:50 [facility]  [address] [place] [address]  Appt Line: [phone]  Allergy RN (Monday):  [phone]    [facility]  [address] [place] [address]  Appt Line: [phone]  Allergy RN (Tues & Wed):  [phone]    [facility]  [address] [place] [address]  Appt Line: [phone]  Allergy RN (Friday):  [phone]       Important Scheduling Information  Aspirin Desensitization: Appt will last 2 clinic days. Please call the Allergy RN line for your clinic to schedule. Discontinue antihistamines 7 days prior to the appointment.     Food Challenges: Appt will last 3-4 hours. Please call the Allergy RN line for your clinic to schedule. Discontinue antihistamines 7 days prior to the appointment.     Penicillin Testing: Appt will last 2-3 hours. Please call the Allergy RN line for your clinic to schedule. Discontinue antihistamines 7 days prior to the appointment.     Skin Testing: Appt will about 40 minutes. Call the appointment line for your clinic to schedule. Discontinue antihistamines 7 days prior to the appointment.     Venom Testing: Appt will last 2-3 hours. Please call the Allergy RN line for your clinic to schedule. Discontinue antihistamines 7 days prior to the appointment.     Thank you for trusting us with your Allergy, Asthma, and Immunology care. Please feel free to contact us with any questions or concerns you may have.      - Stop Symbicort.    - Qvar 80mcg 2 puffs inhaled twice daily  - Albuterol 2-4 puffs inhaled (use a spacer unless using a Proair Respiclick device) every 4 hours as needed for chest tightness, wheezing, shortness of breath and/or coughing.   - Flonase 2 sprays/nostril daily.   - Ipratropium 2 sprays/nostril four times daily as needed.   - Return in 3 months.

## 2018-10-16 ASSESSMENT — ASTHMA QUESTIONNAIRES: ACT_TOTALSCORE: 23

## 2018-10-16 NOTE — ASSESSMENT & PLAN NOTE
History of perennial nasal and ocular symptoms that get worse in the spring and fall. Current symptoms are rhinorrhea. Symptoms increase around cats.  She is on Flonase and Astelin.  She is also on Singulair.  Either vasomotor rhinitis versus local allergic rhinitis. Follows with ENT.       Allergy skin testing was negative.      - Continue Flonase 2 sprays per nostril daily.  - Trial of ipratropium 2 sprays/nostril four times daily as needed.   - Stop azelastine 2 sprays/nostril twice daily as needed for now while trying ipratropium. If ipratropium not helpful or symptoms worsen would resume.   - Zyrtec as needed.   - Singulair 10mg by mouth daily at night.

## 2018-10-16 NOTE — ASSESSMENT & PLAN NOTE
Well controlled. Symptoms flare with URI and cold air.  Symbicort 80/4.5mcg 2 puffs inhaled twice daily with a spacer. Singulair 10mg PO daily      Spirometry today with an FEV1 of 73%. Stable to prior visit.   ACT 23      - Albuterol 2-4 puffs inhaled (use a spacer unless using a Proair Respiclick device) every 4 hours as needed for chest tightness, wheezing, shortness of breath and/or coughing.   - Albuterol 2-4 puffs inhaled (use spacer if not using Proair Respiclick device) 15-20 minutes prior to physical activity.    - Please ensure warm up period prior to exercise.    - Avoid asthma triggers.  -Stop Symbicort 80/4.5mcg 2 puffs inhaled twice daily with a spacer.   -Qvar 80mcg 2 puffs inhaled twice daily  - Singulair 10mg by mouth daily at night.

## 2018-10-22 DIAGNOSIS — I10 ESSENTIAL HYPERTENSION: ICD-10-CM

## 2018-10-22 RX ORDER — HYDROCHLOROTHIAZIDE 12.5 MG/1
12.5 TABLET ORAL DAILY
Qty: 90 TABLET | Refills: 3 | Status: SHIPPED | OUTPATIENT
Start: 2018-10-22 | End: 2018-11-02 | Stop reason: SINTOL

## 2018-10-22 NOTE — TELEPHONE ENCOUNTER
"Requested Prescriptions   Pending Prescriptions Disp Refills     hydrochlorothiazide 12.5 MG TABS tablet 90 tablet 0    Last Written Prescription Date:  10/09/2018 #90 x 0 [facility]  Last filled - not provided, mail order pharm, [facility]  Last office visit: 08/24/2018 PRADIP Corley   Future Office Visit:  None   Sig: Take 1 tablet (12.5 mg) by mouth daily    Diuretics (Including Combos) Protocol Failed    10/22/2018  2:56 PM       Failed - Blood pressure under 140/90 in past 12 months    BP Readings from Last 3 Encounters:   10/15/18 148/84   09/13/18 108/68   08/24/18 142/82                Passed - Recent (12 mo) or future (30 days) visit within the authorizing provider's specialty    Patient had office visit in the last 12 months or has a visit in the next 30 days with authorizing provider or within the authorizing provider's specialty.  See \"Patient Info\" tab in inbasket, or \"Choose Columns\" in Meds & Orders section of the refill encounter.             Passed - Patient is age 18 or older       Passed - No active pregancy on record       Passed - Normal serum creatinine on file in past 12 months    Recent Labs   Lab Test  10/06/18   0937   CR  0.84             Passed - Normal serum potassium on file in past 12 months    Recent Labs   Lab Test  10/06/18   0937   POTASSIUM  4.3                   Passed - Normal serum sodium on file in past 12 months    Recent Labs   Lab Test  10/06/18   0937   NA  134             Passed - No positive pregnancy test in past 12 months          "

## 2018-10-29 DIAGNOSIS — I10 HYPERTENSION GOAL BP (BLOOD PRESSURE) < 140/90: ICD-10-CM

## 2018-10-29 LAB
ANION GAP SERPL CALCULATED.3IONS-SCNC: 8 MMOL/L (ref 3–14)
BUN SERPL-MCNC: 14 MG/DL (ref 7–30)
CALCIUM SERPL-MCNC: 9.2 MG/DL (ref 8.5–10.1)
CHLORIDE SERPL-SCNC: 102 MMOL/L (ref 94–109)
CO2 SERPL-SCNC: 27 MMOL/L (ref 20–32)
CREAT SERPL-MCNC: 0.83 MG/DL (ref 0.52–1.04)
GFR SERPL CREATININE-BSD FRML MDRD: 70 ML/MIN/1.7M2
GLUCOSE SERPL-MCNC: 83 MG/DL (ref 70–99)
POTASSIUM SERPL-SCNC: 4.4 MMOL/L (ref 3.4–5.3)
SODIUM SERPL-SCNC: 137 MMOL/L (ref 133–144)

## 2018-10-29 PROCEDURE — 36415 COLL VENOUS BLD VENIPUNCTURE: CPT | Performed by: FAMILY MEDICINE

## 2018-10-29 PROCEDURE — 80048 BASIC METABOLIC PNL TOTAL CA: CPT | Performed by: FAMILY MEDICINE

## 2018-10-31 ENCOUNTER — MYC MEDICAL ADVICE (OUTPATIENT)
Dept: FAMILY MEDICINE | Facility: CLINIC | Age: 63
End: 2018-10-31

## 2018-10-31 DIAGNOSIS — I10 ESSENTIAL HYPERTENSION: Primary | ICD-10-CM

## 2018-11-02 ENCOUNTER — MYC MEDICAL ADVICE (OUTPATIENT)
Dept: FAMILY MEDICINE | Facility: CLINIC | Age: 63
End: 2018-11-02

## 2018-11-02 RX ORDER — SPIRONOLACTONE 25 MG/1
12.5 TABLET ORAL DAILY
Qty: 15 TABLET | Refills: 0 | Status: SHIPPED | OUTPATIENT
Start: 2018-11-02 | End: 2018-11-07

## 2018-11-07 DIAGNOSIS — I10 ESSENTIAL HYPERTENSION: ICD-10-CM

## 2018-11-07 NOTE — TELEPHONE ENCOUNTER
"Requested Prescriptions   Pending Prescriptions Disp Refills     spironolactone (ALDACTONE) 25 MG tablet 15 tablet 0    Last Written Prescription Date:  11/2/18  Last Fill Quantity: 15,  # refills: 0   Last office visit: 8/24/2018 with prescribing provider:  riana   Future Office Visit:   Next 5 appointments (look out 90 days)     Nov 08, 2018  7:30 AM CST   [facility] with [facility]   [facility]    [address] [place] [id number] [phone]                  Sig: Take 0.5 tablets (12.5 mg) by mouth daily    Diuretics (Including Combos) Protocol Failed    11/7/2018  1:24 PM       Failed - Blood pressure under 140/90 in past 12 months    BP Readings from Last 3 Encounters:   10/15/18 148/84   09/13/18 108/68   08/24/18 142/82                Passed - Recent (12 mo) or future (30 days) visit within the authorizing provider's specialty    Patient had office visit in the last 12 months or has a visit in the next 30 days with authorizing provider or within the authorizing provider's specialty.  See \"Patient Info\" tab in inbasket, or \"Choose Columns\" in Meds & Orders section of the refill encounter.             Passed - Patient is age 18 or older       Passed - No active pregancy on record       Passed - Normal serum creatinine on file in past 12 months    Recent Labs   Lab Test  10/29/18   0823   CR  0.83             Passed - Normal serum potassium on file in past 12 months    Recent Labs   Lab Test  10/29/18   0823   POTASSIUM  4.4                   Passed - Normal serum sodium on file in past 12 months    Recent Labs   Lab Test  10/29/18   0823   NA  137             Passed - No positive pregnancy test in past 12 months          "

## 2018-11-07 NOTE — TELEPHONE ENCOUNTER
I called Miri because I saw that you had ordered her medication at a [place] pharmacy. She said she has been taking it but if she is to continue it she would like the [facility]. She is having her lab work done tomorrow. Jhoana Russell RN

## 2018-11-08 ENCOUNTER — MYC MEDICAL ADVICE (OUTPATIENT)
Dept: FAMILY MEDICINE | Facility: CLINIC | Age: 63
End: 2018-11-08

## 2018-11-08 DIAGNOSIS — I10 ESSENTIAL HYPERTENSION: ICD-10-CM

## 2018-11-08 DIAGNOSIS — I10 ESSENTIAL HYPERTENSION: Primary | ICD-10-CM

## 2018-11-08 LAB
ANION GAP SERPL CALCULATED.3IONS-SCNC: 7 MMOL/L (ref 3–14)
BUN SERPL-MCNC: 16 MG/DL (ref 7–30)
CALCIUM SERPL-MCNC: 9.3 MG/DL (ref 8.5–10.1)
CHLORIDE SERPL-SCNC: 104 MMOL/L (ref 94–109)
CO2 SERPL-SCNC: 29 MMOL/L (ref 20–32)
CREAT SERPL-MCNC: 0.91 MG/DL (ref 0.52–1.04)
GFR SERPL CREATININE-BSD FRML MDRD: 63 ML/MIN/1.7M2
GLUCOSE SERPL-MCNC: 99 MG/DL (ref 70–99)
POTASSIUM SERPL-SCNC: 4.2 MMOL/L (ref 3.4–5.3)
SODIUM SERPL-SCNC: 140 MMOL/L (ref 133–144)

## 2018-11-08 PROCEDURE — 80048 BASIC METABOLIC PNL TOTAL CA: CPT | Performed by: FAMILY MEDICINE

## 2018-11-08 PROCEDURE — 36415 COLL VENOUS BLD VENIPUNCTURE: CPT | Performed by: FAMILY MEDICINE

## 2018-11-09 RX ORDER — SPIRONOLACTONE 25 MG/1
12.5 TABLET ORAL DAILY
Qty: 45 TABLET | Refills: 0 | Status: SHIPPED | OUTPATIENT
Start: 2018-11-09 | End: 2019-01-20

## 2018-11-13 ENCOUNTER — TRANSFERRED RECORDS (OUTPATIENT)
Dept: HEALTH INFORMATION MANAGEMENT | Facility: CLINIC | Age: 63
End: 2018-11-13

## 2018-11-13 DIAGNOSIS — J45.40 MODERATE PERSISTENT ASTHMA WITHOUT COMPLICATION: ICD-10-CM

## 2018-11-13 NOTE — TELEPHONE ENCOUNTER
Signed Prescriptions:                        Disp   Refills    beclomethasone HFA (QVAR REDIHALER) 80 MCG*3 Inha*0        Sig: Inhale 2 puffs into the lungs 2 times daily  Authorizing Provider: DEANGELO PIZARRO  Ordering User: ANGELO RAMIRES RN refilled medication per [facility] Refill Protocol.  Mail order pharmacy requesting 90 day supply.    Angelo Ramires RN

## 2018-11-14 DIAGNOSIS — I10 ESSENTIAL HYPERTENSION: ICD-10-CM

## 2018-11-14 LAB
ANION GAP SERPL CALCULATED.3IONS-SCNC: 5 MMOL/L (ref 3–14)
BUN SERPL-MCNC: 13 MG/DL (ref 7–30)
CALCIUM SERPL-MCNC: 9.5 MG/DL (ref 8.5–10.1)
CHLORIDE SERPL-SCNC: 101 MMOL/L (ref 94–109)
CO2 SERPL-SCNC: 29 MMOL/L (ref 20–32)
CREAT SERPL-MCNC: 0.84 MG/DL (ref 0.52–1.04)
GFR SERPL CREATININE-BSD FRML MDRD: 68 ML/MIN/1.7M2
GLUCOSE SERPL-MCNC: 87 MG/DL (ref 70–99)
POTASSIUM SERPL-SCNC: 4.6 MMOL/L (ref 3.4–5.3)
SODIUM SERPL-SCNC: 135 MMOL/L (ref 133–144)

## 2018-11-14 PROCEDURE — 80048 BASIC METABOLIC PNL TOTAL CA: CPT | Performed by: FAMILY MEDICINE

## 2018-11-14 PROCEDURE — 36415 COLL VENOUS BLD VENIPUNCTURE: CPT | Performed by: FAMILY MEDICINE

## 2018-11-15 ENCOUNTER — MYC MEDICAL ADVICE (OUTPATIENT)
Dept: FAMILY MEDICINE | Facility: CLINIC | Age: 63
End: 2018-11-15

## 2018-12-19 ENCOUNTER — MYC MEDICAL ADVICE (OUTPATIENT)
Dept: FAMILY MEDICINE | Facility: CLINIC | Age: 63
End: 2018-12-19

## 2018-12-19 DIAGNOSIS — R11.0 NAUSEA: Primary | ICD-10-CM

## 2018-12-19 DIAGNOSIS — R35.0 URINARY FREQUENCY: ICD-10-CM

## 2018-12-19 DIAGNOSIS — M54.5 ACUTE LEFT-SIDED LOW BACK PAIN, WITH SCIATICA PRESENCE UNSPECIFIED: ICD-10-CM

## 2018-12-20 DIAGNOSIS — M54.5 ACUTE LEFT-SIDED LOW BACK PAIN, WITH SCIATICA PRESENCE UNSPECIFIED: ICD-10-CM

## 2018-12-20 DIAGNOSIS — I10 ESSENTIAL HYPERTENSION: ICD-10-CM

## 2018-12-20 DIAGNOSIS — R35.0 URINARY FREQUENCY: ICD-10-CM

## 2018-12-20 DIAGNOSIS — R11.0 NAUSEA: ICD-10-CM

## 2018-12-20 LAB
ALBUMIN SERPL-MCNC: 3.9 G/DL (ref 3.4–5)
ALBUMIN UR-MCNC: NEGATIVE MG/DL
ALP SERPL-CCNC: 60 U/L (ref 40–150)
ALT SERPL W P-5'-P-CCNC: 26 U/L (ref 0–50)
ANION GAP SERPL CALCULATED.3IONS-SCNC: 6 MMOL/L (ref 3–14)
APPEARANCE UR: CLEAR
AST SERPL W P-5'-P-CCNC: 20 U/L (ref 0–45)
BASOPHILS # BLD AUTO: 0 10E9/L (ref 0–0.2)
BASOPHILS NFR BLD AUTO: 0.5 %
BILIRUB SERPL-MCNC: 0.3 MG/DL (ref 0.2–1.3)
BILIRUB UR QL STRIP: NEGATIVE
BUN SERPL-MCNC: 13 MG/DL (ref 7–30)
CALCIUM SERPL-MCNC: 8.8 MG/DL (ref 8.5–10.1)
CHLORIDE SERPL-SCNC: 102 MMOL/L (ref 94–109)
CO2 SERPL-SCNC: 26 MMOL/L (ref 20–32)
COLOR UR AUTO: YELLOW
CREAT SERPL-MCNC: 0.91 MG/DL (ref 0.52–1.04)
DIFFERENTIAL METHOD BLD: NORMAL
EOSINOPHIL # BLD AUTO: 0.2 10E9/L (ref 0–0.7)
EOSINOPHIL NFR BLD AUTO: 2.6 %
ERYTHROCYTE [DISTWIDTH] IN BLOOD BY AUTOMATED COUNT: 12.5 % (ref 10–15)
GFR SERPL CREATININE-BSD FRML MDRD: 67 ML/MIN/{1.73_M2}
GLUCOSE SERPL-MCNC: 94 MG/DL (ref 70–99)
GLUCOSE UR STRIP-MCNC: NEGATIVE MG/DL
HCT VFR BLD AUTO: 36.1 % (ref 35–47)
HGB BLD-MCNC: 11.9 G/DL (ref 11.7–15.7)
HGB UR QL STRIP: NEGATIVE
KETONES UR STRIP-MCNC: NEGATIVE MG/DL
LEUKOCYTE ESTERASE UR QL STRIP: NEGATIVE
LYMPHOCYTES # BLD AUTO: 1.9 10E9/L (ref 0.8–5.3)
LYMPHOCYTES NFR BLD AUTO: 32.6 %
MCH RBC QN AUTO: 31.3 PG (ref 26.5–33)
MCHC RBC AUTO-ENTMCNC: 33 G/DL (ref 31.5–36.5)
MCV RBC AUTO: 95 FL (ref 78–100)
MONOCYTES # BLD AUTO: 0.4 10E9/L (ref 0–1.3)
MONOCYTES NFR BLD AUTO: 6.2 %
NEUTROPHILS # BLD AUTO: 3.4 10E9/L (ref 1.6–8.3)
NEUTROPHILS NFR BLD AUTO: 58.1 %
NITRATE UR QL: NEGATIVE
PH UR STRIP: 6.5 PH (ref 5–7)
PLATELET # BLD AUTO: 358 10E9/L (ref 150–450)
POTASSIUM SERPL-SCNC: 4.4 MMOL/L (ref 3.4–5.3)
PROT SERPL-MCNC: 7.4 G/DL (ref 6.8–8.8)
RBC # BLD AUTO: 3.8 10E12/L (ref 3.8–5.2)
SODIUM SERPL-SCNC: 134 MMOL/L (ref 133–144)
SOURCE: NORMAL
SP GR UR STRIP: <=1.005 (ref 1–1.03)
UROBILINOGEN UR STRIP-ACNC: 0.2 EU/DL (ref 0.2–1)
WBC # BLD AUTO: 5.8 10E9/L (ref 4–11)

## 2018-12-20 PROCEDURE — 80053 COMPREHEN METABOLIC PANEL: CPT | Performed by: FAMILY MEDICINE

## 2018-12-20 PROCEDURE — 81003 URINALYSIS AUTO W/O SCOPE: CPT | Performed by: FAMILY MEDICINE

## 2018-12-20 PROCEDURE — 36415 COLL VENOUS BLD VENIPUNCTURE: CPT | Performed by: FAMILY MEDICINE

## 2018-12-20 PROCEDURE — 85025 COMPLETE CBC W/AUTO DIFF WBC: CPT | Performed by: FAMILY MEDICINE

## 2018-12-20 NOTE — TELEPHONE ENCOUNTER
Please call to triage.  We can do labs and a urine this AM but I do not have a slot to see her in.  Looks like there is 1 slot open tomorrow at 8, then I am out till after the New Year.    Elizabeth Corley

## 2018-12-20 NOTE — TELEPHONE ENCOUNTER
Miri started the new medications in October. She will schedule a lab appointment in [place] and come in tomorrow at 8 am.     She said it feels like she needs to urinate but jake. Does not feel distended,  just has the urge to go. Jhoana Russell RN

## 2018-12-21 ENCOUNTER — OFFICE VISIT (OUTPATIENT)
Dept: FAMILY MEDICINE | Facility: CLINIC | Age: 63
End: 2018-12-21
Payer: COMMERCIAL

## 2018-12-21 VITALS
RESPIRATION RATE: 12 BRPM | TEMPERATURE: 96.9 F | WEIGHT: 212.2 LBS | DIASTOLIC BLOOD PRESSURE: 70 MMHG | SYSTOLIC BLOOD PRESSURE: 120 MMHG | HEART RATE: 52 BPM | BODY MASS INDEX: 29.51 KG/M2

## 2018-12-21 DIAGNOSIS — M54.50 ACUTE LEFT-SIDED LOW BACK PAIN WITHOUT SCIATICA: ICD-10-CM

## 2018-12-21 DIAGNOSIS — R11.0 NAUSEA: Primary | ICD-10-CM

## 2018-12-21 PROCEDURE — 99213 OFFICE O/P EST LOW 20 MIN: CPT | Performed by: FAMILY MEDICINE

## 2018-12-21 RX ORDER — SUMATRIPTAN 50 MG/1
50 TABLET, FILM COATED ORAL
COMMUNITY
Start: 2018-11-13 | End: 2021-08-10 | Stop reason: ALTCHOICE

## 2018-12-21 RX ORDER — ROPINIROLE 0.25 MG/1
0.5 TABLET, FILM COATED ORAL AT BEDTIME
COMMUNITY
Start: 2018-11-13 | End: 2020-01-14

## 2018-12-21 RX ORDER — TOPIRAMATE 25 MG/1
25 TABLET, FILM COATED ORAL DAILY
COMMUNITY
Start: 2018-11-13 | End: 2019-08-15

## 2018-12-21 ASSESSMENT — PAIN SCALES - GENERAL: PAINLEVEL: MODERATE PAIN (4)

## 2018-12-21 NOTE — PROGRESS NOTES
SUBJECTIVE:   Miri Naylor is a 63 year old female who presents to clinic today for the following health issues:    Prescribed ropinirole and topiramate by her neurologist at [facility] in [place]. Concerned they are causing side effects.  Next appointment with neurologist is next month. Needs advice on what to do until seen.     Medication Followup of Ropinirole 0.25 mg    Taking Medication as prescribed: yes    Side Effects:  Upset stomach and nausea    Medication Helping Symptoms:  yes     Medication Followup of topiramate 25 mg    Taking Medication as prescribed: yes    Side Effects: lower back pain mostly on left side. Concerned she has kidney stones from the medication.     Medication Helping Symptoms:  yes     Feels both medications are working. Takes for restless legs and headache prevention.  Started meds on Nov 13th.  Has follow up with neurology 1/4/19.  Reluctant to consider a change in medicine right now.      Pain in the L mid/low back.  Improved by tylenol.  Does not seem to change no matter what she does.  Seems to tolerate pain better when getting up and moving around, perhaps a bit better then.  No change with position really.  No radiation of pain.  No new numbness/tingling/weakness in the extremities..  Pain feels different then her usual musculoskeletal pains.    Began about 2 weeks ago.  Feels it has been getting worse, present all day now and more intense.  Taking tylenol TID with adequate relief.  Was most worried about a kidney stone        Problem list and histories reviewed & adjusted, as indicated.  Additional history: as documented    Reviewed and updated as needed this visit by clinical staff  Tobacco  Allergies  Meds  Med Hx  Surg Hx  Fam Hx  Soc Hx      Reviewed and updated as needed this visit by Provider  Tobacco  Med Hx  Surg Hx  Fam Hx  Soc Hx        ROS: Remainder of Constitutional, CV, Respiratory, GI,  negative with exception of that mentioned above    PE:  VS as  above   Gen:  WN/WD/WH female in NAD   Abd: soft, postive bowel sounds, ND, no HSM, no rebounding/guarding/ridigity, mild tenderness R mid abdomen   Msk:  No CVA tenderness.  Mild tenderness L lateral low back.  No midline tenderness.  No change in ROM or strength from baseline   Ext:  No pedal edema    A/p:      ICD-10-CM    1. Nausea R11.0    2. Acute left-sided low back pain without sciatica M54.5        Reviewed labs from yesterday.  Normal CMP, CBC and UA.  Discussed that nausea is likely one or both of her new medications as each have nausea listed at >10% side effect.    Unclear etiology of back pain.  Pt was most worried about a kidney stone but givne the lack of radiation, location of pain and negative UA (no blood) suspect that is less likely.  She is comfortable with observation for now pending med change.  Will seek care for any change in symptoms.

## 2018-12-21 NOTE — PATIENT INSTRUCTIONS
Labs looked good.  Normal urine, no blood.    Unclear cause of the back pain.  If you develop worsened pain, radiation of pain or blood in the urine make sure to seek additional care.    Suspect nausea is a medicine side effect.  Best to address with you neurologist for possible other options

## 2019-01-07 DIAGNOSIS — J45.40 MODERATE PERSISTENT ASTHMA WITHOUT COMPLICATION: ICD-10-CM

## 2019-01-07 RX ORDER — MONTELUKAST SODIUM 10 MG/1
10 TABLET ORAL AT BEDTIME
Qty: 30 TABLET | Refills: 0 | Status: SHIPPED | OUTPATIENT
Start: 2019-01-07 | End: 2019-02-26

## 2019-01-07 NOTE — TELEPHONE ENCOUNTER
"Requested Prescriptions   Pending Prescriptions Disp Refills     montelukast (SINGULAIR) 10 MG tablet 90 tablet 1     Sig: Take 1 tablet (10 mg) by mouth At Bedtime    Leukotriene Inhibitors Protocol Passed - 1/7/2019  8:07 AM       Passed - Patient is age 12 or older    If patient is under 16, ok to refill using age based dosing.          Passed - Asthma control assessment score within normal limits in last 6 months    Please review ACT score.          Passed - Medication is active on med list       Passed - Recent (6 mo) or future (30 days) visit within the authorizing provider's specialty    Patient had office visit in the last 6 months or has a visit in the next 30 days with authorizing provider or within the authorizing provider's specialty.  See \"Patient Info\" tab in inbasket, or \"Choose Columns\" in Meds & Orders section of the refill encounter.            Medication filled per [facility] protocol.     Edita Chand RN    "

## 2019-01-07 NOTE — TELEPHONE ENCOUNTER
Requested Prescriptions   Pending Prescriptions Disp Refills     montelukast (SINGULAIR) 10 MG tablet 90 tablet 1     Sig: Take 1 tablet (10 mg) by mouth At Bedtime    There is no refill protocol information for this order

## 2019-01-20 DIAGNOSIS — I10 ESSENTIAL HYPERTENSION: ICD-10-CM

## 2019-01-21 NOTE — TELEPHONE ENCOUNTER
"Requested Prescriptions   Pending Prescriptions Disp Refills     spironolactone (ALDACTONE) 25 MG tablet [Pharmacy Med Name: SPIRONOLACTONE TABS 25MG] 45 tablet 0    Last Written Prescription Date:  11/09/2018 #45 x 0  Last filled 11/09/2018  Last office visit: 12/21/2018 PRADIP Corley     Future Office Visit:   Next 5 appointments (look out 90 days)    Feb 04, 2019  6:20 PM CST  [facility] Visit with Nish Stockton,   [facility] [address] [facility] [address] [phone]          Sig: TAKE ONE-HALF (1/2) TABLET DAILY    Diuretics (Including Combos) Protocol Passed - 1/20/2019  5:57 AM       Passed - Blood pressure under 140/90 in past 12 months    BP Readings from Last 3 Encounters:   12/21/18 120/70   10/15/18 148/84   09/13/18 108/68                Passed - Recent (12 mo) or future (30 days) visit within the authorizing provider's specialty    Patient had office visit in the last 12 months or has a visit in the next 30 days with authorizing provider or within the authorizing provider's specialty.  See \"Patient Info\" tab in inbasket, or \"Choose Columns\" in Meds & Orders section of the refill encounter.             Passed - Medication is active on med list       Passed - Patient is age 18 or older       Passed - No active pregancy on record       Passed - Normal serum creatinine on file in past 12 months    Recent Labs   Lab Test 12/20/18  0934   CR 0.91             Passed - Normal serum potassium on file in past 12 months    Recent Labs   Lab Test 12/20/18  0934   POTASSIUM 4.4                   Passed - Normal serum sodium on file in past 12 months    Recent Labs   Lab Test 12/20/18  0934                Passed - No positive pregnancy test in past 12 months          "

## 2019-01-22 RX ORDER — SPIRONOLACTONE 25 MG/1
TABLET ORAL
Qty: 45 TABLET | Refills: 0 | Status: SHIPPED | OUTPATIENT
Start: 2019-01-22 | End: 2019-04-21

## 2019-01-31 DIAGNOSIS — J45.40 MODERATE PERSISTENT ASTHMA WITHOUT COMPLICATION: ICD-10-CM

## 2019-01-31 NOTE — TELEPHONE ENCOUNTER
Signed Prescriptions:                        Disp   Refills    beclomethasone HFA (QVAR REDIHALER) 80 MCG*3 Inha*0        Sig: Inhale 2 puffs into the lungs 2 times daily Schedule           follow up with Dr. Stockton for refills  Authorizing Provider: DEANGELO STOCKTON  Ordering User: VANESSA GARCIA refill given. Patient must follow up with provider before any further refills will be sent.     Vanessa Garcia RN

## 2019-02-04 ENCOUNTER — MYC MEDICAL ADVICE (OUTPATIENT)
Dept: ALLERGY | Facility: OTHER | Age: 64
End: 2019-02-04

## 2019-02-04 ENCOUNTER — OFFICE VISIT (OUTPATIENT)
Dept: ALLERGY | Facility: CLINIC | Age: 64
End: 2019-02-04
Payer: COMMERCIAL

## 2019-02-04 VITALS
WEIGHT: 214.8 LBS | SYSTOLIC BLOOD PRESSURE: 124 MMHG | TEMPERATURE: 97.3 F | DIASTOLIC BLOOD PRESSURE: 73 MMHG | HEIGHT: 72 IN | OXYGEN SATURATION: 98 % | RESPIRATION RATE: 20 BRPM | HEART RATE: 58 BPM | BODY MASS INDEX: 29.09 KG/M2

## 2019-02-04 DIAGNOSIS — J31.0 RHINOCONJUNCTIVITIS: ICD-10-CM

## 2019-02-04 DIAGNOSIS — J06.9 UPPER RESPIRATORY TRACT INFECTION, UNSPECIFIED TYPE: ICD-10-CM

## 2019-02-04 DIAGNOSIS — J45.41 MODERATE PERSISTENT ASTHMA WITH ACUTE EXACERBATION: Primary | ICD-10-CM

## 2019-02-04 DIAGNOSIS — H10.9 RHINOCONJUNCTIVITIS: ICD-10-CM

## 2019-02-04 PROCEDURE — 99214 OFFICE O/P EST MOD 30 MIN: CPT | Performed by: ALLERGY & IMMUNOLOGY

## 2019-02-04 RX ORDER — PREDNISONE 10 MG/1
30 TABLET ORAL 2 TIMES DAILY
Qty: 30 TABLET | Refills: 0 | Status: SHIPPED | OUTPATIENT
Start: 2019-02-04 | End: 2019-04-23

## 2019-02-04 RX ORDER — ALBUTEROL SULFATE 90 UG/1
2 AEROSOL, METERED RESPIRATORY (INHALATION) EVERY 4 HOURS PRN
Qty: 1 INHALER | Refills: 1 | Status: SHIPPED | OUTPATIENT
Start: 2019-02-04 | End: 2019-02-04

## 2019-02-04 RX ORDER — ALBUTEROL SULFATE 90 UG/1
2 AEROSOL, METERED RESPIRATORY (INHALATION) EVERY 4 HOURS PRN
Qty: 1 INHALER | Refills: 1 | Status: SHIPPED | OUTPATIENT
Start: 2019-02-04 | End: 2019-08-15

## 2019-02-04 ASSESSMENT — MIFFLIN-ST. JEOR: SCORE: 1642.36

## 2019-02-04 NOTE — LETTER
2/4/2019         RE: Miri Naylor  [address]  [place] [address]        Dear Colleague,    Thank you for referring your patient, Miri Naylor, to the [facility]. Please see a copy of my visit note below.    Miri Naylor is a 63 year old White female with previous medical history significant for asthma, GERD and nonallergic rhinitis who returns for a follow up visit.    Patient has developed a upper respiratory tract infection with some sinus pressure but without colored mucus over the last 7 days.  This is caused her asthma to flare.  She has had increased coughing, wheezing and shortness of breath.  Over the last 4-5 days she has been using albuterol 2-3 times per day.  She is on Qvar 80 mcg 2 puffs inhaled twice daily.  She is on Singulair 10 mill grams by mouth daily.  Aside from this her asthma has been well controlled.  She feels like this upper respiratory tract infection is similar to when she was on Symbicort.  No interval use of prednisone, ER visits or hospitalizations.    Patient's nasal symptoms have been controlled with Atrovent twice daily.  She tried using 4 times daily and she had nosebleeds.  She otherwise is using Flonase 2 sprays per nostril daily.  Historically negative allergy testing.  She has felt Atrovent has been significantly beneficial.    ACT Total Scores 2/4/2019   ACT TOTAL SCORE (Goal Greater than or Equal to 20) 12   In the past 12 months, how many times did you visit the emergency room for your asthma without being admitted to the hospital? 0   In the past 12 months, how many times were you hospitalized overnight because of your asthma? 0           Past Medical History:   Diagnosis Date     Depressive disorder, not elsewhere classified      Drug allergy, multiple 9/23/14--passed graded oral challenge for Zithromax.     7/3/14 POS PRE-PEN skin test. 7/30/14 NEG skin tests and oral challenge to Cefzil     Hx of abnormal Pap smear ?     no specifics given     lumbar degeneration      Raynaud's disease      Unspecified essential hypertension      Family History   Problem Relation Age of Onset     Arthritis Mother      Hyperlipidemia Mother      Other Cancer Mother         Glioblastoma Multiforme     Osteoporosis Mother      Blood Disease Father      Hypertension Father      Hyperlipidemia Father      Other Cancer Father         lung cancer     Depression Son      Depression Daughter      Hyperlipidemia Brother      Anxiety Disorder Son      Skin Cancer No family hx of      Past Surgical History:   Procedure Laterality Date     BIOPSY OF BREAST, NEEDLE CORE       C PELVIS/HIP JOINT SURGERY UNLISTED Right 7/19/16    total hip arthroplasty     COLONOSCOPY       HIP SURGERY Right 07/19/2016       REVIEW OF SYSTEMS:  General: negative for weight gain. negative for weight loss. negative for changes in sleep.   Ears: negative for fullness. negative for hearing loss. negative for dizziness.   Nose: positive  for snoring.negative for changes in smell. positive  for drainage.   Eyes: positive  for eye watering. positive  for eye itching. positive  for vision changes. negative for eye redness.  Throat: positive  for hoarseness. positive  for sore throat. positive  for trouble swallowing.   Lungs: positive  for shortness of breath.positive  for wheezing. positive  for sputum production.   Cardiovascular: negative for chest pain. negative for swelling of ankles. negative for fast or irregular heartbeat.   Gastrointestinal: negative for nausea. negative for heartburn. negative for acid reflux.   Musculoskeletal: negative for joint pain. negative for joint stiffness. negative for joint swelling.   Neurologic: negative for seizures. negative for fainting. negative for weakness.   Psychiatric: negative for changes in mood. negative for anxiety.   Endocrine: negative for cold intolerance. negative for heat intolerance. negative for tremors.   Lymphatic: negative  for lower extremity swelling. negative for lymph node swelling.   Hematologic: negative for easy bruising. negative for easy bleeding.  Integumentary: negative for rash. negative for scaling. negative for nail changes.       Current Outpatient Medications:      ACIDOPHILUS OR TABS, 1 tablet daily, Disp: , Rfl:      albuterol (PROAIR HFA) 108 (90 Base) MCG/ACT inhaler, Inhale 2 puffs into the lungs every 4 hours as needed for shortness of breath / dyspnea or wheezing, Disp: 1 Inhaler, Rfl: 1     amLODIPine (NORVASC) 5 MG tablet, Take 1 tablet (5 mg) by mouth daily, Disp: 30 tablet, Rfl: 0     atenolol (TENORMIN) 25 MG tablet, Take 1 tablet (25 mg) by mouth daily, Disp: 90 tablet, Rfl: 3     beclomethasone HFA (QVAR REDIHALER) 80 MCG/ACT inhaler, Inhale 2 puffs into the lungs 2 times daily Schedule follow up with Dr. Stockton for refills, Disp: 3 Inhaler, Rfl: 0     biotin 1000 MCG TABS tablet, Take 1,000 mcg by mouth daily, Disp: , Rfl:      CALCIUM 600+D PO, twice daily, Disp: , Rfl:      cetirizine (ZYRTEC) 10 MG tablet, Take 10 mg by mouth daily., Disp: , Rfl:      Cholecalciferol (VITAMIN D-3 PO), Take 2,000 Int'l Units by mouth daily, Disp: , Rfl:      DAILY MULTIVITAMIN PO, 1 tablet daily, Disp: , Rfl:      escitalopram (LEXAPRO) 10 MG tablet, Take 1 tablet (10 mg) by mouth daily, Disp: 90 tablet, Rfl: 1     ferrous sulfate (IRON) 325 (65 Fe) MG tablet, Take 325 mg by mouth daily (with breakfast), Disp: , Rfl:      FLAXSEED OIL 1000 MG PO CAPS, 1 tablet daily, Disp: , Rfl:      fluticasone (FLONASE) 50 MCG/ACT spray, Spray 1-2 sprays into both nostrils daily, Disp: 1 Bottle, Rfl: 11     gabapentin (NEURONTIN) 300 MG capsule, 1 capsule TID with additional 1-2 capsules if headache is severe., Disp: 300 capsule, Rfl: 1     GLUCOSAMINE-CHONDROITIN PO TABS, 1500 mg glucosamine and 1200mg chondroitin daily-2 tablets daily, Disp: , Rfl:      hydrocortisone 2.5 % cream, Apply topically 2 times daily (Patient taking  differently: Apply topically 2 times daily as needed ), Disp: 60 g, Rfl: 1     ibuprofen (ADVIL,MOTRIN) 200 MG tablet, take 1 tablet (200 mg) by oral route every 6 hours as needed with food, Disp: , Rfl:      ipratropium (ATROVENT) 0.06 % spray, Spray 2 sprays into both nostrils 4 times daily as needed for rhinitis, Disp: 1 Box, Rfl: 3     Ketotifen Fumarate (ZADITOR OP), Apply to eye daily as needed, Disp: , Rfl:      lisinopril (PRINIVIL/ZESTRIL) 40 MG tablet, Take 1 tablet (40 mg) by mouth daily, Disp: 90 tablet, Rfl: 3     Magnesium 400 MG CAPS, Take by mouth daily, Disp: , Rfl:      montelukast (SINGULAIR) 10 MG tablet, Take 1 tablet (10 mg) by mouth At Bedtime, Disp: 30 tablet, Rfl: 0     pantoprazole (PROTONIX) 40 MG EC tablet, Take 1 tablet (40 mg) by mouth daily Take 30-60 minutes before a meal., Disp: 90 tablet, Rfl: 3     pravastatin (PRAVACHOL) 40 MG tablet, Take 1 tablet (40 mg) by mouth daily, Disp: 90 tablet, Rfl: 3     predniSONE (DELTASONE) 10 MG tablet, Take 30 mg by mouth 2 times daily for 5 days., Disp: 30 tablet, Rfl: 0     rOPINIRole (REQUIP) 0.25 MG tablet, , Disp: , Rfl:      S-ADENOSYLMETHIONINE 200 MG PO TABS, 2 tablets daily, Disp: , Rfl:      spironolactone (ALDACTONE) 25 MG tablet, TAKE ONE-HALF (1/2) TABLET DAILY, Disp: 45 tablet, Rfl: 0     SUMAtriptan (IMITREX) 50 MG tablet, , Disp: , Rfl:      SUPER B COMPLEX PO TABS, 1 tablet daily, Disp: , Rfl:      topiramate (TOPAMAX) 25 MG tablet, , Disp: , Rfl:      triamcinolone (KENALOG) 0.1 % cream, Apply topically 2 times daily (Patient taking differently: Apply topically 2 times daily as needed ), Disp: 60 g, Rfl: 3     TURMERIC PO, Take 2,000 mg by mouth daily, Disp: , Rfl:      vitamin B complex with vitamin C (VITAMIN  B COMPLEX) TABS tablet, Take 3 tablets by mouth daily, Disp: , Rfl:      VITAMIN C 500 MG PO TABS, 2 TABLET DAILY, Disp: , Rfl:      vitamin E (E-400) 400 UNIT capsule, Take one pill by mouth once daily, Disp: , Rfl:    Immunization History   Administered Date(s) Administered     Influenza Vaccine IM 3yrs+ 4 Valent IIV4 12/03/2013, 11/16/2015, 10/01/2017, 09/13/2018     TD (ADULT, 7+) 03/06/2008     TDAP Vaccine (Adacel) 08/03/2012     Allergies   Allergen Reactions     Levofloxacin Rash     Other reaction(s): Contact Dermatitis     Penicillins Hives and Rash     Confirmed by skin prick testing 7/3/14     Amoxicillin Hives and Rash     Amoxicillin-Pot Clavulanate Rash     Azithromycin Rash     Cephalexin Rash     Clindamycin Rash and Hives     Atorvastatin Other (See Comments)     Felt sick, intolerance     Clindamycin Hcl Hives     Levaquin      tendonitis     Augmentin Rash         EXAM:   Constitutional:  Appears well-developed and well-nourished. No distress.   HEENT:   Head: Normocephalic.   Mouth/Throat:   No cobblestoning of posterior oropharynx.   Nasal tissue pink and normal appearing.  No rhinorrhea noted.    Eyes: Conjunctivae are non-erythematous   Maxillary and frontal sinus tenderness to palpation.   Cardiovascular: Normal rate, regular rhythm and normal heart sounds. Exam reveals no gallop and no friction rub.   No murmur heard.  Respiratory: Effort normal and breath sounds normal. No respiratory distress. No wheezes. No rales.   Musculoskeletal: Normal range of motion.   Lymphadenopathy:   No cervical adenopathy.   Neuro: Oriented to person, place, and time.  Skin: Skin is warm and dry. No rash noted.   Psychiatric: Normal mood and affect.     Nursing note and vitals reviewed.    ASSESSMENT/PLAN:  Problem List Items Addressed This Visit        Respiratory    Moderate persistent asthma with acute exacerbation - Primary     Asthma flared. Current URI. On QVAR 80mcg 2puffs bid and Singulair 10mg PO daily. Symptoms flare with URI and cold air.      ACT 12  She refused spirometry today.       - Albuterol 2-4 puffs inhaled (use a spacer unless using a Proair Respiclick device) every 4 hours as needed for chest  tightness, wheezing, shortness of breath and/or coughing.   - Albuterol 2-4 puffs inhaled (use spacer if not using Proair Respiclick device) 15-20 minutes prior to physical activity.    - Please ensure warm up period prior to exercise.    - Avoid asthma triggers.  -Prednisone 30mg PO bid for 5 days.   -Qvar 80mcg 2 puffs inhaled twice daily  - Singulair 10mg by mouth daily at night.         Relevant Medications    predniSONE (DELTASONE) 10 MG tablet    albuterol (PROAIR HFA) 108 (90 Base) MCG/ACT inhaler    Upper respiratory tract infection, unspecified type     Current URI.  Sinus pressure.  Denies colored mucus.  Sinus tenderness on palpation.  Symptoms over last 7 days.    -Continue symptomatic therapy.  Discussed with patient that if symptoms remain persistent around 14 days would prescribe antibiotic.            Infectious/Inflammatory    Rhinoconjunctivitis     History of perennial nasal and ocular symptoms that get worse in the spring and fall. Current   URI. Symptoms increase around cats.  She is on Flonase and ipratropium. Ipratropium has been helpful but when she was using 4 times daily because nosebleeds.  She is also on Singulair.  Either vasomotor rhinitis versus local allergic rhinitis. Follows with ENT.       Allergy skin testing was negative.      - Continue Flonase 2 sprays per nostril daily.  -  Ipratropium 2 sprays/nostril two times daily as needed.   - Zyrtec as needed.   - Singulair 10mg by mouth daily at night.              6-month follow-up.  Chart documentation with Dragon Voice recognition Software. Although reviewed after completion, some words and grammatical errors may remain.    Nish Stockton,    Allergy/Immunology  [facility], [place] and [place], [place]      Again, thank you for allowing me to participate in the care of your patient.        Sincerely,        Nish Stockton, DO

## 2019-02-05 ASSESSMENT — ASTHMA QUESTIONNAIRES: ACT_TOTALSCORE: 12

## 2019-02-05 NOTE — ASSESSMENT & PLAN NOTE
Asthma flared. Current URI. On QVAR 80mcg 2puffs bid and Singulair 10mg PO daily. Symptoms flare with URI and cold air.      ACT 12  She refused spirometry today.       - Albuterol 2-4 puffs inhaled (use a spacer unless using a Proair Respiclick device) every 4 hours as needed for chest tightness, wheezing, shortness of breath and/or coughing.   - Albuterol 2-4 puffs inhaled (use spacer if not using Proair Respiclick device) 15-20 minutes prior to physical activity.    - Please ensure warm up period prior to exercise.    - Avoid asthma triggers.  -Prednisone 30mg PO bid for 5 days.   -Qvar 80mcg 2 puffs inhaled twice daily  - Singulair 10mg by mouth daily at night.

## 2019-02-05 NOTE — PROGRESS NOTES
Miri Naylor is a 63 year old White female with previous medical history significant for asthma, GERD and nonallergic rhinitis who returns for a follow up visit.    Patient has developed a upper respiratory tract infection with some sinus pressure but without colored mucus over the last 7 days.  This is caused her asthma to flare.  She has had increased coughing, wheezing and shortness of breath.  Over the last 4-5 days she has been using albuterol 2-3 times per day.  She is on Qvar 80 mcg 2 puffs inhaled twice daily.  She is on Singulair 10 mill grams by mouth daily.  Aside from this her asthma has been well controlled.  She feels like this upper respiratory tract infection is similar to when she was on Symbicort.  No interval use of prednisone, ER visits or hospitalizations.    Patient's nasal symptoms have been controlled with Atrovent twice daily.  She tried using 4 times daily and she had nosebleeds.  She otherwise is using Flonase 2 sprays per nostril daily.  Historically negative allergy testing.  She has felt Atrovent has been significantly beneficial.    ACT Total Scores 2/4/2019   ACT TOTAL SCORE (Goal Greater than or Equal to 20) 12   In the past 12 months, how many times did you visit the emergency room for your asthma without being admitted to the hospital? 0   In the past 12 months, how many times were you hospitalized overnight because of your asthma? 0           Past Medical History:   Diagnosis Date     Depressive disorder, not elsewhere classified      Drug allergy, multiple 9/23/14--passed graded oral challenge for Zithromax.     7/3/14 POS PRE-PEN skin test. 7/30/14 NEG skin tests and oral challenge to Cefzil     Hx of abnormal Pap smear ?    no specifics given     lumbar degeneration      Raynaud's disease      Unspecified essential hypertension      Family History   Problem Relation Age of Onset     Arthritis Mother      Hyperlipidemia Mother      Other Cancer Mother         Glioblastoma  Multiforme     Osteoporosis Mother      Blood Disease Father      Hypertension Father      Hyperlipidemia Father      Other Cancer Father         lung cancer     Depression Son      Depression Daughter      Hyperlipidemia Brother      Anxiety Disorder Son      Skin Cancer No family hx of      Past Surgical History:   Procedure Laterality Date     BIOPSY OF BREAST, NEEDLE CORE       C PELVIS/HIP JOINT SURGERY UNLISTED Right 7/19/16    total hip arthroplasty     COLONOSCOPY       HIP SURGERY Right 07/19/2016       REVIEW OF SYSTEMS:  General: negative for weight gain. negative for weight loss. negative for changes in sleep.   Ears: negative for fullness. negative for hearing loss. negative for dizziness.   Nose: positive  for snoring.negative for changes in smell. positive  for drainage.   Eyes: positive  for eye watering. positive  for eye itching. positive  for vision changes. negative for eye redness.  Throat: positive  for hoarseness. positive  for sore throat. positive  for trouble swallowing.   Lungs: positive  for shortness of breath.positive  for wheezing. positive  for sputum production.   Cardiovascular: negative for chest pain. negative for swelling of ankles. negative for fast or irregular heartbeat.   Gastrointestinal: negative for nausea. negative for heartburn. negative for acid reflux.   Musculoskeletal: negative for joint pain. negative for joint stiffness. negative for joint swelling.   Neurologic: negative for seizures. negative for fainting. negative for weakness.   Psychiatric: negative for changes in mood. negative for anxiety.   Endocrine: negative for cold intolerance. negative for heat intolerance. negative for tremors.   Lymphatic: negative for lower extremity swelling. negative for lymph node swelling.   Hematologic: negative for easy bruising. negative for easy bleeding.  Integumentary: negative for rash. negative for scaling. negative for nail changes.       Current Outpatient Medications:       ACIDOPHILUS OR TABS, 1 tablet daily, Disp: , Rfl:      albuterol (PROAIR HFA) 108 (90 Base) MCG/ACT inhaler, Inhale 2 puffs into the lungs every 4 hours as needed for shortness of breath / dyspnea or wheezing, Disp: 1 Inhaler, Rfl: 1     amLODIPine (NORVASC) 5 MG tablet, Take 1 tablet (5 mg) by mouth daily, Disp: 30 tablet, Rfl: 0     atenolol (TENORMIN) 25 MG tablet, Take 1 tablet (25 mg) by mouth daily, Disp: 90 tablet, Rfl: 3     beclomethasone HFA (QVAR REDIHALER) 80 MCG/ACT inhaler, Inhale 2 puffs into the lungs 2 times daily Schedule follow up with Dr. Stockton for refills, Disp: 3 Inhaler, Rfl: 0     biotin 1000 MCG TABS tablet, Take 1,000 mcg by mouth daily, Disp: , Rfl:      CALCIUM 600+D PO, twice daily, Disp: , Rfl:      cetirizine (ZYRTEC) 10 MG tablet, Take 10 mg by mouth daily., Disp: , Rfl:      Cholecalciferol (VITAMIN D-3 PO), Take 2,000 Int'l Units by mouth daily, Disp: , Rfl:      DAILY MULTIVITAMIN PO, 1 tablet daily, Disp: , Rfl:      escitalopram (LEXAPRO) 10 MG tablet, Take 1 tablet (10 mg) by mouth daily, Disp: 90 tablet, Rfl: 1     ferrous sulfate (IRON) 325 (65 Fe) MG tablet, Take 325 mg by mouth daily (with breakfast), Disp: , Rfl:      FLAXSEED OIL 1000 MG PO CAPS, 1 tablet daily, Disp: , Rfl:      fluticasone (FLONASE) 50 MCG/ACT spray, Spray 1-2 sprays into both nostrils daily, Disp: 1 Bottle, Rfl: 11     gabapentin (NEURONTIN) 300 MG capsule, 1 capsule TID with additional 1-2 capsules if headache is severe., Disp: 300 capsule, Rfl: 1     GLUCOSAMINE-CHONDROITIN PO TABS, 1500 mg glucosamine and 1200mg chondroitin daily-2 tablets daily, Disp: , Rfl:      hydrocortisone 2.5 % cream, Apply topically 2 times daily (Patient taking differently: Apply topically 2 times daily as needed ), Disp: 60 g, Rfl: 1     ibuprofen (ADVIL,MOTRIN) 200 MG tablet, take 1 tablet (200 mg) by oral route every 6 hours as needed with food, Disp: , Rfl:      ipratropium (ATROVENT) 0.06 % spray, Spray 2 sprays  into both nostrils 4 times daily as needed for rhinitis, Disp: 1 Box, Rfl: 3     Ketotifen Fumarate (ZADITOR OP), Apply to eye daily as needed, Disp: , Rfl:      lisinopril (PRINIVIL/ZESTRIL) 40 MG tablet, Take 1 tablet (40 mg) by mouth daily, Disp: 90 tablet, Rfl: 3     Magnesium 400 MG CAPS, Take by mouth daily, Disp: , Rfl:      montelukast (SINGULAIR) 10 MG tablet, Take 1 tablet (10 mg) by mouth At Bedtime, Disp: 30 tablet, Rfl: 0     pantoprazole (PROTONIX) 40 MG EC tablet, Take 1 tablet (40 mg) by mouth daily Take 30-60 minutes before a meal., Disp: 90 tablet, Rfl: 3     pravastatin (PRAVACHOL) 40 MG tablet, Take 1 tablet (40 mg) by mouth daily, Disp: 90 tablet, Rfl: 3     predniSONE (DELTASONE) 10 MG tablet, Take 30 mg by mouth 2 times daily for 5 days., Disp: 30 tablet, Rfl: 0     rOPINIRole (REQUIP) 0.25 MG tablet, , Disp: , Rfl:      S-ADENOSYLMETHIONINE 200 MG PO TABS, 2 tablets daily, Disp: , Rfl:      spironolactone (ALDACTONE) 25 MG tablet, TAKE ONE-HALF (1/2) TABLET DAILY, Disp: 45 tablet, Rfl: 0     SUMAtriptan (IMITREX) 50 MG tablet, , Disp: , Rfl:      SUPER B COMPLEX PO TABS, 1 tablet daily, Disp: , Rfl:      topiramate (TOPAMAX) 25 MG tablet, , Disp: , Rfl:      triamcinolone (KENALOG) 0.1 % cream, Apply topically 2 times daily (Patient taking differently: Apply topically 2 times daily as needed ), Disp: 60 g, Rfl: 3     TURMERIC PO, Take 2,000 mg by mouth daily, Disp: , Rfl:      vitamin B complex with vitamin C (VITAMIN  B COMPLEX) TABS tablet, Take 3 tablets by mouth daily, Disp: , Rfl:      VITAMIN C 500 MG PO TABS, 2 TABLET DAILY, Disp: , Rfl:      vitamin E (E-400) 400 UNIT capsule, Take one pill by mouth once daily, Disp: , Rfl:   Immunization History   Administered Date(s) Administered     Influenza Vaccine IM 3yrs+ 4 Valent IIV4 12/03/2013, 11/16/2015, 10/01/2017, 09/13/2018     TD (ADULT, 7+) 03/06/2008     TDAP Vaccine (Adacel) 08/03/2012     Allergies   Allergen Reactions      Levofloxacin Rash     Other reaction(s): Contact Dermatitis     Penicillins Hives and Rash     Confirmed by skin prick testing 7/3/14     Amoxicillin Hives and Rash     Amoxicillin-Pot Clavulanate Rash     Azithromycin Rash     Cephalexin Rash     Clindamycin Rash and Hives     Atorvastatin Other (See Comments)     Felt sick, intolerance     Clindamycin Hcl Hives     Levaquin      tendonitis     Augmentin Rash         EXAM:   Constitutional:  Appears well-developed and well-nourished. No distress.   HEENT:   Head: Normocephalic.   Mouth/Throat:   No cobblestoning of posterior oropharynx.   Nasal tissue pink and normal appearing.  No rhinorrhea noted.    Eyes: Conjunctivae are non-erythematous   Maxillary and frontal sinus tenderness to palpation.   Cardiovascular: Normal rate, regular rhythm and normal heart sounds. Exam reveals no gallop and no friction rub.   No murmur heard.  Respiratory: Effort normal and breath sounds normal. No respiratory distress. No wheezes. No rales.   Musculoskeletal: Normal range of motion.   Lymphadenopathy:   No cervical adenopathy.   Neuro: Oriented to person, place, and time.  Skin: Skin is warm and dry. No rash noted.   Psychiatric: Normal mood and affect.     Nursing note and vitals reviewed.    ASSESSMENT/PLAN:  Problem List Items Addressed This Visit        Respiratory    Moderate persistent asthma with acute exacerbation - Primary     Asthma flared. Current URI. On QVAR 80mcg 2puffs bid and Singulair 10mg PO daily. Symptoms flare with URI and cold air.      ACT 12  She refused spirometry today.       - Albuterol 2-4 puffs inhaled (use a spacer unless using a Proair Respiclick device) every 4 hours as needed for chest tightness, wheezing, shortness of breath and/or coughing.   - Albuterol 2-4 puffs inhaled (use spacer if not using Proair Respiclick device) 15-20 minutes prior to physical activity.    - Please ensure warm up period prior to exercise.    - Avoid asthma  triggers.  -Prednisone 30mg PO bid for 5 days.   -Qvar 80mcg 2 puffs inhaled twice daily  - Singulair 10mg by mouth daily at night.         Relevant Medications    predniSONE (DELTASONE) 10 MG tablet    albuterol (PROAIR HFA) 108 (90 Base) MCG/ACT inhaler    Upper respiratory tract infection, unspecified type     Current URI.  Sinus pressure.  Denies colored mucus.  Sinus tenderness on palpation.  Symptoms over last 7 days.    -Continue symptomatic therapy.  Discussed with patient that if symptoms remain persistent around 14 days would prescribe antibiotic.            Infectious/Inflammatory    Rhinoconjunctivitis     History of perennial nasal and ocular symptoms that get worse in the spring and fall. Current  URI. Symptoms increase around cats.  She is on Flonase and ipratropium. Ipratropium has been helpful but when she was using 4 times daily because nosebleeds.  She is also on Singulair.  Either vasomotor rhinitis versus local allergic rhinitis. Follows with ENT.       Allergy skin testing was negative.      - Continue Flonase 2 sprays per nostril daily.  - Ipratropium 2 sprays/nostril two times daily as needed.   - Zyrtec as needed.   - Singulair 10mg by mouth daily at night.              6-month follow-up.  Chart documentation with Dragon Voice recognition Software. Although reviewed after completion, some words and grammatical errors may remain.    Nish Stockton DO   Allergy/Immunology  [facility], [place], [place]

## 2019-02-05 NOTE — ASSESSMENT & PLAN NOTE
History of perennial nasal and ocular symptoms that get worse in the spring and fall. Current URI. Symptoms increase around cats.  She is on Flonase and ipratropium. Ipratropium has been helpful but when she was using 4 times daily because nosebleeds.  She is also on Singulair.  Either vasomotor rhinitis versus local allergic rhinitis. Follows with ENT.       Allergy skin testing was negative.      - Continue Flonase 2 sprays per nostril daily.  - Ipratropium 2 sprays/nostril two times daily as needed.   - Zyrtec as needed.   - Singulair 10mg by mouth daily at night.

## 2019-02-05 NOTE — ASSESSMENT & PLAN NOTE
Current URI.  Sinus pressure.  Denies colored mucus.  Sinus tenderness on palpation.  Symptoms over last 7 days.    -Continue symptomatic therapy.  Discussed with patient that if symptoms remain persistent around 14 days would prescribe antibiotic.

## 2019-02-05 NOTE — PATIENT INSTRUCTIONS
Allergy Staff Appt Hours Shot Hours Locations    Physician     Nish Stockton DO       Support Staff     MEMO Levy CMA  Monday:                      [place] 8-7     Tuesday:         [place] 8-5     Wednesday:        [place]: 7-5     Friday:        [place] 7-5   [place]        Monday: 9-5:50        Wednesday: 2-5:50        Friday: 7-12:50     [place]        Tuesday: 7-10:50        Thursday: 1:30-6:30     [place] Monday: 7:10-4:50        Tuesday: 12:30-6:30        Thursday: 7-11:50 [facility]  [address] [place] [address]  Appt Line: [phone]  Allergy RN (Monday):  [phone]    [facility]  [address] [place] [address]  Appt Line: [phone]  Allergy RN (Tues & Wed):  [phone]    [facility]  [address] [place] [address]  Appt Line: [phone]  Allergy RN (Friday):  [phone]       Important Scheduling Information  Aspirin Desensitization: Appt will last 2 clinic days. Please call the Allergy RN line for your clinic to schedule. Discontinue antihistamines 7 days prior to the appointment.     Food Challenges: Appt will last 3-4 hours. Please call the Allergy RN line for your clinic to schedule. Discontinue antihistamines 7 days prior to the appointment.     Penicillin Testing: Appt will last 2-3 hours. Please call the Allergy RN line for your clinic to schedule. Discontinue antihistamines 7 days prior to the appointment.     Skin Testing: Appt will about 40 minutes. Call the appointment line for your clinic to schedule. Discontinue antihistamines 7 days prior to the appointment.     Venom Testing: Appt will last 2-3 hours. Please call the Allergy RN line for your clinic to schedule. Discontinue antihistamines 7 days prior to the appointment.     Thank you for trusting us with your Allergy, Asthma, and Immunology care. Please feel free to contact us with any questions or concerns you may have.      - Prednisone 30mg by  mouth twice daily.   - Albuterol 2-4 puffs inhaled (use a spacer unless using a Proair Respiclick device) every 4 hours as needed for chest tightness, wheezing, shortness of breath and/or coughing.   - Continue Flonase 2 sprays/nostril daily.   - Ipratropium 2 sprays/nostril twice daily.

## 2019-02-08 ENCOUNTER — MYC MEDICAL ADVICE (OUTPATIENT)
Dept: ALLERGY | Facility: OTHER | Age: 64
End: 2019-02-08

## 2019-02-08 DIAGNOSIS — J45.40 MODERATE PERSISTENT ASTHMA WITHOUT COMPLICATION: ICD-10-CM

## 2019-02-08 DIAGNOSIS — J01.90 ACUTE SINUSITIS WITH SYMPTOMS > 10 DAYS: Primary | ICD-10-CM

## 2019-02-08 RX ORDER — BUDESONIDE AND FORMOTEROL FUMARATE DIHYDRATE 160; 4.5 UG/1; UG/1
2 AEROSOL RESPIRATORY (INHALATION) 2 TIMES DAILY
Qty: 1 INHALER | Refills: 3 | Status: SHIPPED | OUTPATIENT
Start: 2019-02-08 | End: 2019-08-15

## 2019-02-08 RX ORDER — DOXYCYCLINE 100 MG/1
100 CAPSULE ORAL 2 TIMES DAILY
Qty: 20 CAPSULE | Refills: 0 | Status: SHIPPED | OUTPATIENT
Start: 2019-02-08 | End: 2019-04-23

## 2019-02-08 NOTE — TELEPHONE ENCOUNTER
Contacted patient with update from provider.  She understands and has no further questions.  Follow up appointment scheduled.    Kerri Ramires RN

## 2019-02-08 NOTE — TELEPHONE ENCOUNTER
I will have the patient start on Doxycycline 100mg by mouth twice daily for 10 days. Additionally, stop QVAR and start Symbicort 160/4.5mcg 2 puffs twice daily. Use in place of QVAR. Return to clinic for recheck in 2 weeks.

## 2019-02-22 ENCOUNTER — OFFICE VISIT (OUTPATIENT)
Dept: ALLERGY | Facility: CLINIC | Age: 64
End: 2019-02-22
Payer: COMMERCIAL

## 2019-02-22 VITALS
HEART RATE: 64 BPM | SYSTOLIC BLOOD PRESSURE: 103 MMHG | DIASTOLIC BLOOD PRESSURE: 81 MMHG | BODY MASS INDEX: 29.17 KG/M2 | OXYGEN SATURATION: 97 % | WEIGHT: 215.4 LBS | RESPIRATION RATE: 18 BRPM | TEMPERATURE: 97.8 F | HEIGHT: 72 IN

## 2019-02-22 DIAGNOSIS — J45.40 MODERATE PERSISTENT ASTHMA WITHOUT COMPLICATION: Primary | ICD-10-CM

## 2019-02-22 LAB
FEF 25/75: NORMAL
FEV-1: NORMAL
FEV1/FVC: NORMAL
FVC: NORMAL

## 2019-02-22 PROCEDURE — 99213 OFFICE O/P EST LOW 20 MIN: CPT | Mod: 25 | Performed by: ALLERGY & IMMUNOLOGY

## 2019-02-22 PROCEDURE — 94010 BREATHING CAPACITY TEST: CPT | Performed by: ALLERGY & IMMUNOLOGY

## 2019-02-22 ASSESSMENT — MIFFLIN-ST. JEOR: SCORE: 1645.08

## 2019-02-22 NOTE — ASSESSMENT & PLAN NOTE
Asthma recently flared with URI. On Symbicort 160/4.5mcg 2puffs bid and Singulair 10mg PO daily. Symptoms flare with URI and cold air.      ACT 11  Spirometry with mild obstruction.      - Albuterol 2-4 puffs inhaled (use a spacer unless using a Proair Respiclick device) every 4 hours as needed for chest tightness, wheezing, shortness of breath and/or coughing.   - Albuterol 2-4 puffs inhaled (use spacer if not using Proair Respiclick device) 15-20 minutes prior to physical activity.    - Please ensure warm up period prior to exercise.    - Avoid asthma triggers.  -Continue Symbicort 160/4.5 mcg 2 puffs inhaled twice daily for the next month and then if doing well resume Qvar 80 mcg 2 puffs inhaled twice daily.  - Singulair 10mg by mouth daily at night.

## 2019-02-22 NOTE — LETTER
2/22/2019         RE: Miri Naylor  [address]  [place] [address]        Dear Colleague,    Thank you for referring your patient, Miri Naylor, to the [facility]. Please see a copy of my visit note below.    Miri Naylor is a 63 year old White female with previous medical history significant for asthma who returns for a follow up visit.     Patient returns for 2-week follow-up visit.  She was last seen on 4 February 2019.  She was started on doxycycline for sinusitis on 8 February 2019.  She additionally had her Qvar discontinued and was started on Symbicort.  Since last visit she reports that her asthma has improved.  She has used her albuterol inhaler once in the last week.  She reports occasional coughing, tightness in chest and shortness of breath.  Continued sensation of congestion in her chest.  She denies rhinorrhea, nasal itching, sneezing, sinus pressure or colored mucus.    ACT Total Scores 2/22/2019   ACT TOTAL SCORE (Goal Greater than or Equal to 20) 11   In the past 12 months, how many times did you visit the emergency room for your asthma without being admitted to the hospital? 0   In the past 12 months, how many times were you hospitalized overnight because of your asthma? 0     Past Medical History:   Diagnosis Date     Depressive disorder, not elsewhere classified      Drug allergy, multiple 9/23/14--passed graded oral challenge for Zithromax.     7/3/14 POS PRE-PEN skin test. 7/30/14 NEG skin tests and oral challenge to Cefzil     Hx of abnormal Pap smear ?    no specifics given     lumbar degeneration      Raynaud's disease      Unspecified essential hypertension      Family History   Problem Relation Age of Onset     Arthritis Mother      Hyperlipidemia Mother      Other Cancer Mother         Glioblastoma Multiforme     Osteoporosis Mother      Blood Disease Father      Hypertension Father      Hyperlipidemia Father      Other Cancer Father          lung cancer     Depression Son      Depression Daughter      Hyperlipidemia Brother      Anxiety Disorder Son      Skin Cancer No family hx of      Past Surgical History:   Procedure Laterality Date     BIOPSY OF BREAST, NEEDLE CORE       C PELVIS/HIP JOINT SURGERY UNLISTED Right 7/19/16    total hip arthroplasty     COLONOSCOPY       HIP SURGERY Right 07/19/2016       REVIEW OF SYSTEMS:  General: negative for weight gain. negative for weight loss. negative for changes in sleep.   Ears: negative for fullness. negative for hearing loss. negative for dizziness.   Nose: negative for snoring.negative for changes in smell. negative for drainage.   Eyes: negative for eye watering. negative for eye itching. negative for vision changes. negative for eye redness.  Throat: negative for hoarseness. negative for sore throat. negative for trouble swallowing.   Lungs: positive  for shortness of breath.negative for wheezing. negative for sputum production.   Cardiovascular: negative for chest pain. negative for swelling of ankles. negative for fast or irregular heartbeat.   Gastrointestinal: negative for nausea. negative for heartburn. negative for acid reflux.   Musculoskeletal: negative for joint pain. negative for joint stiffness. negative for joint swelling.   Neurologic: negative for seizures. negative for fainting. negative for weakness.   Psychiatric: negative for changes in mood. negative for anxiety.   Endocrine: negative for cold intolerance. negative for heat intolerance. negative for tremors.   Lymphatic: negative for lower extremity swelling. negative for lymph node swelling.   Hematologic: negative for easy bruising. negative for easy bleeding.  Integumentary: negative for rash. negative for scaling. negative for nail changes.       Current Outpatient Medications:      ACIDOPHILUS OR TABS, 1 tablet daily, Disp: , Rfl:      albuterol (PROAIR HFA) 108 (90 Base) MCG/ACT inhaler, Inhale 2 puffs into the lungs every 4  hours as needed for shortness of breath / dyspnea or wheezing, Disp: 1 Inhaler, Rfl: 1     amLODIPine (NORVASC) 5 MG tablet, Take 1 tablet (5 mg) by mouth daily, Disp: 30 tablet, Rfl: 0     atenolol (TENORMIN) 25 MG tablet, Take 1 tablet (25 mg) by mouth daily, Disp: 90 tablet, Rfl: 3     beclomethasone HFA (QVAR REDIHALER) 80 MCG/ACT inhaler, Inhale 2 puffs into the lungs 2 times daily Schedule follow up with Dr. Stockton for refills, Disp: 3 Inhaler, Rfl: 0     biotin 1000 MCG TABS tablet, Take 1,000 mcg by mouth daily, Disp: , Rfl:      budesonide-formoterol (SYMBICORT) 160-4.5 MCG/ACT Inhaler, Inhale 2 puffs into the lungs 2 times daily, Disp: 1 Inhaler, Rfl: 3     CALCIUM 600+D PO, twice daily, Disp: , Rfl:      cetirizine (ZYRTEC) 10 MG tablet, Take 10 mg by mouth daily., Disp: , Rfl:      Cholecalciferol (VITAMIN D-3 PO), Take 2,000 Int'l Units by mouth daily, Disp: , Rfl:      DAILY MULTIVITAMIN PO, 1 tablet daily, Disp: , Rfl:      escitalopram (LEXAPRO) 10 MG tablet, Take 1 tablet (10 mg) by mouth daily, Disp: 90 tablet, Rfl: 1     ferrous sulfate (IRON) 325 (65 Fe) MG tablet, Take 325 mg by mouth daily (with breakfast), Disp: , Rfl:      FLAXSEED OIL 1000 MG PO CAPS, 1 tablet daily, Disp: , Rfl:      fluticasone (FLONASE) 50 MCG/ACT spray, Spray 1-2 sprays into both nostrils daily, Disp: 1 Bottle, Rfl: 11     gabapentin (NEURONTIN) 300 MG capsule, 1 capsule TID with additional 1-2 capsules if headache is severe., Disp: 300 capsule, Rfl: 1     GLUCOSAMINE-CHONDROITIN PO TABS, 1500 mg glucosamine and 1200mg chondroitin daily-2 tablets daily, Disp: , Rfl:      hydrocortisone 2.5 % cream, Apply topically 2 times daily (Patient taking differently: Apply topically 2 times daily as needed ), Disp: 60 g, Rfl: 1     ibuprofen (ADVIL,MOTRIN) 200 MG tablet, take 1 tablet (200 mg) by oral route every 6 hours as needed with food, Disp: , Rfl:      ipratropium (ATROVENT) 0.06 % spray, Spray 2 sprays into both nostrils 4  times daily as needed for rhinitis, Disp: 1 Box, Rfl: 3     Ketotifen Fumarate (ZADITOR OP), Apply to eye daily as needed, Disp: , Rfl:      lisinopril (PRINIVIL/ZESTRIL) 40 MG tablet, Take 1 tablet (40 mg) by mouth daily, Disp: 90 tablet, Rfl: 3     Magnesium 400 MG CAPS, Take by mouth daily, Disp: , Rfl:      montelukast (SINGULAIR) 10 MG tablet, Take 1 tablet (10 mg) by mouth At Bedtime, Disp: 30 tablet, Rfl: 0     pantoprazole (PROTONIX) 40 MG EC tablet, Take 1 tablet (40 mg) by mouth daily Take 30-60 minutes before a meal., Disp: 90 tablet, Rfl: 3     pravastatin (PRAVACHOL) 40 MG tablet, Take 1 tablet (40 mg) by mouth daily, Disp: 90 tablet, Rfl: 3     rOPINIRole (REQUIP) 0.25 MG tablet, , Disp: , Rfl:      S-ADENOSYLMETHIONINE 200 MG PO TABS, 2 tablets daily, Disp: , Rfl:      spironolactone (ALDACTONE) 25 MG tablet, TAKE ONE-HALF (1/2) TABLET DAILY, Disp: 45 tablet, Rfl: 0     SUMAtriptan (IMITREX) 50 MG tablet, , Disp: , Rfl:      SUPER B COMPLEX PO TABS, 1 tablet daily, Disp: , Rfl:      topiramate (TOPAMAX) 25 MG tablet, , Disp: , Rfl:      triamcinolone (KENALOG) 0.1 % cream, Apply topically 2 times daily (Patient taking differently: Apply topically 2 times daily as needed ), Disp: 60 g, Rfl: 3     TURMERIC PO, Take 2,000 mg by mouth daily, Disp: , Rfl:      vitamin B complex with vitamin C (VITAMIN  B COMPLEX) TABS tablet, Take 3 tablets by mouth daily, Disp: , Rfl:      VITAMIN C 500 MG PO TABS, 2 TABLET DAILY, Disp: , Rfl:      vitamin E (E-400) 400 UNIT capsule, Take one pill by mouth once daily, Disp: , Rfl:   Immunization History   Administered Date(s) Administered     Influenza Vaccine IM 3yrs+ 4 Valent IIV4 12/03/2013, 11/16/2015, 10/01/2017, 09/13/2018     TD (ADULT, 7+) 03/06/2008     TDAP Vaccine (Adacel) 08/03/2012     Allergies   Allergen Reactions     Levofloxacin Rash     Other reaction(s): Contact Dermatitis     Penicillins Hives and Rash     Confirmed by skin prick testing 7/3/14      Amoxicillin Hives and Rash     Amoxicillin-Pot Clavulanate Rash     Azithromycin Rash     Cephalexin Rash     Clindamycin Rash and Hives     Atorvastatin Other (See Comments)     Felt sick, intolerance     Clindamycin Hcl Hives     Levaquin      tendonitis     Augmentin Rash         EXAM:   Constitutional:  Appears well-developed and well-nourished. No distress.   HEENT:   Head: Normocephalic.   Mouth/Throat:  No cobblestoning of posterior oropharynx.   Nasal tissue pink and normal appearing.  No rhinorrhea noted.    Eyes: Conjunctivae are non-erythematous   Cardiovascular: Normal rate, regular rhythm and normal heart sounds. Exam reveals no gallop and no friction rub.   No murmur heard.  Respiratory: Effort normal and breath sounds normal. No respiratory distress. No wheezes. No rales.   Musculoskeletal: Normal range of motion.   Neuro: Oriented to person, place, and time.  Skin: Skin is warm and dry. No rash noted.   Psychiatric: Normal mood and affect.     Nursing note and vitals reviewed.      WORKUP:   Spirometry  FVC % pred:85  FEV1 % pred:76  FEV1/FVC % act:69  FEF 25-75% pred:66      ASSESSMENT/PLAN:  Problem List Items Addressed This Visit        Respiratory    Moderate persistent asthma without complication - Primary     Asthma recently flared with URI. On Symbicort 160/4.5mcg 2puffs bid and Singulair 10mg PO daily. Symptoms flare with URI and cold air.      ACT 1 1  Spirometry with mild obstruction.      - Albuterol 2-4 puffs inhaled (use a spacer unless using a Proair Respiclick device) every 4 hours as needed for chest tightness, wheezing, shortness of breath and/or coughing.   - Albuterol 2-4 puffs inhaled (use spacer if not using Proair Respiclick device) 15-20 minutes prior to physical activity.    - Please ensure warm up period prior to exercise.    - Avoid asthma triggers.  -Continue Symbicort 160/4.5 mcg 2 puffs inhaled twice daily for the next month and then if doing well resume Qvar 80 mcg 2  puffs inhaled twice daily.  - Singulair 10mg by mouth daily at night.         Relevant Orders    Spirometry, Breathing Capacity (Completed)          Chart documentation with Dragon Voice recognition Software. Although reviewed after completion, some words and grammatical errors may remain.    Nish Stockton DO   Allergy/Immunology  [facility], [place], [place]      Again, thank you for allowing me to participate in the care of your patient.        Sincerely,        Nish Stockton, DO

## 2019-02-22 NOTE — PATIENT INSTRUCTIONS
Allergy Staff Appt Hours Shot Hours Locations    Physician     Nish Stockton DO       Support Staff     Kerri CHAUDHRY RN      Yulisa LEAL CMA  Monday:                      [place] 8-7     Tuesday:         [place] 8-5     Wednesday:        [place]: 7-5     Friday:        [place] 7-5   [place]        Monday: 9-5:50        Wednesday: 2-5:50        Friday: 7-12:50     [place]        Tuesday: 7-10:50        Thursday: 1:30-6:30     [place] Monday: 7:10-4:50        Tuesday: 12:30-6:30        Thursday: 7-11:50 [facility]  [address] [place] [address]  Appt Line: [phone]  Allergy RN (Monday):  [phone]    [facility]  [address] [place] [address]  Appt Line: [phone]  Allergy RN (Tues & Wed):  [phone]    [facility]  [address] [place] [address]  Appt Line: [phone]  Allergy RN (Friday):  [phone]       Important Scheduling Information  Aspirin Desensitization: Appt will last 2 clinic days. Please call the Allergy RN line for your clinic to schedule. Discontinue antihistamines 7 days prior to the appointment.     Food Challenges: Appt will last 3-4 hours. Please call the Allergy RN line for your clinic to schedule. Discontinue antihistamines 7 days prior to the appointment.     Penicillin Testing: Appt will last 2-3 hours. Please call the Allergy RN line for your clinic to schedule. Discontinue antihistamines 7 days prior to the appointment.     Skin Testing: Appt will about 40 minutes. Call the appointment line for your clinic to schedule. Discontinue antihistamines 7 days prior to the appointment.     Venom Testing: Appt will last 2-3 hours. Please call the Allergy RN line for your clinic to schedule. Discontinue antihistamines 7 days prior to the appointment.     Thank you for trusting us with your Allergy, Asthma, and Immunology care. Please feel free to contact us with any questions or concerns you may have.      - Continue Symbicort  160/4.5mcg 2 puffs twice daily for next month. Then resume QVAR 80mcg 2 puffs twice daily.   - Albuterol 2-4 puffs inhaled (use a spacer unless using a Proair Respiclick device) every 4 hours as needed for chest tightness, wheezing, shortness of breath and/or coughing.

## 2019-02-22 NOTE — PROGRESS NOTES
Miri Naylor is a 63 year old White female with previous medical history significant for asthma who returns for a follow up visit.     Patient returns for 2-week follow-up visit.  She was last seen on 4 February 2019.  She was started on doxycycline for sinusitis on 8 February 2019.  She additionally had her Qvar discontinued and was started on Symbicort.  Since last visit she reports that her asthma has improved.  She has used her albuterol inhaler once in the last week.  She reports occasional coughing, tightness in chest and shortness of breath.  Continued sensation of congestion in her chest.  She denies rhinorrhea, nasal itching, sneezing, sinus pressure or colored mucus.    ACT Total Scores 2/22/2019   ACT TOTAL SCORE (Goal Greater than or Equal to 20) 11   In the past 12 months, how many times did you visit the emergency room for your asthma without being admitted to the hospital? 0   In the past 12 months, how many times were you hospitalized overnight because of your asthma? 0     Past Medical History:   Diagnosis Date     Depressive disorder, not elsewhere classified      Drug allergy, multiple 9/23/14--passed graded oral challenge for Zithromax.     7/3/14 POS PRE-PEN skin test. 7/30/14 NEG skin tests and oral challenge to Cefzil     Hx of abnormal Pap smear ?    no specifics given     lumbar degeneration      Raynaud's disease      Unspecified essential hypertension      Family History   Problem Relation Age of Onset     Arthritis Mother      Hyperlipidemia Mother      Other Cancer Mother         Glioblastoma Multiforme     Osteoporosis Mother      Blood Disease Father      Hypertension Father      Hyperlipidemia Father      Other Cancer Father         lung cancer     Depression Son      Depression Daughter      Hyperlipidemia Brother      Anxiety Disorder Son      Skin Cancer No family hx of      Past Surgical History:   Procedure Laterality Date     BIOPSY OF BREAST, NEEDLE CORE       C  PELVIS/HIP JOINT SURGERY UNLISTED Right 7/19/16    total hip arthroplasty     COLONOSCOPY       HIP SURGERY Right 07/19/2016       REVIEW OF SYSTEMS:  General: negative for weight gain. negative for weight loss. negative for changes in sleep.   Ears: negative for fullness. negative for hearing loss. negative for dizziness.   Nose: negative for snoring.negative for changes in smell. negative for drainage.   Eyes: negative for eye watering. negative for eye itching. negative for vision changes. negative for eye redness.  Throat: negative for hoarseness. negative for sore throat. negative for trouble swallowing.   Lungs: positive  for shortness of breath.negative for wheezing. negative for sputum production.   Cardiovascular: negative for chest pain. negative for swelling of ankles. negative for fast or irregular heartbeat.   Gastrointestinal: negative for nausea. negative for heartburn. negative for acid reflux.   Musculoskeletal: negative for joint pain. negative for joint stiffness. negative for joint swelling.   Neurologic: negative for seizures. negative for fainting. negative for weakness.   Psychiatric: negative for changes in mood. negative for anxiety.   Endocrine: negative for cold intolerance. negative for heat intolerance. negative for tremors.   Lymphatic: negative for lower extremity swelling. negative for lymph node swelling.   Hematologic: negative for easy bruising. negative for easy bleeding.  Integumentary: negative for rash. negative for scaling. negative for nail changes.       Current Outpatient Medications:      ACIDOPHILUS OR TABS, 1 tablet daily, Disp: , Rfl:      albuterol (PROAIR HFA) 108 (90 Base) MCG/ACT inhaler, Inhale 2 puffs into the lungs every 4 hours as needed for shortness of breath / dyspnea or wheezing, Disp: 1 Inhaler, Rfl: 1     amLODIPine (NORVASC) 5 MG tablet, Take 1 tablet (5 mg) by mouth daily, Disp: 30 tablet, Rfl: 0     atenolol (TENORMIN) 25 MG tablet, Take 1 tablet (25  mg) by mouth daily, Disp: 90 tablet, Rfl: 3     beclomethasone HFA (QVAR REDIHALER) 80 MCG/ACT inhaler, Inhale 2 puffs into the lungs 2 times daily Schedule follow up with Dr. Stockton for refills, Disp: 3 Inhaler, Rfl: 0     biotin 1000 MCG TABS tablet, Take 1,000 mcg by mouth daily, Disp: , Rfl:      budesonide-formoterol (SYMBICORT) 160-4.5 MCG/ACT Inhaler, Inhale 2 puffs into the lungs 2 times daily, Disp: 1 Inhaler, Rfl: 3     CALCIUM 600+D PO, twice daily, Disp: , Rfl:      cetirizine (ZYRTEC) 10 MG tablet, Take 10 mg by mouth daily., Disp: , Rfl:      Cholecalciferol (VITAMIN D-3 PO), Take 2,000 Int'l Units by mouth daily, Disp: , Rfl:      DAILY MULTIVITAMIN PO, 1 tablet daily, Disp: , Rfl:      escitalopram (LEXAPRO) 10 MG tablet, Take 1 tablet (10 mg) by mouth daily, Disp: 90 tablet, Rfl: 1     ferrous sulfate (IRON) 325 (65 Fe) MG tablet, Take 325 mg by mouth daily (with breakfast), Disp: , Rfl:      FLAXSEED OIL 1000 MG PO CAPS, 1 tablet daily, Disp: , Rfl:      fluticasone (FLONASE) 50 MCG/ACT spray, Spray 1-2 sprays into both nostrils daily, Disp: 1 Bottle, Rfl: 11     gabapentin (NEURONTIN) 300 MG capsule, 1 capsule TID with additional 1-2 capsules if headache is severe., Disp: 300 capsule, Rfl: 1     GLUCOSAMINE-CHONDROITIN PO TABS, 1500 mg glucosamine and 1200mg chondroitin daily-2 tablets daily, Disp: , Rfl:      hydrocortisone 2.5 % cream, Apply topically 2 times daily (Patient taking differently: Apply topically 2 times daily as needed ), Disp: 60 g, Rfl: 1     ibuprofen (ADVIL,MOTRIN) 200 MG tablet, take 1 tablet (200 mg) by oral route every 6 hours as needed with food, Disp: , Rfl:      ipratropium (ATROVENT) 0.06 % spray, Spray 2 sprays into both nostrils 4 times daily as needed for rhinitis, Disp: 1 Box, Rfl: 3     Ketotifen Fumarate (ZADITOR OP), Apply to eye daily as needed, Disp: , Rfl:      lisinopril (PRINIVIL/ZESTRIL) 40 MG tablet, Take 1 tablet (40 mg) by mouth daily, Disp: 90 tablet,  Rfl: 3     Magnesium 400 MG CAPS, Take by mouth daily, Disp: , Rfl:      montelukast (SINGULAIR) 10 MG tablet, Take 1 tablet (10 mg) by mouth At Bedtime, Disp: 30 tablet, Rfl: 0     pantoprazole (PROTONIX) 40 MG EC tablet, Take 1 tablet (40 mg) by mouth daily Take 30-60 minutes before a meal., Disp: 90 tablet, Rfl: 3     pravastatin (PRAVACHOL) 40 MG tablet, Take 1 tablet (40 mg) by mouth daily, Disp: 90 tablet, Rfl: 3     rOPINIRole (REQUIP) 0.25 MG tablet, , Disp: , Rfl:      S-ADENOSYLMETHIONINE 200 MG PO TABS, 2 tablets daily, Disp: , Rfl:      spironolactone (ALDACTONE) 25 MG tablet, TAKE ONE-HALF (1/2) TABLET DAILY, Disp: 45 tablet, Rfl: 0     SUMAtriptan (IMITREX) 50 MG tablet, , Disp: , Rfl:      SUPER B COMPLEX PO TABS, 1 tablet daily, Disp: , Rfl:      topiramate (TOPAMAX) 25 MG tablet, , Disp: , Rfl:      triamcinolone (KENALOG) 0.1 % cream, Apply topically 2 times daily (Patient taking differently: Apply topically 2 times daily as needed ), Disp: 60 g, Rfl: 3     TURMERIC PO, Take 2,000 mg by mouth daily, Disp: , Rfl:      vitamin B complex with vitamin C (VITAMIN  B COMPLEX) TABS tablet, Take 3 tablets by mouth daily, Disp: , Rfl:      VITAMIN C 500 MG PO TABS, 2 TABLET DAILY, Disp: , Rfl:      vitamin E (E-400) 400 UNIT capsule, Take one pill by mouth once daily, Disp: , Rfl:   Immunization History   Administered Date(s) Administered     Influenza Vaccine IM 3yrs+ 4 Valent IIV4 12/03/2013, 11/16/2015, 10/01/2017, 09/13/2018     TD (ADULT, 7+) 03/06/2008     TDAP Vaccine (Adacel) 08/03/2012     Allergies   Allergen Reactions     Levofloxacin Rash     Other reaction(s): Contact Dermatitis     Penicillins Hives and Rash     Confirmed by skin prick testing 7/3/14     Amoxicillin Hives and Rash     Amoxicillin-Pot Clavulanate Rash     Azithromycin Rash     Cephalexin Rash     Clindamycin Rash and Hives     Atorvastatin Other (See Comments)     Felt sick, intolerance     Clindamycin Hcl Hives     Levaquin       tendonitis     Augmentin Rash         EXAM:   Constitutional:  Appears well-developed and well-nourished. No distress.   HEENT:   Head: Normocephalic.   Mouth/Throat:  No cobblestoning of posterior oropharynx.   Nasal tissue pink and normal appearing.  No rhinorrhea noted.    Eyes: Conjunctivae are non-erythematous   Cardiovascular: Normal rate, regular rhythm and normal heart sounds. Exam reveals no gallop and no friction rub.   No murmur heard.  Respiratory: Effort normal and breath sounds normal. No respiratory distress. No wheezes. No rales.   Musculoskeletal: Normal range of motion.   Neuro: Oriented to person, place, and time.  Skin: Skin is warm and dry. No rash noted.   Psychiatric: Normal mood and affect.     Nursing note and vitals reviewed.      WORKUP:   Spirometry  FVC % pred:85  FEV1 % pred:76  FEV1/FVC % act:69  FEF 25-75% pred:66      ASSESSMENT/PLAN:  Problem List Items Addressed This Visit        Respiratory    Moderate persistent asthma without complication - Primary     Asthma recently flared with URI. On Symbicort 160/4.5mcg 2puffs bid and Singulair 10mg PO daily. Symptoms flare with URI and cold air.      ACT 11  Spirometry with mild obstruction.      - Albuterol 2-4 puffs inhaled (use a spacer unless using a Proair Respiclick device) every 4 hours as needed for chest tightness, wheezing, shortness of breath and/or coughing.   - Albuterol 2-4 puffs inhaled (use spacer if not using Proair Respiclick device) 15-20 minutes prior to physical activity.    - Please ensure warm up period prior to exercise.    - Avoid asthma triggers.  -Continue Symbicort 160/4.5 mcg 2 puffs inhaled twice daily for the next month and then if doing well resume Qvar 80 mcg 2 puffs inhaled twice daily.  - Singulair 10mg by mouth daily at night.         Relevant Orders    Spirometry, Breathing Capacity (Completed)          Chart documentation with Dragon Voice recognition Software. Although reviewed after completion,  some words and grammatical errors may remain.    Nish Stockton DO   Allergy/Immunology  [facility], [place], [place]

## 2019-02-23 ASSESSMENT — ASTHMA QUESTIONNAIRES: ACT_TOTALSCORE: 11

## 2019-02-26 DIAGNOSIS — J45.40 MODERATE PERSISTENT ASTHMA WITHOUT COMPLICATION: ICD-10-CM

## 2019-02-26 RX ORDER — MONTELUKAST SODIUM 10 MG/1
10 TABLET ORAL AT BEDTIME
Qty: 30 TABLET | Refills: 5 | Status: SHIPPED | OUTPATIENT
Start: 2019-02-26 | End: 2019-08-15

## 2019-02-26 NOTE — TELEPHONE ENCOUNTER
Pending Prescriptions:                       Disp   Refills    montelukast (SINGULAIR) 10 MG tablet      30 tab*0            Sig: Take 1 tablet (10 mg) by mouth At Bedtime    Routing refill request to provider for review/approval because:  Last ACT < 20 (scored 11 on 2/22/19).    Kerri Ramires RN

## 2019-02-26 NOTE — TELEPHONE ENCOUNTER
Requested Prescriptions   Pending Prescriptions Disp Refills     montelukast (SINGULAIR) 10 MG tablet 30 tablet 0     Sig: Take 1 tablet (10 mg) by mouth At Bedtime    There is no refill protocol information for this order

## 2019-03-19 DIAGNOSIS — R09.81 NASAL CONGESTION: Primary | ICD-10-CM

## 2019-03-19 RX ORDER — AZELASTINE 1 MG/ML
SPRAY, METERED NASAL
COMMUNITY
Start: 2019-01-28 | End: 2019-03-19

## 2019-03-19 RX ORDER — AZELASTINE 1 MG/ML
2 SPRAY, METERED NASAL 2 TIMES DAILY
Qty: 1 BOTTLE | Refills: 11 | Status: SHIPPED | OUTPATIENT
Start: 2019-03-19 | End: 2019-08-15

## 2019-04-05 ENCOUNTER — TRANSFERRED RECORDS (OUTPATIENT)
Dept: HEALTH INFORMATION MANAGEMENT | Facility: CLINIC | Age: 64
End: 2019-04-05

## 2019-04-10 ENCOUNTER — MYC REFILL (OUTPATIENT)
Dept: FAMILY MEDICINE | Facility: CLINIC | Age: 64
End: 2019-04-10

## 2019-04-10 DIAGNOSIS — G43.009 MIGRAINE WITHOUT AURA AND WITHOUT STATUS MIGRAINOSUS, NOT INTRACTABLE: ICD-10-CM

## 2019-04-10 DIAGNOSIS — R51.9 CHRONIC DAILY HEADACHE: ICD-10-CM

## 2019-04-10 DIAGNOSIS — Z87.820 HISTORY OF TRAUMATIC BRAIN INJURY: ICD-10-CM

## 2019-04-11 RX ORDER — GABAPENTIN 300 MG/1
CAPSULE ORAL
Qty: 300 CAPSULE | Refills: 1 | Status: SHIPPED | OUTPATIENT
Start: 2019-04-11 | End: 2019-09-10

## 2019-04-18 ENCOUNTER — TELEPHONE (OUTPATIENT)
Dept: FAMILY MEDICINE | Facility: CLINIC | Age: 64
End: 2019-04-18

## 2019-04-18 NOTE — TELEPHONE ENCOUNTER
Received a response from [facility] for Gabapentin 300mg caps        Derrick Graham RT (r)  [facility]

## 2019-04-21 DIAGNOSIS — I10 ESSENTIAL HYPERTENSION: ICD-10-CM

## 2019-04-22 NOTE — TELEPHONE ENCOUNTER
"Requested Prescriptions   Pending Prescriptions Disp Refills     spironolactone (ALDACTONE) 25 MG tablet [Pharmacy Med Name: SPIRONOLACTONE TABS 25MG]  Last Written Prescription Date:  01/22/2019 #45 x 0  Last filled 01/22/2019  Last office visit: 12/21/2018 PRADIP Corley     Future Office Visit:   Next 5 appointments (look out 90 days)    Apr 23, 2019 11:00 AM CDT  MyChart Long with Elizabeth Corley,   [facility] ([facility]) [address] [address] [facility] [address]  [phone]   Apr 26, 2019 10:00 AM CDT  Screening Mammogram with [facility]  [facility] [address] [id number] [phone]          45 tablet 0     Sig: TAKE ONE-HALF (1/2) TABLET DAILY       Diuretics (Including Combos) Protocol Passed - 4/21/2019 11:47 PM        Passed - Blood pressure under 140/90 in past 12 months     BP Readings from Last 3 Encounters:   02/22/19 103/81   02/04/19 124/73   12/21/18 120/70                 Passed - Recent (12 mo) or future (30 days) visit within the authorizing provider's specialty     Patient had office visit in the last 12 months or has a visit in the next 30 days with authorizing provider or within the authorizing provider's specialty.  See \"Patient Info\" tab in inbasket, or \"Choose Columns\" in Meds & Orders section of the refill encounter.              Passed - Medication is active on med list        Passed - Patient is age 18 or older        Passed - No active pregancy on record        Passed - Normal serum creatinine on file in past 12 months     Recent Labs   Lab Test 12/20/18  0934   CR 0.91              Passed - Normal serum potassium on file in past 12 months     Recent Labs   Lab Test 12/20/18  0934   POTASSIUM 4.4                    Passed - Normal serum sodium on file in past 12 months     Recent Labs   Lab Test 12/20/18  0934                 Passed - No positive pregnancy test in " past 12 months

## 2019-04-23 ENCOUNTER — OFFICE VISIT (OUTPATIENT)
Dept: FAMILY MEDICINE | Facility: CLINIC | Age: 64
End: 2019-04-23
Payer: COMMERCIAL

## 2019-04-23 ENCOUNTER — ANCILLARY PROCEDURE (OUTPATIENT)
Dept: GENERAL RADIOLOGY | Facility: CLINIC | Age: 64
End: 2019-04-23
Attending: FAMILY MEDICINE
Payer: COMMERCIAL

## 2019-04-23 ENCOUNTER — TRANSFERRED RECORDS (OUTPATIENT)
Dept: HEALTH INFORMATION MANAGEMENT | Facility: CLINIC | Age: 64
End: 2019-04-23

## 2019-04-23 VITALS
HEIGHT: 71 IN | HEART RATE: 68 BPM | TEMPERATURE: 97.8 F | BODY MASS INDEX: 30.38 KG/M2 | DIASTOLIC BLOOD PRESSURE: 80 MMHG | RESPIRATION RATE: 12 BRPM | SYSTOLIC BLOOD PRESSURE: 128 MMHG | WEIGHT: 217 LBS

## 2019-04-23 DIAGNOSIS — M25.462 SWELLING OF LEFT KNEE JOINT: ICD-10-CM

## 2019-04-23 DIAGNOSIS — R60.1 GENERALIZED EDEMA: ICD-10-CM

## 2019-04-23 DIAGNOSIS — R20.2 PARESTHESIAS: Primary | ICD-10-CM

## 2019-04-23 DIAGNOSIS — R11.0 NAUSEA: ICD-10-CM

## 2019-04-23 LAB
ALBUMIN SERPL-MCNC: 4 G/DL (ref 3.4–5)
ALP SERPL-CCNC: 67 U/L (ref 40–150)
ALT SERPL W P-5'-P-CCNC: 30 U/L (ref 0–50)
ANION GAP SERPL CALCULATED.3IONS-SCNC: 5 MMOL/L (ref 3–14)
AST SERPL W P-5'-P-CCNC: 18 U/L (ref 0–45)
BASOPHILS # BLD AUTO: 0 10E9/L (ref 0–0.2)
BASOPHILS NFR BLD AUTO: 0.4 %
BILIRUB SERPL-MCNC: 0.4 MG/DL (ref 0.2–1.3)
BUN SERPL-MCNC: 9 MG/DL (ref 7–30)
CALCIUM SERPL-MCNC: 9.4 MG/DL (ref 8.5–10.1)
CHLORIDE SERPL-SCNC: 101 MMOL/L (ref 94–109)
CO2 SERPL-SCNC: 28 MMOL/L (ref 20–32)
CREAT SERPL-MCNC: 0.94 MG/DL (ref 0.52–1.04)
CRP SERPL-MCNC: <2.9 MG/L (ref 0–8)
DIFFERENTIAL METHOD BLD: NORMAL
EOSINOPHIL # BLD AUTO: 0.1 10E9/L (ref 0–0.7)
EOSINOPHIL NFR BLD AUTO: 1.3 %
ERYTHROCYTE [DISTWIDTH] IN BLOOD BY AUTOMATED COUNT: 12.6 % (ref 10–15)
ERYTHROCYTE [SEDIMENTATION RATE] IN BLOOD BY WESTERGREN METHOD: 15 MM/H (ref 0–30)
GFR SERPL CREATININE-BSD FRML MDRD: 64 ML/MIN/{1.73_M2}
GLUCOSE SERPL-MCNC: 98 MG/DL (ref 70–99)
HCT VFR BLD AUTO: 37.3 % (ref 35–47)
HGB BLD-MCNC: 12.3 G/DL (ref 11.7–15.7)
LIPASE SERPL-CCNC: 205 U/L (ref 73–393)
LYMPHOCYTES # BLD AUTO: 1.4 10E9/L (ref 0.8–5.3)
LYMPHOCYTES NFR BLD AUTO: 26.4 %
MCH RBC QN AUTO: 30.8 PG (ref 26.5–33)
MCHC RBC AUTO-ENTMCNC: 33 G/DL (ref 31.5–36.5)
MCV RBC AUTO: 94 FL (ref 78–100)
MONOCYTES # BLD AUTO: 0.4 10E9/L (ref 0–1.3)
MONOCYTES NFR BLD AUTO: 7.8 %
NEUTROPHILS # BLD AUTO: 3.4 10E9/L (ref 1.6–8.3)
NEUTROPHILS NFR BLD AUTO: 64.1 %
PLATELET # BLD AUTO: 341 10E9/L (ref 150–450)
POTASSIUM SERPL-SCNC: 4.1 MMOL/L (ref 3.4–5.3)
PROT SERPL-MCNC: 7.4 G/DL (ref 6.8–8.8)
RBC # BLD AUTO: 3.99 10E12/L (ref 3.8–5.2)
SODIUM SERPL-SCNC: 134 MMOL/L (ref 133–144)
TSH SERPL DL<=0.005 MIU/L-ACNC: 1.26 MU/L (ref 0.4–4)
VIT B12 SERPL-MCNC: 447 PG/ML (ref 193–986)
WBC # BLD AUTO: 5.4 10E9/L (ref 4–11)

## 2019-04-23 PROCEDURE — 84443 ASSAY THYROID STIM HORMONE: CPT | Performed by: FAMILY MEDICINE

## 2019-04-23 PROCEDURE — 85652 RBC SED RATE AUTOMATED: CPT | Performed by: FAMILY MEDICINE

## 2019-04-23 PROCEDURE — 83690 ASSAY OF LIPASE: CPT | Performed by: FAMILY MEDICINE

## 2019-04-23 PROCEDURE — 99214 OFFICE O/P EST MOD 30 MIN: CPT | Performed by: FAMILY MEDICINE

## 2019-04-23 PROCEDURE — 86140 C-REACTIVE PROTEIN: CPT | Performed by: FAMILY MEDICINE

## 2019-04-23 PROCEDURE — 80053 COMPREHEN METABOLIC PANEL: CPT | Performed by: FAMILY MEDICINE

## 2019-04-23 PROCEDURE — 85025 COMPLETE CBC W/AUTO DIFF WBC: CPT | Performed by: FAMILY MEDICINE

## 2019-04-23 PROCEDURE — 36415 COLL VENOUS BLD VENIPUNCTURE: CPT | Performed by: FAMILY MEDICINE

## 2019-04-23 PROCEDURE — 73562 X-RAY EXAM OF KNEE 3: CPT | Mod: LT

## 2019-04-23 PROCEDURE — 82607 VITAMIN B-12: CPT | Performed by: FAMILY MEDICINE

## 2019-04-23 RX ORDER — SENNOSIDES A AND B 8.6 MG/1
2 TABLET, FILM COATED ORAL DAILY
COMMUNITY
End: 2021-01-27

## 2019-04-23 RX ORDER — SPIRONOLACTONE 25 MG/1
TABLET ORAL
Qty: 45 TABLET | Refills: 0 | Status: SHIPPED | OUTPATIENT
Start: 2019-04-23 | End: 2019-07-21

## 2019-04-23 RX ORDER — POLYETHYLENE GLYCOL 3350 17 G/17G
1 POWDER, FOR SOLUTION ORAL DAILY
COMMUNITY
End: 2021-01-27

## 2019-04-23 ASSESSMENT — PATIENT HEALTH QUESTIONNAIRE - PHQ9
5. POOR APPETITE OR OVEREATING: MORE THAN HALF THE DAYS
SUM OF ALL RESPONSES TO PHQ QUESTIONS 1-9: 4

## 2019-04-23 ASSESSMENT — ANXIETY QUESTIONNAIRES
6. BECOMING EASILY ANNOYED OR IRRITABLE: SEVERAL DAYS
2. NOT BEING ABLE TO STOP OR CONTROL WORRYING: SEVERAL DAYS
GAD7 TOTAL SCORE: 7
3. WORRYING TOO MUCH ABOUT DIFFERENT THINGS: SEVERAL DAYS
1. FEELING NERVOUS, ANXIOUS, OR ON EDGE: SEVERAL DAYS
5. BEING SO RESTLESS THAT IT IS HARD TO SIT STILL: SEVERAL DAYS
7. FEELING AFRAID AS IF SOMETHING AWFUL MIGHT HAPPEN: NOT AT ALL

## 2019-04-23 ASSESSMENT — MIFFLIN-ST. JEOR: SCORE: 1640.44

## 2019-04-23 NOTE — TELEPHONE ENCOUNTER
Routing refill request to provider for review/approval because:  Scheduled for OV today, 4/23/19    Eliana ANDERSON RN

## 2019-04-23 NOTE — PROGRESS NOTES
"  SUBJECTIVE:   Miri Naylor is a 63 year old female who presents to clinic today for the following   health issues:      * bilateral leg/knee and bilateral shoulder/arm pain for the last 6 weeks   * weight gain > 15 lbs in the last 7 months, with no diet change and increased exercise   * bloating  * joint swelling  * lightheadedness  * upset stomach    Feels like things have been \"out of whack\" since early March.    Has had ongoing issues with swelling in the ankles and knees but now seems worse.    L knee swells more then R.  Feels like she has pain just due to the swelling.  L ankle also swells more the R.  H/o multiple ankle sprains.  Swelling worse end of day and improved by morning.      Feels like she is having shooting pain down both legs and arms beginning in March.   Sometimes happens in the morning but always around 4PM is persistent.    Feels like a shock, like an electrical sensation.  Sensation is present in all of her extremities at the same time.  Seems mostly in the outer arms, inner things and outer calves.  Does not involve feet and hands as much.  States it lasts until she takes ibuprofen which helps.  Does not resove until she goes to sleep.  Typically better when she wakes.  Had a brain MRI 5/18 which was normal with the exception of an old head trauma    *  Has had nausea more frequently.  Feels like she might throw up after lunch.  No change in diet.  Does not happen everyday, maybe 3-4 times per week.  Is just in th afternoon, not in the AM.  Seems better by supper.  Was seen for this last year.  Pt feels liek it resolved for a while and now has returned over the last few weeks.    *  Feels \"bloated\" in her abdomen.  Over the last 10 years.  This does not come and go, just feels like her abdomen is always distended, feels this is only getting bigger.  Does not seem to be associated with eating or bowel movements in anyway    *  Gets more lightheaded with position change which she has " had in the past but which is back again in the last couple of weeks.  Has been drinking plenty of water.    Has had unexpected weight gain as well.  Feels it is all in the abdomen.    Takes senna and Miralx for constipation.  Was not an issue prior to her multiple medications.  This seems to work well with no ongoing constipation concerns.    Dr Lim did a 4 day course of prednisone in Feb and and antibiotic.  Now off of all those things.  No change in neurology meds.  Considering botox for ongoing headaches      Additional history: as documented    Reviewed  and updated as needed this visit by clinical staff  Tobacco  Allergies  Meds  Med Hx  Surg Hx  Fam Hx  Soc Hx        Reviewed and updated as needed this visit by Provider  Tobacco  Meds  Med Hx  Surg Hx  Fam Hx  Soc Hx        ROS: Remainder of Constitutional, CV, Respiratory, GI,  negative with exception of that mentioned above    PE:  VS as above   Gen:  WN/WD/WH female in NAD   Neck:  supple, no LAD appreciated   Heart:  RRR without murmur, nl S1, S2, no rubs or gallops   Lungs CTA hua without rales/ronchi/wheezes   Abd: soft, postive bowel sounds, NT/ND, no HSM, no rebounding/guarding/ridigity   Ext:  No pedal edema   MSK:  Full pain free ROM of cervical spine.  No tenderness to palpation midline.  Mild muscle tension traps.  UE and LE with full pain free ROM.  Muscle strength and sensation intact throughout.  DTR's 2/4 UE and LE hua    A/p:      ICD-10-CM    1. Paresthesias R20.2 Comprehensive metabolic panel     CBC with platelets and differential     CRP, inflammation     ESR: Erythrocyte sedimentation rate     TSH with free T4 reflex     Vitamin B12   2. Generalized edema R60.1 Comprehensive metabolic panel     CRP, inflammation     ESR: Erythrocyte sedimentation rate     TSH with free T4 reflex   3. Nausea R11.0 Comprehensive metabolic panel     CBC with platelets and differential     Lipase     CRP, inflammation     ESR: Erythrocyte  sedimentation rate   4. Swelling of left knee joint M25.462 XR Knee Left 3 Views     Patient Instructions   Not sure.  We'll do labs and the knee x-ray today.  I will let you know results when available.  Still considering repeating the MRI of your brain.    Etiology unclear for above symptoms.  Consider central etiology for paresthesias but recent brain MRI normal.  ? Med side effect.      No edema apparent on exam, suspect weight gain as etiology, abd exam wnl    Plan begin eval as above.

## 2019-04-23 NOTE — NURSING NOTE
"Initial /80   Pulse 68   Temp 97.8  F (36.6  C) (Tympanic)   Resp 12   Ht 1.803 m (5' 11\")   Wt 98.4 kg (217 lb)   Breastfeeding? No   BMI 30.27 kg/m   Estimated body mass index is 30.27 kg/m  as calculated from the following:    Height as of this encounter: 1.803 m (5' 11\").    Weight as of this encounter: 98.4 kg (217 lb). .      "

## 2019-04-23 NOTE — PATIENT INSTRUCTIONS
Not sure.  We'll do labs and the knee x-ray today.  I will let you know results when available.  Still considering repeating the MRI of your brain.

## 2019-04-24 ASSESSMENT — ANXIETY QUESTIONNAIRES: GAD7 TOTAL SCORE: 7

## 2019-04-25 ENCOUNTER — TELEPHONE (OUTPATIENT)
Dept: FAMILY MEDICINE | Facility: CLINIC | Age: 64
End: 2019-04-25

## 2019-04-25 NOTE — TELEPHONE ENCOUNTER
Pt called and states that she saw Dr Rosario at [facility] and they told her that she is on such a low dose of topamax that they do not thing that the pain she is having is from that, and asked to have her most recent labs faxed over to them which I have sent.     Miri is asking if Dr Corley / Dr Rosario  Could speak to each other sometime today to consult about her.     Jodi Castañeda, [place]

## 2019-04-26 ENCOUNTER — MYC MEDICAL ADVICE (OUTPATIENT)
Dept: FAMILY MEDICINE | Facility: CLINIC | Age: 64
End: 2019-04-26

## 2019-04-26 DIAGNOSIS — Z12.31 VISIT FOR SCREENING MAMMOGRAM: ICD-10-CM

## 2019-04-26 PROCEDURE — 77067 SCR MAMMO BI INCL CAD: CPT | Mod: TC | Performed by: FAMILY MEDICINE

## 2019-04-26 NOTE — TELEPHONE ENCOUNTER
Spoke with Marcia.  Reviewed information from pt visit and concern for paraesthesias.  Marcia states she will review with Dr Rosario and get back to me and pt.    Elizabeth Corley

## 2019-04-26 NOTE — TELEPHONE ENCOUNTER
Marcia the Asst at Dr. Rosario would like if Dr. Corley could call her by 4:30 today at [phone].    [facility]

## 2019-05-07 DIAGNOSIS — J45.40 MODERATE PERSISTENT ASTHMA WITHOUT COMPLICATION: ICD-10-CM

## 2019-05-07 NOTE — TELEPHONE ENCOUNTER
"Requested Prescriptions   Pending Prescriptions Disp Refills     beclomethasone HFA (QVAR REDIHALER) 80 MCG/ACT inhaler 3 Inhaler 0     Sig: Inhale 2 puffs into the lungs 2 times daily Schedule follow up with Dr. Stockton for refills       Inhaled Steroids Protocol Failed - 5/7/2019  8:02 AM        Failed - Asthma control assessment score within normal limits in last 6 months     Please review ACT score.           Passed - Patient is age 12 or older        Passed - Medication is active on med list        Passed - Recent (6 mo) or future (30 days) visit within the authorizing provider's specialty     Patient had office visit in the last 6 months or has a visit in the next 30 days with authorizing provider or within the authorizing provider's specialty.  See \"Patient Info\" tab in inbasket, or \"Choose Columns\" in Meds & Orders section of the refill encounter.            Routing refill request to provider for review/approval because:  Last ACT = 11 on 2/22/19    Edita Chand RN          "

## 2019-05-07 NOTE — TELEPHONE ENCOUNTER
Requested Prescriptions   Pending Prescriptions Disp Refills     beclomethasone HFA (QVAR REDIHALER) 80 MCG/ACT inhaler 3 Inhaler 0     Sig: Inhale 2 puffs into the lungs 2 times daily Schedule follow up with Dr. Stockton for refills       There is no refill protocol information for this order              Yulisa Lainez CMA

## 2019-05-09 ENCOUNTER — MYC MEDICAL ADVICE (OUTPATIENT)
Dept: FAMILY MEDICINE | Facility: CLINIC | Age: 64
End: 2019-05-09

## 2019-05-22 ENCOUNTER — MYC MEDICAL ADVICE (OUTPATIENT)
Dept: FAMILY MEDICINE | Facility: CLINIC | Age: 64
End: 2019-05-22

## 2019-07-06 ENCOUNTER — OFFICE VISIT (OUTPATIENT)
Dept: URGENT CARE | Facility: URGENT CARE | Age: 64
End: 2019-07-06
Payer: COMMERCIAL

## 2019-07-06 ENCOUNTER — TRANSFERRED RECORDS (OUTPATIENT)
Dept: HEALTH INFORMATION MANAGEMENT | Facility: CLINIC | Age: 64
End: 2019-07-06

## 2019-07-06 VITALS
RESPIRATION RATE: 20 BRPM | TEMPERATURE: 98.4 F | WEIGHT: 219.4 LBS | HEART RATE: 68 BPM | DIASTOLIC BLOOD PRESSURE: 78 MMHG | HEIGHT: 71 IN | OXYGEN SATURATION: 98 % | SYSTOLIC BLOOD PRESSURE: 130 MMHG | BODY MASS INDEX: 30.72 KG/M2

## 2019-07-06 DIAGNOSIS — J06.9 VIRAL UPPER RESPIRATORY TRACT INFECTION: ICD-10-CM

## 2019-07-06 DIAGNOSIS — R51.9 ACUTE INTRACTABLE HEADACHE, UNSPECIFIED HEADACHE TYPE: Primary | ICD-10-CM

## 2019-07-06 PROCEDURE — 99214 OFFICE O/P EST MOD 30 MIN: CPT | Performed by: NURSE PRACTITIONER

## 2019-07-06 RX ORDER — TRAMADOL HYDROCHLORIDE 50 MG/1
50 TABLET ORAL EVERY 6 HOURS PRN
Qty: 4 TABLET | Refills: 0 | Status: SHIPPED | OUTPATIENT
Start: 2019-07-06 | End: 2019-09-10

## 2019-07-06 RX ORDER — ZONISAMIDE 25 MG/1
CAPSULE ORAL
COMMUNITY
Start: 2019-06-06 | End: 2019-08-15

## 2019-07-06 ASSESSMENT — ENCOUNTER SYMPTOMS
CHILLS: 0
NAUSEA: 0
HEADACHES: 1
FEVER: 0
VOMITING: 0
SORE THROAT: 0
DIARRHEA: 0
RHINORRHEA: 1
SHORTNESS OF BREATH: 0
COUGH: 1

## 2019-07-06 ASSESSMENT — PAIN SCALES - GENERAL: PAINLEVEL: SEVERE PAIN (7)

## 2019-07-06 ASSESSMENT — MIFFLIN-ST. JEOR: SCORE: 1646.32

## 2019-07-06 NOTE — PATIENT INSTRUCTIONS
"  Patient Education     Self-Care for Headaches  Most headaches aren't serious and can be relieved with self-care. But some headaches may be a sign of another health problem like eye trouble or high blood pressure. To find the best treatment, learn what kind of headaches you get. For tension headaches, self-care will usually help. To treat migraines, ask your healthcare provider for advice. It is also possible to get both tension and migraine headaches. Self-care involves relieving the pain and avoiding headache  triggers  if you can.    Ways to reduce pain and tension  Try these steps:    Apply a cold compress or ice pack to the pain site.    Drink fluids. If nausea makes it hard to drink, try sucking on ice.    Rest. Protect yourself from bright light and loud noises.    Calm your emotions by imagining a peaceful scene.    Massage tight neck, shoulder, and head muscles.    To relax muscles, soak in a hot bath or use a hot shower.  Use medicines  Aspirin or other over-the-counter pain medicines, such as ibuprofen and acetaminophen, can relieve headache. Remember: Never give aspirin to anyone 18 years old or younger because of the risk of developing Reye syndrome. Use pain medicines only when needed. Certain prescription medicines, if taken too often, can lead to rebound headaches. Check with your healthcare provider or pharmacist about your medicines.  Track your headaches  Keeping a headache diary can help you and your healthcare provider identify what's causing your headaches:    Note when each headache happens.    Identify your activities and the foods you've eaten 6 to 8 hours before the headache began.    Look for any trends or \"triggers.\"  Signs of tension headache  Any of the following can be signs:    Dull pain or feeling of pressure in a tight band around your head    Pain in your neck or shoulders    Headache without a definite beginning or end    Headache after an activity such as driving or working on a " "computer  Signs of migraine  Any of the following can be signs:    Throbbing pain on one or both sides of your head    Nausea or vomiting    Extreme sensitivity to light, sound, and smells    Bright spots, flashes, or other visual changes    Pain or nausea so severe that you can't continue your daily activities  Call your healthcare provider   If you have any of the following symptoms, contact your healthcare provider:    A headache that lingers after a recent injury or bump to the head.    A fever with a stiff neck or pain when you bend your head toward your chest.    A headache along with slurred speech, changes in your vision, or numbness or weakness in your arms or legs.    A headache for longer than 3 days.    Frequent headaches, especially in the morning.    Headaches with seizures     Seek immediate medical attention if you have a headache that you would call \"the worst headache you have ever had.\"  Date Last Reviewed: 3/1/2018    [id number] [employer]. [address] [place] [address], [place] [address]. All rights reserved. This information is not intended as a substitute for professional medical care. Always follow your healthcare professional's instructions.           "

## 2019-07-06 NOTE — PROGRESS NOTES
SUBJECTIVE:   Miri Naylor is a 64 year old female presenting with a chief complaint of   Chief Complaint   Patient presents with     Headache     today is on 3rd of migraine      Cough       She is an established patient of [facility].    Headache    Onset of symptoms was 3 day(s).  Course of illness is worsening  Severity moderate  Character of pain:aching and pressure-like   Current and associated symptoms: nausea and light sensitivity, some dizziness  Location of pain: global  Prodromal sx?:  No  History of Migranes: Yes USES Imitrex for migraines  Is headache similar to previous: Yes  Predisposing factors: None  Treatment and measures tried: Triptan and gabapentin, not helping much    Miri is here today with:  CC:Cough and symptoms of no fever and no chills.  Serious symptoms include none applicable.  Onset of symptoms was 3 days ago. Course of illness is worsening.none applicable exposures. Treatment measures tried include OTC Cough med.      Review of Systems   Constitutional: Negative for chills and fever.   HENT: Positive for congestion and rhinorrhea. Negative for ear pain and sore throat.    Respiratory: Positive for cough. Negative for shortness of breath.    Gastrointestinal: Negative for diarrhea, nausea and vomiting.   Neurological: Positive for headaches.   All other systems reviewed and are negative.      Past Medical History:   Diagnosis Date     Depressive disorder, not elsewhere classified      Drug allergy, multiple 9/23/14--passed graded oral challenge for Zithromax.     7/3/14 POS PRE-PEN skin test. 7/30/14 NEG skin tests and oral challenge to Cefzil     Hx of abnormal Pap smear ?    no specifics given     lumbar degeneration      Raynaud's disease      Unspecified essential hypertension      Family History   Problem Relation Age of Onset     Arthritis Mother      Hyperlipidemia Mother      Other Cancer Mother         Glioblastoma Multiforme     Osteoporosis Mother      Blood Disease  Father      Hypertension Father      Hyperlipidemia Father      Other Cancer Father         lung cancer     Depression Son      Depression Daughter      Hyperlipidemia Brother      Anxiety Disorder Son      Skin Cancer No family hx of      Current Outpatient Medications   Medication Sig Dispense Refill     ACIDOPHILUS OR TABS 1 tablet daily       albuterol (PROAIR HFA) 108 (90 Base) MCG/ACT inhaler Inhale 2 puffs into the lungs every 4 hours as needed for shortness of breath / dyspnea or wheezing 1 Inhaler 1     atenolol (TENORMIN) 25 MG tablet Take 1 tablet (25 mg) by mouth daily 90 tablet 3     azelastine (ASTELIN) 0.1 % nasal spray Spray 2 sprays into both nostrils 2 times daily 1 Bottle 11     beclomethasone HFA (QVAR REDIHALER) 80 MCG/ACT inhaler Inhale 2 puffs into the lungs 2 times daily Schedule follow up with Dr. Stockton for refills 3 Inhaler 0     biotin 1000 MCG TABS tablet Take 1,000 mcg by mouth daily       budesonide-formoterol (SYMBICORT) 160-4.5 MCG/ACT Inhaler Inhale 2 puffs into the lungs 2 times daily 1 Inhaler 3     CALCIUM 600+D PO twice daily       cetirizine (ZYRTEC) 10 MG tablet Take 10 mg by mouth daily.       Cholecalciferol (VITAMIN D-3 PO) Take 2,000 Int'l Units by mouth daily       DAILY MULTIVITAMIN PO 1 tablet daily       escitalopram (LEXAPRO) 10 MG tablet Take 1 tablet (10 mg) by mouth daily 90 tablet 1     ferrous sulfate (IRON) 325 (65 Fe) MG tablet Take 325 mg by mouth daily (with breakfast)       FLAXSEED OIL 1000 MG PO CAPS 1 tablet daily       fluticasone (FLONASE) 50 MCG/ACT spray Spray 1-2 sprays into both nostrils daily 1 Bottle 11     gabapentin (NEURONTIN) 300 MG capsule 1 capsule TID with additional 1-2 capsules if headache is severe. 300 capsule 1     GLUCOSAMINE-CHONDROITIN PO TABS 1500 mg glucosamine and 1200mg chondroitin daily-2 tablets daily       hydrocortisone 2.5 % cream Apply topically 2 times daily (Patient taking differently: Apply topically 2 times daily as  needed ) 60 g 1     ibuprofen (ADVIL,MOTRIN) 200 MG tablet take 1 tablet (200 mg) by oral route every 6 hours as needed with food       ipratropium (ATROVENT) 0.06 % spray Spray 2 sprays into both nostrils 4 times daily as needed for rhinitis 1 Box 3     Ketotifen Fumarate (ZADITOR OP) Apply to eye daily as needed       lisinopril (PRINIVIL/ZESTRIL) 40 MG tablet Take 1 tablet (40 mg) by mouth daily 90 tablet 3     Magnesium 400 MG CAPS Take by mouth daily       montelukast (SINGULAIR) 10 MG tablet Take 1 tablet (10 mg) by mouth At Bedtime 30 tablet 5     pantoprazole (PROTONIX) 40 MG EC tablet Take 1 tablet (40 mg) by mouth daily Take 30-60 minutes before a meal. 90 tablet 3     polyethylene glycol (MIRALAX) powder Take 1 capful by mouth daily       pravastatin (PRAVACHOL) 40 MG tablet Take 1 tablet (40 mg) by mouth daily 90 tablet 3     rOPINIRole (REQUIP) 0.25 MG tablet Take 0.5 mg by mouth At Bedtime        S-ADENOSYLMETHIONINE 200 MG PO TABS 2 tablets daily       senna (SENOKOT) 8.6 MG tablet Take 2 tablets by mouth daily       spironolactone (ALDACTONE) 25 MG tablet TAKE ONE-HALF (1/2) TABLET DAILY 45 tablet 0     SUMAtriptan (IMITREX) 50 MG tablet Take 50 mg by mouth at onset of headache        SUPER B COMPLEX PO TABS 1 tablet daily       traMADol (ULTRAM) 50 MG tablet Take 1 tablet (50 mg) by mouth every 6 hours as needed for headaches or severe pain 4 tablet 0     triamcinolone (KENALOG) 0.1 % cream Apply topically 2 times daily (Patient taking differently: Apply topically 2 times daily as needed ) 60 g 3     TURMERIC PO Take 2,000 mg by mouth daily       vitamin B complex with vitamin C (VITAMIN  B COMPLEX) TABS tablet Take 3 tablets by mouth daily       VITAMIN C 500 MG PO TABS 2 TABLET DAILY       vitamin E (E-400) 400 UNIT capsule Take one pill by mouth once daily       zonisamide (ZONEGRAN) 25 MG capsule        topiramate (TOPAMAX) 25 MG tablet Take 25 mg by mouth daily        Social History     Tobacco  "Use     Smoking status: Never Smoker     Smokeless tobacco: Never Used   Substance Use Topics     Alcohol use: Yes     Comment: one glass of wine daily       OBJECTIVE  /78 (BP Location: Left arm, Patient Position: Sitting, Cuff Size: Adult Large)   Pulse 68   Temp 98.4  F (36.9  C) (Oral)   Resp 20   Ht 1.803 m (5' 11\")   Wt 99.5 kg (219 lb 6.4 oz)   SpO2 98%   BMI 30.60 kg/m      Physical Exam   Constitutional: No distress.   HENT:   Head: Normocephalic and atraumatic.   Right Ear: Tympanic membrane, external ear and ear canal normal.   Left Ear: Tympanic membrane, external ear and ear canal normal.   Nose: Mucosal edema and rhinorrhea present.   Mouth/Throat: Oropharynx is clear and moist. No oropharyngeal exudate, posterior oropharyngeal erythema or tonsillar abscesses.   Eyes: EOM are normal. Right eye exhibits no discharge. Left eye exhibits no discharge.   Neck: Normal range of motion. Neck supple.   Cardiovascular: Normal rate, regular rhythm and normal heart sounds.   Pulmonary/Chest: Effort normal and breath sounds normal. No respiratory distress. She has no wheezes. She has no rales.   Musculoskeletal: Normal range of motion. She exhibits no edema.   Lymphadenopathy:     She has no cervical adenopathy.   Neurological: She is alert. No cranial nerve deficit.   NEURO:  equal  strength, neg Romberg, DTR II/IV bilaterally (UE and LE), finger to nose normal, CN intact, ambulates without difficulty.  no focal deficits noted.     Skin: Skin is warm and dry. No rash noted. She is not diaphoretic.     ASSESSMENT:      ICD-10-CM    1. Acute intractable headache, unspecified headache type R51 traMADol (ULTRAM) 50 MG tablet   2. Viral upper respiratory tract infection J06.9           Differential Diagnosis:  Headache:  Common migraine, Sinusitis, Cluster headache, Subarachnoid hemorrhage, Subdural hemorrhage, Intracerebral hemorrhage, Meningitis    Serious Comorbid Conditions:  Adult:  " Asthma    PLAN:  Patient has been having this headache for the last 3 days and not going away with Imitrex and gabapentin.  I have advised to go to the emergency room to evaluate the cause of headache.  She would like to try medications and go later if symptoms are not going away.  I discussed the differential diagnosis and the fact that she has had a head injury before.  All questions are answered and patient agrees to go to the ER if the initial dose of prescribed medications is not going to help.        Patient Instructions     Patient Education     Self-Care for Headaches  Most headaches aren't serious and can be relieved with self-care. But some headaches may be a sign of another health problem like eye trouble or high blood pressure. To find the best treatment, learn what kind of headaches you get. For tension headaches, self-care will usually help. To treat migraines, ask your healthcare provider for advice. It is also possible to get both tension and migraine headaches. Self-care involves relieving the pain and avoiding headache  triggers  if you can.    Ways to reduce pain and tension  Try these steps:    Apply a cold compress or ice pack to the pain site.    Drink fluids. If nausea makes it hard to drink, try sucking on ice.    Rest. Protect yourself from bright light and loud noises.    Calm your emotions by imagining a peaceful scene.    Massage tight neck, shoulder, and head muscles.    To relax muscles, soak in a hot bath or use a hot shower.  Use medicines  Aspirin or other over-the-counter pain medicines, such as ibuprofen and acetaminophen, can relieve headache. Remember: Never give aspirin to anyone 18 years old or younger because of the risk of developing Reye syndrome. Use pain medicines only when needed. Certain prescription medicines, if taken too often, can lead to rebound headaches. Check with your healthcare provider or pharmacist about your medicines.  Track your headaches  Keeping a  "headache diary can help you and your healthcare provider identify what's causing your headaches:    Note when each headache happens.    Identify your activities and the foods you've eaten 6 to 8 hours before the headache began.    Look for any trends or \"triggers.\"  Signs of tension headache  Any of the following can be signs:    Dull pain or feeling of pressure in a tight band around your head    Pain in your neck or shoulders    Headache without a definite beginning or end    Headache after an activity such as driving or working on a computer  Signs of migraine  Any of the following can be signs:    Throbbing pain on one or both sides of your head    Nausea or vomiting    Extreme sensitivity to light, sound, and smells    Bright spots, flashes, or other visual changes    Pain or nausea so severe that you can't continue your daily activities  Call your healthcare provider   If you have any of the following symptoms, contact your healthcare provider:    A headache that lingers after a recent injury or bump to the head.    A fever with a stiff neck or pain when you bend your head toward your chest.    A headache along with slurred speech, changes in your vision, or numbness or weakness in your arms or legs.    A headache for longer than 3 days.    Frequent headaches, especially in the morning.    Headaches with seizures     Seek immediate medical attention if you have a headache that you would call \"the worst headache you have ever had.\"  Date Last Reviewed: 3/1/2018    [id number] [employer]. [address] [place] [address]. All rights reserved. This information is not intended as a substitute for professional medical care. Always follow your healthcare professional's instructions.                 "

## 2019-07-08 ENCOUNTER — MYC MEDICAL ADVICE (OUTPATIENT)
Dept: FAMILY MEDICINE | Facility: CLINIC | Age: 64
End: 2019-07-08

## 2019-07-09 ENCOUNTER — MYC MEDICAL ADVICE (OUTPATIENT)
Dept: FAMILY MEDICINE | Facility: CLINIC | Age: 64
End: 2019-07-09

## 2019-07-09 DIAGNOSIS — R51.9 CHRONIC NONINTRACTABLE HEADACHE, UNSPECIFIED HEADACHE TYPE: Primary | ICD-10-CM

## 2019-07-09 DIAGNOSIS — G89.29 CHRONIC NONINTRACTABLE HEADACHE, UNSPECIFIED HEADACHE TYPE: Primary | ICD-10-CM

## 2019-07-17 DIAGNOSIS — E61.1 IRON DEFICIENCY: Primary | ICD-10-CM

## 2019-07-17 LAB — FERRITIN SERPL-MCNC: 136 NG/ML (ref 8–252)

## 2019-07-17 PROCEDURE — 36415 COLL VENOUS BLD VENIPUNCTURE: CPT | Performed by: PSYCHIATRY & NEUROLOGY

## 2019-07-17 PROCEDURE — 82728 ASSAY OF FERRITIN: CPT | Performed by: PSYCHIATRY & NEUROLOGY

## 2019-07-21 DIAGNOSIS — I10 ESSENTIAL HYPERTENSION: ICD-10-CM

## 2019-07-23 ENCOUNTER — OFFICE VISIT (OUTPATIENT)
Dept: PSYCHOLOGY | Facility: CLINIC | Age: 64
End: 2019-07-23
Payer: COMMERCIAL

## 2019-07-23 DIAGNOSIS — F34.1 PERSISTENT DEPRESSIVE DISORDER: Primary | ICD-10-CM

## 2019-07-23 DIAGNOSIS — F41.1 GENERALIZED ANXIETY DISORDER: ICD-10-CM

## 2019-07-23 PROCEDURE — 90834 PSYTX W PT 45 MINUTES: CPT | Performed by: MARRIAGE & FAMILY THERAPIST

## 2019-07-23 RX ORDER — SPIRONOLACTONE 25 MG/1
TABLET ORAL
Qty: 45 TABLET | Refills: 2 | Status: SHIPPED | OUTPATIENT
Start: 2019-07-23 | End: 2020-04-21

## 2019-07-23 ASSESSMENT — COLUMBIA-SUICIDE SEVERITY RATING SCALE - C-SSRS
2. HAVE YOU ACTUALLY HAD ANY THOUGHTS OF KILLING YOURSELF?: NO
6. HAVE YOU EVER DONE ANYTHING, STARTED TO DO ANYTHING, OR PREPARED TO DO ANYTHING TO END YOUR LIFE?: NO
1. IN THE PAST MONTH, HAVE YOU WISHED YOU WERE DEAD OR WISHED YOU COULD GO TO SLEEP AND NOT WAKE UP?: NO
ATTEMPT PAST THREE MONTHS: NO
TOTAL  NUMBER OF INTERRUPTED ATTEMPTS PAST 3 MONTHS: NO
2. HAVE YOU ACTUALLY HAD ANY THOUGHTS OF KILLING YOURSELF LIFETIME?: NO
TOTAL  NUMBER OF ABORTED OR SELF INTERRUPTED ATTEMPTS PAST 3 MONTHS: NO
TOTAL  NUMBER OF ABORTED OR SELF INTERRUPTED ATTEMPTS PAST LIFETIME: NO
1. IN THE PAST MONTH, HAVE YOU WISHED YOU WERE DEAD OR WISHED YOU COULD GO TO SLEEP AND NOT WAKE UP?: NO
6. HAVE YOU EVER DONE ANYTHING, STARTED TO DO ANYTHING, OR PREPARED TO DO ANYTHING TO END YOUR LIFE?: NO
TOTAL  NUMBER OF INTERRUPTED ATTEMPTS LIFETIME: NO
ATTEMPT LIFETIME: NO

## 2019-07-23 ASSESSMENT — PATIENT HEALTH QUESTIONNAIRE - PHQ9
SUM OF ALL RESPONSES TO PHQ QUESTIONS 1-9: 15
5. POOR APPETITE OR OVEREATING: NEARLY EVERY DAY

## 2019-07-23 ASSESSMENT — ANXIETY QUESTIONNAIRES
5. BEING SO RESTLESS THAT IT IS HARD TO SIT STILL: MORE THAN HALF THE DAYS
6. BECOMING EASILY ANNOYED OR IRRITABLE: SEVERAL DAYS
3. WORRYING TOO MUCH ABOUT DIFFERENT THINGS: SEVERAL DAYS
IF YOU CHECKED OFF ANY PROBLEMS ON THIS QUESTIONNAIRE, HOW DIFFICULT HAVE THESE PROBLEMS MADE IT FOR YOU TO DO YOUR WORK, TAKE CARE OF THINGS AT HOME, OR GET ALONG WITH OTHER PEOPLE: SOMEWHAT DIFFICULT
GAD7 TOTAL SCORE: 12
7. FEELING AFRAID AS IF SOMETHING AWFUL MIGHT HAPPEN: MORE THAN HALF THE DAYS
2. NOT BEING ABLE TO STOP OR CONTROL WORRYING: SEVERAL DAYS
1. FEELING NERVOUS, ANXIOUS, OR ON EDGE: MORE THAN HALF THE DAYS

## 2019-07-23 NOTE — TELEPHONE ENCOUNTER
"Requested Prescriptions   Pending Prescriptions Disp Refills     spironolactone (ALDACTONE) 25 MG tablet [Pharmacy Med Name: SPIRONOLACTONE TABS 25MG] 45 tablet 0     Sig: TAKE ONE-HALF (1/2) TABLET DAILY  Last Written Prescription Date:  04/23/2019 #45 x 0  Last filled 04/23/2019  Last office visit: 4/23/2019 PRADIP Corley     Future Office Visit:   Next 5 appointments (look out 90 days)    Aug 20, 2019 10:40 AM CDT  Jeff Hairston [facility] [address] [facility] [address] [phone]                Diuretics (Including Combos) Protocol Passed - 7/21/2019  5:57 AM        Passed - Blood pressure under 140/90 in past 12 months     BP Readings from Last 3 Encounters:   07/06/19 130/78   04/23/19 128/80   02/22/19 103/81                 Passed - Recent (12 mo) or future (30 days) visit within the authorizing provider's specialty     Patient had office visit in the last 12 months or has a visit in the next 30 days with authorizing provider or within the authorizing provider's specialty.  See \"Patient Info\" tab in inbasket, or \"Choose Columns\" in Meds & Orders section of the refill encounter.              Passed - Medication is active on med list        Passed - Patient is age 18 or older        Passed - No active pregancy on record        Passed - Normal serum creatinine on file in past 12 months     Recent Labs   Lab Test 04/23/19  1222   CR 0.94              Passed - Normal serum potassium on file in past 12 months     Recent Labs   Lab Test 04/23/19  1222   POTASSIUM 4.1                    Passed - Normal serum sodium on file in past 12 months     Recent Labs   Lab Test 04/23/19  1222                 Passed - No positive pregnancy test in past 12 months          "
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
## 2019-07-23 NOTE — TELEPHONE ENCOUNTER
Prescription approved per [facility].    Eliana ANDERSON RN

## 2019-07-24 ASSESSMENT — ANXIETY QUESTIONNAIRES: GAD7 TOTAL SCORE: 12

## 2019-08-07 ENCOUNTER — PRE VISIT (OUTPATIENT)
Dept: NEUROLOGY | Facility: CLINIC | Age: 64
End: 2019-08-07

## 2019-08-07 NOTE — TELEPHONE ENCOUNTER
FUTURE VISIT INFORMATION      FUTURE VISIT INFORMATION:    Date: 9/16/2019    Time: 1230pm    [place] [facility]  REFERRAL INFORMATION:    Referring provider:  Elizabeth Corley     Referring providers clinic:  [facility]     Reason for visit/diagnosis  Headaches     RECORDS REQUESTED FROM:       Clinic name Comments Records Status Imaging Status   [facility]  7/6/19 ED  CT Head 7/6/19 Care Everywhere  Received   [facility] Deborah Osgood PA:  4/5/19 1/4/19 11/13/18 7/11/18 Received N/A                             Imaging Received  12/24/19  [facility]   Image Type (x): Disc:   PACS: x   Exam Date/Name CT Head 7/6/19 Comments: images resolved in PACS

## 2019-08-15 ENCOUNTER — OFFICE VISIT (OUTPATIENT)
Dept: ALLERGY | Facility: CLINIC | Age: 64
End: 2019-08-15
Payer: COMMERCIAL

## 2019-08-15 VITALS
BODY MASS INDEX: 30.38 KG/M2 | WEIGHT: 217 LBS | HEART RATE: 62 BPM | OXYGEN SATURATION: 97 % | SYSTOLIC BLOOD PRESSURE: 124 MMHG | DIASTOLIC BLOOD PRESSURE: 74 MMHG | HEIGHT: 71 IN

## 2019-08-15 DIAGNOSIS — J31.0 RHINOCONJUNCTIVITIS: Primary | ICD-10-CM

## 2019-08-15 DIAGNOSIS — H10.9 RHINOCONJUNCTIVITIS: Primary | ICD-10-CM

## 2019-08-15 DIAGNOSIS — J45.40 MODERATE PERSISTENT ASTHMA WITHOUT COMPLICATION: ICD-10-CM

## 2019-08-15 PROBLEM — L20.89 OTHER ATOPIC DERMATITIS: Status: RESOLVED | Noted: 2017-01-02 | Resolved: 2019-08-15

## 2019-08-15 PROBLEM — H01.139: Status: RESOLVED | Noted: 2017-01-02 | Resolved: 2019-08-15

## 2019-08-15 PROCEDURE — 99214 OFFICE O/P EST MOD 30 MIN: CPT | Performed by: ALLERGY & IMMUNOLOGY

## 2019-08-15 RX ORDER — ALBUTEROL SULFATE 90 UG/1
2 AEROSOL, METERED RESPIRATORY (INHALATION) EVERY 4 HOURS PRN
Qty: 1 INHALER | Refills: 1 | Status: SHIPPED | OUTPATIENT
Start: 2019-08-15

## 2019-08-15 RX ORDER — MONTELUKAST SODIUM 10 MG/1
10 TABLET ORAL AT BEDTIME
Qty: 30 TABLET | Refills: 11 | Status: SHIPPED | OUTPATIENT
Start: 2019-08-15 | End: 2020-08-26

## 2019-08-15 RX ORDER — AZELASTINE HYDROCHLORIDE, FLUTICASONE PROPIONATE 137; 50 UG/1; UG/1
1 SPRAY, METERED NASAL 2 TIMES DAILY
Qty: 1 BOTTLE | Refills: 11 | Status: SHIPPED | OUTPATIENT
Start: 2019-08-15 | End: 2020-07-22

## 2019-08-15 ASSESSMENT — MIFFLIN-ST. JEOR: SCORE: 1635.44

## 2019-08-15 NOTE — LETTER
8/15/2019         RE: Miri Naylor  [address]  [place] [address]        Dear Colleague,    Thank you for referring your patient, Miri Naylor, to the [facility]. Please see a copy of my visit note below.    Miri Naylor is a 64 year old White female with previous medical history significant for asthma, nonallergic rhinitis who returns for a follow up visit.     Asthma has been well controlled.  Some more coughing in the summer with humid air exposure.  Historically she has had problems with cold air and upper respiratory tract infections.  Rare use of albuterol.  She thinks she used twice over the summer.  She is on Qvar 80 mcg 2 puff inhaled twice daily.  She additionally is on Singulair 10 mg by mouth daily.    Patient has underwent allergy testing in the past which resulted in a negative evaluation.  She has perennial symptoms to get worse in spring and fall.  Symptoms include rhinorrhea, postnasal drainage and intermittent nasal congestion.  She has noticed her symptoms flareup over the last few weeks.  She is on Flonase 2 sprays per nostril daily, Atrovent 2 sprays per nostril twice daily and Zyrtec daily.  She has used Azo lasting in the past and is unclear if beneficial.  She is no longer using.       ACT Total Scores 8/15/2019   ACT TOTAL SCORE (Goal Greater than or Equal to 20) 23   In the past 12 months, how many times did you visit the emergency room for your asthma without being admitted to the hospital? 0   In the past 12 months, how many times were you hospitalized overnight because of your asthma? 0           Past Medical History:   Diagnosis Date     Depressive disorder, not elsewhere classified      Drug allergy, multiple 9/23/14--passed graded oral challenge for Zithromax.     7/3/14 POS PRE-PEN skin test. 7/30/14 NEG skin tests and oral challenge to Cefzil     Hx of abnormal Pap smear ?    no specifics given     lumbar degeneration       Raynaud's disease      Uncomplicated asthma      Unspecified essential hypertension      Family History   Problem Relation Age of Onset     Arthritis Mother      Hyperlipidemia Mother      Other Cancer Mother         Glioblastoma Multiforme     Osteoporosis Mother      Blood Disease Father      Hypertension Father      Hyperlipidemia Father      Other Cancer Father         lung cancer     Depression Son      Depression Daughter      Hyperlipidemia Brother      Anxiety Disorder Son      Skin Cancer No family hx of      Past Surgical History:   Procedure Laterality Date     BIOPSY OF BREAST, NEEDLE CORE       C PELVIS/HIP JOINT SURGERY UNLISTED Right 7/19/16    total hip arthroplasty     COLONOSCOPY       HIP SURGERY Right 07/19/2016       REVIEW OF SYSTEMS:  General: negative for weight gain. negative for weight loss. negative for changes in sleep.   Ears: negative for fullness. negative for hearing loss. negative for dizziness.   Nose: negative for snoring.negative for changes in smell. positive  for drainage.   Eyes: negative for eye watering. negative for eye itching. negative for vision changes. negative for eye redness.  Throat: negative for hoarseness. negative for sore throat. negative for trouble swallowing.   Lungs: negative for shortness of breath.negative for wheezing. negative for sputum production.   Cardiovascular: negative for chest pain. negative for swelling of ankles. negative for fast or irregular heartbeat.   Gastrointestinal: negative for nausea. positive  for heartburn. negative for acid reflux.   Musculoskeletal: positive  for joint pain. positive  for joint stiffness. negative for joint swelling.   Neurologic: negative for seizures. negative for fainting. positive  for weakness.   Psychiatric: negative for changes in mood. negative for anxiety.   Endocrine: negative for cold intolerance. negative for heat intolerance. negative for tremors.   Lymphatic: negative for lower extremity swelling.  negative for lymph node swelling.   Hematologic: negative for easy bruising. negative for easy bleeding.  Integumentary: negative for rash. negative for scaling. negative for nail changes.       Current Outpatient Medications:      ACIDOPHILUS OR TABS, 1 tablet daily, Disp: , Rfl:      albuterol (PROAIR HFA) 108 (90 Base) MCG/ACT inhaler, Inhale 2 puffs into the lungs every 4 hours as needed for shortness of breath / dyspnea or wheezing, Disp: 1 Inhaler, Rfl: 1     atenolol (TENORMIN) 25 MG tablet, Take 1 tablet (25 mg) by mouth daily, Disp: 90 tablet, Rfl: 3     azelastine-fluticasone (DYMISTA) 137-50 MCG/ACT nasal spray, Spray 1 spray into both nostrils 2 times daily, Disp: 1 Bottle, Rfl: 11     beclomethasone HFA (QVAR REDIHALER) 80 MCG/ACT inhaler, Inhale 2 puffs into the lungs 2 times daily Schedule follow up with Dr. Stockton for refills, Disp: 3 Inhaler, Rfl: 3     biotin 1000 MCG TABS tablet, Take 1,000 mcg by mouth daily, Disp: , Rfl:      CALCIUM 600+D PO, twice daily, Disp: , Rfl:      cetirizine (ZYRTEC) 10 MG tablet, Take 10 mg by mouth daily., Disp: , Rfl:      Cholecalciferol (VITAMIN D-3 PO), Take 2,000 Int'l Units by mouth daily, Disp: , Rfl:      DAILY MULTIVITAMIN PO, 1 tablet daily, Disp: , Rfl:      escitalopram (LEXAPRO) 10 MG tablet, Take 1 tablet (10 mg) by mouth daily, Disp: 90 tablet, Rfl: 1     ferrous sulfate (IRON) 325 (65 Fe) MG tablet, Take 325 mg by mouth daily (with breakfast), Disp: , Rfl:      FLAXSEED OIL 1000 MG PO CAPS, 1 tablet daily, Disp: , Rfl:      gabapentin (NEURONTIN) 300 MG capsule, 1 capsule TID with additional 1-2 capsules if headache is severe., Disp: 300 capsule, Rfl: 1     GLUCOSAMINE-CHONDROITIN PO TABS, 1500 mg glucosamine and 1200mg chondroitin daily-2 tablets daily, Disp: , Rfl:      hydrocortisone 2.5 % cream, Apply topically 2 times daily (Patient taking differently: Apply topically 2 times daily as needed ), Disp: 60 g, Rfl: 1     ibuprofen (ADVIL,MOTRIN) 200  MG tablet, take 1 tablet (200 mg) by oral route every 6 hours as needed with food, Disp: , Rfl:      ipratropium (ATROVENT) 0.06 % spray, Spray 2 sprays into both nostrils 4 times daily as needed for rhinitis, Disp: 1 Box, Rfl: 3     Ketotifen Fumarate (ZADITOR OP), Apply to eye daily as needed, Disp: , Rfl:      lisinopril (PRINIVIL/ZESTRIL) 40 MG tablet, Take 1 tablet (40 mg) by mouth daily, Disp: 90 tablet, Rfl: 3     Magnesium 400 MG CAPS, Take by mouth daily, Disp: , Rfl:      montelukast (SINGULAIR) 10 MG tablet, Take 1 tablet (10 mg) by mouth At Bedtime, Disp: 30 tablet, Rfl: 11     pantoprazole (PROTONIX) 40 MG EC tablet, Take 1 tablet (40 mg) by mouth daily Take 30-60 minutes before a meal., Disp: 90 tablet, Rfl: 3     polyethylene glycol (MIRALAX) powder, Take 1 capful by mouth daily, Disp: , Rfl:      pravastatin (PRAVACHOL) 40 MG tablet, Take 1 tablet (40 mg) by mouth daily, Disp: 90 tablet, Rfl: 3     rOPINIRole (REQUIP) 0.25 MG tablet, Take 0.5 mg by mouth At Bedtime , Disp: , Rfl:      S-ADENOSYLMETHIONINE 200 MG PO TABS, 2 tablets daily, Disp: , Rfl:      senna (SENOKOT) 8.6 MG tablet, Take 2 tablets by mouth daily, Disp: , Rfl:      spironolactone (ALDACTONE) 25 MG tablet, TAKE ONE-HALF (1/2) TABLET DAILY, Disp: 45 tablet, Rfl: 2     SUMAtriptan (IMITREX) 50 MG tablet, Take 50 mg by mouth at onset of headache , Disp: , Rfl:      SUPER B COMPLEX PO TABS, 1 tablet daily, Disp: , Rfl:      triamcinolone (KENALOG) 0.1 % cream, Apply topically 2 times daily (Patient taking differently: Apply topically 2 times daily as needed ), Disp: 60 g, Rfl: 3     TURMERIC PO, Take 2,000 mg by mouth daily, Disp: , Rfl:      vitamin B complex with vitamin C (VITAMIN  B COMPLEX) TABS tablet, Take 3 tablets by mouth daily, Disp: , Rfl:      VITAMIN C 500 MG PO TABS, 2 TABLET DAILY, Disp: , Rfl:      vitamin E (E-400) 400 UNIT capsule, Take one pill by mouth once daily, Disp: , Rfl:   Immunization History   Administered  Date(s) Administered     Influenza Vaccine IM 3yrs+ 4 Valent IIV4 12/03/2013, 11/16/2015, 10/01/2017, 09/13/2018     TD (ADULT, 7+) 03/06/2008     TDAP Vaccine (Adacel) 08/03/2012     Allergies   Allergen Reactions     Levofloxacin Rash     Other reaction(s): Contact Dermatitis     Penicillins Hives and Rash     Confirmed by skin prick testing 7/3/14     Amoxicillin Hives and Rash     Amoxicillin-Pot Clavulanate Rash     Azithromycin Rash     Cephalexin Rash     Clindamycin Rash and Hives     Atorvastatin Other (See Comments)     Felt sick, intolerance     Clindamycin Hcl Hives     Levaquin      tendonitis     Augmentin Rash         EXAM:   Constitutional:  Appears well-developed and well-nourished. No distress.   HEENT:   Head: Normocephalic.   Mouth/Throat:   No cobblestoning of posterior oropharynx.   Nasal tissue pink and normal appearing.  No rhinorrhea noted.    Eyes: Conjunctivae are non-erythematous   No maxillary or frontal sinus tenderness to palpation.   Cardiovascular: Normal rate, regular rhythm and normal heart sounds. Exam reveals no gallop and no friction rub.   No murmur heard.  Respiratory: Effort normal and breath sounds normal. No respiratory distress. No wheezes. No rales.   Musculoskeletal: Normal range of motion.    Neuro: Oriented to person, place, and time.  Skin: Skin is warm and dry. No rash noted.   Psychiatric: Normal mood and affect.     Nursing note and vitals reviewed.    ASSESSMENT/PLAN:  Problem List Items Addressed This Visit        Respiratory    Moderate persistent asthma without complication     Flares with URI, cold air and humid air.  Current treatment is Qvar 80 mcg 2 puff inhaled daily and Singulair 10mg PO daily.  Well-controlled.     ACT 23        - Albuterol 2-4 puffs inhaled (use a spacer unless using a Proair Respiclick device) every 4 hours as needed for chest tightness, wheezing, shortness of breath and/or coughing.    - Avoid asthma triggers.  -Qvar 80mcg 2 puffs  inhaled twice daily  - Singulair 10mg by mouth daily at night.         Relevant Medications    azelastine-fluticasone (DYMISTA) 137-50 MCG/ACT nasal spray    beclomethasone HFA (QVAR REDIHALER) 80 MCG/ACT inhaler    montelukast (SINGULAIR) 10 MG tablet    albuterol (PROAIR HFA) 108 (90 Base) MCG/ACT inhaler       Infectious/Inflammatory    Rhinoconjunctivitis - Primary     History of perennial nasal and ocular symptoms that get worse in the spring and fall.  Atrovent has been helpful.  Flonase has been helpful.  Negative allergy testing.  Increased symptoms around cats.  Zyrtec is helpful.  Singulair is helpful.  Currently with postnasal drainage that is picked up over the last few weeks.  Additionally having rhinorrhea.    Either vasomotor rhinitis versus local allergic rhinitis. Follows with ENT.       Allergy skin testing was negative.      - Stop Flonase 2 sprays per nostril daily.  - Dymista 1 spray/nostril twice daily.  - Ipratropium 2 sprays/nostril two times daily as needed.   - Zyrtec as needed.   - Singulair 10mg by mouth daily at night.          Relevant Medications    azelastine-fluticasone (DYMISTA) 137-50 MCG/ACT nasal spray      RTC in 6 months.     Chart documentation with Dragon Voice recognition Software. Although reviewed after completion, some words and grammatical errors may remain.    Nish Stockton DO FAAAAI  Allergy/Immunology  [place], [place]      Again, thank you for allowing me to participate in the care of your patient.        Sincerely,        Nish Stockton DO

## 2019-08-15 NOTE — PROGRESS NOTES
Miri Naylor is a 64 year old White female with previous medical history significant for asthma, nonallergic rhinitis who returns for a follow up visit.     Asthma has been well controlled.  Some more coughing in the summer with humid air exposure.  Historically she has had problems with cold air and upper respiratory tract infections.  Rare use of albuterol.  She thinks she used twice over the summer.  She is on Qvar 80 mcg 2 puff inhaled twice daily.  She additionally is on Singulair 10 mg by mouth daily.    Patient has underwent allergy testing in the past which resulted in a negative evaluation.  She has perennial symptoms to get worse in spring and fall.  Symptoms include rhinorrhea, postnasal drainage and intermittent nasal congestion.  She has noticed her symptoms flareup over the last few weeks.  She is on Flonase 2 sprays per nostril daily, Atrovent 2 sprays per nostril twice daily and Zyrtec daily.  She has used Azo lasting in the past and is unclear if beneficial.  She is no longer using.       ACT Total Scores 8/15/2019   ACT TOTAL SCORE (Goal Greater than or Equal to 20) 23   In the past 12 months, how many times did you visit the emergency room for your asthma without being admitted to the hospital? 0   In the past 12 months, how many times were you hospitalized overnight because of your asthma? 0           Past Medical History:   Diagnosis Date     Depressive disorder, not elsewhere classified      Drug allergy, multiple 9/23/14--passed graded oral challenge for Zithromax.     7/3/14 POS PRE-PEN skin test. 7/30/14 NEG skin tests and oral challenge to Cefzil     Hx of abnormal Pap smear ?    no specifics given     lumbar degeneration      Raynaud's disease      Uncomplicated asthma      Unspecified essential hypertension      Family History   Problem Relation Age of Onset     Arthritis Mother      Hyperlipidemia Mother      Other Cancer Mother         Glioblastoma Multiforme     Osteoporosis  Mother      Blood Disease Father      Hypertension Father      Hyperlipidemia Father      Other Cancer Father         lung cancer     Depression Son      Depression Daughter      Hyperlipidemia Brother      Anxiety Disorder Son      Skin Cancer No family hx of      Past Surgical History:   Procedure Laterality Date     BIOPSY OF BREAST, NEEDLE CORE       C PELVIS/HIP JOINT SURGERY UNLISTED Right 7/19/16    total hip arthroplasty     COLONOSCOPY       HIP SURGERY Right 07/19/2016       REVIEW OF SYSTEMS:  General: negative for weight gain. negative for weight loss. negative for changes in sleep.   Ears: negative for fullness. negative for hearing loss. negative for dizziness.   Nose: negative for snoring.negative for changes in smell. positive  for drainage.   Eyes: negative for eye watering. negative for eye itching. negative for vision changes. negative for eye redness.  Throat: negative for hoarseness. negative for sore throat. negative for trouble swallowing.   Lungs: negative for shortness of breath.negative for wheezing. negative for sputum production.   Cardiovascular: negative for chest pain. negative for swelling of ankles. negative for fast or irregular heartbeat.   Gastrointestinal: negative for nausea. positive  for heartburn. negative for acid reflux.   Musculoskeletal: positive  for joint pain. positive  for joint stiffness. negative for joint swelling.   Neurologic: negative for seizures. negative for fainting. positive  for weakness.   Psychiatric: negative for changes in mood. negative for anxiety.   Endocrine: negative for cold intolerance. negative for heat intolerance. negative for tremors.   Lymphatic: negative for lower extremity swelling. negative for lymph node swelling.   Hematologic: negative for easy bruising. negative for easy bleeding.  Integumentary: negative for rash. negative for scaling. negative for nail changes.       Current Outpatient Medications:      ACIDOPHILUS OR TABS, 1  tablet daily, Disp: , Rfl:      albuterol (PROAIR HFA) 108 (90 Base) MCG/ACT inhaler, Inhale 2 puffs into the lungs every 4 hours as needed for shortness of breath / dyspnea or wheezing, Disp: 1 Inhaler, Rfl: 1     atenolol (TENORMIN) 25 MG tablet, Take 1 tablet (25 mg) by mouth daily, Disp: 90 tablet, Rfl: 3     azelastine-fluticasone (DYMISTA) 137-50 MCG/ACT nasal spray, Spray 1 spray into both nostrils 2 times daily, Disp: 1 Bottle, Rfl: 11     beclomethasone HFA (QVAR REDIHALER) 80 MCG/ACT inhaler, Inhale 2 puffs into the lungs 2 times daily Schedule follow up with Dr. Stockton for refills, Disp: 3 Inhaler, Rfl: 3     biotin 1000 MCG TABS tablet, Take 1,000 mcg by mouth daily, Disp: , Rfl:      CALCIUM 600+D PO, twice daily, Disp: , Rfl:      cetirizine (ZYRTEC) 10 MG tablet, Take 10 mg by mouth daily., Disp: , Rfl:      Cholecalciferol (VITAMIN D-3 PO), Take 2,000 Int'l Units by mouth daily, Disp: , Rfl:      DAILY MULTIVITAMIN PO, 1 tablet daily, Disp: , Rfl:      escitalopram (LEXAPRO) 10 MG tablet, Take 1 tablet (10 mg) by mouth daily, Disp: 90 tablet, Rfl: 1     ferrous sulfate (IRON) 325 (65 Fe) MG tablet, Take 325 mg by mouth daily (with breakfast), Disp: , Rfl:      FLAXSEED OIL 1000 MG PO CAPS, 1 tablet daily, Disp: , Rfl:      gabapentin (NEURONTIN) 300 MG capsule, 1 capsule TID with additional 1-2 capsules if headache is severe., Disp: 300 capsule, Rfl: 1     GLUCOSAMINE-CHONDROITIN PO TABS, 1500 mg glucosamine and 1200mg chondroitin daily-2 tablets daily, Disp: , Rfl:      hydrocortisone 2.5 % cream, Apply topically 2 times daily (Patient taking differently: Apply topically 2 times daily as needed ), Disp: 60 g, Rfl: 1     ibuprofen (ADVIL,MOTRIN) 200 MG tablet, take 1 tablet (200 mg) by oral route every 6 hours as needed with food, Disp: , Rfl:      ipratropium (ATROVENT) 0.06 % spray, Spray 2 sprays into both nostrils 4 times daily as needed for rhinitis, Disp: 1 Box, Rfl: 3     Ketotifen Fumarate  (ZADITOR OP), Apply to eye daily as needed, Disp: , Rfl:      lisinopril (PRINIVIL/ZESTRIL) 40 MG tablet, Take 1 tablet (40 mg) by mouth daily, Disp: 90 tablet, Rfl: 3     Magnesium 400 MG CAPS, Take by mouth daily, Disp: , Rfl:      montelukast (SINGULAIR) 10 MG tablet, Take 1 tablet (10 mg) by mouth At Bedtime, Disp: 30 tablet, Rfl: 11     pantoprazole (PROTONIX) 40 MG EC tablet, Take 1 tablet (40 mg) by mouth daily Take 30-60 minutes before a meal., Disp: 90 tablet, Rfl: 3     polyethylene glycol (MIRALAX) powder, Take 1 capful by mouth daily, Disp: , Rfl:      pravastatin (PRAVACHOL) 40 MG tablet, Take 1 tablet (40 mg) by mouth daily, Disp: 90 tablet, Rfl: 3     rOPINIRole (REQUIP) 0.25 MG tablet, Take 0.5 mg by mouth At Bedtime , Disp: , Rfl:      S-ADENOSYLMETHIONINE 200 MG PO TABS, 2 tablets daily, Disp: , Rfl:      senna (SENOKOT) 8.6 MG tablet, Take 2 tablets by mouth daily, Disp: , Rfl:      spironolactone (ALDACTONE) 25 MG tablet, TAKE ONE-HALF (1/2) TABLET DAILY, Disp: 45 tablet, Rfl: 2     SUMAtriptan (IMITREX) 50 MG tablet, Take 50 mg by mouth at onset of headache , Disp: , Rfl:      SUPER B COMPLEX PO TABS, 1 tablet daily, Disp: , Rfl:      triamcinolone (KENALOG) 0.1 % cream, Apply topically 2 times daily (Patient taking differently: Apply topically 2 times daily as needed ), Disp: 60 g, Rfl: 3     TURMERIC PO, Take 2,000 mg by mouth daily, Disp: , Rfl:      vitamin B complex with vitamin C (VITAMIN  B COMPLEX) TABS tablet, Take 3 tablets by mouth daily, Disp: , Rfl:      VITAMIN C 500 MG PO TABS, 2 TABLET DAILY, Disp: , Rfl:      vitamin E (E-400) 400 UNIT capsule, Take one pill by mouth once daily, Disp: , Rfl:   Immunization History   Administered Date(s) Administered     Influenza Vaccine IM 3yrs+ 4 Valent IIV4 12/03/2013, 11/16/2015, 10/01/2017, 09/13/2018     TD (ADULT, 7+) 03/06/2008     TDAP Vaccine (Adacel) 08/03/2012     Allergies   Allergen Reactions     Levofloxacin Rash     Other  reaction(s): Contact Dermatitis     Penicillins Hives and Rash     Confirmed by skin prick testing 7/3/14     Amoxicillin Hives and Rash     Amoxicillin-Pot Clavulanate Rash     Azithromycin Rash     Cephalexin Rash     Clindamycin Rash and Hives     Atorvastatin Other (See Comments)     Felt sick, intolerance     Clindamycin Hcl Hives     Levaquin      tendonitis     Augmentin Rash         EXAM:   Constitutional:  Appears well-developed and well-nourished. No distress.   HEENT:   Head: Normocephalic.   Mouth/Throat:   No cobblestoning of posterior oropharynx.   Nasal tissue pink and normal appearing.  No rhinorrhea noted.    Eyes: Conjunctivae are non-erythematous   No maxillary or frontal sinus tenderness to palpation.   Cardiovascular: Normal rate, regular rhythm and normal heart sounds. Exam reveals no gallop and no friction rub.   No murmur heard.  Respiratory: Effort normal and breath sounds normal. No respiratory distress. No wheezes. No rales.   Musculoskeletal: Normal range of motion.    Neuro: Oriented to person, place, and time.  Skin: Skin is warm and dry. No rash noted.   Psychiatric: Normal mood and affect.     Nursing note and vitals reviewed.    ASSESSMENT/PLAN:  Problem List Items Addressed This Visit        Respiratory    Moderate persistent asthma without complication     Flares with URI, cold air and humid air.  Current treatment is Qvar 80 mcg 2 puff inhaled daily and Singulair 10mg PO daily.  Well-controlled.     ACT 23        - Albuterol 2-4 puffs inhaled (use a spacer unless using a Proair Respiclick device) every 4 hours as needed for chest tightness, wheezing, shortness of breath and/or coughing.    - Avoid asthma triggers.  -Qvar 80mcg 2 puffs inhaled twice daily  - Singulair 10mg by mouth daily at night.         Relevant Medications    azelastine-fluticasone (DYMISTA) 137-50 MCG/ACT nasal spray    beclomethasone HFA (QVAR REDIHALER) 80 MCG/ACT inhaler    montelukast (SINGULAIR) 10 MG  tablet    albuterol (PROAIR HFA) 108 (90 Base) MCG/ACT inhaler       Infectious/Inflammatory    Rhinoconjunctivitis - Primary     History of perennial nasal and ocular symptoms that get worse in the spring and fall.  Atrovent has been helpful.  Flonase has been helpful.  Negative allergy testing.  Increased symptoms around cats.  Zyrtec is helpful.  Singulair is helpful.  Currently with postnasal drainage that is picked up over the last few weeks.  Additionally having rhinorrhea.    Either vasomotor rhinitis versus local allergic rhinitis. Follows with ENT.       Allergy skin testing was negative.      - Stop Flonase 2 sprays per nostril daily.  - Dymista 1 spray/nostril twice daily.  - Ipratropium 2 sprays/nostril two times daily as needed.   - Zyrtec as needed.   - Singulair 10mg by mouth daily at night.          Relevant Medications    azelastine-fluticasone (DYMISTA) 137-50 MCG/ACT nasal spray      RTC in 6 months.     Chart documentation with Dragon Voice recognition Software. Although reviewed after completion, some words and grammatical errors may remain.    Nish Stockton DO FAAAAI  Allergy/Immunology  [facility], [place]

## 2019-08-15 NOTE — PATIENT INSTRUCTIONS
Allergy Staff Appt Hours Shot Hours Locations    Physician     Nish Stockton DO       Support Staff     MEMO Levy, MADELIN  Tuesday:        [place] 7-4:20     Wednesday:        [place]: 7-5     Thursday:                    [place] 7-6:40     Friday:        [place] 7-2:40   [place]        Thursday: 1-5:50        Friday: 7-10:50     [place]        Tuesday: 7- 3:20        Wednesday: 7-4:20     [place] Monday: 7-4:20        Tuesday: 1-6:20         [facility]  [address] [place] [address]  Appt Line: [phone]  Allergy RN:  [phone]    [facility] (formerly [facility]3]  [address] [place] [address]  Appt Line: [phone]  Allergy RN:  [phone]       Important Scheduling Information  Aspirin Desensitization: Appt will last 2 clinic days. Please call the Allergy RN line for your clinic to schedule. Discontinue antihistamines 7 days prior to the appointment.     Food Challenges: Appt will last 3-4 hours. Please call the Allergy RN line for your clinic to schedule. Discontinue antihistamines 7 days prior to the appointment.     Penicillin Testing: Appt will last 2-3 hours. Please call the Allergy RN line for your clinic to schedule. Discontinue antihistamines 7 days prior to the appointment.     Skin Testing: Appt will about 40 minutes. Call the appointment line for your clinic to schedule. Discontinue antihistamines 7 days prior to the appointment.     Venom Testing: Appt will last 2-3 hours. Please call the Allergy RN line for your clinic to schedule. Discontinue antihistamines 7 days prior to the appointment.     Thank you for trusting us with your Allergy, Asthma, and Immunology care. Please feel free to contact us with any questions or concerns you may have.      - Qvar 80mcg 2 puffs inhaled twice daily  - Singulair 10mg by mouth daily at night.   - Albuterol 2-4 puffs inhaled (use a spacer unless using a Proair Respiclick device) every 4 hours as needed for chest  tightness, wheezing, shortness of breath and/or coughing.   - Dymista 1 spray/nostril twice daily.  - Stop Flonase.   - Atrovent 2 sprays/nostril twice daily.   - Cetirizine (Zyrtec) 10mg by mouth daily.

## 2019-08-15 NOTE — ASSESSMENT & PLAN NOTE
Flares with URI, cold air and humid air.  Current treatment is Qvar 80 mcg 2 puff inhaled daily and Singulair 10mg PO daily.  Well-controlled.     ACT 23        - Albuterol 2-4 puffs inhaled (use a spacer unless using a Proair Respiclick device) every 4 hours as needed for chest tightness, wheezing, shortness of breath and/or coughing.    - Avoid asthma triggers.  -Qvar 80mcg 2 puffs inhaled twice daily  - Singulair 10mg by mouth daily at night.

## 2019-08-15 NOTE — LETTER
My Asthma Action Plan  Name: Miri Naylor   YOB: 1955  Date: 8/15/2019   My doctor: Nish Stockton, DO   My clinic: [facility]        My Control Medicine: Beclomethasone (QVar) -  80 mcg 2 puffs twice daily  Montelukast (Singulair) -  10 mg daily  My Rescue Medicine:   Albuterol 2-4 puffs inhaled (use a spacer unless using a Proair Respiclick device) every 4 hours as needed for chest tightness, wheezing, shortness of breath and/or coughing.      My Asthma Severity: moderate persistent  Avoid your asthma triggers:  exercise or sports  Singing            GREEN ZONE   Good Control    I feel good    No cough or wheeze    Can work, sleep and play without asthma symptoms       Take your asthma control medicine every day.     1. If exercise triggers your asthma, take your rescue medication    15 minutes before exercise or sports, and    During exercise if you have asthma symptoms  2. Spacer to use with inhaler: If you have a spacer, make sure to use it with your inhaler             YELLOW ZONE Getting Worse  I have ANY of these:    I do not feel good    Cough or wheeze    Chest feels tight    Wake up at night   1. Keep taking your Green Zone medications  2. Start taking your rescue medicine:    every 20 minutes for up to 1 hour. Then every 4 hours for 24-48 hours.  3. If you stay in the Yellow Zone for more than 12-24 hours, contact your doctor.  4. If you do not return to the Green Zone in 12-24 hours or you get worse, start taking your oral steroid medicine if prescribed by your provider.           RED ZONE Medical Alert - Get Help  I have ANY of these:    I feel awful    Medicine is not helping    Breathing getting harder    Trouble walking or talking    Nose opens wide to breathe       1. Take your rescue medicine NOW  2. If your provider has prescribed an oral steroid medicine, start taking it NOW  3. Call your doctor NOW  4. If you are still in the Red Zone after 20 minutes and  you have not reached your doctor:    Take your rescue medicine again and    Call 911 or go to the emergency room right away    See your regular doctor within 2 weeks of an Emergency Room or Urgent Care visit for follow-up treatment.          Annual Reminders:  Meet with Asthma Educator,  Flu Shot in the Fall, consider Pneumonia Vaccination for patients with asthma (aged 19 and older).    Pharmacy:    [facility] - [place], [place] - [address] [facility] - [place], [place] - [address]                      Asthma Triggers  How To Control Things That Make Your Asthma Worse    Triggers are things that make your asthma worse.  Look at the list below to help you find your triggers and what you can do about them.  You can help prevent asthma flare-ups by staying away from your triggers.      Trigger                                                          What you can do   Cigarette Smoke  Tobacco smoke can make asthma worse. Do not allow smoking in your home, car or around you.  Be sure no one smokes at a child s day care or school.  If you smoke, ask your health care provider for ways to help you quit.  Ask family members to quit too.  Ask your health care provider for a referral to Quit Plan to help you quit smoking, or call 0-119-790-PLAN.     Colds, Flu, Bronchitis  These are common triggers of asthma. Wash your hands often.  Don t touch your eyes, nose or mouth.  Get a flu shot every year.     Dust Mites  These are tiny bugs that live in cloth or carpet. They are too small to see. Wash sheets and blankets in hot water every week.   Encase pillows and mattress in dust mite proof covers.  Avoid having carpet if you can. If you have carpet, vacuum weekly.   Use a dust mask and HEPA vacuum.   Pollen and Outdoor Mold  Some people are allergic to trees, grass, or weed pollen, or molds. Try to keep your windows closed.  Limit time out doors when pollen count is high.    Ask you health care provider about taking medicine during allergy season.     Animal Dander  Some people are allergic to skin flakes, urine or saliva from pets with fur or feathers. Keep pets with fur or feathers out of your home.    If you can t keep the pet outdoors, then keep the pet out of your bedroom.  Keep the bedroom door closed.  Keep pets off cloth furniture and away from stuffed toys.     Mice, Rats, and Cockroaches  Some people are allergic to the waste from these pests.   Cover food and garbage.  Clean up spills and food crumbs.  Store grease in the refrigerator.   Keep food out of the bedroom.   Indoor Mold  This can be a trigger if your home has high moisture. Fix leaking faucets, pipes, or other sources of water.   Clean moldy surfaces.  Dehumidify basement if it is damp and smelly.   Smoke, Strong Odors, and Sprays  These can reduce air quality. Stay away from strong odors and sprays, such as perfume, powder, hair spray, paints, smoke incense, paint, cleaning products, candles and new carpet.   Exercise or Sports  Some people with asthma have this trigger. Be active!  Ask your doctor about taking medicine before sports or exercise to prevent symptoms.    Warm up for 5-10 minutes before and after sports or exercise.     Other Triggers of Asthma  Cold air:  Cover your nose and mouth with a scarf.  Sometimes laughing or crying can be a trigger.  Some medicines and food can trigger asthma.

## 2019-08-16 ASSESSMENT — ASTHMA QUESTIONNAIRES: ACT_TOTALSCORE: 23

## 2019-08-25 DIAGNOSIS — E78.5 HYPERLIPIDEMIA LDL GOAL <130: ICD-10-CM

## 2019-08-26 NOTE — TELEPHONE ENCOUNTER
"Requested Prescriptions   Pending Prescriptions Disp Refills     pravastatin (PRAVACHOL) 40 MG tablet [Pharmacy Med Name: PRAVASTATIN TABS 40MG] 90 tablet 4     Sig: TAKE 1 TABLET DAILY  Last Written Prescription Date:  08/30/2018 #90 x 3  Last filled 05/30/2019 #90 x 4  Last office visit: 4/23/2019 PRADIP Corley    Future Office Visit:   Next 5 appointments (look out 90 days)    Aug 27, 2019 12:00 PM CDT  Return Visit with LUIS ENRIQUE Briseno  [facility] ([facility]) [address]  [place] [address]  [phone]   Sep 10, 2019  3:00 PM CDT  Jeff Martinez with Elizabeth Corley,   [facility] ([facility]) [address] [address]  [phone]                Statins Protocol Failed - 8/25/2019 11:53 PM        Failed - LDL on file in past 12 months     Recent Labs   Lab Test 08/24/18  1517   *             Passed - No abnormal creatine kinase in past 12 months     No lab results found.             Passed - Recent (12 mo) or future (30 days) visit within the authorizing provider's specialty     Patient had office visit in the last 12 months or has a visit in the next 30 days with authorizing provider or within the authorizing provider's specialty.  See \"Patient Info\" tab in inbasket, or \"Choose Columns\" in Meds & Orders section of the refill encounter.              Passed - Medication is active on med list        Passed - Patient is age 18 or older        Passed - No active pregnancy on record        Passed - No positive pregnancy test in past 12 months          "

## 2019-08-27 ENCOUNTER — OFFICE VISIT (OUTPATIENT)
Dept: PSYCHOLOGY | Facility: CLINIC | Age: 64
End: 2019-08-27
Payer: COMMERCIAL

## 2019-08-27 DIAGNOSIS — F41.1 GENERALIZED ANXIETY DISORDER: ICD-10-CM

## 2019-08-27 DIAGNOSIS — I10 ESSENTIAL HYPERTENSION: Primary | ICD-10-CM

## 2019-08-27 DIAGNOSIS — F34.1 PERSISTENT DEPRESSIVE DISORDER: Primary | ICD-10-CM

## 2019-08-27 PROCEDURE — 90791 PSYCH DIAGNOSTIC EVALUATION: CPT | Performed by: MARRIAGE & FAMILY THERAPIST

## 2019-08-27 RX ORDER — PRAVASTATIN SODIUM 40 MG
TABLET ORAL
Qty: 90 TABLET | Refills: 4 | Status: SHIPPED | OUTPATIENT
Start: 2019-08-27 | End: 2020-11-24

## 2019-08-27 ASSESSMENT — ANXIETY QUESTIONNAIRES
7. FEELING AFRAID AS IF SOMETHING AWFUL MIGHT HAPPEN: NOT AT ALL
GAD7 TOTAL SCORE: 9
3. WORRYING TOO MUCH ABOUT DIFFERENT THINGS: SEVERAL DAYS
4. TROUBLE RELAXING: MORE THAN HALF THE DAYS
GAD7 TOTAL SCORE: 9
5. BEING SO RESTLESS THAT IT IS HARD TO SIT STILL: MORE THAN HALF THE DAYS
6. BECOMING EASILY ANNOYED OR IRRITABLE: SEVERAL DAYS
7. FEELING AFRAID AS IF SOMETHING AWFUL MIGHT HAPPEN: NOT AT ALL
2. NOT BEING ABLE TO STOP OR CONTROL WORRYING: SEVERAL DAYS
GAD7 TOTAL SCORE: 9
1. FEELING NERVOUS, ANXIOUS, OR ON EDGE: MORE THAN HALF THE DAYS

## 2019-08-27 ASSESSMENT — PATIENT HEALTH QUESTIONNAIRE - PHQ9
SUM OF ALL RESPONSES TO PHQ QUESTIONS 1-9: 12
10. IF YOU CHECKED OFF ANY PROBLEMS, HOW DIFFICULT HAVE THESE PROBLEMS MADE IT FOR YOU TO DO YOUR WORK, TAKE CARE OF THINGS AT HOME, OR GET ALONG WITH OTHER PEOPLE: SOMEWHAT DIFFICULT
SUM OF ALL RESPONSES TO PHQ QUESTIONS 1-9: 12
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
## 2019-08-27 NOTE — TELEPHONE ENCOUNTER
Routing refill request to provider for review/approval because:  Drug interaction warning  Malia Brown RN

## 2019-08-27 NOTE — Clinical Note
Anastasias, Dr. Corley.  I met with Miri for Diagnostic Assessment/therapy intake.  Plan for therapy will be to focus on mood improvement and increasing calm mindset, specifically in context of marital conflict, along with (re-)processing of past loss/trauma.  Looking forward to working further with Miri!

## 2019-08-28 RX ORDER — LISINOPRIL 40 MG/1
TABLET ORAL
Qty: 90 TABLET | Refills: 1 | Status: SHIPPED | OUTPATIENT
Start: 2019-08-28 | End: 2020-02-24

## 2019-08-28 ASSESSMENT — ANXIETY QUESTIONNAIRES: GAD7 TOTAL SCORE: 9

## 2019-08-28 ASSESSMENT — PATIENT HEALTH QUESTIONNAIRE - PHQ9: SUM OF ALL RESPONSES TO PHQ QUESTIONS 1-9: 12

## 2019-08-28 NOTE — TELEPHONE ENCOUNTER
Prescription approved per [facility] Refill Protocol.    Eliana ANDERSON RN

## 2019-08-28 NOTE — TELEPHONE ENCOUNTER
"Requested Prescriptions   Pending Prescriptions Disp Refills     lisinopril (PRINIVIL/ZESTRIL) 40 MG tablet [Pharmacy Med Name: LISINOPRIL TABS 40MG]  Last Written Prescription Date:  8/24/18  Last Fill Quantity: 90,  # refills: 3   Last office visit: 4/23/2019 with prescribing provider:  riana   Future Office Visit:   Next 5 appointments (look out 90 days)    Sep 10, 2019  3:00 PM CDT  Jeff Martinez with Elizabeth Corley DO  [facility] ([facility] [address] [address]  [phone]   Sep 18, 2019  2:00 PM CDT  [facility] [address] [facility] [address] [facility] [address]  [phone]   Sep 25, 2019 11:00 AM CDT  Return Visit with Loki Crowe [facility] ([facility]) [address] [place], [place] [address] [address]  [phone]          90 tablet 4     Sig: TAKE 1 TABLET DAILY       ACE Inhibitors (Including Combos) Protocol Passed - 8/27/2019 11:19 PM        Passed - Blood pressure under 140/90 in past 12 months     BP Readings from Last 3 Encounters:   08/15/19 124/74   07/06/19 130/78   04/23/19 128/80                 Passed - Recent (12 mo) or future (30 days) visit within the authorizing provider's specialty     Patient had office visit in the last 12 months or has a visit in the next 30 days with authorizing provider or within the authorizing provider's specialty.  See \"Patient Info\" tab in inbasket, or \"Choose Columns\" in Meds & Orders section of the refill encounter.              Passed - Medication is active on med list        Passed - Patient is age 18 or older        Passed - No active pregnancy on record        Passed - Normal serum creatinine on file in past 12 months     Recent Labs   Lab Test 04/23/19  1222   CR 0.94             Passed - Normal serum potassium on file in past 12 months     Recent Labs   Lab Test " 04/23/19  1222   POTASSIUM 4.1             Passed - No positive pregnancy test within past 12 months

## 2019-09-10 ENCOUNTER — OFFICE VISIT (OUTPATIENT)
Dept: FAMILY MEDICINE | Facility: CLINIC | Age: 64
End: 2019-09-10
Payer: COMMERCIAL

## 2019-09-10 ENCOUNTER — PSYCHE (OUTPATIENT)
Dept: PSYCHOLOGY | Facility: CLINIC | Age: 64
End: 2019-09-10
Payer: COMMERCIAL

## 2019-09-10 VITALS
SYSTOLIC BLOOD PRESSURE: 132 MMHG | TEMPERATURE: 97.3 F | RESPIRATION RATE: 12 BRPM | HEIGHT: 71 IN | BODY MASS INDEX: 30.32 KG/M2 | WEIGHT: 216.6 LBS | HEART RATE: 60 BPM | DIASTOLIC BLOOD PRESSURE: 78 MMHG

## 2019-09-10 DIAGNOSIS — E78.5 HYPERLIPIDEMIA LDL GOAL <130: ICD-10-CM

## 2019-09-10 DIAGNOSIS — R51.9 CHRONIC DAILY HEADACHE: ICD-10-CM

## 2019-09-10 DIAGNOSIS — D64.9 ANEMIA, UNSPECIFIED TYPE: ICD-10-CM

## 2019-09-10 DIAGNOSIS — K21.9 GASTROESOPHAGEAL REFLUX DISEASE, ESOPHAGITIS PRESENCE NOT SPECIFIED: ICD-10-CM

## 2019-09-10 DIAGNOSIS — I10 HYPERTENSION GOAL BP (BLOOD PRESSURE) < 140/90: Primary | ICD-10-CM

## 2019-09-10 DIAGNOSIS — R63.5 WEIGHT GAIN: ICD-10-CM

## 2019-09-10 DIAGNOSIS — G43.009 MIGRAINE WITHOUT AURA AND WITHOUT STATUS MIGRAINOSUS, NOT INTRACTABLE: ICD-10-CM

## 2019-09-10 DIAGNOSIS — F32.5 MAJOR DEPRESSION IN COMPLETE REMISSION (H): ICD-10-CM

## 2019-09-10 DIAGNOSIS — Z87.820 HISTORY OF TRAUMATIC BRAIN INJURY: ICD-10-CM

## 2019-09-10 DIAGNOSIS — F41.1 GENERALIZED ANXIETY DISORDER: Primary | ICD-10-CM

## 2019-09-10 DIAGNOSIS — F34.1 PERSISTENT DEPRESSIVE DISORDER: ICD-10-CM

## 2019-09-10 LAB
ALBUMIN SERPL-MCNC: 4.1 G/DL (ref 3.4–5)
ALP SERPL-CCNC: 74 U/L (ref 40–150)
ALT SERPL W P-5'-P-CCNC: 28 U/L (ref 0–50)
ANION GAP SERPL CALCULATED.3IONS-SCNC: 4 MMOL/L (ref 3–14)
AST SERPL W P-5'-P-CCNC: 27 U/L (ref 0–45)
BILIRUB SERPL-MCNC: 0.2 MG/DL (ref 0.2–1.3)
BUN SERPL-MCNC: 16 MG/DL (ref 7–30)
CALCIUM SERPL-MCNC: 9.5 MG/DL (ref 8.5–10.1)
CHLORIDE SERPL-SCNC: 99 MMOL/L (ref 94–109)
CO2 SERPL-SCNC: 31 MMOL/L (ref 20–32)
CREAT SERPL-MCNC: 1 MG/DL (ref 0.52–1.04)
ERYTHROCYTE [DISTWIDTH] IN BLOOD BY AUTOMATED COUNT: 12.6 % (ref 10–15)
GFR SERPL CREATININE-BSD FRML MDRD: 60 ML/MIN/{1.73_M2}
GLUCOSE SERPL-MCNC: 98 MG/DL (ref 70–99)
HCT VFR BLD AUTO: 36.1 % (ref 35–47)
HGB BLD-MCNC: 12 G/DL (ref 11.7–15.7)
LDLC SERPL DIRECT ASSAY-MCNC: 135 MG/DL
MCH RBC QN AUTO: 30.9 PG (ref 26.5–33)
MCHC RBC AUTO-ENTMCNC: 33.2 G/DL (ref 31.5–36.5)
MCV RBC AUTO: 93 FL (ref 78–100)
PLATELET # BLD AUTO: 365 10E9/L (ref 150–450)
POTASSIUM SERPL-SCNC: 4.3 MMOL/L (ref 3.4–5.3)
PROT SERPL-MCNC: 7.4 G/DL (ref 6.8–8.8)
RBC # BLD AUTO: 3.88 10E12/L (ref 3.8–5.2)
SODIUM SERPL-SCNC: 134 MMOL/L (ref 133–144)
WBC # BLD AUTO: 7.5 10E9/L (ref 4–11)

## 2019-09-10 PROCEDURE — 90834 PSYTX W PT 45 MINUTES: CPT | Performed by: MARRIAGE & FAMILY THERAPIST

## 2019-09-10 PROCEDURE — 85027 COMPLETE CBC AUTOMATED: CPT | Performed by: FAMILY MEDICINE

## 2019-09-10 PROCEDURE — 36415 COLL VENOUS BLD VENIPUNCTURE: CPT | Performed by: FAMILY MEDICINE

## 2019-09-10 PROCEDURE — 90682 RIV4 VACC RECOMBINANT DNA IM: CPT | Performed by: FAMILY MEDICINE

## 2019-09-10 PROCEDURE — 90785 PSYTX COMPLEX INTERACTIVE: CPT | Performed by: MARRIAGE & FAMILY THERAPIST

## 2019-09-10 PROCEDURE — 99214 OFFICE O/P EST MOD 30 MIN: CPT | Mod: 25 | Performed by: FAMILY MEDICINE

## 2019-09-10 PROCEDURE — 83721 ASSAY OF BLOOD LIPOPROTEIN: CPT | Performed by: FAMILY MEDICINE

## 2019-09-10 PROCEDURE — 90471 IMMUNIZATION ADMIN: CPT | Performed by: FAMILY MEDICINE

## 2019-09-10 PROCEDURE — 80053 COMPREHEN METABOLIC PANEL: CPT | Performed by: FAMILY MEDICINE

## 2019-09-10 RX ORDER — GABAPENTIN 300 MG/1
CAPSULE ORAL
Qty: 300 CAPSULE | Refills: 1 | Status: SHIPPED | OUTPATIENT
Start: 2019-09-10 | End: 2020-01-14

## 2019-09-10 RX ORDER — PANTOPRAZOLE SODIUM 40 MG/1
TABLET, DELAYED RELEASE ORAL
Qty: 90 TABLET | Refills: 4 | Status: SHIPPED | OUTPATIENT
Start: 2019-09-10 | End: 2020-12-03

## 2019-09-10 RX ORDER — ESCITALOPRAM OXALATE 10 MG/1
10 TABLET ORAL DAILY
Qty: 90 TABLET | Refills: 1 | Status: SHIPPED | OUTPATIENT
Start: 2019-09-10 | End: 2020-04-21

## 2019-09-10 RX ORDER — ATENOLOL 25 MG/1
25 TABLET ORAL DAILY
Qty: 90 TABLET | Refills: 3 | Status: SHIPPED | OUTPATIENT
Start: 2019-09-10 | End: 2020-09-25

## 2019-09-10 ASSESSMENT — MIFFLIN-ST. JEOR: SCORE: 1633.62

## 2019-09-10 ASSESSMENT — ANXIETY QUESTIONNAIRES
7. FEELING AFRAID AS IF SOMETHING AWFUL MIGHT HAPPEN: SEVERAL DAYS
6. BECOMING EASILY ANNOYED OR IRRITABLE: SEVERAL DAYS
GAD7 TOTAL SCORE: 12
4. TROUBLE RELAXING: NEARLY EVERY DAY
GAD7 TOTAL SCORE: 12
GAD7 TOTAL SCORE: 8
3. WORRYING TOO MUCH ABOUT DIFFERENT THINGS: SEVERAL DAYS
7. FEELING AFRAID AS IF SOMETHING AWFUL MIGHT HAPPEN: SEVERAL DAYS
2. NOT BEING ABLE TO STOP OR CONTROL WORRYING: SEVERAL DAYS
7. FEELING AFRAID AS IF SOMETHING AWFUL MIGHT HAPPEN: SEVERAL DAYS
5. BEING SO RESTLESS THAT IT IS HARD TO SIT STILL: SEVERAL DAYS
3. WORRYING TOO MUCH ABOUT DIFFERENT THINGS: SEVERAL DAYS
2. NOT BEING ABLE TO STOP OR CONTROL WORRYING: SEVERAL DAYS
1. FEELING NERVOUS, ANXIOUS, OR ON EDGE: NEARLY EVERY DAY
1. FEELING NERVOUS, ANXIOUS, OR ON EDGE: MORE THAN HALF THE DAYS
5. BEING SO RESTLESS THAT IT IS HARD TO SIT STILL: MORE THAN HALF THE DAYS
GAD7 TOTAL SCORE: 12
6. BECOMING EASILY ANNOYED OR IRRITABLE: SEVERAL DAYS

## 2019-09-10 ASSESSMENT — PATIENT HEALTH QUESTIONNAIRE - PHQ9
SUM OF ALL RESPONSES TO PHQ QUESTIONS 1-9: 10
SUM OF ALL RESPONSES TO PHQ QUESTIONS 1-9: 6
SUM OF ALL RESPONSES TO PHQ QUESTIONS 1-9: 10
10. IF YOU CHECKED OFF ANY PROBLEMS, HOW DIFFICULT HAVE THESE PROBLEMS MADE IT FOR YOU TO DO YOUR WORK, TAKE CARE OF THINGS AT HOME, OR GET ALONG WITH OTHER PEOPLE: NOT DIFFICULT AT ALL
5. POOR APPETITE OR OVEREATING: SEVERAL DAYS

## 2019-09-10 NOTE — NURSING NOTE
"Initial /78   Pulse 60   Temp 97.3  F (36.3  C) (Tympanic)   Resp 12   Ht 1.803 m (5' 11\")   Wt 98.2 kg (216 lb 9.6 oz)   Breastfeeding? No   BMI 30.21 kg/m   Estimated body mass index is 30.21 kg/m  as calculated from the following:    Height as of this encounter: 1.803 m (5' 11\").    Weight as of this encounter: 98.2 kg (216 lb 9.6 oz). .      "

## 2019-09-10 NOTE — PROGRESS NOTES
Subjective     Miri Naylor is a 64 year old female who presents to clinic today for the following health issues:    HPI   Hyperlipidemia Follow-Up      Are you having any of the following symptoms? (Select all that apply)  No complaints of shortness of breath, chest pain or pressure.  No increased sweating or nausea with activity.  No left-sided neck or arm pain.  No complaints of pain in calves when walking 1-2 blocks.    Are you regularly taking any medication or supplement to lower your cholesterol?   Yes- pravastatin    Are you having muscle aches or other side effects that you think could be caused by your cholesterol lowering medication?  No      Hypertension Follow-up      Do you check your blood pressure regularly outside of the clinic? No     Are you following a low salt diet? Yes    Are your blood pressures ever more than 140 on the top number (systolic) OR more   than 90 on the bottom number (diastolic), for example 140/90? Not checking    Depression and Anxiety Follow-Up    How are you doing with your depression since your last visit? Worsened for the last month    How are you doing with your anxiety since your last visit?  Worsened for the last month    Are you having other symptoms that might be associated with depression or anxiety? No    Have you had a significant life event? No     Do you have any concerns with your use of alcohol or other drugs? No    Social History     Tobacco Use     Smoking status: Never Smoker     Smokeless tobacco: Never Used   Substance Use Topics     Alcohol use: Yes     Comment: one glass of wine daily     Drug use: No     PHQ 8/27/2019 9/10/2019 9/10/2019   PHQ-9 Total Score 12 10 6   Q9: Thoughts of better off dead/self-harm past 2 weeks Not at all Not at all Not at all     MELISA-7 SCORE 8/27/2019 9/10/2019 9/10/2019   Total Score 9 (mild anxiety) - 12 (moderate anxiety)   Total Score 9 12 8       Suicide Assessment Five-step Evaluation and Treatment  "(SAFE-T)      How many servings of fruits and vegetables do you eat daily?  4 or more    On average, how many sweetened beverages do you drink each day (soda, juice, sweet tea, etc)?   0    How many days per week do you miss taking your medication? 0    * right foot pain    Has been having a lot of trouble with cramping.  Feel this caused her foot pain.  Has stabbing pain first thing in the morning.  Improved with icing and stretching.      Having leg cramps at night about once weekly.   Feels like these have been going on since last summer.        * weight     Has been trying really hard to lose weight.  Stopped drinking alcohol in Jan.  Did not have any weight loss with stopping alcohol.    Has started [employer] in May.  Exercising regularly.  Eating about 1500 calories per day.  Feels like she can jump 6-7 pounds in 1 day.  Frustrated and wonders what else she can do.  Not interested in medication.        * labs     * review neurology visits/headaches    No longer seeing Sheryl for headaches.  Has had issues with communication.  Felt the topiramate and zonegran actually made headaches worse.  Feels better now that she is off of them.    Has appointment on Monday with neurology at [facility].    Reviewed and updated as needed this visit by Provider  Tobacco  Allergies  Meds  Med Hx  Surg Hx  Fam Hx  Soc Hx        Review of Systems   ROS COMP: Constitutional, HEENT, cardiovascular, pulmonary, gi and gu systems are negative, except as otherwise noted.      Objective    /78   Pulse 60   Temp 97.3  F (36.3  C) (Tympanic)   Resp 12   Ht 1.803 m (5' 11\")   Wt 98.2 kg (216 lb 9.6 oz)   Breastfeeding? No   BMI 30.21 kg/m    Body mass index is 30.21 kg/m .  Physical Exam   PE:  VS as above   Gen:  WN/WD/WH female in NAD   Heart:  RRR without murmur, nl S1, S2, no rubs or gallops   Lungs CTA hua without rales/ronchi/wheezes   Ext:  No pedal edema      Diagnostic Test Results:  Labs reviewed in Epic    "     A/p:      ICD-10-CM    1. Hypertension goal BP (blood pressure) < 140/90 I10 atenolol (TENORMIN) 25 MG tablet     Comprehensive metabolic panel   2. Weight gain R63.5    3. Chronic daily headache R51 gabapentin (NEURONTIN) 300 MG capsule   4. Migraine without aura and without status migrainosus, not intractable G43.009 gabapentin (NEURONTIN) 300 MG capsule   5. History of traumatic brain injury Z87.820 gabapentin (NEURONTIN) 300 MG capsule   6. Major depression in complete remission (H) F32.5 escitalopram (LEXAPRO) 10 MG tablet   7. Hyperlipidemia LDL goal <130 E78.5 Comprehensive metabolic panel     LDL cholesterol direct   8. Anemia, unspecified type D64.9 CBC with platelets     HTN:  Now controlled, continue current meds  Headache:  meds refilled, keep neurology appointment  Depression:  Stable  Lipids:  Stable  Weight gain:  Spent time reviewing diet and exercise recommendations and providing support.  Reviewed briefly intermittent fasting, pt might consider.

## 2019-09-10 NOTE — PROGRESS NOTES
Progress Note    Patient Name: Miri Naylor  Date: 9/10/19         Service Type: Individual  Video Visit: No     Session Start Time: 8:04  Session End Time: 8:56     Session Length: 38-52    Session #: 3    Attendees: Client attended alone     Treatment Plan Last Reviewed: 9/10/19, next due 12/10/19.  PHQ-9 / MELISA-7 : completed.    DATA  Interactive Complexity: Yes, visit entailed Interactive Complexity evidenced by:  -Use of play equipment, physical devices,  or  to overcome barriers to diagnostic or therapeutic interaction with a patient who is not fluent in the same language or who has not developed or lost expressive or receptive language skills to use or understand typical language  Crisis: No       Progress Since Last Session (Related to Symptoms / Goals / Homework):   Symptoms: Client reported decreased PHQ-9 and increased MELISA-7 scores.    Homework: Achieved / completed to satisfaction  Completed in session      Episode of Care Goals: Minimal progress - PREPARATION (Decided to change - considering how); Intervened by negotiating a change plan and determining options / strategies for behavior change, identifying triggers, exploring social supports, and working towards setting a date to begin behavior change     Current / Ongoing Stressors and Concerns:   Client reported that her previous therapy was not working, so she wanted to try something different.  Client chose to engage in EMDR therapy as her goal for therapy, and opted to engage in [facility] resource installation during today's session.     Treatment Objective(s) Addressed in This Session:   Client will complete needed assessment(s) and [facility] EMDR resourcing to confirm readiness for EMDR.  Client identified her desired therapy goal.     Intervention:   EMDR: oriented client to EMDR therapy protocol and installed [facility] resource.  Motivational Interviewing: used  circular/Socratic questioning to elicit client's identification of her desired therapy goal.        ASSESSMENT: Current Emotional / Mental Status (status of significant symptoms):   Risk status (Self / Other harm or suicidal ideation)   Patient denies current fears or concerns for personal safety.   Patient denies current or recent suicidal ideation or behaviors.   Patientdenies current or recent homicidal ideation or behaviors.   Patient denies current or recent self injurious behavior or ideation.   Patient denies other safety concerns.   Patient Patient reports there has been no change in risk factors since their last session.     PatientPatient reports there has been no change in protective factors since their last session.     Recommended that patient call 911 or go to the local ED should there be a change in any of these risk factors.     Appearance:   Appropriate    Eye Contact:   Good    Psychomotor Behavior: Normal    Attitude:   Cooperative    Orientation:   All   Speech    Rate / Production: Normal     Volume:  Normal    Mood:    Anxious  Normal   Affect:    Appropriate    Thought Content:  Clear    Thought Form:  Coherent  Logical    Insight:    Good      Medication Review:   No current psychiatric medications prescribed     Medication Compliance:   NA     Changes in Health Issues:   None reported     Chemical Use Review:   Substance Use: Chemical use reviewed, no active concerns identified      Tobacco Use: No current tobacco use.      Diagnosis:  1. Generalized anxiety disorder    2. Persistent depressive disorder        Collateral Reports Completed:   Not Applicable    PLAN: (Patient Tasks / Therapist Tasks / Other)  Client agreed to engage in EMDR [facility] resourcing at least once daily and additionally as needed for distress between now and follow-up session next week.        LUIS ENRIQUE Briseno                                                          ______________________________________________________________________    Treatment Plan    Patient's Name: Miri Naylor  YOB: 1955    Date: 9/10/19    DSM5 Diagnoses: 300.4 (F34.1) Persistent Depressive Disorder, Early onset or 300.02 (F41.1) Generalized Anxiety Disorder  Psychosocial / Contextual Factors: marital conflict, history of loss/trauma  WHODAS: 29    Referral / Collaboration:  Referral to another professional/service is not indicated at this time..    Anticipated number of session or this episode of care: 11-20      MeasurableTreatment Goal(s) related to diagnosis / functional impairment(s)  Goal 1: Client will successfully process through past trauma defined as reporting 0 Subjective Units of Distress related to trauma on 0-10 scale and 7 on Validity of (positive) Cognition scale about self on 1-7 scale   I will know I've met my goal when I am less impacted by my past loss/trauma.       Objective #A (Client Action)    Status: New - Date: 9/10/19      Client will identify past traumatic events/memories which are causing current distress.     Intervention(s)  Therapist will take client's history and facilitate client's identification of targets for EMDR.     Objective #B  Client will complete needed assessment(s) and [facility] EMDR resourcing to confirm readiness for EMDR.    Status: New - Date: 9/10/19      Intervention(s)  Therapist will administer Dissociative Experiences Scale, Multidimensional Inventory of Dissociation as needed and complete [facility] EMDR resourcing with client.     Objective #C  Client will engage in installing at least 2 EMDR resources.  Status: New - Date: 9/10/19      Intervention(s)  Therapist will complete EMDR resourcing (Container, Remote Control, grounding/progressive muscle relaxation, Light Stream, Inner Advisor) with client.     Objective #D (Client Action)    Status: New - Date: 9/10/19      Client will engage in reprocessing all past  traumatic event/memory targets.     Intervention(s)   Therapist will complete EMDR reprocessing with client.    Patient has reviewed and agreed to the above plan.      Loki Crowe, LMFT  September 10, 2019

## 2019-09-11 ASSESSMENT — PATIENT HEALTH QUESTIONNAIRE - PHQ9: SUM OF ALL RESPONSES TO PHQ QUESTIONS 1-9: 10

## 2019-09-11 ASSESSMENT — ANXIETY QUESTIONNAIRES
GAD7 TOTAL SCORE: 8
GAD7 TOTAL SCORE: 12

## 2019-09-18 ENCOUNTER — OFFICE VISIT (OUTPATIENT)
Dept: PSYCHOLOGY | Facility: CLINIC | Age: 64
End: 2019-09-18
Payer: COMMERCIAL

## 2019-09-18 DIAGNOSIS — F41.1 GENERALIZED ANXIETY DISORDER: ICD-10-CM

## 2019-09-18 DIAGNOSIS — F34.1 PERSISTENT DEPRESSIVE DISORDER: Primary | ICD-10-CM

## 2019-09-18 PROCEDURE — 90834 PSYTX W PT 45 MINUTES: CPT | Performed by: MARRIAGE & FAMILY THERAPIST

## 2019-09-18 ASSESSMENT — PATIENT HEALTH QUESTIONNAIRE - PHQ9
SUM OF ALL RESPONSES TO PHQ QUESTIONS 1-9: 8
SUM OF ALL RESPONSES TO PHQ QUESTIONS 1-9: 8
10. IF YOU CHECKED OFF ANY PROBLEMS, HOW DIFFICULT HAVE THESE PROBLEMS MADE IT FOR YOU TO DO YOUR WORK, TAKE CARE OF THINGS AT HOME, OR GET ALONG WITH OTHER PEOPLE: SOMEWHAT DIFFICULT

## 2019-09-18 NOTE — PROGRESS NOTES
Progress Note    Patient Name: Miri Naylor  Date: 9/18/19         Service Type: Individual  Video Visit: No     Session Start Time: 2:01  Session End Time: 2:53     Session Length: 38-52    Session #: 4    Attendees: Client attended alone     Treatment Plan Last Reviewed: 9/10/19, next due 12/10/19.  PHQ-9 / MELISA-7 : completed.    DATA  Interactive Complexity: No  Crisis: No       Progress Since Last Session (Related to Symptoms / Goals / Homework):   Symptoms: Worsening Client reported increased PHQ-9 score.    Homework: Partially completed       Episode of Care Goals: Minimal progress - ACTION (Actively working towards change); Intervened by reinforcing change plan / affirming steps taken     Current / Ongoing Stressors and Concerns:   Client reported that she attempted to engage in EMDR therapy [facility] resourcing during the past week since the previous session, but that her emotional dysregulation interfered with accessing [facility].  Client reported recognizing that she needs help to better regulate her emotions.  Client reported that her main current concern is her relationship with her , which is highly conflictual, and that she needs to decide whether or not to stay .     Treatment Objective(s) Addressed in This Session:   Decrease frequency and intensity of feeling down, depressed, hopeless  Identify negative self-talk and behaviors: challenge core beliefs, myths, and actions     Intervention:   EMDR: Client will identify past traumatic events/memories which are causing current distress.        ASSESSMENT: Current Emotional / Mental Status (status of significant symptoms):   Risk status (Self / Other harm or suicidal ideation)   Patient denies current fears or concerns for personal safety.   Patient denies current or recent suicidal ideation or behaviors.   Patientdenies current or recent homicidal ideation or behaviors.   Patient denies  current or recent self injurious behavior or ideation.   Patient denies other safety concerns.   Patient Patient reports there has been no change in risk factors since their last session.     PatientPatient reports there has been no change in protective factors since their last session.     Recommended that patient call 911 or go to the local ED should there be a change in any of these risk factors.     Appearance:   Appropriate    Eye Contact:   Good    Psychomotor Behavior: Normal    Attitude:   Cooperative    Orientation:   All   Speech    Rate / Production: Normal     Volume:  Normal    Mood:    Anxious  Depressed  Normal   Affect:    Appropriate    Thought Content:  Clear    Thought Form:  Coherent  Logical    Insight:    Good      Medication Review:   No current psychiatric medications prescribed     Medication Compliance:   NA     Changes in Health Issues:   None reported     Chemical Use Review:   Substance Use: Chemical use reviewed, no active concerns identified      Tobacco Use: No current tobacco use.      Diagnosis:  1. Persistent depressive disorder    2. Generalized anxiety disorder        Collateral Reports Completed:   Not Applicable    PLAN: (Patient Tasks / Therapist Tasks / Other)  Client agreed to continue to attempt to engage in EMDR [facility] resourcing at least once daily and additionally as needed for distress and to contemplate her options regarding her relationship with her  between now and follow-up session next week.        Loki Crowe, [facility]                                                         ______________________________________________________________________    Treatment Plan    Patient's Name: Miri Naylor  YOB: 1955    Date: 9/10/19    DSM5 Diagnoses: 300.4 (F34.1) Persistent Depressive Disorder, Early onset or 300.02 (F41.1) Generalized Anxiety Disorder  Psychosocial / Contextual Factors: marital conflict, history of loss/trauma  WHODAS:  29    Referral / Collaboration:  Referral to another professional/service is not indicated at this time..    Anticipated number of session or this episode of care: 11-20      MeasurableTreatment Goal(s) related to diagnosis / functional impairment(s)  Goal 1: Client will successfully process through past trauma defined as reporting 0 Subjective Units of Distress related to trauma on 0-10 scale and 7 on Validity of (positive) Cognition scale about self on 1-7 scale   I will know I've met my goal when I am less impacted by my past loss/trauma.       Objective #A (Client Action)    Status: New - Date: 9/10/19      Client will identify past traumatic events/memories which are causing current distress.     Intervention(s)  Therapist will take client's history and facilitate client's identification of targets for EMDR.     Objective #B  Client will complete needed assessment(s) and [facility] EMDR resourcing to confirm readiness for EMDR.    Status: New - Date: 9/10/19      Intervention(s)  Therapist will administer Dissociative Experiences Scale, Multidimensional Inventory of Dissociation as needed and complete [facility] EMDR resourcing with client.     Objective #C  Client will engage in installing at least 2 EMDR resources.  Status: New - Date: 9/10/19      Intervention(s)  Therapist will complete EMDR resourcing (Container, Remote Control, grounding/progressive muscle relaxation, Light Stream, Inner Advisor) with client.     Objective #D (Client Action)    Status: New - Date: 9/10/19      Client will engage in reprocessing all past traumatic event/memory targets.     Intervention(s)   Therapist will complete EMDR reprocessing with client.    Patient has reviewed and agreed to the above plan.      Loki Crowe, LMFT  September 18, 2019

## 2019-09-19 ASSESSMENT — PATIENT HEALTH QUESTIONNAIRE - PHQ9: SUM OF ALL RESPONSES TO PHQ QUESTIONS 1-9: 8

## 2019-09-25 ENCOUNTER — OFFICE VISIT (OUTPATIENT)
Dept: PSYCHOLOGY | Facility: CLINIC | Age: 64
End: 2019-09-25
Payer: COMMERCIAL

## 2019-09-25 DIAGNOSIS — F41.1 GENERALIZED ANXIETY DISORDER: ICD-10-CM

## 2019-09-25 DIAGNOSIS — F34.1 PERSISTENT DEPRESSIVE DISORDER: Primary | ICD-10-CM

## 2019-09-25 PROCEDURE — 90834 PSYTX W PT 45 MINUTES: CPT | Performed by: MARRIAGE & FAMILY THERAPIST

## 2019-09-25 ASSESSMENT — ANXIETY QUESTIONNAIRES
GAD7 TOTAL SCORE: 9
5. BEING SO RESTLESS THAT IT IS HARD TO SIT STILL: MORE THAN HALF THE DAYS
3. WORRYING TOO MUCH ABOUT DIFFERENT THINGS: SEVERAL DAYS
4. TROUBLE RELAXING: MORE THAN HALF THE DAYS
1. FEELING NERVOUS, ANXIOUS, OR ON EDGE: SEVERAL DAYS
7. FEELING AFRAID AS IF SOMETHING AWFUL MIGHT HAPPEN: SEVERAL DAYS
GAD7 TOTAL SCORE: 9
6. BECOMING EASILY ANNOYED OR IRRITABLE: SEVERAL DAYS
GAD7 TOTAL SCORE: 9
7. FEELING AFRAID AS IF SOMETHING AWFUL MIGHT HAPPEN: SEVERAL DAYS
2. NOT BEING ABLE TO STOP OR CONTROL WORRYING: SEVERAL DAYS

## 2019-09-25 ASSESSMENT — PATIENT HEALTH QUESTIONNAIRE - PHQ9
SUM OF ALL RESPONSES TO PHQ QUESTIONS 1-9: 9
SUM OF ALL RESPONSES TO PHQ QUESTIONS 1-9: 9
10. IF YOU CHECKED OFF ANY PROBLEMS, HOW DIFFICULT HAVE THESE PROBLEMS MADE IT FOR YOU TO DO YOUR WORK, TAKE CARE OF THINGS AT HOME, OR GET ALONG WITH OTHER PEOPLE: SOMEWHAT DIFFICULT

## 2019-09-25 NOTE — PROGRESS NOTES
Progress Note    Patient Name: Miri Naylor  Date: 9/25/19         Service Type: Individual  Video Visit: No     Session Start Time: 11:00  Session End Time: 11:52     Session Length: 38-52    Session #: 5    Attendees: Client attended alone     Treatment Plan Last Reviewed: 9/10/19, next due 12/10/19.  PHQ-9 / MELISA-7 : completed.    DATA  Interactive Complexity: No  Crisis: No       Progress Since Last Session (Related to Symptoms / Goals / Homework):   Symptoms: Worsening client reported increased PHQ-9 and MELISA-7 scores.    Homework: Did not complete       Episode of Care Goals: Minimal progress - ACTION (Actively working towards change); Intervened by reinforcing change plan / affirming steps taken     Current / Ongoing Stressors and Concerns:   Client reported that she did not attempt to engage in EMDR therapy [facility] resourcing during the past week since the previous session due to not having time.  Client reported that her emotional dysregulation remains an issue, and opted to add a therapy goal to this effect to her Tx Plan.     Treatment Objective(s) Addressed in This Session:   use cognitive strategies identified in therapy to challenge anxious thoughts  Identify negative self-talk and behaviors: challenge core beliefs, myths, and actions     Intervention:   CBT: taught client behavioral triad and REBT/ABCD model for understanding/intervening on her irrational thinking causing her extreme emotions and actions.        ASSESSMENT: Current Emotional / Mental Status (status of significant symptoms):   Risk status (Self / Other harm or suicidal ideation)   Patient denies current fears or concerns for personal safety.   Patient denies current or recent suicidal ideation or behaviors.   Patientdenies current or recent homicidal ideation or behaviors.   Patient denies current or recent self injurious behavior or ideation.   Patient denies other safety  concerns.   Patient Patient reports there has been no change in risk factors since their last session.     PatientPatient reports there has been no change in protective factors since their last session.     Recommended that patient call 911 or go to the local ED should there be a change in any of these risk factors.     Appearance:   Appropriate    Eye Contact:   Good    Psychomotor Behavior: Normal    Attitude:   Cooperative    Orientation:   All   Speech    Rate / Production: Normal     Volume:  Normal    Mood:    Anxious  Depressed  Normal   Affect:    Appropriate    Thought Content:  Clear    Thought Form:  Coherent  Logical    Insight:    Good      Medication Review:   No current psychiatric medications prescribed     Medication Compliance:   NA     Changes in Health Issues:   None reported     Chemical Use Review:   Substance Use: Chemical use reviewed, no active concerns identified      Tobacco Use: No current tobacco use.      Diagnosis:  1. Persistent depressive disorder    2. Generalized anxiety disorder        Collateral Reports Completed:   Not Applicable    PLAN: (Patient Tasks / Therapist Tasks / Other)  Client agreed to work on increasing her awareness of--and disputing--her irrational thinking using REBT ABCD model between now and follow-up session in three weeks.        Loki Crowe, [facility]                                                         ______________________________________________________________________    Treatment Plan    Patient's Name: Miri Naylor  YOB: 1955    Date: 9/10/19    DSM5 Diagnoses: 300.4 (F34.1) Persistent Depressive Disorder, Early onset or 300.02 (F41.1) Generalized Anxiety Disorder  Psychosocial / Contextual Factors: marital conflict, history of loss/trauma  WHODAS: 29    Referral / Collaboration:  Referral to another professional/service is not indicated at this time..    Anticipated number of session or this episode of care:  11-20      MeasurableTreatment Goal(s) related to diagnosis / functional impairment(s)  Goal 1: Client will successfully process through past trauma defined as reporting 0 Subjective Units of Distress related to trauma on 0-10 scale and 7 on Validity of (positive) Cognition scale about self on 1-7 scale   I will know I've met my goal when I am less impacted by my past loss/trauma.       Objective #A (Client Action)    Status: New - Date: 9/10/19      Client will identify past traumatic events/memories which are causing current distress.     Intervention(s)  Therapist will take client's history and facilitate client's identification of targets for EMDR.     Objective #B  Client will complete needed assessment(s) and [facility] EMDR resourcing to confirm readiness for EMDR.    Status: New - Date: 9/10/19      Intervention(s)  Therapist will administer Dissociative Experiences Scale, Multidimensional Inventory of Dissociation as needed and complete [facility] EMDR resourcing with client.     Objective #C  Client will engage in installing at least 2 EMDR resources.  Status: New - Date: 9/10/19      Intervention(s)  Therapist will complete EMDR resourcing (Container, Remote Control, grounding/progressive muscle relaxation, Light Stream, Inner Advisor) with client.     Objective #D (Client Action)    Status: New - Date: 9/10/19      Client will engage in reprocessing all past traumatic event/memory targets.     Intervention(s)   Therapist will complete EMDR reprocessing with client.    Goal 2:  Client will improve overall baseline moodregulation by 2 points on a 1-10 Likert scale per self-report (10 = optimal mood/regulation).    I will know I've met my goal when I feel better/less depressed overall.      Objective #A (Client Action)    Status: New - Date:  9/25/19    Client will Identify negative self-talk and behaviors: challenge core beliefs, myths, and actions.    Intervention(s)  Therapist will teach emotional  regulation skills. CBT/REBT ABCD model.    Objective #B  Client will Increase interest, engagement, and pleasure in doing things  Decrease frequency and intensity of feeling down, depressed, hopeless  Improve concentration, focus, and mindfulness in daily activities .    Status: New - Date:  9/25/19    Intervention(s)  Therapist will teach emotional regulation skills. DBT Core Mindfulness, Distress Tolerance, Emotion Regulation, and Interpersonal Effetiveness skills.    Patient has reviewed and agreed to the above plan.      Loki Crowe, LMFT  September 25, 2019

## 2019-09-26 ASSESSMENT — PATIENT HEALTH QUESTIONNAIRE - PHQ9: SUM OF ALL RESPONSES TO PHQ QUESTIONS 1-9: 9

## 2019-09-26 ASSESSMENT — ANXIETY QUESTIONNAIRES: GAD7 TOTAL SCORE: 9

## 2019-09-30 DIAGNOSIS — I10 ESSENTIAL HYPERTENSION: ICD-10-CM

## 2019-10-01 RX ORDER — HYDROCHLOROTHIAZIDE 12.5 MG/1
TABLET ORAL
Qty: 90 TABLET | Refills: 4 | OUTPATIENT
Start: 2019-10-01

## 2019-10-01 NOTE — TELEPHONE ENCOUNTER
Pharmacy notified as per note below.    This Order Has Been Discontinued     Order Status Reason By On   Discontinued Side effects Elizabeth Corley,  11/2/18 [id number]

## 2019-10-01 NOTE — TELEPHONE ENCOUNTER
"Requested Prescriptions   Pending Prescriptions Disp Refills     hydrochlorothiazide (HYDRODIURIL) 12.5 MG tablet [Pharmacy Med Name: HYDROCHLOROTHIAZIDE TABS 12.5MG] 90 tablet 4     Sig: TAKE 1 TABLET DAILY  Last Written Prescription Date:  10/22/2018 #90 x 3  Last filled 07/08/2019 #90 x 4  Last office visit: 9/10/2019 PRADIP Corley    Note: Medication is not on the current med list     Future Office Visit:   Next 5 appointments (look out 90 days)    Oct 16, 2019  1:00 PM CDT  Return Visit with Loki Crowe, [facility] ([facility] [address] [facility] [facility] [address]  [phone]   Oct 23, 2019  1:00 PM CDT  Return Visit with Loki Crowe, [facility] ([facility]) [address] [address]  [phone]   Oct 30, 2019 12:00 PM CDT  Return Visit with Loki Crowe, [facility] ([facility]) [address] [facility] [address]  [phone]                Diuretics (Including Combos) Protocol Failed - 9/30/2019 11:17 PM        Failed - Medication is active on med list        Passed - Blood pressure under 140/90 in past 12 months     BP Readings from Last 3 Encounters:   09/10/19 132/78   08/15/19 124/74   07/06/19 130/78                 Passed - Recent (12 mo) or future (30 days) visit within the authorizing provider's specialty     Patient has had an office visit with the authorizing provider or a provider within the authorizing providers department within the previous 12 mos or has a future within next 30 days. See \"Patient Info\" tab in inbasket, or \"Choose Columns\" in Meds & Orders section of the refill encounter.              Passed - Patient is age 18 or older        Passed - No active pregancy on record        Passed - Normal serum creatinine on file in past 12 months     Recent Labs   Lab Test 09/10/19  1616   CR 1.00              Passed - Normal " serum potassium on file in past 12 months     Recent Labs   Lab Test 09/10/19  1616   POTASSIUM 4.3                    Passed - Normal serum sodium on file in past 12 months     Recent Labs   Lab Test 09/10/19  1616                 Passed - No positive pregnancy test in past 12 months

## 2019-10-16 ENCOUNTER — OFFICE VISIT (OUTPATIENT)
Dept: PSYCHOLOGY | Facility: CLINIC | Age: 64
End: 2019-10-16
Payer: COMMERCIAL

## 2019-10-16 DIAGNOSIS — F34.1 PERSISTENT DEPRESSIVE DISORDER: ICD-10-CM

## 2019-10-16 DIAGNOSIS — F41.1 GENERALIZED ANXIETY DISORDER: Primary | ICD-10-CM

## 2019-10-16 PROCEDURE — 90834 PSYTX W PT 45 MINUTES: CPT | Performed by: MARRIAGE & FAMILY THERAPIST

## 2019-10-16 ASSESSMENT — ANXIETY QUESTIONNAIRES
GAD7 TOTAL SCORE: 9
2. NOT BEING ABLE TO STOP OR CONTROL WORRYING: SEVERAL DAYS
7. FEELING AFRAID AS IF SOMETHING AWFUL MIGHT HAPPEN: SEVERAL DAYS
7. FEELING AFRAID AS IF SOMETHING AWFUL MIGHT HAPPEN: SEVERAL DAYS
GAD7 TOTAL SCORE: 9
GAD7 TOTAL SCORE: 9
5. BEING SO RESTLESS THAT IT IS HARD TO SIT STILL: SEVERAL DAYS
1. FEELING NERVOUS, ANXIOUS, OR ON EDGE: MORE THAN HALF THE DAYS
3. WORRYING TOO MUCH ABOUT DIFFERENT THINGS: SEVERAL DAYS
4. TROUBLE RELAXING: MORE THAN HALF THE DAYS
6. BECOMING EASILY ANNOYED OR IRRITABLE: SEVERAL DAYS

## 2019-10-16 NOTE — PROGRESS NOTES
"                                           Progress Note    Patient Name: Miri Naylor  Date: 10/16/19         Service Type: Individual  Video Visit: No     Session Start Time: 1:00  Session End Time: 1:52     Session Length: 38-52    Session #: 6    Attendees: Client attended alone     Treatment Plan Last Reviewed: 9/10/19, next due 12/10/19.  PHQ-9 / MELISA-7 : completed.    DATA  Interactive Complexity: No  Crisis: No       Progress Since Last Session (Related to Symptoms / Goals / Homework):   Symptoms: No change client is stable.    Homework: Partially completed       Episode of Care Goals: Minimal progress - ACTION (Actively working towards change); Intervened by reinforcing change plan / affirming steps taken     Current / Ongoing Stressors and Concerns:   Client reported that she continues to struggle with uncertainty regarding direction in her life and relationship with her .  Client reported her insight that she has been in pattern of trying to fit into her 's \"perfect picture,\" and that she wants to focus on her own wants now.  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     Treatment Objective(s) Addressed in This Session:   compile a list of boundaries that they would like to set with others.      Intervention:   Interpersonal Therapy: taught/reviewed with client relationship metaphor of the dance and determining what steps she wants to learn/take.        ASSESSMENT: Current Emotional / Mental Status (status of significant symptoms):   Risk status (Self / Other harm or suicidal ideation)   Patient denies current fears or concerns for personal safety.   Patient denies current or recent suicidal ideation or behaviors.   Patientdenies current or recent homicidal ideation or behaviors.   Patient denies current or recent self injurious behavior or ideation.   Patient " denies other safety concerns.   Patient Patient reports there has been no change in risk factors since their last session.     PatientPatient reports there has been no change in protective factors since their last session.     Recommended that patient call 911 or go to the local ED should there be a change in any of these risk factors.     Appearance:   Appropriate    Eye Contact:   Good    Psychomotor Behavior: Normal    Attitude:   Cooperative    Orientation:   All   Speech    Rate / Production: Normal     Volume:  Normal    Mood:    Anxious  Depressed  Normal   Affect:    Appropriate    Thought Content:  Clear    Thought Form:  Coherent  Logical    Insight:    Good      Medication Review:   No current psychiatric medications prescribed     Medication Compliance:   NA     Changes in Health Issues:   None reported     Chemical Use Review:   Substance Use: Chemical use reviewed, no active concerns identified      Tobacco Use: No current tobacco use.      Diagnosis:  1. Generalized anxiety disorder    2. Persistent depressive disorder        Collateral Reports Completed:   Not Applicable    PLAN: (Patient Tasks / Therapist Tasks / Other)  Client agreed to work on developing her awareness of her desired direction in her marriage between now and follow-up session next week.        Loki Crowe, [facility]                                                         ______________________________________________________________________    Treatment Plan    Patient's Name: Miri Naylor  YOB: 1955    Date: 9/10/19    DSM5 Diagnoses: 300.4 (F34.1) Persistent Depressive Disorder, Early onset or 300.02 (F41.1) Generalized Anxiety Disorder  Psychosocial / Contextual Factors: marital conflict, history of loss/trauma  WHODAS: 29    Referral / Collaboration:  Referral to another professional/service is not indicated at this time..    Anticipated number of session or this episode of care:  11-20      MeasurableTreatment Goal(s) related to diagnosis / functional impairment(s)  Goal 1: Client will successfully process through past trauma defined as reporting 0 Subjective Units of Distress related to trauma on 0-10 scale and 7 on Validity of (positive) Cognition scale about self on 1-7 scale   I will know I've met my goal when I am less impacted by my past loss/trauma.       Objective #A (Client Action)    Status: New - Date: 9/10/19      Client will identify past traumatic events/memories which are causing current distress.     Intervention(s)  Therapist will take client's history and facilitate client's identification of targets for EMDR.     Objective #B  Client will complete needed assessment(s) and [facility] resourcing to confirm readiness for EMDR.    Status: New - Date: 9/10/19      Intervention(s)  Therapist will administer Dissociative Experiences Scale, Multidimensional Inventory of Dissociation as needed and complete [facility] resourcing with client.     Objective #C  Client will engage in installing at least 2 EMDR resources.  Status: New - Date: 9/10/19      Intervention(s)  Therapist will complete EMDR resourcing (Container, Remote Control, grounding/progressive muscle relaxation, Light Stream, Inner Advisor) with client.     Objective #D (Client Action)    Status: New - Date: 9/10/19      Client will engage in reprocessing all past traumatic event/memory targets.     Intervention(s)   Therapist will complete EMDR reprocessing with client.    Goal 2:  Client will improve overall baseline moodregulation by 2 points on a 1-10 Likert scale per self-report (10 = optimal mood/regulation).    I will know I've met my goal when I feel better/less depressed overall.      Objective #A (Client Action)    Status: New - Date:  9/25/19    Client will Identify negative self-talk and behaviors: challenge core beliefs, myths, and actions.    Intervention(s)  Therapist will teach emotional  regulation skills. CBT/REBT ABCD model.    Objective #B  Client will Increase interest, engagement, and pleasure in doing things  Decrease frequency and intensity of feeling down, depressed, hopeless  Improve concentration, focus, and mindfulness in daily activities .    Status: New - Date:  9/25/19    Intervention(s)  Therapist will teach emotional regulation skills. DBT Core Mindfulness, Distress Tolerance, Emotion Regulation, and Interpersonal Effetiveness skills.    Patient has reviewed and agreed to the above plan.      Loki Crowe, LMFT  October 16, 2019

## 2019-10-17 ASSESSMENT — PATIENT HEALTH QUESTIONNAIRE - PHQ9: SUM OF ALL RESPONSES TO PHQ QUESTIONS 1-9: 9

## 2019-10-17 ASSESSMENT — ANXIETY QUESTIONNAIRES: GAD7 TOTAL SCORE: 9

## 2019-10-23 ENCOUNTER — OFFICE VISIT (OUTPATIENT)
Dept: PSYCHOLOGY | Facility: CLINIC | Age: 64
End: 2019-10-23
Payer: COMMERCIAL

## 2019-10-23 DIAGNOSIS — F41.1 GENERALIZED ANXIETY DISORDER: Primary | ICD-10-CM

## 2019-10-23 DIAGNOSIS — F34.1 PERSISTENT DEPRESSIVE DISORDER: ICD-10-CM

## 2019-10-23 PROCEDURE — 90834 PSYTX W PT 45 MINUTES: CPT | Performed by: MARRIAGE & FAMILY THERAPIST

## 2019-10-23 ASSESSMENT — PATIENT HEALTH QUESTIONNAIRE - PHQ9
SUM OF ALL RESPONSES TO PHQ QUESTIONS 1-9: 7
10. IF YOU CHECKED OFF ANY PROBLEMS, HOW DIFFICULT HAVE THESE PROBLEMS MADE IT FOR YOU TO DO YOUR WORK, TAKE CARE OF THINGS AT HOME, OR GET ALONG WITH OTHER PEOPLE: SOMEWHAT DIFFICULT
SUM OF ALL RESPONSES TO PHQ QUESTIONS 1-9: 7

## 2019-10-23 NOTE — PROGRESS NOTES
Progress Note    Patient Name: Miri Naylor  Date: 10/23/19         Service Type: Individual  Video Visit: No     Session Start Time: 1:04  Session End Time: 1:52     Session Length: 38-52    Session #: 7    Attendees: Client attended alone     Treatment Plan Last Reviewed: 9/10/19, next due 12/10/19.  PHQ-9 / MELISA-7 : completed.    DATA  Interactive Complexity: No  Crisis: No       Progress Since Last Session (Related to Symptoms / Goals / Homework):   Symptoms: Improving client reported decreased PHQ-9 score.    Homework: Achieved / completed to satisfaction       Episode of Care Goals: Satisfactory progress - ACTION (Actively working towards change); Intervened by reinforcing change plan / affirming steps taken     Current / Ongoing Stressors and Concerns:   Client reported that she wants to continue in her relationship with her --with conditions.  Client reported one of her conditions to be that her  engage in therapy.  Client reported that she struggles with being able to forgive.     Treatment Objective(s) Addressed in This Session:   compile a list of boundaries that they would like to set with others.      Intervention:   CBT: taught/reviewed with client concept of forgiveness being giving up hope of a better past.  DBT: taught/reviewed with client decision-making paradigm.  Interpersonal Therapy: reviewed with client communicating her relationship conditions to her .        ASSESSMENT: Current Emotional / Mental Status (status of significant symptoms):   Risk status (Self / Other harm or suicidal ideation)   Patient denies current fears or concerns for personal safety.   Patient denies current or recent suicidal ideation or behaviors.   Patientdenies current or recent homicidal ideation or behaviors.   Patient denies current or recent self injurious behavior or ideation.   Patient denies other safety concerns.   Patient Patient  reports there has been no change in risk factors since their last session.     PatientPatient reports there has been no change in protective factors since their last session.     Recommended that patient call 911 or go to the local ED should there be a change in any of these risk factors.     Appearance:   Appropriate    Eye Contact:   Good    Psychomotor Behavior: Normal    Attitude:   Cooperative    Orientation:   All   Speech    Rate / Production: Normal     Volume:  Normal    Mood:    Anxious  Depressed  Normal   Affect:    Appropriate    Thought Content:  Clear    Thought Form:  Coherent  Logical    Insight:    Good      Medication Review:   No current psychiatric medications prescribed     Medication Compliance:   NA     Changes in Health Issues:   None reported     Chemical Use Review:   Substance Use: Chemical use reviewed, no active concerns identified      Tobacco Use: No current tobacco use.      Diagnosis:  1. Generalized anxiety disorder    2. Persistent depressive disorder        Collateral Reports Completed:   Not Applicable    PLAN: (Patient Tasks / Therapist Tasks / Other)  Client agreed to work on developing plan to communicate her relationship conditions to her , to use DBT decision-making paradigm, and to work towards forgiveness between now and follow-up session next week.        Loki Crowe, [facility]                                                         ______________________________________________________________________    Treatment Plan    Patient's Name: Miri Naylor  YOB: 1955    Date: 9/10/19    DSM5 Diagnoses: 300.4 (F34.1) Persistent Depressive Disorder, Early onset or 300.02 (F41.1) Generalized Anxiety Disorder  Psychosocial / Contextual Factors: marital conflict, history of loss/trauma  WHODAS: 29    Referral / Collaboration:  Referral to another professional/service is not indicated at this time..    Anticipated number of session or this episode  of care: 11-20      MeasurableTreatment Goal(s) related to diagnosis / functional impairment(s)  Goal 1: Client will successfully process through past trauma defined as reporting 0 Subjective Units of Distress related to trauma on 0-10 scale and 7 on Validity of (positive) Cognition scale about self on 1-7 scale   I will know I've met my goal when I am less impacted by my past loss/trauma.       Objective #A (Client Action)    Status: New - Date: 9/10/19      Client will identify past traumatic events/memories which are causing current distress.     Intervention(s)  Therapist will take client's history and facilitate client's identification of targets for EMDR.     Objective #B  Client will complete needed assessment(s) and [facility] resourcing to confirm readiness for EMDR.    Status: New - Date: 9/10/19      Intervention(s)  Therapist will administer Dissociative Experiences Scale, Multidimensional Inventory of Dissociation as needed and complete [facility] resourcing with client.     Objective #C  Client will engage in installing at least 2 EMDR resources.  Status: New - Date: 9/10/19      Intervention(s)  Therapist will complete EMDR resourcing (Container, Remote Control, grounding/progressive muscle relaxation, Light Stream, Inner Advisor) with client.     Objective #D (Client Action)    Status: New - Date: 9/10/19      Client will engage in reprocessing all past traumatic event/memory targets.     Intervention(s)   Therapist will complete EMDR reprocessing with client.    Goal 2:  Client will improve overall baseline moodregulation by 2 points on a 1-10 Likert scale per self-report (10 = optimal mood/regulation).    I will know I've met my goal when I feel better/less depressed overall.      Objective #A (Client Action)    Status: New - Date:  9/25/19    Client will Identify negative self-talk and behaviors: challenge core beliefs, myths, and actions.    Intervention(s)  Therapist will teach  emotional regulation skills. CBT/REBT ABCD model.    Objective #B  Client will Increase interest, engagement, and pleasure in doing things  Decrease frequency and intensity of feeling down, depressed, hopeless  Improve concentration, focus, and mindfulness in daily activities .    Status: New - Date:  9/25/19    Intervention(s)  Therapist will teach emotional regulation skills. DBT Core Mindfulness, Distress Tolerance, Emotion Regulation, and Interpersonal Effetiveness skills.    Patient has reviewed and agreed to the above plan.      Loki Crowe, LMFT  October 23, 2019

## 2019-10-24 ASSESSMENT — PATIENT HEALTH QUESTIONNAIRE - PHQ9: SUM OF ALL RESPONSES TO PHQ QUESTIONS 1-9: 7

## 2019-10-30 ENCOUNTER — OFFICE VISIT (OUTPATIENT)
Dept: PSYCHOLOGY | Facility: CLINIC | Age: 64
End: 2019-10-30
Payer: COMMERCIAL

## 2019-10-30 DIAGNOSIS — F34.1 PERSISTENT DEPRESSIVE DISORDER: Primary | ICD-10-CM

## 2019-10-30 DIAGNOSIS — F41.1 GENERALIZED ANXIETY DISORDER: ICD-10-CM

## 2019-10-30 PROCEDURE — 90834 PSYTX W PT 45 MINUTES: CPT | Performed by: MARRIAGE & FAMILY THERAPIST

## 2019-10-30 ASSESSMENT — ANXIETY QUESTIONNAIRES
GAD7 TOTAL SCORE: 8
4. TROUBLE RELAXING: MORE THAN HALF THE DAYS
2. NOT BEING ABLE TO STOP OR CONTROL WORRYING: SEVERAL DAYS
GAD7 TOTAL SCORE: 8
1. FEELING NERVOUS, ANXIOUS, OR ON EDGE: SEVERAL DAYS
7. FEELING AFRAID AS IF SOMETHING AWFUL MIGHT HAPPEN: SEVERAL DAYS
6. BECOMING EASILY ANNOYED OR IRRITABLE: SEVERAL DAYS
7. FEELING AFRAID AS IF SOMETHING AWFUL MIGHT HAPPEN: SEVERAL DAYS
GAD7 TOTAL SCORE: 8
5. BEING SO RESTLESS THAT IT IS HARD TO SIT STILL: SEVERAL DAYS
3. WORRYING TOO MUCH ABOUT DIFFERENT THINGS: SEVERAL DAYS

## 2019-10-30 NOTE — PROGRESS NOTES
Progress Note    Patient Name: Miri Naylor  Date: 10/30/19         Service Type: Individual  Video Visit: No     Session Start Time: 12:00  Session End Time: 12:52     Session Length: 38-52    Session #: 8    Attendees: Client attended alone     Treatment Plan Last Reviewed: 9/10/19, next due 12/10/19.  PHQ-9 / MELISA-7 : completed.    DATA  Interactive Complexity: No  Crisis: No       Progress Since Last Session (Related to Symptoms / Goals / Homework):   Symptoms: Improving client reported decreased MELISA-7 score.    Homework: Partially completed       Episode of Care Goals: Minimal progress - ACTION (Actively working towards change); Intervened by reinforcing change plan / affirming steps taken     Current / Ongoing Stressors and Concerns:   Client reported that she continues to struggle with forgiveness.  Client reported that she has decided not to accompany her  on a planned trip, and has significant anxiety about how to communicate this to him.  Client reported struggling taking on others' emotions.     Treatment Objective(s) Addressed in This Session:   Identify negative self-talk and behaviors: challenge core beliefs, myths, and actions  learn & utilize at least some assertive communication skills weekly     Intervention:   CBT: re-reviewed with client concept of empathic distress and keeping her own emotional boundaries separate from others'.  Interpersonal Therapy: reviewed with client skills/strategies for assertively communicating with her  by taking ownership of her perspective.        ASSESSMENT: Current Emotional / Mental Status (status of significant symptoms):   Risk status (Self / Other harm or suicidal ideation)   Patient denies current fears or concerns for personal safety.   Patient denies current or recent suicidal ideation or behaviors.   Patientdenies current or recent homicidal ideation or behaviors.   Patient denies current or  recent self injurious behavior or ideation.   Patient denies other safety concerns.   Patient Patient reports there has been no change in risk factors since their last session.     PatientPatient reports there has been no change in protective factors since their last session.     Recommended that patient call 911 or go to the local ED should there be a change in any of these risk factors.     Appearance:   Appropriate    Eye Contact:   Good    Psychomotor Behavior: Normal    Attitude:   Cooperative    Orientation:   All   Speech    Rate / Production: Normal     Volume:  Normal    Mood:    Anxious  Depressed  Normal   Affect:    Appropriate    Thought Content:  Clear    Thought Form:  Coherent  Logical    Insight:    Good      Medication Review:   No current psychiatric medications prescribed     Medication Compliance:   NA     Changes in Health Issues:   None reported     Chemical Use Review:   Substance Use: Chemical use reviewed, no active concerns identified      Tobacco Use: No current tobacco use.      Diagnosis:  1. Persistent depressive disorder    2. Generalized anxiety disorder        Collateral Reports Completed:   Not Applicable    PLAN: (Patient Tasks / Therapist Tasks / Other)  Client agreed to work on developing plan to assertively communicate to her  by taking ownership, and to work towards not taking on others' emotions between now and follow-up session in three weeks.        Loki Crowe LMFT                                                         ______________________________________________________________________    Treatment Plan    Patient's Name: Miri Naylor  YOB: 1955    Date: 9/10/19    DSM5 Diagnoses: 300.4 (F34.1) Persistent Depressive Disorder, Early onset or 300.02 (F41.1) Generalized Anxiety Disorder  Psychosocial / Contextual Factors: marital conflict, history of loss/trauma  WHODAS: 29    Referral / Collaboration:  Referral to another  professional/service is not indicated at this time..    Anticipated number of session or this episode of care: 11-20      MeasurableTreatment Goal(s) related to diagnosis / functional impairment(s)  Goal 1: Client will successfully process through past trauma defined as reporting 0 Subjective Units of Distress related to trauma on 0-10 scale and 7 on Validity of (positive) Cognition scale about self on 1-7 scale   I will know I've met my goal when I am less impacted by my past loss/trauma.       Objective #A (Client Action)    Status: New - Date: 9/10/19      Client will identify past traumatic events/memories which are causing current distress.     Intervention(s)  Therapist will take client's history and facilitate client's identification of targets for EMDR.     Objective #B  Client will complete needed assessment(s) and [facility] resourcing to confirm readiness for EMDR.    Status: New - Date: 9/10/19      Intervention(s)  Therapist will administer Dissociative Experiences Scale, Multidimensional Inventory of Dissociation as needed and complete [facility] EMDR resourcing with client.     Objective #C  Client will engage in installing at least 2 EMDR resources.  Status: New - Date: 9/10/19      Intervention(s)  Therapist will complete EMDR resourcing (Container, Remote Control, grounding/progressive muscle relaxation, Light Stream, Inner Advisor) with client.     Objective #D (Client Action)    Status: New - Date: 9/10/19      Client will engage in reprocessing all past traumatic event/memory targets.     Intervention(s)   Therapist will complete EMDR reprocessing with client.    Goal 2:  Client will improve overall baseline moodregulation by 2 points on a 1-10 Likert scale per self-report (10 = optimal mood/regulation).    I will know I've met my goal when I feel better/less depressed overall.      Objective #A (Client Action)    Status: New - Date:  9/25/19    Client will Identify negative self-talk and  behaviors: challenge core beliefs, myths, and actions.    Intervention(s)  Therapist will teach emotional regulation skills. CBT/REBT ABCD model.    Objective #B  Client will Increase interest, engagement, and pleasure in doing things  Decrease frequency and intensity of feeling down, depressed, hopeless  Improve concentration, focus, and mindfulness in daily activities .    Status: New - Date:  9/25/19    Intervention(s)  Therapist will teach emotional regulation skills. DBT Core Mindfulness, Distress Tolerance, Emotion Regulation, and Interpersonal Effetiveness skills.    Patient has reviewed and agreed to the above plan.      Loki Crowe, LMFT  October 30, 2019

## 2019-10-31 ASSESSMENT — PATIENT HEALTH QUESTIONNAIRE - PHQ9: SUM OF ALL RESPONSES TO PHQ QUESTIONS 1-9: 7

## 2019-10-31 ASSESSMENT — ANXIETY QUESTIONNAIRES: GAD7 TOTAL SCORE: 8

## 2019-11-20 ENCOUNTER — OFFICE VISIT (OUTPATIENT)
Dept: PSYCHOLOGY | Facility: CLINIC | Age: 64
End: 2019-11-20
Payer: COMMERCIAL

## 2019-11-20 DIAGNOSIS — F41.1 GENERALIZED ANXIETY DISORDER: ICD-10-CM

## 2019-11-20 DIAGNOSIS — F34.1 PERSISTENT DEPRESSIVE DISORDER: Primary | ICD-10-CM

## 2019-11-20 PROCEDURE — 90834 PSYTX W PT 45 MINUTES: CPT | Performed by: MARRIAGE & FAMILY THERAPIST

## 2019-11-20 ASSESSMENT — ANXIETY QUESTIONNAIRES
4. TROUBLE RELAXING: SEVERAL DAYS
7. FEELING AFRAID AS IF SOMETHING AWFUL MIGHT HAPPEN: SEVERAL DAYS
1. FEELING NERVOUS, ANXIOUS, OR ON EDGE: SEVERAL DAYS
5. BEING SO RESTLESS THAT IT IS HARD TO SIT STILL: SEVERAL DAYS
GAD7 TOTAL SCORE: 7
2. NOT BEING ABLE TO STOP OR CONTROL WORRYING: SEVERAL DAYS
6. BECOMING EASILY ANNOYED OR IRRITABLE: SEVERAL DAYS
3. WORRYING TOO MUCH ABOUT DIFFERENT THINGS: SEVERAL DAYS
7. FEELING AFRAID AS IF SOMETHING AWFUL MIGHT HAPPEN: SEVERAL DAYS
GAD7 TOTAL SCORE: 7
GAD7 TOTAL SCORE: 7

## 2019-11-20 NOTE — PROGRESS NOTES
Progress Note    Patient Name: Miri Naylor  Date: 10/30/19         Service Type: Individual  Video Visit: No     Session Start Time: 1:01  Session End Time: 1:53     Session Length: 38-52    Session #: 9    Attendees: Client attended alone     Treatment Plan Last Reviewed: 9/10/19, next due 12/10/19.  PHQ-9 / MELISA-7 : completed.    DATA  Interactive Complexity: No  Crisis: No       Progress Since Last Session (Related to Symptoms / Goals / Homework):   Symptoms: Client reported increased PHQ-9 and decreased MELISA-7 scores.    Homework: Partially completed       Episode of Care Goals: Minimal progress - ACTION (Actively working towards change); Intervened by reinforcing change plan / affirming steps taken     Current / Ongoing Stressors and Concerns:   Client reported feeling significant anxiety about her upcoming vacation with her .  Client reported that emotions and conflicts tend to run high, especially when there is alcohol use.  Client reported that her  is not open to her feedback.     Treatment Objective(s) Addressed in This Session:   use at least some coping skills for anxiety management in the next two weeks  practice setting boundaries some times in the next two weeks     Intervention:   Interpersonal Therapy: reviewed with client skills/strategies for assertively setting boundaries in social situations.  Solution Focused: reviewed with client strategies for removing herself from conflict situations.        ASSESSMENT: Current Emotional / Mental Status (status of significant symptoms):   Risk status (Self / Other harm or suicidal ideation)   Patient denies current fears or concerns for personal safety.   Patient denies current or recent suicidal ideation or behaviors.   Patientdenies current or recent homicidal ideation or behaviors.   Patient denies current or recent self injurious behavior or ideation.   Patient denies other safety  concerns.   Patient Patient reports there has been no change in risk factors since their last session.     PatientPatient reports there has been no change in protective factors since their last session.     Recommended that patient call 911 or go to the local ED should there be a change in any of these risk factors.     Appearance:   Appropriate    Eye Contact:   Good    Psychomotor Behavior: Normal    Attitude:   Cooperative    Orientation:   All   Speech    Rate / Production: Normal     Volume:  Normal    Mood:    Anxious  Depressed  Normal   Affect:    Appropriate    Thought Content:  Clear    Thought Form:  Coherent  Logical    Insight:    Good      Medication Review:   No current psychiatric medications prescribed     Medication Compliance:   NA     Changes in Health Issues:   None reported     Chemical Use Review:   Substance Use: Chemical use reviewed, no active concerns identified      Tobacco Use: No current tobacco use.      Diagnosis:  1. Persistent depressive disorder    2. Generalized anxiety disorder        Collateral Reports Completed:   Not Applicable    PLAN: (Patient Tasks / Therapist Tasks / Other)  Client agreed to work on developing plan to assertively set boundaries and remove herself from conflict situations between now and follow-up session in three weeks.        Loki Crowe, [facility]                                                         ______________________________________________________________________    Treatment Plan    Patient's Name: Miri Naylor  YOB: 1955    Date: 9/10/19    DSM5 Diagnoses: 300.4 (F34.1) Persistent Depressive Disorder, Early onset or 300.02 (F41.1) Generalized Anxiety Disorder  Psychosocial / Contextual Factors: marital conflict, history of loss/trauma  WHODAS: 29    Referral / Collaboration:  Referral to another professional/service is not indicated at this time..    Anticipated number of session or this episode of care:  11-20      MeasurableTreatment Goal(s) related to diagnosis / functional impairment(s)  Goal 1: Client will successfully process through past trauma defined as reporting 0 Subjective Units of Distress related to trauma on 0-10 scale and 7 on Validity of (positive) Cognition scale about self on 1-7 scale   I will know I've met my goal when I am less impacted by my past loss/trauma.       Objective #A (Client Action)    Status: New - Date: 9/10/19      Client will identify past traumatic events/memories which are causing current distress.     Intervention(s)  Therapist will take client's history and facilitate client's identification of targets for EMDR.     Objective #B  Client will complete needed assessment(s) and [facility] resourcing to confirm readiness for EMDR.    Status: New - Date: 9/10/19      Intervention(s)  Therapist will administer Dissociative Experiences Scale, Multidimensional Inventory of Dissociation as needed and complete [facility] resourcing with client.     Objective #C  Client will engage in installing at least 2 EMDR resources.  Status: New - Date: 9/10/19      Intervention(s)  Therapist will complete EMDR resourcing (Container, Remote Control, grounding/progressive muscle relaxation, Light Stream, Inner Advisor) with client.     Objective #D (Client Action)    Status: New - Date: 9/10/19      Client will engage in reprocessing all past traumatic event/memory targets.     Intervention(s)   Therapist will complete EMDR reprocessing with client.    Goal 2:  Client will improve overall baseline moodregulation by 2 points on a 1-10 Likert scale per self-report (10 = optimal mood/regulation).    I will know I've met my goal when I feel better/less depressed overall.      Objective #A (Client Action)    Status: New - Date:  9/25/19    Client will Identify negative self-talk and behaviors: challenge core beliefs, myths, and actions.    Intervention(s)  Therapist will teach emotional  regulation skills. CBT/REBT ABCD model.    Objective #B  Client will Increase interest, engagement, and pleasure in doing things  Decrease frequency and intensity of feeling down, depressed, hopeless  Improve concentration, focus, and mindfulness in daily activities .    Status: New - Date:  9/25/19    Intervention(s)  Therapist will teach emotional regulation skills. DBT Core Mindfulness, Distress Tolerance, Emotion Regulation, and Interpersonal Effetiveness skills.    Patient has reviewed and agreed to the above plan.      Loki Crowe, LMFT  November 20, 2019

## 2019-11-21 ASSESSMENT — PATIENT HEALTH QUESTIONNAIRE - PHQ9: SUM OF ALL RESPONSES TO PHQ QUESTIONS 1-9: 8

## 2019-11-21 ASSESSMENT — ANXIETY QUESTIONNAIRES: GAD7 TOTAL SCORE: 7

## 2019-12-11 ENCOUNTER — OFFICE VISIT (OUTPATIENT)
Dept: PSYCHOLOGY | Facility: CLINIC | Age: 64
End: 2019-12-11
Payer: COMMERCIAL

## 2019-12-11 DIAGNOSIS — F34.1 PERSISTENT DEPRESSIVE DISORDER: ICD-10-CM

## 2019-12-11 DIAGNOSIS — F41.1 GENERALIZED ANXIETY DISORDER: Primary | ICD-10-CM

## 2019-12-11 PROCEDURE — 90834 PSYTX W PT 45 MINUTES: CPT | Performed by: MARRIAGE & FAMILY THERAPIST

## 2019-12-11 ASSESSMENT — PATIENT HEALTH QUESTIONNAIRE - PHQ9
10. IF YOU CHECKED OFF ANY PROBLEMS, HOW DIFFICULT HAVE THESE PROBLEMS MADE IT FOR YOU TO DO YOUR WORK, TAKE CARE OF THINGS AT HOME, OR GET ALONG WITH OTHER PEOPLE: SOMEWHAT DIFFICULT
SUM OF ALL RESPONSES TO PHQ QUESTIONS 1-9: 10
SUM OF ALL RESPONSES TO PHQ QUESTIONS 1-9: 10

## 2019-12-11 ASSESSMENT — ANXIETY QUESTIONNAIRES
3. WORRYING TOO MUCH ABOUT DIFFERENT THINGS: SEVERAL DAYS
2. NOT BEING ABLE TO STOP OR CONTROL WORRYING: MORE THAN HALF THE DAYS
5. BEING SO RESTLESS THAT IT IS HARD TO SIT STILL: SEVERAL DAYS
7. FEELING AFRAID AS IF SOMETHING AWFUL MIGHT HAPPEN: MORE THAN HALF THE DAYS
GAD7 TOTAL SCORE: 9
7. FEELING AFRAID AS IF SOMETHING AWFUL MIGHT HAPPEN: MORE THAN HALF THE DAYS
GAD7 TOTAL SCORE: 9
1. FEELING NERVOUS, ANXIOUS, OR ON EDGE: SEVERAL DAYS
GAD7 TOTAL SCORE: 9
4. TROUBLE RELAXING: SEVERAL DAYS
6. BECOMING EASILY ANNOYED OR IRRITABLE: SEVERAL DAYS
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
## 2019-12-12 ASSESSMENT — ANXIETY QUESTIONNAIRES: GAD7 TOTAL SCORE: 9

## 2019-12-12 ASSESSMENT — PATIENT HEALTH QUESTIONNAIRE - PHQ9: SUM OF ALL RESPONSES TO PHQ QUESTIONS 1-9: 10

## 2019-12-18 ENCOUNTER — OFFICE VISIT (OUTPATIENT)
Dept: PSYCHOLOGY | Facility: CLINIC | Age: 64
End: 2019-12-18
Payer: COMMERCIAL

## 2019-12-18 DIAGNOSIS — F34.1 PERSISTENT DEPRESSIVE DISORDER: ICD-10-CM

## 2019-12-18 DIAGNOSIS — F41.1 GENERALIZED ANXIETY DISORDER: Primary | ICD-10-CM

## 2019-12-18 PROCEDURE — 90834 PSYTX W PT 45 MINUTES: CPT | Performed by: MARRIAGE & FAMILY THERAPIST

## 2019-12-18 ASSESSMENT — PATIENT HEALTH QUESTIONNAIRE - PHQ9
10. IF YOU CHECKED OFF ANY PROBLEMS, HOW DIFFICULT HAVE THESE PROBLEMS MADE IT FOR YOU TO DO YOUR WORK, TAKE CARE OF THINGS AT HOME, OR GET ALONG WITH OTHER PEOPLE: SOMEWHAT DIFFICULT
SUM OF ALL RESPONSES TO PHQ QUESTIONS 1-9: 8
SUM OF ALL RESPONSES TO PHQ QUESTIONS 1-9: 8

## 2019-12-18 NOTE — PROGRESS NOTES
Progress Note    Patient Name: Miri Naylor  Date: 12/18/19         Service Type: Individual  Video Visit: No     Session Start Time: 1:04  Session End Time: 1:56     Session Length: 38-52    Session #: 11    Attendees: Client attended alone     Treatment Plan Last Reviewed: 12/11/19, next due 3/11/20.  PHQ-9 / MELISA-7 : completed.    DATA  Interactive Complexity: No  Crisis: No       Progress Since Last Session (Related to Symptoms / Goals / Homework):   Symptoms: Improving Client reported decreased PHQ-9 score.    Homework: Partially completed       Episode of Care Goals: Satisfactory progress - ACTION (Actively working towards change); Intervened by reinforcing change plan / affirming steps taken     Current / Ongoing Stressors and Concerns:   Client reported regarding continued, ongoing conflict with her .  Client reported that she feels caught between either speaking her piece and dealing with her 's reaction or not speaking to avoid said reaction with no changed occurring.  Client reported that she has decided what not to do (separate from her ) but not what choice to make going forward.     Treatment Objective(s) Addressed in This Session:   learn & utilize at least some assertive communication skills weekly      Intervention:   Interpersonal Therapy: reviewed with client balancing short-term conflict avoidance with long-term relationship consequences.        ASSESSMENT: Current Emotional / Mental Status (status of significant symptoms):   Risk status (Self / Other harm or suicidal ideation)   Patient denies current fears or concerns for personal safety.   Patient denies current or recent suicidal ideation or behaviors.   Patientdenies current or recent homicidal ideation or behaviors.   Patient denies current or recent self injurious behavior or ideation.   Patient denies other safety concerns.   Patient Patient reports there has been no  change in risk factors since their last session.     PatientPatient reports there has been no change in protective factors since their last session.     Recommended that patient call 911 or go to the local ED should there be a change in any of these risk factors.     Appearance:   Appropriate    Eye Contact:   Good    Psychomotor Behavior: Normal    Attitude:   Cooperative    Orientation:   All   Speech    Rate / Production: Normal     Volume:  Normal    Mood:    Anxious  Depressed  Normal   Affect:    Appropriate    Thought Content:  Clear    Thought Form:  Coherent  Logical    Insight:    Good      Medication Review:   No current psychiatric medications prescribed     Medication Compliance:   NA     Changes in Health Issues:   None reported     Chemical Use Review:   Substance Use: Chemical use reviewed, no active concerns identified      Tobacco Use: No current tobacco use.      Diagnosis:  1. Generalized anxiety disorder    2. Persistent depressive disorder        Collateral Reports Completed:   Not Applicable    PLAN: (Patient Tasks / Therapist Tasks / Other)  Client agreed to work on contemplating short-term conflict avoidance with long-term relationship consequences between now and follow-up session 12/31/19.        Loki Crowe, LMFT                                                         ______________________________________________________________________    Treatment Plan    Patient's Name: Miri Naylor  YOB: 1955    Date: 12/11/19    DSM5 Diagnoses: 300.4 (F34.1) Persistent Depressive Disorder, Early onset or 300.02 (F41.1) Generalized Anxiety Disorder  Psychosocial / Contextual Factors: marital conflict, history of loss/trauma  WHODAS: 29    Referral / Collaboration:  Referral to another professional/service is not indicated at this time..    Anticipated number of session or this episode of care: 11-20      MeasurableTreatment Goal(s) related to diagnosis / functional  impairment(s)  Goal 1: Client will successfully process through past trauma defined as reporting 0 Subjective Units of Distress related to trauma on 0-10 scale and 7 on Validity of (positive) Cognition scale about self on 1-7 scale   I will know I've met my goal when I am less impacted by my past loss/trauma.       Objective #A (Client Action)    Status: Conitnued - Date: 12/1119      Client will identify past traumatic events/memories which are causing current distress.     Intervention(s)  Therapist will take client's history and facilitate client's identification of targets for EMDR.     Objective #B  Client will complete needed assessment(s) and [facility] resourcing to confirm readiness for EMDR.    Status: Continued - Date: 12/11/19      Intervention(s)  Therapist will administer Dissociative Experiences Scale, Multidimensional Inventory of Dissociation as needed and complete [facility] EM[facility] resourcing with client.     Objective #C  Client will engage in installing at least 2 EMDR resources.  Status: Continued - Date: 12/11/19      Intervention(s)  Therapist will complete EMDR resourcing (Container, Remote Control, grounding/progressive muscle relaxation, Light Stream, Inner Advisor) with client.     Objective #D (Client Action)    Status: Continued - Date: 12/11/19      Client will engage in reprocessing all past traumatic event/memory targets.     Intervention(s)   Therapist will complete EMDR reprocessing with client.    Goal 2:  Client will improve overall baseline mood regulation by 2 points on a 1-10 Likert scale per self-report (10 = optimal mood/regulation).    I will know I've met my goal when I feel better/less depressed overall.      Objective #A (Client Action)    Status: Continued - Date:  12/11/19    Client will Identify negative self-talk and behaviors: challenge core beliefs, myths, and actions.    Intervention(s)  Therapist will teach emotional regulation skills. CBT/REBT ABCD  model.    Objective #B  Client will Increase interest, engagement, and pleasure in doing things  Decrease frequency and intensity of feeling down, depressed, hopeless  Improve concentration, focus, and mindfulness in daily activities .    Status: Continued - Date:  12/11/19    Intervention(s)  Therapist will teach emotional regulation skills. DBT Core Mindfulness, Distress Tolerance, Emotion Regulation, and Interpersonal Effetiveness skills.    Patient has reviewed and agreed to the above plan.      LUIS ENRIQUE Briseno  December 18, 2019

## 2019-12-19 ASSESSMENT — PATIENT HEALTH QUESTIONNAIRE - PHQ9: SUM OF ALL RESPONSES TO PHQ QUESTIONS 1-9: 8

## 2019-12-31 ENCOUNTER — OFFICE VISIT (OUTPATIENT)
Dept: PSYCHOLOGY | Facility: CLINIC | Age: 64
End: 2019-12-31
Payer: COMMERCIAL

## 2019-12-31 DIAGNOSIS — F41.1 GENERALIZED ANXIETY DISORDER: ICD-10-CM

## 2019-12-31 DIAGNOSIS — F34.1 PERSISTENT DEPRESSIVE DISORDER: Primary | ICD-10-CM

## 2019-12-31 PROCEDURE — 90834 PSYTX W PT 45 MINUTES: CPT | Performed by: MARRIAGE & FAMILY THERAPIST

## 2019-12-31 ASSESSMENT — ANXIETY QUESTIONNAIRES
5. BEING SO RESTLESS THAT IT IS HARD TO SIT STILL: SEVERAL DAYS
GAD7 TOTAL SCORE: 7
2. NOT BEING ABLE TO STOP OR CONTROL WORRYING: SEVERAL DAYS
6. BECOMING EASILY ANNOYED OR IRRITABLE: SEVERAL DAYS
3. WORRYING TOO MUCH ABOUT DIFFERENT THINGS: SEVERAL DAYS
4. TROUBLE RELAXING: SEVERAL DAYS
GAD7 TOTAL SCORE: 7
7. FEELING AFRAID AS IF SOMETHING AWFUL MIGHT HAPPEN: SEVERAL DAYS
7. FEELING AFRAID AS IF SOMETHING AWFUL MIGHT HAPPEN: SEVERAL DAYS
1. FEELING NERVOUS, ANXIOUS, OR ON EDGE: SEVERAL DAYS
GAD7 TOTAL SCORE: 7

## 2019-12-31 ASSESSMENT — PATIENT HEALTH QUESTIONNAIRE - PHQ9
SUM OF ALL RESPONSES TO PHQ QUESTIONS 1-9: 8
SUM OF ALL RESPONSES TO PHQ QUESTIONS 1-9: 8

## 2019-12-31 NOTE — PROGRESS NOTES
"                                           Progress Note    Patient Name: Miri Naylor  Date: 12/31/19         Service Type: Individual  Video Visit: No     Session Start Time: 1:00  Session End Time: 1:52     Session Length: 38-52    Session #: 12    Attendees: Client attended alone     Treatment Plan Last Reviewed: 12/11/19, next due 3/11/20.  PHQ-9 / MELISA-7 : completed.    DATA  Interactive Complexity: No  Crisis: No       Progress Since Last Session (Related to Symptoms / Goals / Homework):   Symptoms: Improving Client reported decreased MELISA-7 score.    Homework: Partially completed       Episode of Care Goals: Satisfactory progress - ACTION (Actively working towards change); Intervened by reinforcing change plan / affirming steps taken     Current / Ongoing Stressors and Concerns:   Client reported continuing to struggle with how to proceed in conflict with her , as he does not want to discuss it.  Client reported that she often invalidates herself in the process.  Client reported that she has pattern of over-apologizing and owning situations' outcomes to \"keep the peace.\"     Treatment Objective(s) Addressed in This Session:   Identify negative self-talk and behaviors: challenge core beliefs, myths, and actions  practice setting boundaries some times in the next few weeks      Intervention:   DBT: reviewed with client practicing self-validation.  Interpersonal Therapy: reviewed with client setting limits with her  and herself regarding not over-apologizing and owning situations' outcomes.        ASSESSMENT: Current Emotional / Mental Status (status of significant symptoms):   Risk status (Self / Other harm or suicidal ideation)   Patient denies current fears or concerns for personal safety.   Patient denies current or recent suicidal ideation or behaviors.   Patientdenies current or recent homicidal ideation or behaviors.   Patient denies current or recent self injurious behavior or " ideation.   Patient denies other safety concerns.   Patient Patient reports there has been no change in risk factors since their last session.     PatientPatient reports there has been no change in protective factors since their last session.     Recommended that patient call 911 or go to the local ED should there be a change in any of these risk factors.     Appearance:   Appropriate    Eye Contact:   Good    Psychomotor Behavior: Normal    Attitude:   Cooperative    Orientation:   All   Speech    Rate / Production: Normal     Volume:  Normal    Mood:    Anxious  Depressed  Normal   Affect:    Appropriate    Thought Content:  Clear    Thought Form:  Coherent  Logical    Insight:    Good      Medication Review:   No current psychiatric medications prescribed     Medication Compliance:   NA     Changes in Health Issues:   None reported     Chemical Use Review:   Substance Use: Chemical use reviewed, no active concerns identified      Tobacco Use: No current tobacco use.      Diagnosis:  1. Persistent depressive disorder    2. Generalized anxiety disorder        Collateral Reports Completed:   Not Applicable    PLAN: (Patient Tasks / Therapist Tasks / Other)  Client agreed to work on self-validating and setting limits with her  and herself of not owning situations' outcomes between now and follow-up session 1/15/20.        Loki Crowe, [facility]                                                         ______________________________________________________________________    Treatment Plan    Patient's Name: Miri Naylor  YOB: 1955    Date: 12/11/19    DSM5 Diagnoses: 300.4 (F34.1) Persistent Depressive Disorder, Early onset or 300.02 (F41.1) Generalized Anxiety Disorder  Psychosocial / Contextual Factors: marital conflict, history of loss/trauma  WHODAS: 30    Referral / Collaboration:  Referral to another professional/service is not indicated at this time..    Anticipated number of  session or this episode of care: 11-20      MeasurableTreatment Goal(s) related to diagnosis / functional impairment(s)  Goal 1: Client will successfully process through past trauma defined as reporting 0 Subjective Units of Distress related to trauma on 0-10 scale and 7 on Validity of (positive) Cognition scale about self on 1-7 scale   I will know I've met my goal when I am less impacted by my past loss/trauma.       Objective #A (Client Action)    Status: Conitnued - Date: 12/1119      Client will identify past traumatic events/memories which are causing current distress.     Intervention(s)  Therapist will take client's history and facilitate client's identification of targets for EMDR.     Objective #B  Client will complete needed assessment(s) and [facility] EMDR resourcing to confirm readiness for EMDR.    Status: Continued - Date: 12/11/19      Intervention(s)  Therapist will administer Dissociative Experiences Scale, Multidimensional Inventory of Dissociation as needed and complete [facility] EMDR resourcing with client.     Objective #C  Client will engage in installing at least 2 EMDR resources.  Status: Continued - Date: 12/11/19      Intervention(s)  Therapist will complete EMDR resourcing (Container, Remote Control, grounding/progressive muscle relaxation, Light Stream, Inner Advisor) with client.     Objective #D (Client Action)    Status: Continued - Date: 12/11/19      Client will engage in reprocessing all past traumatic event/memory targets.     Intervention(s)   Therapist will complete EMDR reprocessing with client.    Goal 2:  Client will improve overall baseline mood regulation by 2 points on a 1-10 Likert scale per self-report (10 = optimal mood/regulation).    I will know I've met my goal when I feel better/less depressed overall.      Objective #A (Client Action)    Status: Continued - Date:  12/11/19    Client will Identify negative self-talk and behaviors: challenge core beliefs, myths,  and actions.    Intervention(s)  Therapist will teach emotional regulation skills. CBT/REBT ABCD model.    Objective #B  Client will Increase interest, engagement, and pleasure in doing things  Decrease frequency and intensity of feeling down, depressed, hopeless  Improve concentration, focus, and mindfulness in daily activities .    Status: Continued - Date:  12/11/19    Intervention(s)  Therapist will teach emotional regulation skills. DBT Core Mindfulness, Distress Tolerance, Emotion Regulation, and Interpersonal Effetiveness skills.    Patient has reviewed and agreed to the above plan.      LUIS ENRIQUE Briseno  December 31, 2019

## 2020-01-01 ASSESSMENT — ANXIETY QUESTIONNAIRES: GAD7 TOTAL SCORE: 7

## 2020-01-01 ASSESSMENT — PATIENT HEALTH QUESTIONNAIRE - PHQ9: SUM OF ALL RESPONSES TO PHQ QUESTIONS 1-9: 8

## 2020-01-14 ENCOUNTER — OFFICE VISIT (OUTPATIENT)
Dept: NEUROLOGY | Facility: CLINIC | Age: 65
End: 2020-01-14
Attending: FAMILY MEDICINE
Payer: COMMERCIAL

## 2020-01-14 VITALS
DIASTOLIC BLOOD PRESSURE: 80 MMHG | HEART RATE: 77 BPM | WEIGHT: 212 LBS | BODY MASS INDEX: 28.71 KG/M2 | HEIGHT: 72 IN | OXYGEN SATURATION: 99 % | SYSTOLIC BLOOD PRESSURE: 133 MMHG | RESPIRATION RATE: 16 BRPM

## 2020-01-14 DIAGNOSIS — G43.009 MIGRAINE WITHOUT AURA AND WITHOUT STATUS MIGRAINOSUS, NOT INTRACTABLE: ICD-10-CM

## 2020-01-14 DIAGNOSIS — G25.81 RESTLESS LEGS SYNDROME: Primary | ICD-10-CM

## 2020-01-14 DIAGNOSIS — R51.9 CHRONIC DAILY HEADACHE: ICD-10-CM

## 2020-01-14 DIAGNOSIS — Z87.820 HISTORY OF TRAUMATIC BRAIN INJURY: ICD-10-CM

## 2020-01-14 RX ORDER — NARATRIPTAN 2.5 MG/1
2.5 TABLET ORAL
Qty: 18 TABLET | Refills: 3 | Status: SHIPPED | OUTPATIENT
Start: 2020-01-14 | End: 2021-01-27

## 2020-01-14 RX ORDER — ROPINIROLE 0.25 MG/1
0.5 TABLET, FILM COATED ORAL AT BEDTIME
Qty: 60 TABLET | Refills: 11 | Status: SHIPPED | OUTPATIENT
Start: 2020-01-14 | End: 2021-01-27

## 2020-01-14 RX ORDER — GABAPENTIN 300 MG/1
CAPSULE ORAL
Qty: 270 CAPSULE | Refills: 3 | Status: SHIPPED | OUTPATIENT
Start: 2020-01-14 | End: 2021-01-27

## 2020-01-14 RX ORDER — METOCLOPRAMIDE 10 MG/1
10 TABLET ORAL 3 TIMES DAILY PRN
Qty: 20 TABLET | Refills: 3 | Status: SHIPPED | OUTPATIENT
Start: 2020-01-14 | End: 2022-04-27

## 2020-01-14 ASSESSMENT — HEADACHE IMPACT TEST (HIT 6)
WHEN YOU HAVE HEADACHES HOW OFTEN IS THE PAIN SEVERE: ALWAYS
WHEN YOU HAVE A HEADACHE HOW OFTEN DO YOU WISH YOU COULD LIE DOWN: VERY OFTEN
HOW OFTEN HAVE YOU FELT FED UP OR IRRITATED BECAUSE OF YOUR HEADACHES: SOMETIMES
HOW OFTEN HAVE YOU FELT TOO TIRED TO WORK BECAUSE OF YOUR HEADACHES: SOMETIMES
HOW OFTEN DO HEADACHES LIMIT YOUR DAILY ACTIVITIES: SOMETIMES
WHEN YOU HAVE HEADACHES HOW OFTEN IS THE PAIN SEVERE: ALWAYS
HOW OFTEN DO HEADACHES LIMIT YOUR DAILY ACTIVITIES: SOMETIMES
HIT6 TOTAL SCORE: 62
HOW OFTEN DID HEADACHS LIMIT CONCENTRATION ON WORK OR DAILY ACTIVITY: RARELY
HOW OFTEN HAVE YOU FELT FED UP OR IRRITATED BECAUSE OF YOUR HEADACHES: SOMETIMES
HIT6 TOTAL SCORE: 62
HOW OFTEN HAVE YOU FELT TOO TIRED TO WORK BECAUSE OF YOUR HEADACHES: SOMETIMES
HOW OFTEN DID HEADACHS LIMIT CONCENTRATION ON WORK OR DAILY ACTIVITY: RARELY
WHEN YOU HAVE A HEADACHE HOW OFTEN DO YOU WISH YOU COULD LIE DOWN: VERY OFTEN

## 2020-01-14 ASSESSMENT — MIFFLIN-ST. JEOR: SCORE: 1624.66

## 2020-01-14 ASSESSMENT — PAIN SCALES - GENERAL: PAINLEVEL: NO PAIN (0)

## 2020-01-14 NOTE — LETTER
2020       RE: Miri Naylor  [address]  [place] [address]     Dear Colleague,    Thank you for referring your patient, Miri Naylor, to the [facility] NEUROLOGY at [facility]. Please see a copy of my visit note below.      DEPARTMENT OF NEUROLOGY  HEADACHE CLINIC INITIAL VISIT    Patient Name:  Miri Naylor  MRN:  [id number]    :  1955  Date of Clinic Visit:  2020  Primary Care Provider:  Elizabeth Corley  Referring Provider: Elizabeth Corley    CHIEF COMPLAINT: Headaches    HISTORY OF PRESENT ILLNESS:  Miri Naylor is a 64 year old female presents to clinic to establish care for headaches. She developed headaches in  after she had a traumatic brain injury due to falling down the stairs. Her headaches have actually improved over the last few years. She describes two types of headaches - a daily headache and a more severe flare up.    Regarding her daily headache she reports this is a dull, achey pain that is mostly centered on the top of her head, the bitemporal regions, and behind her eyes. This pain is present pretty much constantly at about a 2/10. She has taken gabapentin chronically and feels that this is extremely helpful; she notices relief of her headache when she takes her first dose. She also notices a wearing off effect where her headache starts to come back prior to her next dose. If she misses her dose the headache will increase up to a maximum of a 7/10. She occasionally has photophobia and nausea with these headaches. She will take OTC medications 1-2 times per week either for headache or body aches.     Regarding her more severe headaches, she reports these are mostly centered behind her eyes. They are more severe than her typical headaches. She does not get these often, experiencing one every few months. Her last such episode was in 2019 when she presented to the ED; this one lasted several days.  She had been prescribed sumatriptan but upon using this medication she felt it made the headache worse. She received ketorolac, metoclopramide, and diphenhydramine and had good relief from her headache with these medications.       PAST MEDICAL HISTORY:  Past Medical History:   Diagnosis Date     Depressive disorder, not elsewhere classified      Drug allergy, multiple 9/23/14--passed graded oral challenge for Zithromax.     7/3/14 POS PRE-PEN skin test. 7/30/14 NEG skin tests and oral challenge to Cefzil     Hx of abnormal Pap smear ?    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 Drinks 1.5-2 cups of 16 oz coffee per day. Denies ETOH, drug use, tobacco use.    FAMILY HISTORY:  Family History   Problem Relation Age of Onset     Arthritis Mother      Hyperlipidemia Mother      Other Cancer Mother         Glioblastoma Multiforme     Osteoporosis Mother      Blood Disease Father      Hypertension Father      Hyperlipidemia Father      Other Cancer Father         lung cancer     Depression Son      Depression Daughter      Hyperlipidemia Brother      Anxiety Disorder Son      Skin Cancer No family hx of      No one with migraines in the family.     REVIEW OF SYSTEMS: 10-point ROS completed and negative except as designated in HPI or here.     ALLERGIES:  Allergies   Allergen Reactions     Levofloxacin Rash     Other reaction(s): Contact Dermatitis     Penicillins Hives and Rash     Confirmed by skin prick testing 7/3/14     Amoxicillin Hives and Rash     Amoxicillin-Pot Clavulanate Rash     Azithromycin  Rash     Cephalexin Rash     Clindamycin Rash and Hives     Atorvastatin Other (See Comments)     Felt sick, intolerance     Clindamycin Hcl Hives     Levaquin      tendonitis     Augmentin Rash       MEDICATIONS:  Current Outpatient Medications   Medication Sig Dispense Refill     ACIDOPHILUS OR TABS 1 tablet daily       albuterol (PROAIR HFA) 108 (90 Base) MCG/ACT inhaler Inhale 2 puffs into the lungs every 4 hours as needed for shortness of breath / dyspnea or wheezing 1 Inhaler 1     atenolol (TENORMIN) 25 MG tablet Take 1 tablet (25 mg) by mouth daily 90 tablet 3     azelastine-fluticasone (DYMISTA) 137-50 MCG/ACT nasal spray Spray 1 spray into both nostrils 2 times daily 1 Bottle 11     beclomethasone HFA (QVAR REDIHALER) 80 MCG/ACT inhaler Inhale 2 puffs into the lungs 2 times daily Schedule follow up with Dr. Stockton for refills 3 Inhaler 3     biotin 1000 MCG TABS tablet Take 1,000 mcg by mouth daily       CALCIUM 600+D PO twice daily       cetirizine (ZYRTEC) 10 MG tablet Take 10 mg by mouth daily.       Cholecalciferol (VITAMIN D-3 PO) Take 2,000 Int'l Units by mouth daily       DAILY MULTIVITAMIN PO 1 tablet daily       escitalopram (LEXAPRO) 10 MG tablet Take 1 tablet (10 mg) by mouth daily 90 tablet 1     ferrous sulfate (IRON) 325 (65 Fe) MG tablet Take 325 mg by mouth daily (with breakfast)       FLAXSEED OIL 1000 MG PO CAPS 1 tablet daily       gabapentin (NEURONTIN) 300 MG capsule 1 capsule TID with additional 1-2 capsules if headache is severe. 300 capsule 1     GLUCOSAMINE-CHONDROITIN PO TABS 1500 mg glucosamine and 1200mg chondroitin daily-2 tablets daily       hydrocortisone 2.5 % cream Apply topically 2 times daily (Patient taking differently: Apply topically 2 times daily as needed ) 60 g 1     ibuprofen (ADVIL,MOTRIN) 200 MG tablet take 1 tablet (200 mg) by oral route every 6 hours as needed with food       Ketotifen Fumarate (ZADITOR OP) Apply to eye daily as needed       lisinopril  "(PRINIVIL/ZESTRIL) 40 MG tablet TAKE 1 TABLET DAILY 90 tablet 1     Magnesium 400 MG CAPS Take by mouth daily       montelukast (SINGULAIR) 10 MG tablet Take 1 tablet (10 mg) by mouth At Bedtime 30 tablet 11     pantoprazole (PROTONIX) 40 MG EC tablet TAKE 1 TABLET DAILY 30 TO 60 MINUTES BEFORE A MEAL 90 tablet 4     polyethylene glycol (MIRALAX) powder Take 1 capful by mouth daily       pravastatin (PRAVACHOL) 40 MG tablet TAKE 1 TABLET DAILY 90 tablet 4     rOPINIRole (REQUIP) 0.25 MG tablet Take 0.5 mg by mouth At Bedtime        S-ADENOSYLMETHIONINE 200 MG PO TABS 2 tablets daily       senna (SENOKOT) 8.6 MG tablet Take 2 tablets by mouth daily       spironolactone (ALDACTONE) 25 MG tablet TAKE ONE-HALF (1/2) TABLET DAILY 45 tablet 2     SUMAtriptan (IMITREX) 50 MG tablet Take 50 mg by mouth at onset of headache        triamcinolone (KENALOG) 0.1 % cream Apply topically 2 times daily (Patient taking differently: Apply topically 2 times daily as needed ) 60 g 3     TURMERIC PO Take 2,000 mg by mouth daily       vitamin B complex with vitamin C (VITAMIN  B COMPLEX) TABS tablet Take 3 tablets by mouth daily       VITAMIN C 500 MG PO TABS 2 TABLET DAILY       vitamin E (E-400) 400 UNIT capsule Take one pill by mouth once daily         PHYSICAL EXAMINATION:  Vitals: /80 (BP Location: Left arm, Patient Position: Chair, Cuff Size: Adult Regular)   Pulse 77   Resp 16   Ht 1.822 m (5' 11.75\")   Wt 96.2 kg (212 lb)   SpO2 99%   BMI 28.95 kg/m       General: Adult female in no acute distress presents to clinic alone  MSK: No tenderness to palpation in the occiput or supraorbital region    Neuro:  Mental Status: Fully alert, attentive and oriented. Spontaneous speech with intact comprehension and repetition. Attention and fund of knowledge normal.   Cranial Nerves: Visual fields intact. Fundoscopic exam normal. PERRL. EOMI. Facial movements symmetric. Hearing intact to conversation. Palate elevation symmetric, " uvula midline. No dysarthria. Shoulder shrug strong bilaterally. Tongue protrusion midline.  Motor: No abnormal movements. Normal tone throughout. No pronator drift. Strength 5/5 throughout upper and lower extremities.  Reflexes: 2+ and symmetric at patella, achilles, biceps, brachioradialis. No clonus. Toes mute.   Sensory: Intact and symmetric light touch sensation throughout upper and lower extremities.  Coordination: FNF and HS intact without ataxia or dysmetria.   Station/Gait: Normal casual gait with good arm swing. Able to walk on heels, toes, and in tandem. No romberg.      PERTINENT INVESTIGATIONS:   All available and relevant labs, imaging, and other procedures were reviewed personally.   MRI brain wo/w 5/30/18 - areas of encephalomalacia in the left and right frontal lobes, stable from prior    ASSESSMENT:  #Chronic post-traumatic headache with migraine phenotype:  Ms. Naylor presents to clinic to establish care for headaches that fit the diagnosis listed above. Currently she is treating these headaches with gabapentin with good benefit. She continues to have daily headaches which are at least a 2/10 and can increase up to a 7/10 if she misses a dose of gabapentin.     Further medication options for better headache control were discussed with her. She has tried topiramate and zonisamide, the latter of which worsened her restless leg syndrome. She is on a beta blocker (atenolol) for blood pressure, however at a low dose; increasing this dose is an option, however there is risk it could worsen her asthma or her depression so we will avoid this. In addition, tricyclic antidepressants such as amitriptyline are contraindicated while she is on citalopram and would not want to change these medications given comorbid depression.     At this point Ms. Naylor is not wanting to make any changes to her preventative treatment. Options to consider in the future would include CGRP inhibitors or botulinum toxin  (which she is resistant to the idea due to her friend having botulism from a food outbreak in the 1980s).     For abortive therapy, she was given a prescription for metoclopramide which she found helpful in the ED. In addition, will trial a new triptan medication; will prescribe naratriptan as this tends to have a slower onset and is usually better tolerated in patients with side effects to sumatriptan.     #Restless leg syndrome:  Reports that she is fairly well controlled on her current dose of ropinerole; this will be renewed today.    PLAN:  -Continue gabapentin 300 mg TID  -Consider CGRP inhibitors or botulinum toxin injection if further preventative treatment is needed  -Can use tylenol as abortive therapy for mild headaches, not to exceed 14 doses per month  -Naratriptan 2.5 mg as needed for moderate to severe headache, can repeat dose once in 4 hours if needed, not to exceed 9 doses per month  -Metoclopramide 10 mg TID prn for headache-associated nausea or as abortive therapy  -Ropinerole 0.5 mg at bedtime for RLS  -Follow up in one year or sooner if needed    Patient was seen and discussed with Dr. Nolan, who agrees with my assessment and plan.    Charly Robles MD  Neurology PGY-4    Physician Attestation   I, Diamond Nolan MD, saw this patient and agree with the findings and plan of care as documented in the note.      Diamond Nolan MD

## 2020-01-14 NOTE — NURSING NOTE
Chief Complaint   Patient presents with     Headache     ump new headache     Desiree Adrian cma

## 2020-01-14 NOTE — PROGRESS NOTES
DEPARTMENT OF NEUROLOGY  HEADACHE CLINIC INITIAL VISIT    Patient Name:  Miri Naylor  MRN:  [id number]    :  1955  Date of Clinic Visit:  2020  Primary Care Provider:  Elizabeth Corley  Referring Provider: Elizabeth Corley    CHIEF COMPLAINT: Headaches    HISTORY OF PRESENT ILLNESS:  Miri Naylor is a 64 year old female presents to clinic to establish care for headaches. She developed headaches in  after she had a traumatic brain injury due to falling down the stairs. Her headaches have actually improved over the last few years. She describes two types of headaches - a daily headache and a more severe flare up.    Regarding her daily headache she reports this is a dull, achey pain that is mostly centered on the top of her head, the bitemporal regions, and behind her eyes. This pain is present pretty much constantly at about a 2/10. She has taken gabapentin chronically and feels that this is extremely helpful; she notices relief of her headache when she takes her first dose. She also notices a wearing off effect where her headache starts to come back prior to her next dose. If she misses her dose the headache will increase up to a maximum of a 7/10. She occasionally has photophobia and nausea with these headaches. She will take OTC medications 1-2 times per week either for headache or body aches.     Regarding her more severe headaches, she reports these are mostly centered behind her eyes. They are more severe than her typical headaches. She does not get these often, experiencing one every few months. Her last such episode was in 2019 when she presented to the ED; this one lasted several days. She had been prescribed sumatriptan but upon using this medication she felt it made the headache worse. She received ketorolac, metoclopramide, and diphenhydramine and had good relief from her headache with these medications.       PAST MEDICAL HISTORY:  Past Medical History:   Diagnosis  Date     Depressive disorder, not elsewhere classified      Drug allergy, multiple 9/23/14--passed graded oral challenge for Zithromax.     7/3/14 POS PRE-PEN skin test. 7/30/14 NEG skin tests and oral challenge to Cefzil     Hx of abnormal Pap smear ?    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 Drinks 1.5-2 cups of 16 oz coffee per day. Denies ETOH, drug use, tobacco use.    FAMILY HISTORY:  Family History   Problem Relation Age of Onset     Arthritis Mother      Hyperlipidemia Mother      Other Cancer Mother         Glioblastoma Multiforme     Osteoporosis Mother      Blood Disease Father      Hypertension Father      Hyperlipidemia Father      Other Cancer Father         lung cancer     Depression Son      Depression Daughter      Hyperlipidemia Brother      Anxiety Disorder Son      Skin Cancer No family hx of      No one with migraines in the family.     REVIEW OF SYSTEMS: 10-point ROS completed and negative except as designated in HPI or here.     ALLERGIES:  Allergies   Allergen Reactions     Levofloxacin Rash     Other reaction(s): Contact Dermatitis     Penicillins Hives and Rash     Confirmed by skin prick testing 7/3/14     Amoxicillin Hives and Rash     Amoxicillin-Pot Clavulanate Rash     Azithromycin Rash     Cephalexin Rash     Clindamycin Rash and Hives     Atorvastatin Other (See Comments)     Felt sick, intolerance     Clindamycin Hcl Hives     Levaquin      tendonitis     Augmentin Rash       MEDICATIONS:  Current Outpatient Medications   Medication Sig Dispense Refill      ACIDOPHILUS OR TABS 1 tablet daily       albuterol (PROAIR HFA) 108 (90 Base) MCG/ACT inhaler Inhale 2 puffs into the lungs every 4 hours as needed for shortness of breath / dyspnea or wheezing 1 Inhaler 1     atenolol (TENORMIN) 25 MG tablet Take 1 tablet (25 mg) by mouth daily 90 tablet 3     azelastine-fluticasone (DYMISTA) 137-50 MCG/ACT nasal spray Spray 1 spray into both nostrils 2 times daily 1 Bottle 11     beclomethasone HFA (QVAR REDIHALER) 80 MCG/ACT inhaler Inhale 2 puffs into the lungs 2 times daily Schedule follow up with Dr. Stockton for refills 3 Inhaler 3     biotin 1000 MCG TABS tablet Take 1,000 mcg by mouth daily       CALCIUM 600+D PO twice daily       cetirizine (ZYRTEC) 10 MG tablet Take 10 mg by mouth daily.       Cholecalciferol (VITAMIN D-3 PO) Take 2,000 Int'l Units by mouth daily       DAILY MULTIVITAMIN PO 1 tablet daily       escitalopram (LEXAPRO) 10 MG tablet Take 1 tablet (10 mg) by mouth daily 90 tablet 1     ferrous sulfate (IRON) 325 (65 Fe) MG tablet Take 325 mg by mouth daily (with breakfast)       FLAXSEED OIL 1000 MG PO CAPS 1 tablet daily       gabapentin (NEURONTIN) 300 MG capsule 1 capsule TID with additional 1-2 capsules if headache is severe. 300 capsule 1     GLUCOSAMINE-CHONDROITIN PO TABS 1500 mg glucosamine and 1200mg chondroitin daily-2 tablets daily       hydrocortisone 2.5 % cream Apply topically 2 times daily (Patient taking differently: Apply topically 2 times daily as needed ) 60 g 1     ibuprofen (ADVIL,MOTRIN) 200 MG tablet take 1 tablet (200 mg) by oral route every 6 hours as needed with food       Ketotifen Fumarate (ZADITOR OP) Apply to eye daily as needed       lisinopril (PRINIVIL/ZESTRIL) 40 MG tablet TAKE 1 TABLET DAILY 90 tablet 1     Magnesium 400 MG CAPS Take by mouth daily       montelukast (SINGULAIR) 10 MG tablet Take 1 tablet (10 mg) by mouth At Bedtime 30 tablet 11     pantoprazole (PROTONIX) 40 MG EC tablet TAKE 1 TABLET DAILY 30 TO 60 MINUTES  "BEFORE A MEAL 90 tablet 4     polyethylene glycol (MIRALAX) powder Take 1 capful by mouth daily       pravastatin (PRAVACHOL) 40 MG tablet TAKE 1 TABLET DAILY 90 tablet 4     rOPINIRole (REQUIP) 0.25 MG tablet Take 0.5 mg by mouth At Bedtime        S-ADENOSYLMETHIONINE 200 MG PO TABS 2 tablets daily       senna (SENOKOT) 8.6 MG tablet Take 2 tablets by mouth daily       spironolactone (ALDACTONE) 25 MG tablet TAKE ONE-HALF (1/2) TABLET DAILY 45 tablet 2     SUMAtriptan (IMITREX) 50 MG tablet Take 50 mg by mouth at onset of headache        triamcinolone (KENALOG) 0.1 % cream Apply topically 2 times daily (Patient taking differently: Apply topically 2 times daily as needed ) 60 g 3     TURMERIC PO Take 2,000 mg by mouth daily       vitamin B complex with vitamin C (VITAMIN  B COMPLEX) TABS tablet Take 3 tablets by mouth daily       VITAMIN C 500 MG PO TABS 2 TABLET DAILY       vitamin E (E-400) 400 UNIT capsule Take one pill by mouth once daily         PHYSICAL EXAMINATION:  Vitals: /80 (BP Location: Left arm, Patient Position: Chair, Cuff Size: Adult Regular)   Pulse 77   Resp 16   Ht 1.822 m (5' 11.75\")   Wt 96.2 kg (212 lb)   SpO2 99%   BMI 28.95 kg/m      General: Adult female in no acute distress presents to clinic alone  MSK: No tenderness to palpation in the occiput or supraorbital region    Neuro:  Mental Status: Fully alert, attentive and oriented. Spontaneous speech with intact comprehension and repetition. Attention and fund of knowledge normal.   Cranial Nerves: Visual fields intact. Fundoscopic exam normal. PERRL. EOMI. Facial movements symmetric. Hearing intact to conversation. Palate elevation symmetric, uvula midline. No dysarthria. Shoulder shrug strong bilaterally. Tongue protrusion midline.  Motor: No abnormal movements. Normal tone throughout. No pronator drift. Strength 5/5 throughout upper and lower extremities.  Reflexes: 2+ and symmetric at patella, achilles, biceps, " brachioradialis. No clonus. Toes mute.   Sensory: Intact and symmetric light touch sensation throughout upper and lower extremities.  Coordination: FNF and HS intact without ataxia or dysmetria.   Station/Gait: Normal casual gait with good arm swing. Able to walk on heels, toes, and in tandem. No romberg.      PERTINENT INVESTIGATIONS:   All available and relevant labs, imaging, and other procedures were reviewed personally.   MRI brain wo/w 5/30/18 - areas of encephalomalacia in the left and right frontal lobes, stable from prior    ASSESSMENT:  #Chronic post-traumatic headache with migraine phenotype:  Ms. Naylor presents to clinic to establish care for headaches that fit the diagnosis listed above. Currently she is treating these headaches with gabapentin with good benefit. She continues to have daily headaches which are at least a 2/10 and can increase up to a 7/10 if she misses a dose of gabapentin.     Further medication options for better headache control were discussed with her. She has tried topiramate and zonisamide, the latter of which worsened her restless leg syndrome. She is on a beta blocker (atenolol) for blood pressure, however at a low dose; increasing this dose is an option, however there is risk it could worsen her asthma or her depression so we will avoid this. In addition, tricyclic antidepressants such as amitriptyline are contraindicated while she is on citalopram and would not want to change these medications given comorbid depression.     At this point Ms. Naylor is not wanting to make any changes to her preventative treatment. Options to consider in the future would include CGRP inhibitors or botulinum toxin (which she is resistant to the idea due to her friend having botulism from a food outbreak in the 1980s).     For abortive therapy, she was given a prescription for metoclopramide which she found helpful in the ED. In addition, will trial a new triptan medication; will  prescribe naratriptan as this tends to have a slower onset and is usually better tolerated in patients with side effects to sumatriptan.     #Restless leg syndrome:  Reports that she is fairly well controlled on her current dose of ropinerole; this will be renewed today.    PLAN:  -Continue gabapentin 300 mg TID  -Consider CGRP inhibitors or botulinum toxin injection if further preventative treatment is needed  -Can use tylenol as abortive therapy for mild headaches, not to exceed 14 doses per month  -Naratriptan 2.5 mg as needed for moderate to severe headache, can repeat dose once in 4 hours if needed, not to exceed 9 doses per month  -Metoclopramide 10 mg TID prn for headache-associated nausea or as abortive therapy  -Ropinerole 0.5 mg at bedtime for RLS  -Follow up in one year or sooner if needed    Patient was seen and discussed with Dr. Nolan, who agrees with my assessment and plan.    Charly Rolbes MD  Neurology PGY-4    Physician Attestation   I, Diamond Nolan MD, saw this patient and agree with the findings and plan of care as documented in the note.      Diamond Nolan MD

## 2020-01-14 NOTE — PATIENT INSTRUCTIONS
1. Tylenol as needed for mild headache   2. Naratriptan for moderate or severe headache, repeat in 4 hours if needed  3. Reglan for nausea or as a rescue medication for headache    Continue gabapentin 300 mg three times per day.     Continue ropinirole 0.5 mg nightly.

## 2020-01-22 ENCOUNTER — OFFICE VISIT (OUTPATIENT)
Dept: PSYCHOLOGY | Facility: CLINIC | Age: 65
End: 2020-01-22
Payer: COMMERCIAL

## 2020-01-22 DIAGNOSIS — F41.1 GENERALIZED ANXIETY DISORDER: Primary | ICD-10-CM

## 2020-01-22 DIAGNOSIS — F34.1 PERSISTENT DEPRESSIVE DISORDER: ICD-10-CM

## 2020-01-22 PROCEDURE — 90834 PSYTX W PT 45 MINUTES: CPT | Performed by: MARRIAGE & FAMILY THERAPIST

## 2020-01-22 ASSESSMENT — ANXIETY QUESTIONNAIRES
GAD7 TOTAL SCORE: 7
5. BEING SO RESTLESS THAT IT IS HARD TO SIT STILL: SEVERAL DAYS
6. BECOMING EASILY ANNOYED OR IRRITABLE: SEVERAL DAYS
4. TROUBLE RELAXING: SEVERAL DAYS
2. NOT BEING ABLE TO STOP OR CONTROL WORRYING: SEVERAL DAYS
1. FEELING NERVOUS, ANXIOUS, OR ON EDGE: SEVERAL DAYS
7. FEELING AFRAID AS IF SOMETHING AWFUL MIGHT HAPPEN: SEVERAL DAYS
GAD7 TOTAL SCORE: 7
GAD7 TOTAL SCORE: 7
3. WORRYING TOO MUCH ABOUT DIFFERENT THINGS: SEVERAL DAYS
7. FEELING AFRAID AS IF SOMETHING AWFUL MIGHT HAPPEN: SEVERAL DAYS

## 2020-01-22 NOTE — PROGRESS NOTES
"                                           Progress Note    Patient Name: Miri Naylor  Date: 1/22/20         Service Type: Individual  Video Visit: No     Session Start Time: 1:08  Session End Time: 2:00     Session Length: 38-52    Session #: 13    Attendees: Client attended alone     Treatment Plan Last Reviewed: 12/11/19, next due 3/11/20.  PHQ-9 / MELISA-7 : completed.    DATA  Interactive Complexity: No  Crisis: No       Progress Since Last Session (Related to Symptoms / Goals / Homework):   Symptoms: No change client is stable.    Homework: Partially completed       Episode of Care Goals: Satisfactory progress - ACTION (Actively working towards change); Intervened by reinforcing change plan / affirming steps taken     Current / Ongoing Stressors and Concerns:   Client reported that her  agreed to family therapy, but not individual therapy for himself, about which client is unsure.  Client reported that she often does not want to engage with her  socially, and considers her desires for connection with what a \"parallel existence\" would look like.  Client reported being uncertain about her own needs/wants/priorities.     Treatment Objective(s) Addressed in This Session:   Increase interest, engagement, and pleasure in doing things  Identify negative self-talk and behaviors: challenge core beliefs, myths, and actions  compile a list of boundaries that they would like to set with others.       Intervention:   CBT: reviewed with client considering/identifying her personal priorities/needs/wants.  Interpersonal Therapy: reviewed with client considering what expectations/limits to set with her  and herself regarding not over-apologizing and owning situations' outcomes.        ASSESSMENT: Current Emotional / Mental Status (status of significant symptoms):   Risk status (Self / Other harm or suicidal ideation)   Patient denies current fears or concerns for personal safety.   Patient " denies current or recent suicidal ideation or behaviors.   Patientdenies current or recent homicidal ideation or behaviors.   Patient denies current or recent self injurious behavior or ideation.   Patient denies other safety concerns.   Patient Patient reports there has been no change in risk factors since their last session.     PatientPatient reports there has been no change in protective factors since their last session.     Recommended that patient call 911 or go to the local ED should there be a change in any of these risk factors.     Appearance:   Appropriate    Eye Contact:   Good    Psychomotor Behavior: Normal    Attitude:   Cooperative    Orientation:   All   Speech    Rate / Production: Normal     Volume:  Normal    Mood:    Anxious  Depressed  Normal   Affect:    Appropriate    Thought Content:  Clear    Thought Form:  Coherent  Logical    Insight:    Good      Medication Review:   No current psychiatric medications prescribed     Medication Compliance:   NA     Changes in Health Issues:   None reported     Chemical Use Review:   Substance Use: Chemical use reviewed, no active concerns identified      Tobacco Use: No current tobacco use.      Diagnosis:  1. Generalized anxiety disorder    2. Persistent depressive disorder        Collateral Reports Completed:   Not Applicable    PLAN: (Patient Tasks / Therapist Tasks / Other)  Client agreed to work on contemplating/identifying her own priorities/needs/wants and considering what limits to set with her .        Loki Crowe LMFT                                                         ______________________________________________________________________    Treatment Plan    Patient's Name: Miri Naylor  YOB: 1955    Date: 12/11/19    DSM5 Diagnoses: 300.4 (F34.1) Persistent Depressive Disorder, Early onset or 300.02 (F41.1) Generalized Anxiety Disorder  Psychosocial / Contextual Factors: marital conflict, history of  loss/trauma  WHODAS: 29    Referral / Collaboration:  Referral to another professional/service is not indicated at this time..    Anticipated number of session or this episode of care: 11-20      MeasurableTreatment Goal(s) related to diagnosis / functional impairment(s)  Goal 1: Client will successfully process through past trauma defined as reporting 0 Subjective Units of Distress related to trauma on 0-10 scale and 7 on Validity of (positive) Cognition scale about self on 1-7 scale   I will know I've met my goal when I am less impacted by my past loss/trauma.       Objective #A (Client Action)    Status: Conitnued - Date: 12/1119      Client will identify past traumatic events/memories which are causing current distress.     Intervention(s)  Therapist will take client's history and facilitate client's identification of targets for EMDR.     Objective #B  Client will complete needed assessment(s) and [facility] EM[facility] resourcing to confirm readiness for EMDR.    Status: Continued - Date: 12/11/19      Intervention(s)  Therapist will administer Dissociative Experiences Scale, Multidimensional Inventory of Dissociation as needed and complete [facility] resourcing with client.     Objective #C  Client will engage in installing at least 2 EMDR resources.  Status: Continued - Date: 12/11/19      Intervention(s)  Therapist will complete EMDR resourcing (Container, Remote Control, grounding/progressive muscle relaxation, Light Stream, Inner Advisor) with client.     Objective #D (Client Action)    Status: Continued - Date: 12/11/19      Client will engage in reprocessing all past traumatic event/memory targets.     Intervention(s)   Therapist will complete EMDR reprocessing with client.    Goal 2:  Client will improve overall baseline mood regulation by 2 points on a 1-10 Likert scale per self-report (10 = optimal mood/regulation).    I will know I've met my goal when I feel better/less depressed overall.       Objective #A (Client Action)    Status: Continued - Date:  12/11/19    Client will Identify negative self-talk and behaviors: challenge core beliefs, myths, and actions.    Intervention(s)  Therapist will teach emotional regulation skills. CBT/REBT ABCD model.    Objective #B  Client will Increase interest, engagement, and pleasure in doing things  Decrease frequency and intensity of feeling down, depressed, hopeless  Improve concentration, focus, and mindfulness in daily activities .    Status: Continued - Date:  12/11/19    Intervention(s)  Therapist will teach emotional regulation skills. DBT Core Mindfulness, Distress Tolerance, Emotion Regulation, and Interpersonal Effetiveness skills.    Patient has reviewed and agreed to the above plan.      Loki Crowe, LMFT  January 22, 2020

## 2020-01-23 ASSESSMENT — PATIENT HEALTH QUESTIONNAIRE - PHQ9: SUM OF ALL RESPONSES TO PHQ QUESTIONS 1-9: 8

## 2020-01-23 ASSESSMENT — ANXIETY QUESTIONNAIRES: GAD7 TOTAL SCORE: 7

## 2020-01-26 NOTE — PATIENT INSTRUCTIONS
If you have any questions regarding your allergies, asthma, or what we discussed during your visit today please call the allergy clinic or contact us via [employer].    [facility] ([place], [place]): [phone]  [facility]: [phone]  [facility]: [phone]  [facility]: [phone]  [facility]: [phone]  [facility]: [phone]    1. Return to clinic for skin testing. Need to hold antihistamines for 7 days prior to return appointment.   2. Start hydrocortisone 2.5% cream twice daily to rash under eyes. Do not use longer than 2 weeks.   3. Emollient (Eucerin, Vanicream, Aquaphor, or Vaseline) twice daily to eczematous areas.   4. Avoid itching at eyes.   5. Start Flonase, Nasacort or Rhinocort 2 sprays/nostril daily.   6. Pataday eye drops 1 drop/eye daily as needed. Hold for 2 days prior to skin testing.      No

## 2020-02-05 ENCOUNTER — OFFICE VISIT (OUTPATIENT)
Dept: PSYCHOLOGY | Facility: CLINIC | Age: 65
End: 2020-02-05
Payer: COMMERCIAL

## 2020-02-05 DIAGNOSIS — F41.1 GENERALIZED ANXIETY DISORDER: Primary | ICD-10-CM

## 2020-02-05 DIAGNOSIS — F34.1 PERSISTENT DEPRESSIVE DISORDER: ICD-10-CM

## 2020-02-05 PROCEDURE — 90834 PSYTX W PT 45 MINUTES: CPT | Performed by: MARRIAGE & FAMILY THERAPIST

## 2020-02-05 ASSESSMENT — ANXIETY QUESTIONNAIRES
3. WORRYING TOO MUCH ABOUT DIFFERENT THINGS: SEVERAL DAYS
7. FEELING AFRAID AS IF SOMETHING AWFUL MIGHT HAPPEN: SEVERAL DAYS
GAD7 TOTAL SCORE: 7
5. BEING SO RESTLESS THAT IT IS HARD TO SIT STILL: SEVERAL DAYS
4. TROUBLE RELAXING: SEVERAL DAYS
GAD7 TOTAL SCORE: 7
GAD7 TOTAL SCORE: 7
2. NOT BEING ABLE TO STOP OR CONTROL WORRYING: SEVERAL DAYS
6. BECOMING EASILY ANNOYED OR IRRITABLE: SEVERAL DAYS
7. FEELING AFRAID AS IF SOMETHING AWFUL MIGHT HAPPEN: SEVERAL DAYS
1. FEELING NERVOUS, ANXIOUS, OR ON EDGE: SEVERAL DAYS

## 2020-02-05 ASSESSMENT — PATIENT HEALTH QUESTIONNAIRE - PHQ9
SUM OF ALL RESPONSES TO PHQ QUESTIONS 1-9: 8
10. IF YOU CHECKED OFF ANY PROBLEMS, HOW DIFFICULT HAVE THESE PROBLEMS MADE IT FOR YOU TO DO YOUR WORK, TAKE CARE OF THINGS AT HOME, OR GET ALONG WITH OTHER PEOPLE: SOMEWHAT DIFFICULT
SUM OF ALL RESPONSES TO PHQ QUESTIONS 1-9: 8

## 2020-02-05 NOTE — PROGRESS NOTES
Progress Note    Patient Name: Miri Naylor  Date: 2/5/20         Service Type: Individual  Video Visit: No     Session Start Time: 2:00  Session End Time: 2:52     Session Length: 38-52    Session #: 14    Attendees: Client attended alone     Treatment Plan Last Reviewed: 12/11/19, next due 3/11/20.  PHQ-9 / MELISA-7 : completed.    DATA  Interactive Complexity: No  Crisis: No       Progress Since Last Session (Related to Symptoms / Goals / Homework):   Symptoms: No change client is stable.    Homework: Partially completed       Episode of Care Goals: Minimal progress - ACTION (Actively working towards change); Intervened by reinforcing change plan / affirming steps taken     Current / Ongoing Stressors and Concerns:   Client reported that her  agreed to attend therapy on the condition that it be client's (this) therapist.  Client reported regarding differences in social styles with her , which contributes to their ongoing conflict.  Client reported being uncertain about how to proceed with her  effectively in order to get her needs met.     Treatment Objective(s) Addressed in This Session:   compile a list of boundaries that they would like to set with others.       Intervention:   DBT: taught/reviewed with client decision-making paradigm.  Interpersonal Therapy: re-reviewed with client considering options for communicating expectations/limit-setting with her .        ASSESSMENT: Current Emotional / Mental Status (status of significant symptoms):   Risk status (Self / Other harm or suicidal ideation)   Patient denies current fears or concerns for personal safety.   Patient denies current or recent suicidal ideation or behaviors.   Patientdenies current or recent homicidal ideation or behaviors.   Patient denies current or recent self injurious behavior or ideation.   Patient denies other safety concerns.   Patient Patient reports  there has been no change in risk factors since their last session.     PatientPatient reports there has been no change in protective factors since their last session.     Recommended that patient call 911 or go to the local ED should there be a change in any of these risk factors.     Appearance:   Appropriate    Eye Contact:   Good    Psychomotor Behavior: Normal    Attitude:   Cooperative    Orientation:   All   Speech    Rate / Production: Normal     Volume:  Normal    Mood:    Anxious  Depressed  Normal   Affect:    Appropriate    Thought Content:  Clear    Thought Form:  Coherent  Logical    Insight:    Good      Medication Review:   No current psychiatric medications prescribed     Medication Compliance:   NA     Changes in Health Issues:   None reported     Chemical Use Review:   Substance Use: Chemical use reviewed, no active concerns identified      Tobacco Use: No current tobacco use.      Diagnosis:  No diagnosis found.    Collateral Reports Completed:   Not Applicable    PLAN: (Patient Tasks / Therapist Tasks / Other)  Client agreed to work on considering taught/reviewed decision-making paradigm regarding effectively communicating expectations/limit-settting with her .        Loki Crowe, [facility]                                                         ______________________________________________________________________    Treatment Plan    Patient's Name: Miri Naylor  YOB: 1955    Date: 12/11/19    DSM5 Diagnoses: 300.4 (F34.1) Persistent Depressive Disorder, Early onset or 300.02 (F41.1) Generalized Anxiety Disorder  Psychosocial / Contextual Factors: marital conflict, history of loss/trauma  WHODAS: 30    Referral / Collaboration:  Referral to another professional/service is not indicated at this time..    Anticipated number of session or this episode of care: 11-20      MeasurableTreatment Goal(s) related to diagnosis / functional impairment(s)  Goal 1: Client  will successfully process through past trauma defined as reporting 0 Subjective Units of Distress related to trauma on 0-10 scale and 7 on Validity of (positive) Cognition scale about self on 1-7 scale   I will know I've met my goal when I am less impacted by my past loss/trauma.       Objective #A (Client Action)    Status: Conitnued - Date: 12/1119      Client will identify past traumatic events/memories which are causing current distress.     Intervention(s)  Therapist will take client's history and facilitate client's identification of targets for EMDR.     Objective #B  Client will complete needed assessment(s) and [facility] EMDR resourcing to confirm readiness for EMDR.    Status: Continued - Date: 12/11/19      Intervention(s)  Therapist will administer Dissociative Experiences Scale, Multidimensional Inventory of Dissociation as needed and complete [facility] EMDR resourcing with client.     Objective #C  Client will engage in installing at least 2 EMDR resources.  Status: Continued - Date: 12/11/19      Intervention(s)  Therapist will complete EMDR resourcing (Container, Remote Control, grounding/progressive muscle relaxation, Light Stream, Inner Advisor) with client.     Objective #D (Client Action)    Status: Continued - Date: 12/11/19      Client will engage in reprocessing all past traumatic event/memory targets.     Intervention(s)   Therapist will complete EMDR reprocessing with client.    Goal 2:  Client will improve overall baseline mood regulation by 2 points on a 1-10 Likert scale per self-report (10 = optimal mood/regulation).    I will know I've met my goal when I feel better/less depressed overall.      Objective #A (Client Action)    Status: Continued - Date:  12/11/19    Client will Identify negative self-talk and behaviors: challenge core beliefs, myths, and actions.    Intervention(s)  Therapist will teach emotional regulation skills. CBT/REBT ABCD model.    Objective #B  Client will  Increase interest, engagement, and pleasure in doing things  Decrease frequency and intensity of feeling down, depressed, hopeless  Improve concentration, focus, and mindfulness in daily activities .    Status: Continued - Date:  12/11/19    Intervention(s)  Therapist will teach emotional regulation skills. DBT Core Mindfulness, Distress Tolerance, Emotion Regulation, and Interpersonal Effetiveness skills.    Patient has reviewed and agreed to the above plan.      Loki Crowe, LMFT  February 5, 2020

## 2020-02-06 ASSESSMENT — ANXIETY QUESTIONNAIRES: GAD7 TOTAL SCORE: 7

## 2020-02-06 ASSESSMENT — PATIENT HEALTH QUESTIONNAIRE - PHQ9: SUM OF ALL RESPONSES TO PHQ QUESTIONS 1-9: 8

## 2020-02-13 ENCOUNTER — MYC MEDICAL ADVICE (OUTPATIENT)
Dept: ALLERGY | Facility: CLINIC | Age: 65
End: 2020-02-13

## 2020-02-13 ENCOUNTER — OFFICE VISIT (OUTPATIENT)
Dept: ALLERGY | Facility: CLINIC | Age: 65
End: 2020-02-13
Payer: COMMERCIAL

## 2020-02-13 VITALS
WEIGHT: 212 LBS | HEART RATE: 60 BPM | OXYGEN SATURATION: 99 % | DIASTOLIC BLOOD PRESSURE: 78 MMHG | HEIGHT: 71 IN | TEMPERATURE: 97.5 F | BODY MASS INDEX: 29.68 KG/M2 | SYSTOLIC BLOOD PRESSURE: 127 MMHG

## 2020-02-13 DIAGNOSIS — J45.40 MODERATE PERSISTENT ASTHMA WITHOUT COMPLICATION: Primary | ICD-10-CM

## 2020-02-13 DIAGNOSIS — J31.0 RHINOCONJUNCTIVITIS: ICD-10-CM

## 2020-02-13 DIAGNOSIS — H10.9 RHINOCONJUNCTIVITIS: ICD-10-CM

## 2020-02-13 LAB
FEF 25/75: NORMAL
FEV-1: NORMAL
FEV1/FVC: NORMAL
FVC: NORMAL

## 2020-02-13 PROCEDURE — 94010 BREATHING CAPACITY TEST: CPT | Performed by: ALLERGY & IMMUNOLOGY

## 2020-02-13 PROCEDURE — 99214 OFFICE O/P EST MOD 30 MIN: CPT | Mod: 25 | Performed by: ALLERGY & IMMUNOLOGY

## 2020-02-13 RX ORDER — BUDESONIDE AND FORMOTEROL FUMARATE DIHYDRATE 160; 4.5 UG/1; UG/1
2 AEROSOL RESPIRATORY (INHALATION) 2 TIMES DAILY
Qty: 1 INHALER | Refills: 3 | Status: SHIPPED | OUTPATIENT
Start: 2020-02-13 | End: 2020-04-28

## 2020-02-13 ASSESSMENT — MIFFLIN-ST. JEOR: SCORE: 1612.76

## 2020-02-13 ASSESSMENT — ASTHMA QUESTIONNAIRES
QUESTION_3 LAST FOUR WEEKS HOW OFTEN DID YOUR ASTHMA SYMPTOMS (WHEEZING, COUGHING, SHORTNESS OF BREATH, CHEST TIGHTNESS OR PAIN) WAKE YOU UP AT NIGHT OR EARLIER THAN USUAL IN THE MORNING: ONCE OR TWICE
QUESTION_5 LAST FOUR WEEKS HOW WOULD YOU RATE YOUR ASTHMA CONTROL: WELL CONTROLLED
QUESTION_2 LAST FOUR WEEKS HOW OFTEN HAVE YOU HAD SHORTNESS OF BREATH: ONCE OR TWICE A WEEK
QUESTION_1 LAST FOUR WEEKS HOW MUCH OF THE TIME DID YOUR ASTHMA KEEP YOU FROM GETTING AS MUCH DONE AT WORK, SCHOOL OR AT HOME: NONE OF THE TIME
ACT_TOTALSCORE: 22
QUESTION_4 LAST FOUR WEEKS HOW OFTEN HAVE YOU USED YOUR RESCUE INHALER OR NEBULIZER MEDICATION (SUCH AS ALBUTEROL): NOT AT ALL

## 2020-02-13 ASSESSMENT — PAIN SCALES - GENERAL: PAINLEVEL: NO PAIN (0)

## 2020-02-13 NOTE — LETTER
My Asthma Action Plan    Name: Miri Naylor   YOB: 1955  Date: 2/13/2020   My doctor: Nish Stockton, DO   My clinic: [facility]        My Control Medicine: Budesonide + formoterol (Symbicort HFA) -  160/4.5 mcg 2 puffs twice daily  Montelukast (Singulair) -  10 mg daily  My Rescue Medicine: Albuterol (Proair/Ventolin/Proventil HFA) 2-4 puffs EVERY 4 HOURS as needed. Use a spacer if recommended by your provider.  My Oral Steroid Medicine: none My Asthma Severity:   Moderate Persistent  Know your asthma triggers:   Upper respiratory infections  exercise or sports  Singing            GREEN ZONE   Good Control    I feel good    No cough or wheeze    Can work, sleep and play without asthma symptoms       Take your asthma control medicine every day.     1. If exercise triggers your asthma, take your rescue medication    15 minutes before exercise or sports, and    During exercise if you have asthma symptoms  2. Spacer to use with inhaler: If you have a spacer, make sure to use it with your inhaler             YELLOW ZONE Getting Worse  I have ANY of these:    I do not feel good    Cough or wheeze    Chest feels tight    Wake up at night   1. Keep taking your Green Zone medications  2. Start taking your rescue medicine:    every 20 minutes for up to 1 hour. Then every 4 hours for 24-48 hours.  3. If you stay in the Yellow Zone for more than 12-24 hours, contact your doctor.  4. If you do not return to the Green Zone in 12-24 hours or you get worse, start taking your oral steroid medicine if prescribed by your provider.           RED ZONE Medical Alert - Get Help  I have ANY of these:    I feel awful    Medicine is not helping    Breathing getting harder    Trouble walking or talking    Nose opens wide to breathe       1. Take your rescue medicine NOW  2. If your provider has prescribed an oral steroid medicine, start taking it NOW  3. Call your doctor NOW  4. If you are still in the  Red Zone after 20 minutes and you have not reached your doctor:    Take your rescue medicine again and    Call 911 or go to the emergency room right away    See your regular doctor within 2 weeks of an Emergency Room or Urgent Care visit for follow-up treatment.          Annual Reminders:  Meet with Asthma Educator,  Flu Shot in the Fall, consider Pneumonia Vaccination for patients with asthma (aged 19 and older).    Pharmacy:    [facility] - [place], [place] - [address] [facility] [place] [address]  [id number] - [place], [place] - [address] [facility] [address]    Electronically signed by Nish Stockton, DO   Date: 02/13/20                      Asthma Triggers  How To Control Things That Make Your Asthma Worse    Triggers are things that make your asthma worse.  Look at the list below to help you find your triggers and what you can do about them.  You can help prevent asthma flare-ups by staying away from your triggers.      Trigger                                                          What you can do   Cigarette Smoke  Tobacco smoke can make asthma worse. Do not allow smoking in your home, car or around you.  Be sure no one smokes at a child s day care or school.  If you smoke, ask your health care provider for ways to help you quit.  Ask family members to quit too.  Ask your health care provider for a referral to Quit Plan to help you quit smoking, or call 1-276-074-PLAN.     Colds, Flu, Bronchitis  These are common triggers of asthma. Wash your hands often.  Don t touch your eyes, nose or mouth.  Get a flu shot every year.     Dust Mites  These are tiny bugs that live in cloth or carpet. They are too small to see. Wash sheets and blankets in hot water every week.   Encase pillows and mattress in dust mite proof covers.  Avoid having carpet if you can. If you have carpet, vacuum weekly.   Use a dust mask  and HEPA vacuum.   Pollen and Outdoor Mold  Some people are allergic to trees, grass, or weed pollen, or molds. Try to keep your windows closed.  Limit time out doors when pollen count is high.   Ask you health care provider about taking medicine during allergy season.     Animal Dander  Some people are allergic to skin flakes, urine or saliva from pets with fur or feathers. Keep pets with fur or feathers out of your home.    If you can t keep the pet outdoors, then keep the pet out of your bedroom.  Keep the bedroom door closed.  Keep pets off cloth furniture and away from stuffed toys.     Mice, Rats, and Cockroaches   Some people are allergic to the waste from these pests.   Cover food and garbage.  Clean up spills and food crumbs.  Store grease in the refrigerator.   Keep food out of the bedroom.   Indoor Mold  This can be a trigger if your home has high moisture. Fix leaking faucets, pipes, or other sources of water.   Clean moldy surfaces.  Dehumidify basement if it is damp and smelly.   Smoke, Strong Odors, and Sprays  These can reduce air quality. Stay away from strong odors and sprays, such as perfume, powder, hair spray, paints, smoke incense, paint, cleaning products, candles and new carpet.   Exercise or Sports  Some people with asthma have this trigger. Be active!  Ask your doctor about taking medicine before sports or exercise to prevent symptoms.    Warm up for 5-10 minutes before and after sports or exercise.     Other Triggers of Asthma  Cold air:  Cover your nose and mouth with a scarf.  Sometimes laughing or crying can be a trigger.  Some medicines and food can trigger asthma.

## 2020-02-13 NOTE — LETTER
2/13/2020         RE: Miri Naylor  [address]  [place] [address]        Dear Colleague,    Thank you for referring your patient, Miri Naylor, to the [facility]. Please see a copy of my visit note below.    Miri Naylor is a 64 year old White female with previous medical history significant for nonallergic rhinitis and asthma who returns for a follow up visit.    Patient reports that her asthma has been well controlled.  She has been on Qvar 80 mcg 2 puffs inhaled twice daily.  She reports last time she had used albuterol was in November.  Denies wheezing, coughing or shortness of breath recently.  She is additionally on Singulair.  She has nonallergic nasal disease.  This has improved with Dymista 1 spray per nostril twice daily.  Still alternates between rhinorrhea and congestion.  Question if she has local allergic rhinitis.  Ipratropium had been tried and not beneficial.  She additionally takes Zyrtec daily.    ACT Total Scores 2/13/2020   ACT TOTAL SCORE (Goal Greater than or Equal to 20) 22   In the past 12 months, how many times did you visit the emergency room for your asthma without being admitted to the hospital? 0   In the past 12 months, how many times were you hospitalized overnight because of your asthma? 0           Past Medical History:   Diagnosis Date     Depressive disorder, not elsewhere classified      Drug allergy, multiple 9/23/14--passed graded oral challenge for Zithromax.     7/3/14 POS PRE-PEN skin test. 7/30/14 NEG skin tests and oral challenge to Cefzil     Hx of abnormal Pap smear ?    no specifics given     lumbar degeneration      Raynaud's disease      Uncomplicated asthma      Unspecified essential hypertension      Family History   Problem Relation Age of Onset     Arthritis Mother      Hyperlipidemia Mother      Other Cancer Mother         Glioblastoma Multiforme     Osteoporosis Mother      Blood Disease Father       Hypertension Father      Hyperlipidemia Father      Other Cancer Father         lung cancer     Depression Son      Depression Daughter      Hyperlipidemia Brother      Anxiety Disorder Son      Skin Cancer No family hx of      Past Surgical History:   Procedure Laterality Date     BIOPSY OF BREAST, NEEDLE CORE       C PELVIS/HIP JOINT SURGERY UNLISTED Right 7/19/16    total hip arthroplasty     COLONOSCOPY       HIP SURGERY Right 07/19/2016       REVIEW OF SYSTEMS:  General: negative for weight gain. negative for weight loss. negative for changes in sleep.   Ears: negative for fullness. negative for hearing loss. negative for dizziness.   Nose: negative for snoring.negative for changes in smell. negative for drainage.   Eyes: negative for eye watering. negative for eye itching. negative for vision changes. negative for eye redness.  Throat: negative for hoarseness. negative for sore throat. negative for trouble swallowing.   Lungs: negative for shortness of breath.negative for wheezing. negative for sputum production.   Cardiovascular: negative for chest pain. negative for swelling of ankles. negative for fast or irregular heartbeat.   Gastrointestinal: negative for nausea. negative for heartburn. negative for acid reflux.   Musculoskeletal: negative for joint pain. negative for joint stiffness. negative for joint swelling.   Neurologic: negative for seizures. negative for fainting. negative for weakness.   Psychiatric: negative for changes in mood. negative for anxiety.   Endocrine: negative for cold intolerance. negative for heat intolerance. negative for tremors.   Lymphatic: negative for lower extremity swelling. negative for lymph node swelling.   Hematologic: negative for easy bruising. negative for easy bleeding.  Integumentary: negative for rash. negative for scaling. negative for nail changes.       Current Outpatient Medications:      ACIDOPHILUS OR TABS, 1 tablet daily, Disp: , Rfl:      albuterol  (PROAIR HFA) 108 (90 Base) MCG/ACT inhaler, Inhale 2 puffs into the lungs every 4 hours as needed for shortness of breath / dyspnea or wheezing, Disp: 1 Inhaler, Rfl: 1     atenolol (TENORMIN) 25 MG tablet, Take 1 tablet (25 mg) by mouth daily, Disp: 90 tablet, Rfl: 3     azelastine-fluticasone (DYMISTA) 137-50 MCG/ACT nasal spray, Spray 1 spray into both nostrils 2 times daily, Disp: 1 Bottle, Rfl: 11     biotin 1000 MCG TABS tablet, Take 1,000 mcg by mouth daily, Disp: , Rfl:      budesonide-formoterol (SYMBICORT) 160-4.5 MCG/ACT Inhaler, Inhale 2 puffs into the lungs 2 times daily, Disp: 1 Inhaler, Rfl: 3     CALCIUM 600+D PO, twice daily, Disp: , Rfl:      cetirizine (ZYRTEC) 10 MG tablet, Take 10 mg by mouth daily., Disp: , Rfl:      Cholecalciferol (VITAMIN D-3 PO), Take 2,000 Int'l Units by mouth daily, Disp: , Rfl:      DAILY MULTIVITAMIN PO, 1 tablet daily, Disp: , Rfl:      escitalopram (LEXAPRO) 10 MG tablet, Take 1 tablet (10 mg) by mouth daily, Disp: 90 tablet, Rfl: 1     ferrous sulfate (IRON) 325 (65 Fe) MG tablet, Take 325 mg by mouth daily (with breakfast), Disp: , Rfl:      FLAXSEED OIL 1000 MG PO CAPS, 1 tablet daily, Disp: , Rfl:      gabapentin (NEURONTIN) 300 MG capsule, 1 capsule TID with additional 1-2 capsules if headache is severe., Disp: 270 capsule, Rfl: 3     GLUCOSAMINE-CHONDROITIN PO TABS, 1500 mg glucosamine and 1200mg chondroitin daily-2 tablets daily, Disp: , Rfl:      hydrocortisone 2.5 % cream, Apply topically 2 times daily (Patient taking differently: Apply topically 2 times daily as needed ), Disp: 60 g, Rfl: 1     ibuprofen (ADVIL,MOTRIN) 200 MG tablet, take 1 tablet (200 mg) by oral route every 6 hours as needed with food, Disp: , Rfl:      Ketotifen Fumarate (ZADITOR OP), Apply to eye daily as needed, Disp: , Rfl:      lisinopril (PRINIVIL/ZESTRIL) 40 MG tablet, TAKE 1 TABLET DAILY, Disp: 90 tablet, Rfl: 1     Magnesium 400 MG CAPS, Take by mouth daily, Disp: , Rfl:       metoclopramide (REGLAN) 10 MG tablet, Take 1 tablet (10 mg) by mouth 3 times daily as needed (headache), Disp: 20 tablet, Rfl: 3     montelukast (SINGULAIR) 10 MG tablet, Take 1 tablet (10 mg) by mouth At Bedtime, Disp: 30 tablet, Rfl: 11     naratriptan (AMERGE) 2.5 MG tablet, Take 1 tablet (2.5 mg) by mouth at onset of headache for migraine (repeat in 4 hours if needed) May repeat in 4 hours. Max 2 tablets/24 hours., Disp: 18 tablet, Rfl: 3     pantoprazole (PROTONIX) 40 MG EC tablet, TAKE 1 TABLET DAILY 30 TO 60 MINUTES BEFORE A MEAL, Disp: 90 tablet, Rfl: 4     polyethylene glycol (MIRALAX) powder, Take 1 capful by mouth daily, Disp: , Rfl:      pravastatin (PRAVACHOL) 40 MG tablet, TAKE 1 TABLET DAILY, Disp: 90 tablet, Rfl: 4     rOPINIRole (REQUIP) 0.25 MG tablet, Take 2 tablets (0.5 mg) by mouth At Bedtime, Disp: 60 tablet, Rfl: 11     S-ADENOSYLMETHIONINE 200 MG PO TABS, 2 tablets daily, Disp: , Rfl:      senna (SENOKOT) 8.6 MG tablet, Take 2 tablets by mouth daily, Disp: , Rfl:      spironolactone (ALDACTONE) 25 MG tablet, TAKE ONE-HALF (1/2) TABLET DAILY, Disp: 45 tablet, Rfl: 2     SUMAtriptan (IMITREX) 50 MG tablet, Take 50 mg by mouth at onset of headache , Disp: , Rfl:      triamcinolone (KENALOG) 0.1 % cream, Apply topically 2 times daily (Patient taking differently: Apply topically 2 times daily as needed ), Disp: 60 g, Rfl: 3     TURMERIC PO, Take 2,000 mg by mouth daily, Disp: , Rfl:      vitamin B complex with vitamin C (VITAMIN  B COMPLEX) TABS tablet, Take 3 tablets by mouth daily, Disp: , Rfl:      VITAMIN C 500 MG PO TABS, 2 TABLET DAILY, Disp: , Rfl:      vitamin E (E-400) 400 UNIT capsule, Take one pill by mouth once daily, Disp: , Rfl:   Immunization History   Administered Date(s) Administered     Influenza Quad, Recombinant, p-free (RIV4) 09/10/2019     Influenza Vaccine IM > 6 months Valent IIV4 12/03/2013, 11/16/2015, 10/01/2017, 09/13/2018     TD (ADULT, 7+) 03/06/2008     TDAP Vaccine  (Adacel) 08/03/2012     Twinrix A/B 10/12/2017, 12/27/2017, 04/24/2018     Zoster vaccine, live 09/14/2012     Allergies   Allergen Reactions     Levofloxacin Rash     Other reaction(s): Contact Dermatitis     Penicillins Hives and Rash     Confirmed by skin prick testing 7/3/14     Amoxicillin Hives and Rash     Amoxicillin-Pot Clavulanate Rash     Azithromycin Rash     Cephalexin Rash     Clindamycin Rash and Hives     Atorvastatin Other (See Comments)     Felt sick, intolerance     Clindamycin Hcl Hives     Levaquin      tendonitis     Augmentin Rash         EXAM:   Constitutional:  Appears well-developed and well-nourished. No distress.   HEENT:   Head: Normocephalic.   No cobblestoning of posterior oropharynx.   Nasal tissue pink and normal appearing.  No rhinorrhea noted.    Eyes: Conjunctivae are non-erythematous   No maxillary or frontal sinus tenderness to palpation.   Cardiovascular: Normal rate, regular rhythm and normal heart sounds. Exam reveals no gallop and no friction rub.   No murmur heard.  Respiratory: Effort normal and breath sounds normal. No respiratory distress. No wheezes. No rales.   Musculoskeletal: Normal range of motion.   Neuro: Oriented to person, place, and time.  Skin: Skin is warm and dry. No rash noted.   Psychiatric: Normal mood and affect.     Nursing note and vitals reviewed.    WORKUP:   Spirometry  FVC % pred:73  FEV1 % pred:60  FEV1/FVC % act:63    Moderate obstruction      ASSESSMENT/PLAN:  Problem List Items Addressed This Visit        Respiratory    Moderate persistent asthma without complication - Primary     Flares with URI, cold air and humid air.  Current treatment is Qvar 80 mcg 2 puff inhaled daily and Singulair 10mg PO daily.  Well-controlled.     ACT 22  Spirometry with moderate obstruction. Worse than previous visits.      - Albuterol 2-4 puffs inhaled (use a spacer unless using a Proair Respiclick device) every 4 hours as needed for chest tightness, wheezing,  shortness of breath and/or coughing.    - Avoid asthma triggers.  - Stop Qvar 80mcg 2 puffs inhaled twice daily. Decrease in FEV1 even though feeling fine.   - Symbicort 160/4.5mcg 2 puffs inhaled twice daily with a spacer.   - Singulair 10mg by mouth daily at night.         Relevant Medications    budesonide-formoterol (SYMBICORT) 160-4.5 MCG/ACT Inhaler    Other Relevant Orders    Spirometry, Breathing Capacity (Completed)       Infectious/Inflammatory    Rhinoconjunctivitis     History of perennial nasal and ocular symptoms that get worse in the spring and fall.   atrovent initially thought to be beneficial she no longer thinks is helpful. Flonase has been helpful.     Tried on Dymista and somewhat beneficial.  Negative allergy testing.  Increased symptoms around cats.  Zyrtec is helpful.  Singulair is helpful.   Either vasomotor rhinitis versus local allergic rhinitis. Follows with ENT.       Allergy skin testing was negative.      - Dymista 1 spray/nostril twice daily.  - Zyrtec as needed.   - Singulair 10mg by mouth daily at night.              Return in 4 weeks.     Chart documentation with Dragon Voice recognition Software. Although reviewed after completion, some words and grammatical errors may remain.    Nish Stockton DO FAAAAI  Allergy/Immunology  [facility] and [place], [place]      Again, thank you for allowing me to participate in the care of your patient.        Sincerely,        Nish Stockton, DO

## 2020-02-14 ASSESSMENT — ASTHMA QUESTIONNAIRES: ACT_TOTALSCORE: 22

## 2020-02-14 NOTE — ASSESSMENT & PLAN NOTE
Flares with URI, cold air and humid air.  Current treatment is Qvar 80 mcg 2 puff inhaled daily and Singulair 10mg PO daily.  Well-controlled.     ACT 22  Spirometry with moderate obstruction. Worse than previous visits.      - Albuterol 2-4 puffs inhaled (use a spacer unless using a Proair Respiclick device) every 4 hours as needed for chest tightness, wheezing, shortness of breath and/or coughing.    - Avoid asthma triggers.  - Stop Qvar 80mcg 2 puffs inhaled twice daily. Decrease in FEV1 even though feeling fine.   - Symbicort 160/4.5mcg 2 puffs inhaled twice daily with a spacer.   - Singulair 10mg by mouth daily at night.

## 2020-02-14 NOTE — PATIENT INSTRUCTIONS
Allergy Staff Appt Hours Shot Hours Locations    Physician     Nish Stockton DO       Support Staff     MEMO Levy, MADELIN  Tuesday:        [place] 7-4:20     Wednesday:        [place]: 7-5     Thursday:                    [place] 7-6:40     Friday:  [place]  7-2:40   [place]        Thursday: 1-5:50        Friday: 7-10:50     [place]        Tuesday: 7- 3:20        Wednesday: 7-4:20     [place] Monday: 7-4:20        Tuesday: 1-6:20         [facility]  [address] [place] [address]  Appt Line: [phone]  Allergy RN:  [phone]    [facility]  [address] [place] [address]  Appt Line: [phone]  Allergy RN:  [phone]       Important Scheduling Information  Aspirin Desensitization: Appt will last 2 clinic days. Please call the Allergy RN line for your clinic to schedule. Discontinue antihistamines 7 days prior to the appointment.     Food Challenges: Appt will last 3-4 hours. Please call the Allergy RN line for your clinic to schedule. Discontinue antihistamines 7 days prior to the appointment.     Penicillin Testing: Appt will last 2-3 hours. Please call the Allergy RN line for your clinic to schedule. Discontinue antihistamines 7 days prior to the appointment.     Skin Testing: Appt will about 40 minutes. Call the appointment line for your clinic to schedule. Discontinue antihistamines 7 days prior to the appointment.     Venom Testing: Appt will last 2-3 hours. Please call the Allergy RN line for your clinic to schedule. Discontinue antihistamines 7 days prior to the appointment.     Thank you for trusting us with your Allergy, Asthma, and Immunology care. Please feel free to contact us with any questions or concerns you may have.      - Stop QVAR 80mcg 2 puffs twice daily.   - Symbicort 160/4.5mcg 2 puffs inhaled twice daily with a spacer.   - Albuterol 2-4 puffs inhaled (use a spacer unless using a Proair Respiclick device) every 4 hours as  needed for chest tightness, wheezing, shortness of breath and/or coughing.   - Singulair 10mg by mouth daily at night.   - Dymista 1 spray/nostril twice daily.  - Zyrtec as needed.   - Return in 4 weeks.

## 2020-02-14 NOTE — ASSESSMENT & PLAN NOTE
History of perennial nasal and ocular symptoms that get worse in the spring and fall.  atrovent initially thought to be beneficial she no longer thinks is helpful. Flonase has been helpful.    Tried on Dymista and somewhat beneficial.  Negative allergy testing.  Increased symptoms around cats.  Zyrtec is helpful.  Singulair is helpful.   Either vasomotor rhinitis versus local allergic rhinitis. Follows with ENT.       Allergy skin testing was negative.      - Dymista 1 spray/nostril twice daily.  - Zyrtec as needed.   - Singulair 10mg by mouth daily at night.

## 2020-02-14 NOTE — TELEPHONE ENCOUNTER
Pharmacy called to clarify. No PA is needed and symbicort is covered under the brand name. Message sent to patient.      Yulisa Lainez MA

## 2020-02-14 NOTE — PROGRESS NOTES
Miri Naylor is a 64 year old White female with previous medical history significant for nonallergic rhinitis and asthma who returns for a follow up visit.    Patient reports that her asthma has been well controlled.  She has been on Qvar 80 mcg 2 puffs inhaled twice daily.  She reports last time she had used albuterol was in November.  Denies wheezing, coughing or shortness of breath recently.  She is additionally on Singulair.  She has nonallergic nasal disease.  This has improved with Dymista 1 spray per nostril twice daily.  Still alternates between rhinorrhea and congestion.  Question if she has local allergic rhinitis.  Ipratropium had been tried and not beneficial.  She additionally takes Zyrtec daily.    ACT Total Scores 2/13/2020   ACT TOTAL SCORE (Goal Greater than or Equal to 20) 22   In the past 12 months, how many times did you visit the emergency room for your asthma without being admitted to the hospital? 0   In the past 12 months, how many times were you hospitalized overnight because of your asthma? 0           Past Medical History:   Diagnosis Date     Depressive disorder, not elsewhere classified      Drug allergy, multiple 9/23/14--passed graded oral challenge for Zithromax.     7/3/14 POS PRE-PEN skin test. 7/30/14 NEG skin tests and oral challenge to Cefzil     Hx of abnormal Pap smear ?    no specifics given     lumbar degeneration      Raynaud's disease      Uncomplicated asthma      Unspecified essential hypertension      Family History   Problem Relation Age of Onset     Arthritis Mother      Hyperlipidemia Mother      Other Cancer Mother         Glioblastoma Multiforme     Osteoporosis Mother      Blood Disease Father      Hypertension Father      Hyperlipidemia Father      Other Cancer Father         lung cancer     Depression Son      Depression Daughter      Hyperlipidemia Brother      Anxiety Disorder Son      Skin Cancer No family hx of      Past Surgical History:   Procedure  Laterality Date     BIOPSY OF BREAST, NEEDLE CORE       C PELVIS/HIP JOINT SURGERY UNLISTED Right 7/19/16    total hip arthroplasty     COLONOSCOPY       HIP SURGERY Right 07/19/2016       REVIEW OF SYSTEMS:  General: negative for weight gain. negative for weight loss. negative for changes in sleep.   Ears: negative for fullness. negative for hearing loss. negative for dizziness.   Nose: negative for snoring.negative for changes in smell. negative for drainage.   Eyes: negative for eye watering. negative for eye itching. negative for vision changes. negative for eye redness.  Throat: negative for hoarseness. negative for sore throat. negative for trouble swallowing.   Lungs: negative for shortness of breath.negative for wheezing. negative for sputum production.   Cardiovascular: negative for chest pain. negative for swelling of ankles. negative for fast or irregular heartbeat.   Gastrointestinal: negative for nausea. negative for heartburn. negative for acid reflux.   Musculoskeletal: negative for joint pain. negative for joint stiffness. negative for joint swelling.   Neurologic: negative for seizures. negative for fainting. negative for weakness.   Psychiatric: negative for changes in mood. negative for anxiety.   Endocrine: negative for cold intolerance. negative for heat intolerance. negative for tremors.   Lymphatic: negative for lower extremity swelling. negative for lymph node swelling.   Hematologic: negative for easy bruising. negative for easy bleeding.  Integumentary: negative for rash. negative for scaling. negative for nail changes.       Current Outpatient Medications:      ACIDOPHILUS OR TABS, 1 tablet daily, Disp: , Rfl:      albuterol (PROAIR HFA) 108 (90 Base) MCG/ACT inhaler, Inhale 2 puffs into the lungs every 4 hours as needed for shortness of breath / dyspnea or wheezing, Disp: 1 Inhaler, Rfl: 1     atenolol (TENORMIN) 25 MG tablet, Take 1 tablet (25 mg) by mouth daily, Disp: 90 tablet, Rfl:  3     azelastine-fluticasone (DYMISTA) 137-50 MCG/ACT nasal spray, Spray 1 spray into both nostrils 2 times daily, Disp: 1 Bottle, Rfl: 11     biotin 1000 MCG TABS tablet, Take 1,000 mcg by mouth daily, Disp: , Rfl:      budesonide-formoterol (SYMBICORT) 160-4.5 MCG/ACT Inhaler, Inhale 2 puffs into the lungs 2 times daily, Disp: 1 Inhaler, Rfl: 3     CALCIUM 600+D PO, twice daily, Disp: , Rfl:      cetirizine (ZYRTEC) 10 MG tablet, Take 10 mg by mouth daily., Disp: , Rfl:      Cholecalciferol (VITAMIN D-3 PO), Take 2,000 Int'l Units by mouth daily, Disp: , Rfl:      DAILY MULTIVITAMIN PO, 1 tablet daily, Disp: , Rfl:      escitalopram (LEXAPRO) 10 MG tablet, Take 1 tablet (10 mg) by mouth daily, Disp: 90 tablet, Rfl: 1     ferrous sulfate (IRON) 325 (65 Fe) MG tablet, Take 325 mg by mouth daily (with breakfast), Disp: , Rfl:      FLAXSEED OIL 1000 MG PO CAPS, 1 tablet daily, Disp: , Rfl:      gabapentin (NEURONTIN) 300 MG capsule, 1 capsule TID with additional 1-2 capsules if headache is severe., Disp: 270 capsule, Rfl: 3     GLUCOSAMINE-CHONDROITIN PO TABS, 1500 mg glucosamine and 1200mg chondroitin daily-2 tablets daily, Disp: , Rfl:      hydrocortisone 2.5 % cream, Apply topically 2 times daily (Patient taking differently: Apply topically 2 times daily as needed ), Disp: 60 g, Rfl: 1     ibuprofen (ADVIL,MOTRIN) 200 MG tablet, take 1 tablet (200 mg) by oral route every 6 hours as needed with food, Disp: , Rfl:      Ketotifen Fumarate (ZADITOR OP), Apply to eye daily as needed, Disp: , Rfl:      lisinopril (PRINIVIL/ZESTRIL) 40 MG tablet, TAKE 1 TABLET DAILY, Disp: 90 tablet, Rfl: 1     Magnesium 400 MG CAPS, Take by mouth daily, Disp: , Rfl:      metoclopramide (REGLAN) 10 MG tablet, Take 1 tablet (10 mg) by mouth 3 times daily as needed (headache), Disp: 20 tablet, Rfl: 3     montelukast (SINGULAIR) 10 MG tablet, Take 1 tablet (10 mg) by mouth At Bedtime, Disp: 30 tablet, Rfl: 11     naratriptan (AMERGE) 2.5 MG  tablet, Take 1 tablet (2.5 mg) by mouth at onset of headache for migraine (repeat in 4 hours if needed) May repeat in 4 hours. Max 2 tablets/24 hours., Disp: 18 tablet, Rfl: 3     pantoprazole (PROTONIX) 40 MG EC tablet, TAKE 1 TABLET DAILY 30 TO 60 MINUTES BEFORE A MEAL, Disp: 90 tablet, Rfl: 4     polyethylene glycol (MIRALAX) powder, Take 1 capful by mouth daily, Disp: , Rfl:      pravastatin (PRAVACHOL) 40 MG tablet, TAKE 1 TABLET DAILY, Disp: 90 tablet, Rfl: 4     rOPINIRole (REQUIP) 0.25 MG tablet, Take 2 tablets (0.5 mg) by mouth At Bedtime, Disp: 60 tablet, Rfl: 11     S-ADENOSYLMETHIONINE 200 MG PO TABS, 2 tablets daily, Disp: , Rfl:      senna (SENOKOT) 8.6 MG tablet, Take 2 tablets by mouth daily, Disp: , Rfl:      spironolactone (ALDACTONE) 25 MG tablet, TAKE ONE-HALF (1/2) TABLET DAILY, Disp: 45 tablet, Rfl: 2     SUMAtriptan (IMITREX) 50 MG tablet, Take 50 mg by mouth at onset of headache , Disp: , Rfl:      triamcinolone (KENALOG) 0.1 % cream, Apply topically 2 times daily (Patient taking differently: Apply topically 2 times daily as needed ), Disp: 60 g, Rfl: 3     TURMERIC PO, Take 2,000 mg by mouth daily, Disp: , Rfl:      vitamin B complex with vitamin C (VITAMIN  B COMPLEX) TABS tablet, Take 3 tablets by mouth daily, Disp: , Rfl:      VITAMIN C 500 MG PO TABS, 2 TABLET DAILY, Disp: , Rfl:      vitamin E (E-400) 400 UNIT capsule, Take one pill by mouth once daily, Disp: , Rfl:   Immunization History   Administered Date(s) Administered     Influenza Quad, Recombinant, p-free (RIV4) 09/10/2019     Influenza Vaccine IM > 6 months Valent IIV4 12/03/2013, 11/16/2015, 10/01/2017, 09/13/2018     TD (ADULT, 7+) 03/06/2008     TDAP Vaccine (Adacel) 08/03/2012     Twinrix A/B 10/12/2017, 12/27/2017, 04/24/2018     Zoster vaccine, live 09/14/2012     Allergies   Allergen Reactions     Levofloxacin Rash     Other reaction(s): Contact Dermatitis     Penicillins Hives and Rash     Confirmed by skin prick  testing 7/3/14     Amoxicillin Hives and Rash     Amoxicillin-Pot Clavulanate Rash     Azithromycin Rash     Cephalexin Rash     Clindamycin Rash and Hives     Atorvastatin Other (See Comments)     Felt sick, intolerance     Clindamycin Hcl Hives     Levaquin      tendonitis     Augmentin Rash         EXAM:   Constitutional:  Appears well-developed and well-nourished. No distress.   HEENT:   Head: Normocephalic.   No cobblestoning of posterior oropharynx.   Nasal tissue pink and normal appearing.  No rhinorrhea noted.    Eyes: Conjunctivae are non-erythematous   No maxillary or frontal sinus tenderness to palpation.   Cardiovascular: Normal rate, regular rhythm and normal heart sounds. Exam reveals no gallop and no friction rub.   No murmur heard.  Respiratory: Effort normal and breath sounds normal. No respiratory distress. No wheezes. No rales.   Musculoskeletal: Normal range of motion.   Neuro: Oriented to person, place, and time.  Skin: Skin is warm and dry. No rash noted.   Psychiatric: Normal mood and affect.     Nursing note and vitals reviewed.    WORKUP:   Spirometry  FVC % pred:73  FEV1 % pred:60  FEV1/FVC % act:63    Moderate obstruction      ASSESSMENT/PLAN:  Problem List Items Addressed This Visit        Respiratory    Moderate persistent asthma without complication - Primary     Flares with URI, cold air and humid air.  Current treatment is Qvar 80 mcg 2 puff inhaled daily and Singulair 10mg PO daily.  Well-controlled.     ACT 22  Spirometry with moderate obstruction. Worse than previous visits.      - Albuterol 2-4 puffs inhaled (use a spacer unless using a Proair Respiclick device) every 4 hours as needed for chest tightness, wheezing, shortness of breath and/or coughing.    - Avoid asthma triggers.  - Stop Qvar 80mcg 2 puffs inhaled twice daily. Decrease in FEV1 even though feeling fine.   - Symbicort 160/4.5mcg 2 puffs inhaled twice daily with a spacer.   - Singulair 10mg by mouth daily at night.          Relevant Medications    budesonide-formoterol (SYMBICORT) 160-4.5 MCG/ACT Inhaler    Other Relevant Orders    Spirometry, Breathing Capacity (Completed)       Infectious/Inflammatory    Rhinoconjunctivitis     History of perennial nasal and ocular symptoms that get worse in the spring and fall.  atrovent initially thought to be beneficial she no longer thinks is helpful. Flonase has been helpful.    Tried on Dymista and somewhat beneficial.  Negative allergy testing.  Increased symptoms around cats.  Zyrtec is helpful.  Singulair is helpful.   Either vasomotor rhinitis versus local allergic rhinitis. Follows with ENT.       Allergy skin testing was negative.      - Dymista 1 spray/nostril twice daily.  - Zyrtec as needed.   - Singulair 10mg by mouth daily at night.              Return in 4 weeks.     Chart documentation with Dragon Voice recognition Software. Although reviewed after completion, some words and grammatical errors may remain.    Nish Stockton DO FAAAAI  Allergy/Immunology  [place], [place]

## 2020-02-23 DIAGNOSIS — I10 ESSENTIAL HYPERTENSION: ICD-10-CM

## 2020-02-24 ENCOUNTER — HEALTH MAINTENANCE LETTER (OUTPATIENT)
Age: 65
End: 2020-02-24

## 2020-02-24 RX ORDER — LISINOPRIL 40 MG/1
TABLET ORAL
Qty: 90 TABLET | Refills: 0 | Status: SHIPPED | OUTPATIENT
Start: 2020-02-24 | End: 2020-05-19

## 2020-02-24 NOTE — TELEPHONE ENCOUNTER
"Refill approved.      Requested Prescriptions   Pending Prescriptions Disp Refills     lisinopril (ZESTRIL) 40 MG tablet [Pharmacy Med Name: LISINOPRIL TABS 40MG] 90 tablet 4     Sig: TAKE 1 TABLET DAILY       ACE Inhibitors (Including Combos) Protocol Passed - 2/23/2020 11:21 PM        Passed - Blood pressure under 140/90 in past 12 months     BP Readings from Last 3 Encounters:   02/13/20 127/78   01/14/20 133/80   09/10/19 132/78                 Passed - Recent (12 mo) or future (30 days) visit within the authorizing provider's specialty     Patient has had an office visit with the authorizing provider or a provider within the authorizing providers department within the previous 12 mos or has a future within next 30 days. See \"Patient Info\" tab in inbasket, or \"Choose Columns\" in Meds & Orders section of the refill encounter.              Passed - Medication is active on med list        Passed - Patient is age 18 or older        Passed - No active pregnancy on record        Passed - Normal serum creatinine on file in past 12 months     Recent Labs   Lab Test 09/10/19  1616   CR 1.00             Passed - Normal serum potassium on file in past 12 months     Recent Labs   Lab Test 09/10/19  1616   POTASSIUM 4.3             Passed - No positive pregnancy test within past 12 months        MEMO Jang    "

## 2020-02-26 ENCOUNTER — OFFICE VISIT (OUTPATIENT)
Dept: PSYCHOLOGY | Facility: CLINIC | Age: 65
End: 2020-02-26
Payer: COMMERCIAL

## 2020-02-26 DIAGNOSIS — F34.1 PERSISTENT DEPRESSIVE DISORDER: ICD-10-CM

## 2020-02-26 DIAGNOSIS — F41.1 GENERALIZED ANXIETY DISORDER: Primary | ICD-10-CM

## 2020-02-26 PROCEDURE — 90834 PSYTX W PT 45 MINUTES: CPT | Performed by: MARRIAGE & FAMILY THERAPIST

## 2020-02-26 ASSESSMENT — ANXIETY QUESTIONNAIRES
6. BECOMING EASILY ANNOYED OR IRRITABLE: SEVERAL DAYS
2. NOT BEING ABLE TO STOP OR CONTROL WORRYING: SEVERAL DAYS
7. FEELING AFRAID AS IF SOMETHING AWFUL MIGHT HAPPEN: SEVERAL DAYS
GAD7 TOTAL SCORE: 8
5. BEING SO RESTLESS THAT IT IS HARD TO SIT STILL: SEVERAL DAYS
7. FEELING AFRAID AS IF SOMETHING AWFUL MIGHT HAPPEN: SEVERAL DAYS
1. FEELING NERVOUS, ANXIOUS, OR ON EDGE: SEVERAL DAYS
GAD7 TOTAL SCORE: 8
GAD7 TOTAL SCORE: 8
4. TROUBLE RELAXING: MORE THAN HALF THE DAYS
3. WORRYING TOO MUCH ABOUT DIFFERENT THINGS: SEVERAL DAYS

## 2020-02-26 NOTE — PROGRESS NOTES
Progress Note    Patient Name: Miri Naylor  Date: 2/26/20         Service Type: Individual  Video Visit: No     Session Start Time: 1:00  Session End Time: 1:52     Session Length: 38-52    Session #: 15    Attendees: Client attended alone     Treatment Plan Last Reviewed: 12/11/19, next due 3/11/20.  PHQ-9 / MELISA-7 : completed.    DATA  Interactive Complexity: No  Crisis: No       Progress Since Last Session (Related to Symptoms / Goals / Homework):   Symptoms: Worsening client reported increased MELISA-7 score.    Homework: Partially completed       Episode of Care Goals: Minimal progress - ACTION (Actively working towards change); Intervened by reinforcing change plan / affirming steps taken     Current / Ongoing Stressors and Concerns:   Client reported feeling impatient about lack of progress and wanting things to be better in her marriage.  Client reported that her  has actually sought out independent therapy, and is unsure if he has an appointment yet.  Client reported struggling with her lack of ability to unilaterally improve the relationship, and stated that she doesn't want a parallel existence with her .  Client reported being uncertain about her future career, considering closure.     Treatment Objective(s) Addressed in This Session:   use cognitive strategies identified in therapy to challenge anxious thoughts      Intervention:   CBT: reviewed with client letting go of things outside of her control (i.e. her 's actions) and focusing on what she can do (i.e. setting boundaries with her ).  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        ASSESSMENT: Current Emotional / Mental Status (status of significant symptoms):   Risk status (Self / Other harm or suicidal ideation)   Patient denies current fears or concerns for personal safety.   Patient denies current or recent  suicidal ideation or behaviors.   Patientdenies current or recent homicidal ideation or behaviors.   Patient denies current or recent self injurious behavior or ideation.   Patient denies other safety concerns.   Patient Patient reports there has been no change in risk factors since their last session.     PatientPatient reports there has been no change in protective factors since their last session.     Recommended that patient call 911 or go to the local ED should there be a change in any of these risk factors.     Appearance:   Appropriate    Eye Contact:   Good    Psychomotor Behavior: Normal    Attitude:   Cooperative    Orientation:   All   Speech    Rate / Production: Normal     Volume:  Normal    Mood:    Anxious  Depressed  Normal   Affect:    Appropriate    Thought Content:  Clear    Thought Form:  Coherent  Logical    Insight:    Good      Medication Review:   No current psychiatric medications prescribed     Medication Compliance:   NA     Changes in Health Issues:   None reported     Chemical Use Review:   Substance Use: Chemical use reviewed, no active concerns identified      Tobacco Use: No current tobacco use.      Diagnosis:  1. Generalized anxiety disorder    2. Persistent depressive disorder        Collateral Reports Completed:   Not Applicable    PLAN: (Patient Tasks / Therapist Tasks / Other)  Client agreed to work on letting go of things outside of her control (I.e. her 's actions) and focusing on what she can do (I.e. setting boundaries with her ), and considering her grief process and timing regarding her career.        LUIS ENRIQUE Briseno                                                         ______________________________________________________________________    Treatment Plan    Patient's Name: Miri Naylor  YOB: 1955    Date: 12/11/19    DSM5 Diagnoses: 300.4 (F34.1) Persistent Depressive Disorder, Early onset or 300.02 (F41.1) Generalized  Anxiety Disorder  Psychosocial / Contextual Factors: marital conflict, history of loss/trauma  WHODAS: 30    Referral / Collaboration:  Referral to another professional/service is not indicated at this time..    Anticipated number of session or this episode of care: 11-20      MeasurableTreatment Goal(s) related to diagnosis / functional impairment(s)  Goal 1: Client will successfully process through past trauma defined as reporting 0 Subjective Units of Distress related to trauma on 0-10 scale and 7 on Validity of (positive) Cognition scale about self on 1-7 scale   I will know I've met my goal when I am less impacted by my past loss/trauma.       Objective #A (Client Action)    Status: Conitnued - Date: 12/1119      Client will identify past traumatic events/memories which are causing current distress.     Intervention(s)  Therapist will take client's history and facilitate client's identification of targets for EMDR.     Objective #B  Client will complete needed assessment(s) and [facility] EMDR resourcing to confirm readiness for EMDR.    Status: Continued - Date: 12/11/19      Intervention(s)  Therapist will administer Dissociative Experiences Scale, Multidimensional Inventory of Dissociation as needed and complete [facility] EMDR resourcing with client.     Objective #C  Client will engage in installing at least 2 EMDR resources.  Status: Continued - Date: 12/11/19      Intervention(s)  Therapist will complete EMDR resourcing (Container, Remote Control, grounding/progressive muscle relaxation, Light Stream, Inner Advisor) with client.     Objective #D (Client Action)    Status: Continued - Date: 12/11/19      Client will engage in reprocessing all past traumatic event/memory targets.     Intervention(s)   Therapist will complete EMDR reprocessing with client.    Goal 2:  Client will improve overall baseline mood regulation by 2 points on a 1-10 Likert scale per self-report (10 = optimal mood/regulation).    I  will know I've met my goal when I feel better/less depressed overall.      Objective #A (Client Action)    Status: Continued - Date:  12/11/19    Client will Identify negative self-talk and behaviors: challenge core beliefs, myths, and actions.    Intervention(s)  Therapist will teach emotional regulation skills. CBT/REBT ABCD model.    Objective #B  Client will Increase interest, engagement, and pleasure in doing things  Decrease frequency and intensity of feeling down, depressed, hopeless  Improve concentration, focus, and mindfulness in daily activities .    Status: Continued - Date:  12/11/19    Intervention(s)  Therapist will teach emotional regulation skills. DBT Core Mindfulness, Distress Tolerance, Emotion Regulation, and Interpersonal Effetiveness skills.    Patient has reviewed and agreed to the above plan.      Loki Crowe, LMFT  February 26, 2020

## 2020-02-27 ASSESSMENT — PATIENT HEALTH QUESTIONNAIRE - PHQ9: SUM OF ALL RESPONSES TO PHQ QUESTIONS 1-9: 8

## 2020-02-27 ASSESSMENT — ANXIETY QUESTIONNAIRES: GAD7 TOTAL SCORE: 8

## 2020-03-04 ENCOUNTER — OFFICE VISIT (OUTPATIENT)
Dept: PSYCHOLOGY | Facility: CLINIC | Age: 65
End: 2020-03-04
Payer: COMMERCIAL

## 2020-03-04 DIAGNOSIS — F41.1 GENERALIZED ANXIETY DISORDER: Primary | ICD-10-CM

## 2020-03-04 DIAGNOSIS — F34.1 PERSISTENT DEPRESSIVE DISORDER: ICD-10-CM

## 2020-03-04 PROCEDURE — 90834 PSYTX W PT 45 MINUTES: CPT | Performed by: MARRIAGE & FAMILY THERAPIST

## 2020-03-04 NOTE — PROGRESS NOTES
"                                           Progress Note    Patient Name: Miri Naylor  Date: 3/4/20         Service Type: Individual  Video Visit: No     Session Start Time: 1:00  Session End Time: 1:52     Session Length: 38-52    Session #: 16    Attendees: Client attended alone     Treatment Plan Last Reviewed: 12/11/19, next due 3/11/20.  PHQ-9 / MELISA-7 : completed.    DATA  Interactive Complexity: No  Crisis: No       Progress Since Last Session (Related to Symptoms / Goals / Homework):   Symptoms: No change client is stable.    Homework: Partially completed       Episode of Care Goals: Minimal progress - ACTION (Actively working towards change); Intervened by reinforcing change plan / affirming steps taken     Current / Ongoing Stressors and Concerns:   Client reported her insight that she has not dealt with her grief regarding the losses of her parents.  Client reported her insight that she has often tried to fit in to be \"normal,\" which has often been contradictory to her own desires.  Client reported struggling with wanting to go to her son's upcoming graduation, but not wanting to travel with her , and feeling stuck.     Treatment Objective(s) Addressed in This Session:   Decrease frequency and intensity of feeling down, depressed, hopeless  Improve concentration, focus, and mindfulness in daily activities   increase understanding of steps in the grief process      Intervention:   CBT: reviewed with client challenging her narratives about being \"normal.\"  Also reviewed stages of grief.        ASSESSMENT: Current Emotional / Mental Status (status of significant symptoms):   Risk status (Self / Other harm or suicidal ideation)   Patient denies current fears or concerns for personal safety.   Patient denies current or recent suicidal ideation or behaviors.   Patientdenies current or recent homicidal ideation or behaviors.   Patient denies current or recent self injurious behavior or " "ideation.   Patient denies other safety concerns.   Patient Patient reports there has been no change in risk factors since their last session.     PatientPatient reports there has been no change in protective factors since their last session.     Recommended that patient call 911 or go to the local ED should there be a change in any of these risk factors.     Appearance:   Appropriate    Eye Contact:   Good    Psychomotor Behavior: Normal    Attitude:   Cooperative    Orientation:   All   Speech    Rate / Production: Normal     Volume:  Normal    Mood:    Anxious  Depressed  Normal   Affect:    Appropriate    Thought Content:  Clear    Thought Form:  Coherent  Logical    Insight:    Good      Medication Review:   No current psychiatric medications prescribed     Medication Compliance:   NA     Changes in Health Issues:   None reported     Chemical Use Review:   Substance Use: Chemical use reviewed, no active concerns identified      Tobacco Use: No current tobacco use.      Diagnosis:  1. Generalized anxiety disorder    2. Persistent depressive disorder        Collateral Reports Completed:   Not Applicable    PLAN: (Patient Tasks / Therapist Tasks / Other)  Client agreed to work on challenging her narratives about needing to be \"harsha,\" instead working on balance between self and others, and working more deliberately through her grief process.        Loki Crowe, [facility]                                                         ______________________________________________________________________    Treatment Plan    Patient's Name: Miri Naylor  YOB: 1955    Date: 12/11/19    DSM5 Diagnoses: 300.4 (F34.1) Persistent Depressive Disorder, Early onset or 300.02 (F41.1) Generalized Anxiety Disorder  Psychosocial / Contextual Factors: marital conflict, history of loss/trauma  WHODAS: 30    Referral / Collaboration:  Referral to another professional/service is not indicated at this " time..    Anticipated number of session or this episode of care: 11-20      MeasurableTreatment Goal(s) related to diagnosis / functional impairment(s)  Goal 1: Client will successfully process through past trauma defined as reporting 0 Subjective Units of Distress related to trauma on 0-10 scale and 7 on Validity of (positive) Cognition scale about self on 1-7 scale   I will know I've met my goal when I am less impacted by my past loss/trauma.       Objective #A (Client Action)    Status: Conitnued - Date: 12/1119      Client will identify past traumatic events/memories which are causing current distress.     Intervention(s)  Therapist will take client's history and facilitate client's identification of targets for EMDR.     Objective #B  Client will complete needed assessment(s) and [facility] EMDR resourcing to confirm readiness for EMDR.    Status: Continued - Date: 12/11/19      Intervention(s)  Therapist will administer Dissociative Experiences Scale, Multidimensional Inventory of Dissociation as needed and complete [facility] EMDR resourcing with client.     Objective #C  Client will engage in installing at least 2 EMDR resources.  Status: Continued - Date: 12/11/19      Intervention(s)  Therapist will complete EMDR resourcing (Container, Remote Control, grounding/progressive muscle relaxation, Light Stream, Inner Advisor) with client.     Objective #D (Client Action)    Status: Continued - Date: 12/11/19      Client will engage in reprocessing all past traumatic event/memory targets.     Intervention(s)   Therapist will complete EMDR reprocessing with client.    Goal 2:  Client will improve overall baseline mood regulation by 2 points on a 1-10 Likert scale per self-report (10 = optimal mood/regulation).    I will know I've met my goal when I feel better/less depressed overall.      Objective #A (Client Action)    Status: Continued - Date:  12/11/19    Client will Identify negative self-talk and behaviors:  challenge core beliefs, myths, and actions.    Intervention(s)  Therapist will teach emotional regulation skills. CBT/REBT ABCD model.    Objective #B  Client will Increase interest, engagement, and pleasure in doing things  Decrease frequency and intensity of feeling down, depressed, hopeless  Improve concentration, focus, and mindfulness in daily activities .    Status: Continued - Date:  12/11/19    Intervention(s)  Therapist will teach emotional regulation skills. DBT Core Mindfulness, Distress Tolerance, Emotion Regulation, and Interpersonal Effetiveness skills.    Patient has reviewed and agreed to the above plan.      Loki Crowe, LMFT  March 4, 2020

## 2020-03-05 ASSESSMENT — PATIENT HEALTH QUESTIONNAIRE - PHQ9: SUM OF ALL RESPONSES TO PHQ QUESTIONS 1-9: 8

## 2020-03-12 ENCOUNTER — OFFICE VISIT (OUTPATIENT)
Dept: ALLERGY | Facility: CLINIC | Age: 65
End: 2020-03-12
Payer: COMMERCIAL

## 2020-03-12 ENCOUNTER — ANCILLARY PROCEDURE (OUTPATIENT)
Dept: GENERAL RADIOLOGY | Facility: CLINIC | Age: 65
End: 2020-03-12
Attending: ALLERGY & IMMUNOLOGY
Payer: COMMERCIAL

## 2020-03-12 VITALS
BODY MASS INDEX: 30.38 KG/M2 | SYSTOLIC BLOOD PRESSURE: 147 MMHG | OXYGEN SATURATION: 98 % | HEIGHT: 71 IN | DIASTOLIC BLOOD PRESSURE: 80 MMHG | HEART RATE: 69 BPM | WEIGHT: 217 LBS

## 2020-03-12 DIAGNOSIS — I10 HYPERTENSION GOAL BP (BLOOD PRESSURE) < 140/90: ICD-10-CM

## 2020-03-12 DIAGNOSIS — J45.40 MODERATE PERSISTENT ASTHMA WITHOUT COMPLICATION: ICD-10-CM

## 2020-03-12 DIAGNOSIS — J01.90 ACUTE SINUSITIS WITH SYMPTOMS > 10 DAYS: ICD-10-CM

## 2020-03-12 DIAGNOSIS — J45.40 MODERATE PERSISTENT ASTHMA WITHOUT COMPLICATION: Primary | ICD-10-CM

## 2020-03-12 DIAGNOSIS — J31.0 RHINOCONJUNCTIVITIS: ICD-10-CM

## 2020-03-12 DIAGNOSIS — H10.9 RHINOCONJUNCTIVITIS: ICD-10-CM

## 2020-03-12 LAB
FEF 25/75: NORMAL
FEV-1: NORMAL
FEV1/FVC: NORMAL
FVC: NORMAL

## 2020-03-12 PROCEDURE — 71046 X-RAY EXAM CHEST 2 VIEWS: CPT

## 2020-03-12 PROCEDURE — 99214 OFFICE O/P EST MOD 30 MIN: CPT | Mod: 25 | Performed by: ALLERGY & IMMUNOLOGY

## 2020-03-12 PROCEDURE — 94010 BREATHING CAPACITY TEST: CPT | Performed by: ALLERGY & IMMUNOLOGY

## 2020-03-12 RX ORDER — DOXYCYCLINE 100 MG/1
100 CAPSULE ORAL 2 TIMES DAILY
Qty: 14 CAPSULE | Refills: 0 | Status: SHIPPED | OUTPATIENT
Start: 2020-03-12 | End: 2020-05-19

## 2020-03-12 ASSESSMENT — PAIN SCALES - GENERAL: PAINLEVEL: NO PAIN (0)

## 2020-03-12 ASSESSMENT — MIFFLIN-ST. JEOR: SCORE: 1635.44

## 2020-03-12 NOTE — ASSESSMENT & PLAN NOTE
Flares with URI, cold air and humid air.  Current treatment is Symbicort 160/4.5mcg 2 puff inhaled daily and Singulair 10mg PO daily.  Inability getting a deep breath.  Some coughing with nasal symptoms recently.  Likely secondary to sinusitis.     ACT 22  Spirometry with restriction.        - Albuterol 2-4 puffs inhaled (use a spacer unless using a Proair Respiclick device) every 4 hours as needed for chest tightness, wheezing, shortness of breath and/or coughing.    - Avoid asthma triggers.  - Chest x-ray.  -Complete pulmonary function studies.  - Symbicort 160/4.5mcg 2 puffs inhaled twice daily with a spacer.   - Singulair 10mg by mouth daily at night.

## 2020-03-12 NOTE — PATIENT INSTRUCTIONS
Allergy Staff Appt Hours Shot Hours Locations    Physician     Nish Stockton DO       Support Staff     MEMO [facility], [facility]  Tuesday:        [place] 7-4:20     Wednesday:        [place]: 7-5     Thursday:                    [place] 7-6:40     Friday:  [place]  7-2:40   [place]        Thursday: 1-5:50        Friday: 7-10:50     [place]        Tuesday: 7- 3:20        Wednesday: 7-4:20     [place] Monday: 7-4:20        Tuesday: 1-6:20         [facility]  [address] [place] [address]  Appt Line: [phone]  Allergy RN:  [phone]    [facility]  [address] [place] [address]  Appt Line: [phone]  Allergy RN:  [phone]       Important Scheduling Information  Aspirin Desensitization: Appt will last 2 clinic days. Please call the Allergy RN line for your clinic to schedule. Discontinue antihistamines 7 days prior to the appointment.     Food Challenges: Appt will last 3-4 hours. Please call the Allergy RN line for your clinic to schedule. Discontinue antihistamines 7 days prior to the appointment.     Penicillin Testing: Appt will last 2-3 hours. Please call the Allergy RN line for your clinic to schedule. Discontinue antihistamines 7 days prior to the appointment.     Skin Testing: Appt will about 40 minutes. Call the appointment line for your clinic to schedule. Discontinue antihistamines 7 days prior to the appointment.     Venom Testing: Appt will last 2-3 hours. Please call the Allergy RN line for your clinic to schedule. Discontinue antihistamines 7 days prior to the appointment.     Thank you for trusting us with your Allergy, Asthma, and Immunology care. Please feel free to contact us with any questions or concerns you may have.      - Symbicort 160/4.5mcg 2 puffs inhaled twice daily with a spacer.   - Singulair 10mg by mouth daily at night.   - Zyrtec 10-20mg daily.   - Dymista 1 spray/nostril twice daily.  - Chest x-ray  - Complete pft.   -  Doxycycline 100mg twice daily for 7 days.

## 2020-03-12 NOTE — ASSESSMENT & PLAN NOTE
History of perennial nasal and ocular symptoms that get worse in the spring and fall.  atrovent initially thought to be beneficial she no longer thinks is helpful. Flonase has been helpful.    Tried on Dymista and somewhat beneficial.  Negative allergy testing.  Increased symptoms around cats.  Zyrtec is helpful.  Singulair is helpful.   Either vasomotor rhinitis versus local allergic rhinitis. Follows with ENT.  Recently with colored mucus, sinus pressure, postnasal drainage and congestion.  Symptoms over the last 14 days.      Allergy skin testing was negative.      - Dymista 1 spray/nostril twice daily.  - Zyrtec 10 to 20 mg as needed.   - Singulair 10mg by mouth daily at night.   -Treating sinusitis as noted.

## 2020-03-12 NOTE — ASSESSMENT & PLAN NOTE
Sinus pressure, congestion, postnasal drainage, yellow mucus.  Frontal sinus tenderness to palpation.    -Continue Dymista.  -Doxycycline twice daily for the next 7 days.

## 2020-03-12 NOTE — PROGRESS NOTES
Miri Naylor is a 64 year old White female with previous medical history significant for asthma and nonallergic rhinitis who returns for a follow up visit.     At last visit she was switched from Qvar to Symbicort 160/4.5 mcg 2 puff inhaled twice daily secondary to obstruction noted on spirometry which was worse than prior visit.  She remains on singular 10 mg by mouth daily.     Nonallergic rhinitis.  She is on Dymista 1 spray per nostril twice daily.  On Zyrtec as well.  On Singulair as well.  Historically symptoms in the spring and fall.  She had thought she has symptoms around cats.  Possible local allergic rhinitis versus vasomotor rhinitis.  She has had congestion, sinus pressure, postnasal drainage, yellow mucus over the last 14 days.    ACT Total Scores 3/12/2020   ACT TOTAL SCORE (Goal Greater than or Equal to 20) 22   In the past 12 months, how many times did you visit the emergency room for your asthma without being admitted to the hospital? 0   In the past 12 months, how many times were you hospitalized overnight because of your asthma? 0           Past Medical History:   Diagnosis Date     Depressive disorder, not elsewhere classified      Drug allergy, multiple 9/23/14--passed graded oral challenge for Zithromax.     7/3/14 POS PRE-PEN skin test. 7/30/14 NEG skin tests and oral challenge to Cefzil     Hx of abnormal Pap smear ?    no specifics given     lumbar degeneration      Raynaud's disease      Uncomplicated asthma      Unspecified essential hypertension      Family History   Problem Relation Age of Onset     Arthritis Mother      Hyperlipidemia Mother      Other Cancer Mother         Glioblastoma Multiforme     Osteoporosis Mother      Blood Disease Father      Hypertension Father      Hyperlipidemia Father      Other Cancer Father         lung cancer     Depression Son      Depression Daughter      Hyperlipidemia Brother      Anxiety Disorder Son      Skin Cancer No family hx of      Past  Surgical History:   Procedure Laterality Date     BIOPSY OF BREAST, NEEDLE CORE       C PELVIS/HIP JOINT SURGERY UNLISTED Right 7/19/16    total hip arthroplasty     COLONOSCOPY       HIP SURGERY Right 07/19/2016       REVIEW OF SYSTEMS:  General: negative for weight gain. negative for weight loss. negative for changes in sleep.   Ears: negative for fullness. negative for hearing loss. negative for dizziness.   Nose: negative for snoring.negative for changes in smell. positive  for drainage.   Eyes: negative for eye watering. negative for eye itching. negative for vision changes. negative for eye redness.  Throat: negative for hoarseness. negative for sore throat. negative for trouble swallowing.   Lungs: negative for shortness of breath.negative for wheezing. positive  for sputum production.   Cardiovascular: negative for chest pain. negative for swelling of ankles. negative for fast or irregular heartbeat.   Gastrointestinal: negative for nausea. negative for heartburn. negative for acid reflux.   Musculoskeletal: negative for joint pain. negative for joint stiffness. negative for joint swelling.   Neurologic: negative for seizures. negative for fainting. negative for weakness.   Psychiatric: negative for changes in mood. negative for anxiety.   Endocrine: negative for cold intolerance. negative for heat intolerance. negative for tremors.   Lymphatic: negative for lower extremity swelling. negative for lymph node swelling.   Hematologic: negative for easy bruising. negative for easy bleeding.  Integumentary: negative for rash. negative for scaling. negative for nail changes.       Current Outpatient Medications:      ACIDOPHILUS OR TABS, 1 tablet daily, Disp: , Rfl:      albuterol (PROAIR HFA) 108 (90 Base) MCG/ACT inhaler, Inhale 2 puffs into the lungs every 4 hours as needed for shortness of breath / dyspnea or wheezing, Disp: 1 Inhaler, Rfl: 1     atenolol (TENORMIN) 25 MG tablet, Take 1 tablet (25 mg) by mouth  daily, Disp: 90 tablet, Rfl: 3     azelastine-fluticasone (DYMISTA) 137-50 MCG/ACT nasal spray, Spray 1 spray into both nostrils 2 times daily, Disp: 1 Bottle, Rfl: 11     biotin 1000 MCG TABS tablet, Take 1,000 mcg by mouth daily, Disp: , Rfl:      budesonide-formoterol (SYMBICORT) 160-4.5 MCG/ACT Inhaler, Inhale 2 puffs into the lungs 2 times daily, Disp: 1 Inhaler, Rfl: 3     CALCIUM 600+D PO, twice daily, Disp: , Rfl:      cetirizine (ZYRTEC) 10 MG tablet, Take 10 mg by mouth daily., Disp: , Rfl:      Cholecalciferol (VITAMIN D-3 PO), Take 2,000 Int'l Units by mouth daily, Disp: , Rfl:      DAILY MULTIVITAMIN PO, 1 tablet daily, Disp: , Rfl:      doxycycline hyclate (VIBRAMYCIN) 100 MG capsule, Take 1 capsule (100 mg) by mouth 2 times daily for 7 days, Disp: 14 capsule, Rfl: 0     escitalopram (LEXAPRO) 10 MG tablet, Take 1 tablet (10 mg) by mouth daily, Disp: 90 tablet, Rfl: 1     ferrous sulfate (IRON) 325 (65 Fe) MG tablet, Take 325 mg by mouth daily (with breakfast), Disp: , Rfl:      FLAXSEED OIL 1000 MG PO CAPS, 1 tablet daily, Disp: , Rfl:      gabapentin (NEURONTIN) 300 MG capsule, 1 capsule TID with additional 1-2 capsules if headache is severe., Disp: 270 capsule, Rfl: 3     GLUCOSAMINE-CHONDROITIN PO TABS, 1500 mg glucosamine and 1200mg chondroitin daily-2 tablets daily, Disp: , Rfl:      hydrocortisone 2.5 % cream, Apply topically 2 times daily (Patient taking differently: Apply topically 2 times daily as needed ), Disp: 60 g, Rfl: 1     ibuprofen (ADVIL,MOTRIN) 200 MG tablet, take 1 tablet (200 mg) by oral route every 6 hours as needed with food, Disp: , Rfl:      Ketotifen Fumarate (ZADITOR OP), Apply to eye daily as needed, Disp: , Rfl:      lisinopril (ZESTRIL) 40 MG tablet, TAKE 1 TABLET DAILY, Disp: 90 tablet, Rfl: 0     Magnesium 400 MG CAPS, Take by mouth daily, Disp: , Rfl:      metoclopramide (REGLAN) 10 MG tablet, Take 1 tablet (10 mg) by mouth 3 times daily as needed (headache), Disp: 20  tablet, Rfl: 3     montelukast (SINGULAIR) 10 MG tablet, Take 1 tablet (10 mg) by mouth At Bedtime, Disp: 30 tablet, Rfl: 11     naratriptan (AMERGE) 2.5 MG tablet, Take 1 tablet (2.5 mg) by mouth at onset of headache for migraine (repeat in 4 hours if needed) May repeat in 4 hours. Max 2 tablets/24 hours., Disp: 18 tablet, Rfl: 3     pantoprazole (PROTONIX) 40 MG EC tablet, TAKE 1 TABLET DAILY 30 TO 60 MINUTES BEFORE A MEAL, Disp: 90 tablet, Rfl: 4     polyethylene glycol (MIRALAX) powder, Take 1 capful by mouth daily, Disp: , Rfl:      pravastatin (PRAVACHOL) 40 MG tablet, TAKE 1 TABLET DAILY, Disp: 90 tablet, Rfl: 4     rOPINIRole (REQUIP) 0.25 MG tablet, Take 2 tablets (0.5 mg) by mouth At Bedtime, Disp: 60 tablet, Rfl: 11     S-ADENOSYLMETHIONINE 200 MG PO TABS, 2 tablets daily, Disp: , Rfl:      senna (SENOKOT) 8.6 MG tablet, Take 2 tablets by mouth daily, Disp: , Rfl:      spironolactone (ALDACTONE) 25 MG tablet, TAKE ONE-HALF (1/2) TABLET DAILY, Disp: 45 tablet, Rfl: 2     SUMAtriptan (IMITREX) 50 MG tablet, Take 50 mg by mouth at onset of headache , Disp: , Rfl:      triamcinolone (KENALOG) 0.1 % cream, Apply topically 2 times daily (Patient taking differently: Apply topically 2 times daily as needed ), Disp: 60 g, Rfl: 3     TURMERIC PO, Take 2,000 mg by mouth daily, Disp: , Rfl:      vitamin B complex with vitamin C (VITAMIN  B COMPLEX) TABS tablet, Take 3 tablets by mouth daily, Disp: , Rfl:      VITAMIN C 500 MG PO TABS, 2 TABLET DAILY, Disp: , Rfl:      vitamin E (E-400) 400 UNIT capsule, Take one pill by mouth once daily, Disp: , Rfl:   Immunization History   Administered Date(s) Administered     Influenza Quad, Recombinant, p-free (RIV4) 09/10/2019     Influenza Vaccine IM > 6 months Valent IIV4 12/03/2013, 11/16/2015, 10/01/2017, 09/13/2018     TD (ADULT, 7+) 03/06/2008     TDAP Vaccine (Adacel) 08/03/2012     Twinrix A/B 10/12/2017, 12/27/2017, 04/24/2018     Zoster vaccine, live 09/14/2012      Allergies   Allergen Reactions     Levofloxacin Rash     Other reaction(s): Contact Dermatitis     Penicillins Hives and Rash     Confirmed by skin prick testing 7/3/14     Amoxicillin Hives and Rash     Amoxicillin-Pot Clavulanate Rash     Azithromycin Rash     Cephalexin Rash     Clindamycin Rash and Hives     Atorvastatin Other (See Comments)     Felt sick, intolerance     Clindamycin Hcl Hives     Levaquin      tendonitis     Augmentin Rash         EXAM:   Constitutional:  Appears well-developed and well-nourished. No distress.   HEENT:   Head: Normocephalic.   No cobblestoning of posterior oropharynx.   Nasal tissue pink and normal appearing.  No rhinorrhea noted.    Eyes: Conjunctivae are non-erythematous    frontal sinus tenderness to palpation.   Cardiovascular: Normal rate, regular rhythm and normal heart sounds. Exam reveals no gallop and no friction rub.   No murmur heard.  Respiratory: Effort normal and breath sounds normal. No respiratory distress. No wheezes. No rales.   Musculoskeletal: Normal range of motion.   Lymphadenopathy:   No cervical adenopathy.   Neuro: Oriented to person, place, and time.  Skin: Skin is warm and dry. No rash noted.   Psychiatric: Normal mood and affect.     Nursing note and vitals reviewed.      WORKUP:   Spirometry  FVC % pred:67  FEV1 % pred:66  FEV1/FVC % act:76          ASSESSMENT/PLAN:  Problem List Items Addressed This Visit        Respiratory    Moderate persistent asthma without complication - Primary     Flares with URI, cold air and humid air.  Current treatment is Symbicort 160/4.5mcg 2 puff inhaled daily and Singulair 10mg PO daily.  Inability getting a deep breath.  Some coughing with nasal symptoms recently.  Likely secondary to sinusitis.     ACT 22  Spirometry with restriction.        - Albuterol 2-4 puffs inhaled (use a spacer unless using a Proair Respiclick device) every 4 hours as needed for chest tightness, wheezing, shortness of breath and/or  coughing.    - Avoid asthma triggers.  - Chest x-ray.  -Complete pulmonary function studies.  - Symbicort 160/4.5mcg 2 puffs inhaled twice daily with a spacer.   - Singulair 10mg by mouth daily at night.         Relevant Orders    Spirometry, Breathing Capacity (Completed)    CHEST X-RAY 2 VW [[id number]]    Pulmonary Function Test    Acute sinusitis with symptoms > 10 days     Sinus pressure, congestion, postnasal drainage, yellow mucus.  Frontal sinus tenderness to palpation.    -Continue Dymista.  -Doxycycline twice daily for the next 7 days.         Relevant Medications    doxycycline hyclate (VIBRAMYCIN) 100 MG capsule       Circulatory    Hypertension goal BP (blood pressure) < 140/90     Miri to follow up with Primary Care provider regarding elevated blood pressure.              Infectious/Inflammatory    Rhinoconjunctivitis     History of perennial nasal and ocular symptoms that get worse in the spring and fall.  atrovent initially thought to be beneficial she no longer thinks is helpful. Flonase has been helpful.    Tried on Dymista and somewhat beneficial.  Negative allergy testing.  Increased symptoms around cats.  Zyrtec is helpful.  Singulair is helpful.   Either vasomotor rhinitis versus local allergic rhinitis. Follows with ENT.  Recently with colored mucus, sinus pressure, postnasal drainage and congestion.  Symptoms over the last 14 days.      Allergy skin testing was negative.      - Dymista 1 spray/nostril twice daily.  - Zyrtec 10 to 20 mg as needed.   - Singulair 10mg by mouth daily at night.   -Treating sinusitis as noted.               Chart documentation with Dragon Voice recognition Software. Although reviewed after completion, some words and grammatical errors may remain.    Nish Stockton DO FAAAAI  Allergy/Immunology  [place], [place]

## 2020-03-12 NOTE — LETTER
3/12/2020         RE: Miri Naylor  [address]  [place] [address]        Dear Colleague,    Thank you for referring your patient, Miri Naylor, to the [facility]. Please see a copy of my visit note below.    Miri Naylor is a 64 year old White female with previous medical history significant for asthma and nonallergic rhinitis who returns for a follow up visit.     At last visit she was switched from Qvar to Symbicort 160/4.5 mcg 2 puff inhaled twice daily secondary to obstruction noted on spirometry which was worse than prior visit.  She remains on singular 10 mg by mouth daily.     Nonallergic rhinitis.  She is on Dymista 1 spray per nostril twice daily.  On Zyrtec as well.  On Singulair as well.  Historically symptoms in the spring and fall.  She had thought she has symptoms around cats.  Possible local allergic rhinitis versus vasomotor rhinitis.  She has had congestion, sinus pressure, postnasal drainage, yellow mucus over the last 14 days.    ACT Total Scores 3/12/2020   ACT TOTAL SCORE (Goal Greater than or Equal to 20) 22   In the past 12 months, how many times did you visit the emergency room for your asthma without being admitted to the hospital? 0   In the past 12 months, how many times were you hospitalized overnight because of your asthma? 0           Past Medical History:   Diagnosis Date     Depressive disorder, not elsewhere classified      Drug allergy, multiple 9/23/14--passed graded oral challenge for Zithromax.     7/3/14 POS PRE-PEN skin test. 7/30/14 NEG skin tests and oral challenge to Cefzil     Hx of abnormal Pap smear ?    no specifics given     lumbar degeneration      Raynaud's disease      Uncomplicated asthma      Unspecified essential hypertension      Family History   Problem Relation Age of Onset     Arthritis Mother      Hyperlipidemia Mother      Other Cancer Mother         Glioblastoma Multiforme     Osteoporosis Mother       Blood Disease Father      Hypertension Father      Hyperlipidemia Father      Other Cancer Father         lung cancer     Depression Son      Depression Daughter      Hyperlipidemia Brother      Anxiety Disorder Son      Skin Cancer No family hx of      Past Surgical History:   Procedure Laterality Date     BIOPSY OF BREAST, NEEDLE CORE       C PELVIS/HIP JOINT SURGERY UNLISTED Right 7/19/16    total hip arthroplasty     COLONOSCOPY       HIP SURGERY Right 07/19/2016       REVIEW OF SYSTEMS:  General: negative for weight gain. negative for weight loss. negative for changes in sleep.   Ears: negative for fullness. negative for hearing loss. negative for dizziness.   Nose: negative for snoring.negative for changes in smell. positive  for drainage.   Eyes: negative for eye watering. negative for eye itching. negative for vision changes. negative for eye redness.  Throat: negative for hoarseness. negative for sore throat. negative for trouble swallowing.   Lungs: negative for shortness of breath.negative for wheezing. positive  for sputum production.   Cardiovascular: negative for chest pain. negative for swelling of ankles. negative for fast or irregular heartbeat.   Gastrointestinal: negative for nausea. negative for heartburn. negative for acid reflux.   Musculoskeletal: negative for joint pain. negative for joint stiffness. negative for joint swelling.   Neurologic: negative for seizures. negative for fainting. negative for weakness.   Psychiatric: negative for changes in mood. negative for anxiety.   Endocrine: negative for cold intolerance. negative for heat intolerance. negative for tremors.   Lymphatic: negative for lower extremity swelling. negative for lymph node swelling.   Hematologic: negative for easy bruising. negative for easy bleeding.  Integumentary: negative for rash. negative for scaling. negative for nail changes.       Current Outpatient Medications:      ACIDOPHILUS OR TABS, 1 tablet daily, Disp:  , Rfl:      albuterol (PROAIR HFA) 108 (90 Base) MCG/ACT inhaler, Inhale 2 puffs into the lungs every 4 hours as needed for shortness of breath / dyspnea or wheezing, Disp: 1 Inhaler, Rfl: 1     atenolol (TENORMIN) 25 MG tablet, Take 1 tablet (25 mg) by mouth daily, Disp: 90 tablet, Rfl: 3     azelastine-fluticasone (DYMISTA) 137-50 MCG/ACT nasal spray, Spray 1 spray into both nostrils 2 times daily, Disp: 1 Bottle, Rfl: 11     biotin 1000 MCG TABS tablet, Take 1,000 mcg by mouth daily, Disp: , Rfl:      budesonide-formoterol (SYMBICORT) 160-4.5 MCG/ACT Inhaler, Inhale 2 puffs into the lungs 2 times daily, Disp: 1 Inhaler, Rfl: 3     CALCIUM 600+D PO, twice daily, Disp: , Rfl:      cetirizine (ZYRTEC) 10 MG tablet, Take 10 mg by mouth daily., Disp: , Rfl:      Cholecalciferol (VITAMIN D-3 PO), Take 2,000 Int'l Units by mouth daily, Disp: , Rfl:      DAILY MULTIVITAMIN PO, 1 tablet daily, Disp: , Rfl:      doxycycline hyclate (VIBRAMYCIN) 100 MG capsule, Take 1 capsule (100 mg) by mouth 2 times daily for 7 days, Disp: 14 capsule, Rfl: 0     escitalopram (LEXAPRO) 10 MG tablet, Take 1 tablet (10 mg) by mouth daily, Disp: 90 tablet, Rfl: 1     ferrous sulfate (IRON) 325 (65 Fe) MG tablet, Take 325 mg by mouth daily (with breakfast), Disp: , Rfl:      FLAXSEED OIL 1000 MG PO CAPS, 1 tablet daily, Disp: , Rfl:      gabapentin (NEURONTIN) 300 MG capsule, 1 capsule TID with additional 1-2 capsules if headache is severe., Disp: 270 capsule, Rfl: 3     GLUCOSAMINE-CHONDROITIN PO TABS, 1500 mg glucosamine and 1200mg chondroitin daily-2 tablets daily, Disp: , Rfl:      hydrocortisone 2.5 % cream, Apply topically 2 times daily (Patient taking differently: Apply topically 2 times daily as needed ), Disp: 60 g, Rfl: 1     ibuprofen (ADVIL,MOTRIN) 200 MG tablet, take 1 tablet (200 mg) by oral route every 6 hours as needed with food, Disp: , Rfl:      Ketotifen Fumarate (ZADITOR OP), Apply to eye daily as needed, Disp: , Rfl:       lisinopril (ZESTRIL) 40 MG tablet, TAKE 1 TABLET DAILY, Disp: 90 tablet, Rfl: 0     Magnesium 400 MG CAPS, Take by mouth daily, Disp: , Rfl:      metoclopramide (REGLAN) 10 MG tablet, Take 1 tablet (10 mg) by mouth 3 times daily as needed (headache), Disp: 20 tablet, Rfl: 3     montelukast (SINGULAIR) 10 MG tablet, Take 1 tablet (10 mg) by mouth At Bedtime, Disp: 30 tablet, Rfl: 11     naratriptan (AMERGE) 2.5 MG tablet, Take 1 tablet (2.5 mg) by mouth at onset of headache for migraine (repeat in 4 hours if needed) May repeat in 4 hours. Max 2 tablets/24 hours., Disp: 18 tablet, Rfl: 3     pantoprazole (PROTONIX) 40 MG EC tablet, TAKE 1 TABLET DAILY 30 TO 60 MINUTES BEFORE A MEAL, Disp: 90 tablet, Rfl: 4     polyethylene glycol (MIRALAX) powder, Take 1 capful by mouth daily, Disp: , Rfl:      pravastatin (PRAVACHOL) 40 MG tablet, TAKE 1 TABLET DAILY, Disp: 90 tablet, Rfl: 4     rOPINIRole (REQUIP) 0.25 MG tablet, Take 2 tablets (0.5 mg) by mouth At Bedtime, Disp: 60 tablet, Rfl: 11     S-ADENOSYLMETHIONINE 200 MG PO TABS, 2 tablets daily, Disp: , Rfl:      senna (SENOKOT) 8.6 MG tablet, Take 2 tablets by mouth daily, Disp: , Rfl:      spironolactone (ALDACTONE) 25 MG tablet, TAKE ONE-HALF (1/2) TABLET DAILY, Disp: 45 tablet, Rfl: 2     SUMAtriptan (IMITREX) 50 MG tablet, Take 50 mg by mouth at onset of headache , Disp: , Rfl:      triamcinolone (KENALOG) 0.1 % cream, Apply topically 2 times daily (Patient taking differently: Apply topically 2 times daily as needed ), Disp: 60 g, Rfl: 3     TURMERIC PO, Take 2,000 mg by mouth daily, Disp: , Rfl:      vitamin B complex with vitamin C (VITAMIN  B COMPLEX) TABS tablet, Take 3 tablets by mouth daily, Disp: , Rfl:      VITAMIN C 500 MG PO TABS, 2 TABLET DAILY, Disp: , Rfl:      vitamin E (E-400) 400 UNIT capsule, Take one pill by mouth once daily, Disp: , Rfl:   Immunization History   Administered Date(s) Administered     Influenza Quad, Recombinant, p-free (RIV4)  09/10/2019     Influenza Vaccine IM > 6 months Valent IIV4 12/03/2013, 11/16/2015, 10/01/2017, 09/13/2018     TD (ADULT, 7+) 03/06/2008     TDAP Vaccine (Adacel) 08/03/2012     Twinrix A/B 10/12/2017, 12/27/2017, 04/24/2018     Zoster vaccine, live 09/14/2012     Allergies   Allergen Reactions     Levofloxacin Rash     Other reaction(s): Contact Dermatitis     Penicillins Hives and Rash     Confirmed by skin prick testing 7/3/14     Amoxicillin Hives and Rash     Amoxicillin-Pot Clavulanate Rash     Azithromycin Rash     Cephalexin Rash     Clindamycin Rash and Hives     Atorvastatin Other (See Comments)     Felt sick, intolerance     Clindamycin Hcl Hives     Levaquin      tendonitis     Augmentin Rash         EXAM:   Constitutional:  Appears well-developed and well-nourished. No distress.   HEENT:   Head: Normocephalic.   No cobblestoning of posterior oropharynx.   Nasal tissue pink and normal appearing.  No rhinorrhea noted.    Eyes: Conjunctivae are non-erythematous    frontal sinus tenderness to palpation.   Cardiovascular: Normal rate, regular rhythm and normal heart sounds. Exam reveals no gallop and no friction rub.   No murmur heard.  Respiratory: Effort normal and breath sounds normal. No respiratory distress. No wheezes. No rales.   Musculoskeletal: Normal range of motion.   Lymphadenopathy:   No cervical adenopathy.   Neuro: Oriented to person, place, and time.  Skin: Skin is warm and dry. No rash noted.   Psychiatric: Normal mood and affect.     Nursing note and vitals reviewed.      WORKUP:   Spirometry  FVC % pred:67  FEV1 % pred:66  FEV1/FVC % act:76          ASSESSMENT/PLAN:  Problem List Items Addressed This Visit        Respiratory    Moderate persistent asthma without complication - Primary     Flares with URI, cold air and humid air.  Current treatment is Symbicort 160/4.5mcg 2 puff inhaled daily and Singulair 10mg PO daily.  Inability getting a deep breath.  Some coughing with nasal symptoms  recently.  Likely secondary to sinusitis.     ACT 22  Spirometry with restriction.        - Albuterol 2-4 puffs inhaled (use a spacer unless using a Proair Respiclick device) every 4 hours as needed for chest tightness, wheezing, shortness of breath and/or coughing.    - Avoid asthma triggers.  - Chest x-ray.  -Complete pulmonary function studies.  - Symbicort 160/4.5mcg 2 puffs inhaled twice daily with a spacer.   - Singulair 10mg by mouth daily at night.         Relevant Orders    Spirometry, Breathing Capacity (Completed)    CHEST X-RAY 2 VW [[id number]]    Pulmonary Function Test    Acute sinusitis with symptoms > 10 days     Sinus pressure, congestion, postnasal drainage, yellow mucus.  Frontal sinus tenderness to palpation.    -Continue Dymista.  -Doxycycline twice daily for the next 7 days.         Relevant Medications    doxycycline hyclate (VIBRAMYCIN) 100 MG capsule       Circulatory    Hypertension goal BP (blood pressure) < 140/90     Miri to follow up with Primary Care provider regarding elevated blood pressure.              Infectious/Inflammatory    Rhinoconjunctivitis     History of perennial nasal and ocular symptoms that get worse in the spring and fall.  atrovent initially thought to be beneficial she no longer thinks is helpful. Flonase has been helpful.    Tried on Dymista and somewhat beneficial.  Negative allergy testing.  Increased symptoms around cats.  Zyrtec is helpful.  Singulair is helpful.   Either vasomotor rhinitis versus local allergic rhinitis. Follows with ENT.  Recently with colored mucus, sinus pressure, postnasal drainage and congestion.  Symptoms over the last 14 days.      Allergy skin testing was negative.      - Dymista 1 spray/nostril twice daily.  - Zyrtec 10 to 20 mg as needed.   - Singulair 10mg by mouth daily at night.   -Treating sinusitis as noted.               Chart documentation with Dragon Voice recognition Software. Although reviewed after completion, some words and  grammatical errors may remain.    Nish Stockton DO FAAAAI  Allergy/Immunology  [facility] and [place]      Again, thank you for allowing me to participate in the care of your patient.        Sincerely,        Nish Stockton, DO

## 2020-03-13 ASSESSMENT — ASTHMA QUESTIONNAIRES: ACT_TOTALSCORE: 22

## 2020-04-07 ENCOUNTER — MYC MEDICAL ADVICE (OUTPATIENT)
Dept: ALLERGY | Facility: CLINIC | Age: 65
End: 2020-04-07

## 2020-04-08 ENCOUNTER — VIRTUAL VISIT (OUTPATIENT)
Dept: PSYCHOLOGY | Facility: CLINIC | Age: 65
End: 2020-04-08
Payer: COMMERCIAL

## 2020-04-08 DIAGNOSIS — F34.1 PERSISTENT DEPRESSIVE DISORDER: ICD-10-CM

## 2020-04-08 DIAGNOSIS — F41.1 GENERALIZED ANXIETY DISORDER: Primary | ICD-10-CM

## 2020-04-08 PROCEDURE — 90832 PSYTX W PT 30 MINUTES: CPT | Mod: 95 | Performed by: MARRIAGE & FAMILY THERAPIST

## 2020-04-08 NOTE — PROGRESS NOTES
"                                           Progress Note    Patient Name: Miri Naylor  Date: 4/8/20         Service Type: Individual  Video Visit: No     Session Start Time: 11:00  Session End Time: 11:25     Session Length: 16-37    Session #: 17    Attendees: Client attended alone    The patient has been notified of the following:      \"We have found that certain health care needs can be provided without the need for a face to face visit.  This service lets us provide the care you need with a phone conversation.       I will have full access to your [facility] medical record during this entire phone call.   I will be taking notes for your medical record.      Since this is like an office visit, we will bill your insurance company for this service.       There are potential benefits and risks of telephone visits (e.g. limits to patient confidentiality) that differ from in-person visits.?  Confidentiality still applies for telephone services, and nobody will record the visit.  It is important to be in a quiet, private space that is free of distractions (including cell phone or other devices) during the visit.??      If during the course of the call I believe a telephone visit is not appropriate, you will not be charged for this service\"     Consent has been obtained for this service by care team member: Yes     Treatment Plan Last Reviewed: 4/8/20, next due 7/8/20.  PHQ-9 / MELISA-7 : completed.    DATA  Interactive Complexity: No  Crisis: No       Progress Since Last Session (Related to Symptoms / Goals / Homework):   Symptoms: Client reported experiencing anxiety.    Homework: Partially completed       Episode of Care Goals: Minimal progress - ACTION (Actively working towards change); Intervened by reinforcing change plan / affirming steps taken     Current / Ongoing Stressors and Concerns:   Client reported experiencing anxiety related to coronavirus and its effects.  Client reported that she has been limited " in her activities, and experiencing continued conflict with her spouse.  Client reported wanting to coordinate therapeutic efforts with her spouse's individual therapist (with whom he just stared working).     Treatment Objective(s) Addressed in This Session:   use cognitive strategies identified in therapy to challenge anxious thoughts      Intervention:   CBT: reviewed with client focusing on what she can do/control and letting go of things outside of her control.  Motivational Interviewing: used circular/Socratic questioning to elicit client's identification of her desired continued therapy goals.        ASSESSMENT: Current Emotional / Mental Status (status of significant symptoms):   Risk status (Self / Other harm or suicidal ideation)   Patient denies current fears or concerns for personal safety.   Patient denies current or recent suicidal ideation or behaviors.   Patientdenies current or recent homicidal ideation or behaviors.   Patient denies current or recent self injurious behavior or ideation.   Patient denies other safety concerns.   Patient Patient reports there has been no change in risk factors since their last session.     PatientPatient reports there has been no change in protective factors since their last session.     Recommended that patient call 911 or go to the local ED should there be a change in any of these risk factors.     Appearance:   unable to assess (phone visit)    Eye Contact:   unable to assess (phone visit)    Psychomotor Behavior: unable to assess (phone visit)    Attitude:   Cooperative    Orientation:   All   Speech    Rate / Production: Normal     Volume:  Normal    Mood:    Anxious  Depressed  Normal   Affect:    unable to assess (phone visit)    Thought Content:  Clear    Thought Form:  Coherent  Logical    Insight:    Good      Medication Review:   No current psychiatric medications prescribed     Medication Compliance:   NA     Changes in Health Issues:   None  reported     Chemical Use Review:   Substance Use: Chemical use reviewed, no active concerns identified      Tobacco Use: No current tobacco use.      Diagnosis:  1. Generalized anxiety disorder    2. Persistent depressive disorder        Collateral Reports Completed:   Not Applicable    PLAN: (Patient Tasks / Therapist Tasks / Other)  Client agreed to work on focusing and what she can do/control and letting go of things outside of her control, and signing release for therapy coordination.        Loki Mancerapearl Crowe, LMFT                                                         ______________________________________________________________________    Treatment Plan    Patient's Name: Miri Naylor  YOB: 1955    Date: 4/8/20    DSM5 Diagnoses: 300.4 (F34.1) Persistent Depressive Disorder, Early onset or 300.02 (F41.1) Generalized Anxiety Disorder  Psychosocial / Contextual Factors: marital conflict, history of loss/trauma  WHODAS: 30    Referral / Collaboration:  Referral to another professional/service is not indicated at this time..    Anticipated number of session or this episode of care: 21-30      MeasurableTreatment Goal(s) related to diagnosis / functional impairment(s)  Goal 1: Client will successfully process through past trauma defined as reporting 0 Subjective Units of Distress related to trauma on 0-10 scale and 7 on Validity of (positive) Cognition scale about self on 1-7 scale   I will know I've met my goal when I am less impacted by my past loss/trauma.       Objective #A (Client Action)    Status: Conitnued - Date: 4/8/20      Client will identify past traumatic events/memories which are causing current distress.     Intervention(s)  Therapist will take client's history and facilitate client's identification of targets for EMDR.     Objective #B  Client will complete needed assessment(s) and [facility] EMDR resourcing to confirm readiness for EMDR.    Status: Continued - Date:  4/8/20      Intervention(s)  Therapist will administer Dissociative Experiences Scale, Multidimensional Inventory of Dissociation as needed and complete [facility] EMDR resourcing with client.     Objective #C  Client will engage in installing at least 2 EMDR resources.  Status: Continued - Date: 4/8/20      Intervention(s)  Therapist will complete EMDR resourcing (Container, Remote Control, grounding/progressive muscle relaxation, Light Stream, Inner Advisor) with client.     Objective #D (Client Action)    Status: Continued - Date: 4/8/20      Client will engage in reprocessing all past traumatic event/memory targets.     Intervention(s)   Therapist will complete EMDR reprocessing with client.    Goal 2:  Client will improve overall baseline mood regulation by 2 points on a 1-10 Likert scale per self-report (10 = optimal mood/regulation).    I will know I've met my goal when I feel better/less depressed overall.      Objective #A (Client Action)    Status: Continued - Date:  4/8/20    Client will Identify negative self-talk and behaviors: challenge core beliefs, myths, and actions.    Intervention(s)  Therapist will teach emotional regulation skills. CBT/REBT ABCD model.    Objective #B  Client will Increase interest, engagement, and pleasure in doing things  Decrease frequency and intensity of feeling down, depressed, hopeless  Improve concentration, focus, and mindfulness in daily activities .    Status: Continued - Date:  4/8/20    Intervention(s)  Therapist will teach emotional regulation skills. DBT Core Mindfulness, Distress Tolerance, Emotion Regulation, and Interpersonal Effetiveness skills.    Patient has reviewed and agreed to the above plan.      Loki Crowe, LM  April 8, 2020

## 2020-04-15 ENCOUNTER — VIRTUAL VISIT (OUTPATIENT)
Dept: PSYCHOLOGY | Facility: CLINIC | Age: 65
End: 2020-04-15
Payer: COMMERCIAL

## 2020-04-15 DIAGNOSIS — F41.1 GENERALIZED ANXIETY DISORDER: Primary | ICD-10-CM

## 2020-04-15 DIAGNOSIS — F34.1 PERSISTENT DEPRESSIVE DISORDER: ICD-10-CM

## 2020-04-15 PROCEDURE — 90834 PSYTX W PT 45 MINUTES: CPT | Mod: 95 | Performed by: MARRIAGE & FAMILY THERAPIST

## 2020-04-15 NOTE — PROGRESS NOTES
Progress Note    Patient Name: Miri Naylor  Date: 4/15/20         Service Type: Individual      Session Start Time: 7:04  Session End Time: 7:56     Session Length: 38-52    Session #: 18    Attendees: Client attended alone    Telemedicine Visit: The patient's condition can be safely assessed and treated via synchronous audio and visual telemedicine encounter.      Reason for Telemedicine Visit: Services only offered telehealth    Originating Site (Patient Location): Patient's home    Distant Site (Provider Location): Provider Remote Setting    Consent:  The patient/guardian has verbally consented to: the potential risks and benefits of telemedicine (video visit) versus in person care; bill my insurance or make self-payment for services provided; and responsibility for payment of non-covered services.     Mode of Communication:  Video Conference via [email or web address]    As the provider I attest to compliance with applicable laws and regulations related to telemedicine.     Treatment Plan Last Reviewed: 4/8/20, next due 7/8/20.  PHQ-9 / MELISA-7 : PHQ-9 completed, MELISA-7 not completed.    DATA  Interactive Complexity: No  Crisis: No       Progress Since Last Session (Related to Symptoms / Goals / Homework):   Symptoms: Client reported experiencing mild depression and anxiety.    Homework: Partially completed       Episode of Care Goals: Minimal progress - ACTION (Actively working towards change); Intervened by reinforcing change plan / affirming steps taken     Current / Ongoing Stressors and Concerns:   Client reported experiencing anxiety related to coronavirus and its effects, including experiencing continued conflict with her spouse in quarantine.  Client again reported wanting to coordinate therapeutic efforts with her spouse's individual therapist, and feeling pessimistic about this as she believes that her spouse will be time-limited in his therapy and will have  unrealistic expectations.  Client reported that she has been avoiding her spouse and communication between them is minimal, which she recognizes will not solve their issues.     Treatment Objective(s) Addressed in This Session:   learn & utilize at least some assertive communication skills weekly      Intervention:   Interpersonal Therapy: reviewed with client assertively/directly communicating her needs to her spouse and practicing active/reflective listening to ascertain his needs and respond accordingly.        ASSESSMENT: Current Emotional / Mental Status (status of significant symptoms):   Risk status (Self / Other harm or suicidal ideation)   Patient denies current fears or concerns for personal safety.   Patient denies current or recent suicidal ideation or behaviors.   Patientdenies current or recent homicidal ideation or behaviors.   Patient denies current or recent self injurious behavior or ideation.   Patient denies other safety concerns.   Patient Patient reports there has been no change in risk factors since their last session.     PatientPatient reports there has been no change in protective factors since their last session.     Recommended that patient call 911 or go to the local ED should there be a change in any of these risk factors.     Appearance:   Appropriate    Eye Contact:   Good    Psychomotor Behavior: Normal    Attitude:   Cooperative    Orientation:   All   Speech    Rate / Production: Normal     Volume:  Normal    Mood:    Anxious  Depressed  Normal   Affect:    Appropriate  Worrisome    Thought Content:  Clear    Thought Form:  Coherent  Logical    Insight:    Good  and Intellectual Insight     Medication Review:   No current psychiatric medications prescribed     Medication Compliance:   NA     Changes in Health Issues:   None reported     Chemical Use Review:   Substance Use: Chemical use reviewed, no active concerns identified      Tobacco Use: No current tobacco use.       Diagnosis:  1. Generalized anxiety disorder    2. Persistent depressive disorder        Collateral Reports Completed:   Not Applicable    PLAN: (Patient Tasks / Therapist Tasks / Other)  Client agreed to work on assertively/directly communicating her relationship needs to her spouse and using active/reflective listening to ascertain his relationship needs/wants and respond accordingly/effectively.        Loki Barron Amrita, LMFT                                                         ______________________________________________________________________    Treatment Plan    Patient's Name: Miri Naylor  YOB: 1955    Date: 4/8/20    DSM5 Diagnoses: 300.4 (F34.1) Persistent Depressive Disorder, Early onset or 300.02 (F41.1) Generalized Anxiety Disorder  Psychosocial / Contextual Factors: marital conflict, history of loss/trauma  WHODAS: 29    Referral / Collaboration:  Referral to another professional/service is not indicated at this time..    Anticipated number of session or this episode of care: 21-30      MeasurableTreatment Goal(s) related to diagnosis / functional impairment(s)  Goal 1: Client will successfully process through past trauma defined as reporting 0 Subjective Units of Distress related to trauma on 0-10 scale and 7 on Validity of (positive) Cognition scale about self on 1-7 scale   I will know I've met my goal when I am less impacted by my past loss/trauma.       Objective #A (Client Action)    Status: Conitnued - Date: 4/8/20      Client will identify past traumatic events/memories which are causing current distress.     Intervention(s)  Therapist will take client's history and facilitate client's identification of targets for EMDR.     Objective #B  Client will complete needed assessment(s) and [facility] resourcing to confirm readiness for EMDR.    Status: Continued - Date: 4/8/20      Intervention(s)  Therapist will administer Dissociative Experiences Scale,  Multidimensional Inventory of Dissociation as needed and complete [facility] EM[facility] resourcing with client.     Objective #C  Client will engage in installing at least 2 EMDR resources.  Status: Continued - Date: 4/8/20      Intervention(s)  Therapist will complete EMDR resourcing (Container, Remote Control, grounding/progressive muscle relaxation, Light Stream, Inner Advisor) with client.     Objective #D (Client Action)    Status: Continued - Date: 4/8/20      Client will engage in reprocessing all past traumatic event/memory targets.     Intervention(s)   Therapist will complete EMDR reprocessing with client.    Goal 2:  Client will improve overall baseline mood regulation by 2 points on a 1-10 Likert scale per self-report (10 = optimal mood/regulation).    I will know I've met my goal when I feel better/less depressed overall.      Objective #A (Client Action)    Status: Continued - Date:  4/8/20    Client will Identify negative self-talk and behaviors: challenge core beliefs, myths, and actions.    Intervention(s)  Therapist will teach emotional regulation skills. CBT/REBT ABCD model.    Objective #B  Client will Increase interest, engagement, and pleasure in doing things  Decrease frequency and intensity of feeling down, depressed, hopeless  Improve concentration, focus, and mindfulness in daily activities .    Status: Continued - Date:  4/8/20    Intervention(s)  Therapist will teach emotional regulation skills. DBT Core Mindfulness, Distress Tolerance, Emotion Regulation, and Interpersonal Effetiveness skills.    Patient has reviewed and agreed to the above plan.      Loki Crowe, LMFT  April 15, 2020

## 2020-04-16 ASSESSMENT — PATIENT HEALTH QUESTIONNAIRE - PHQ9: SUM OF ALL RESPONSES TO PHQ QUESTIONS 1-9: 7

## 2020-04-19 DIAGNOSIS — I10 ESSENTIAL HYPERTENSION: ICD-10-CM

## 2020-04-19 DIAGNOSIS — F32.5 MAJOR DEPRESSION IN COMPLETE REMISSION (H): ICD-10-CM

## 2020-04-21 RX ORDER — SPIRONOLACTONE 25 MG/1
TABLET ORAL
Qty: 45 TABLET | Refills: 0 | Status: SHIPPED | OUTPATIENT
Start: 2020-04-21 | End: 2020-07-21

## 2020-04-21 RX ORDER — ESCITALOPRAM OXALATE 10 MG/1
TABLET ORAL
Qty: 90 TABLET | Refills: 0 | Status: SHIPPED | OUTPATIENT
Start: 2020-04-21 | End: 2020-05-19

## 2020-04-21 NOTE — TELEPHONE ENCOUNTER
"Routing refill request for escitalopram to provider for review/approval because: Patient needs to be seen because:  Last OV > 6 months ago  PHQ-9 score > 5    Routing refill request for spironolactone to provider for review/approval because: Last recorded BP not at goal      Requested Prescriptions   Pending Prescriptions Disp Refills     escitalopram (LEXAPRO) 10 MG tablet [Pharmacy Med Name: ESCITALOPRAM TABS 10MG] 90 tablet 3     Sig: TAKE 1 TABLET DAILY       SSRIs Protocol Failed - 4/19/2020  6:25 AM        Failed - PHQ-9 score less than 5 in past 6 months     Please review last PHQ-9 score.           Failed - Recent (6 mo) or future (30 days) visit within the authorizing provider's specialty     Patient had office visit in the last 6 months or has a visit in the next 30 days with authorizing provider or within the authorizing provider's specialty.  See \"Patient Info\" tab in inbasket, or \"Choose Columns\" in Meds & Orders section of the refill encounter.            Passed - Medication is active on med list        Passed - Patient is age 18 or older        Passed - No active pregnancy on record        Passed - No positive pregnancy test in last 12 months           spironolactone (ALDACTONE) 25 MG tablet [Pharmacy Med Name: SPIRONOLACTONE TABS 25MG] 45 tablet 3     Sig: TAKE ONE-HALF (1/2) TABLET DAILY       Diuretics (Including Combos) Protocol Failed - 4/19/2020  6:25 AM        Failed - Blood pressure under 140/90 in past 12 months     BP Readings from Last 3 Encounters:   03/12/20 (!) 147/80   02/13/20 127/78   01/14/20 133/80                 Passed - Recent (12 mo) or future (30 days) visit within the authorizing provider's specialty     Patient has had an office visit with the authorizing provider or a provider within the authorizing providers department within the previous 12 mos or has a future within next 30 days. See \"Patient Info\" tab in inbasket, or \"Choose Columns\" in Meds & Orders section of the " refill encounter.              Passed - Medication is active on med list        Passed - Patient is age 18 or older        Passed - No active pregancy on record        Passed - Normal serum creatinine on file in past 12 months     Recent Labs   Lab Test 09/10/19  1616   CR 1.00              Passed - Normal serum potassium on file in past 12 months     Recent Labs   Lab Test 09/10/19  1616   POTASSIUM 4.3                    Passed - Normal serum sodium on file in past 12 months     Recent Labs   Lab Test 09/10/19  1616                 Passed - No positive pregnancy test in past 12 months           escitalopram (LEXAPRO) 10 MG tablet   Last Written Prescription Date:  9/10/19  Last Fill Quantity: 90,  # refills: 1   Last office visit: 9/10/2019 with prescribing provider:  Barney DELVALLE   Future Office Visit:    PHQ 2/26/2020 3/4/2020 4/15/2020   PHQ-9 Total Score 8 8 7   Q9: Thoughts of better off dead/self-harm past 2 weeks Not at all Not at all Not at all     spironolactone (ALDACTONE) 25 MG tablet   Last Written Prescription Date:  7/23/19  Last Fill Quantity: 45,  # refills: 2   Last office visit: 9/10/2019 with prescribing provider:  DO Barney   Future Office Visit:      Eliana ANDERSON RN, BSN

## 2020-04-28 DIAGNOSIS — J45.40 MODERATE PERSISTENT ASTHMA WITHOUT COMPLICATION: ICD-10-CM

## 2020-04-28 RX ORDER — BUDESONIDE AND FORMOTEROL FUMARATE DIHYDRATE 160; 4.5 UG/1; UG/1
2 AEROSOL RESPIRATORY (INHALATION) 2 TIMES DAILY
Qty: 3 INHALER | Refills: 0 | Status: SHIPPED | OUTPATIENT
Start: 2020-04-28 | End: 2020-07-28

## 2020-04-29 ENCOUNTER — VIRTUAL VISIT (OUTPATIENT)
Dept: PSYCHOLOGY | Facility: CLINIC | Age: 65
End: 2020-04-29
Payer: COMMERCIAL

## 2020-04-29 DIAGNOSIS — F34.1 PERSISTENT DEPRESSIVE DISORDER: Primary | ICD-10-CM

## 2020-04-29 DIAGNOSIS — F41.1 GENERALIZED ANXIETY DISORDER: ICD-10-CM

## 2020-04-29 PROCEDURE — 90834 PSYTX W PT 45 MINUTES: CPT | Mod: 95 | Performed by: MARRIAGE & FAMILY THERAPIST

## 2020-04-29 NOTE — PROGRESS NOTES
"                                           Progress Note    Patient Name: Miri Naylor  Date: 4/29/20         Service Type: Individual      Session Start Time: 1:00  Session End Time: 1:52     Session Length: 52    Session #: 19    Attendees: Client attended alone    Telemedicine Visit: The patient's condition can be safely assessed and treated via synchronous audio and visual telemedicine encounter.      Reason for Telemedicine Visit: Services only offered telehealth    Originating Site (Patient Location): Patient's home    Distant Site (Provider Location): Provider Remote Setting    Consent:  The patient/guardian has verbally consented to: the potential risks and benefits of telemedicine (video visit) versus in person care; bill my insurance or make self-payment for services provided; and responsibility for payment of non-covered services.     Mode of Communication:  Video Conference via [employer]    As the provider I attest to compliance with applicable laws and regulations related to telemedicine.     Treatment Plan Last Reviewed: 4/8/20, next due 7/8/20.  PHQ-9 / MELISA-7 : not completed.    DATA  Interactive Complexity: No  Crisis: No       Progress Since Last Session (Related to Symptoms / Goals / Homework):   Symptoms: Client reported continuing to experience depression and anxiety.    Homework: Partially completed       Episode of Care Goals: Minimal progress - ACTION (Actively working towards change); Intervened by reinforcing change plan / affirming steps taken     Current / Ongoing Stressors and Concerns:   Client reported experiencing depression/anxiety related to coronavirus and its effects, due to being in continued quarantine.  Client again reported wanting to coordinate therapeutic efforts with her spouse's individual therapist, and feeling pessimistic about this due to her spouse's \"magical thinking\" about a quick resolution.  Client reported struggling with general malaise due to not having " "much to look forward to--\"the only thing keeping me sane is taking care of my grandchildren.\"     Treatment Objective(s) Addressed in This Session:   use cognitive strategies identified in therapy to challenge anxious thoughts  Increase interest, engagement, and pleasure in doing things  Decrease frequency and intensity of feeling down, depressed, hopeless  Identify negative self-talk and behaviors: challenge core beliefs, myths, and actions      Intervention:   CBT: reviewed with client focusing on what she can do/control, letting go of things outside of her control, and engaging in enjoyable activities.        ASSESSMENT: Current Emotional / Mental Status (status of significant symptoms):   Risk status (Self / Other harm or suicidal ideation)   Patient denies current fears or concerns for personal safety.   Patient denies current or recent suicidal ideation or behaviors.   Patientdenies current or recent homicidal ideation or behaviors.   Patient denies current or recent self injurious behavior or ideation.   Patient denies other safety concerns.   Patient Patient reports there has been no change in risk factors since their last session.     PatientPatient reports there has been no change in protective factors since their last session.     Recommended that patient call 911 or go to the local ED should there be a change in any of these risk factors.     Appearance:   Appropriate    Eye Contact:   Good    Psychomotor Behavior: Normal    Attitude:   Cooperative    Orientation:   All   Speech    Rate / Production: Normal     Volume:  Normal    Mood:    Anxious  Depressed  Normal   Affect:    Appropriate  Blunted  Worrisome    Thought Content:  Clear    Thought Form:  Coherent  Logical    Insight:    Fair  and Intellectual Insight     Medication Review:   No current psychiatric medications prescribed     Medication Compliance:   NA     Changes in Health Issues:   None reported     Chemical Use Review:   Substance Use: " Chemical use reviewed, no active concerns identified      Tobacco Use: No current tobacco use.      Diagnosis:  1. Persistent depressive disorder    2. Generalized anxiety disorder        Collateral Reports Completed:   Not Applicable    PLAN: (Patient Tasks / Therapist Tasks / Other)  Client agreed to work on focusing on what she can do in her control, letting go of things outside of her control, engaging in enjoyable activities, and completing Release of Information form for this therapist to consult with her spouse's therapist.        Loki Anderson Crowe, LMFT                                                         ______________________________________________________________________    Treatment Plan    Patient's Name: Miri Naylor  YOB: 1955    Date: 4/8/20    DSM5 Diagnoses: 300.4 (F34.1) Persistent Depressive Disorder, Early onset or 300.02 (F41.1) Generalized Anxiety Disorder  Psychosocial / Contextual Factors: marital conflict, history of loss/trauma  WHODAS: 29    Referral / Collaboration:  Referral to another professional/service is not indicated at this time..    Anticipated number of session or this episode of care: 21-30      MeasurableTreatment Goal(s) related to diagnosis / functional impairment(s)  Goal 1: Client will successfully process through past trauma defined as reporting 0 Subjective Units of Distress related to trauma on 0-10 scale and 7 on Validity of (positive) Cognition scale about self on 1-7 scale   I will know I've met my goal when I am less impacted by my past loss/trauma.       Objective #A (Client Action)    Status: Conitnued - Date: 4/8/20      Client will identify past traumatic events/memories which are causing current distress.     Intervention(s)  Therapist will take client's history and facilitate client's identification of targets for EMDR.     Objective #B  Client will complete needed assessment(s) and [facility] EM[facility] resourcing to confirm readiness  for EMDR.    Status: Continued - Date: 4/8/20      Intervention(s)  Therapist will administer Dissociative Experiences Scale, Multidimensional Inventory of Dissociation as needed and complete [facility] EMDR resourcing with client.     Objective #C  Client will engage in installing at least 2 EMDR resources.  Status: Continued - Date: 4/8/20      Intervention(s)  Therapist will complete EMDR resourcing (Container, Remote Control, grounding/progressive muscle relaxation, Light Stream, Inner Advisor) with client.     Objective #D (Client Action)    Status: Continued - Date: 4/8/20      Client will engage in reprocessing all past traumatic event/memory targets.     Intervention(s)   Therapist will complete EMDR reprocessing with client.    Goal 2:  Client will improve overall baseline mood regulation by 2 points on a 1-10 Likert scale per self-report (10 = optimal mood/regulation).    I will know I've met my goal when I feel better/less depressed overall.      Objective #A (Client Action)    Status: Continued - Date:  4/8/20    Client will Identify negative self-talk and behaviors: challenge core beliefs, myths, and actions.    Intervention(s)  Therapist will teach emotional regulation skills. CBT/REBT ABCD model.    Objective #B  Client will Increase interest, engagement, and pleasure in doing things  Decrease frequency and intensity of feeling down, depressed, hopeless  Improve concentration, focus, and mindfulness in daily activities .    Status: Continued - Date:  4/8/20    Intervention(s)  Therapist will teach emotional regulation skills. DBT Core Mindfulness, Distress Tolerance, Emotion Regulation, and Interpersonal Effetiveness skills.    Patient has reviewed and agreed to the above plan.      Loki Crowe, LM  April 29, 2020

## 2020-05-06 ENCOUNTER — VIRTUAL VISIT (OUTPATIENT)
Dept: PSYCHOLOGY | Facility: CLINIC | Age: 65
End: 2020-05-06
Payer: COMMERCIAL

## 2020-05-06 DIAGNOSIS — F41.1 GENERALIZED ANXIETY DISORDER: Primary | ICD-10-CM

## 2020-05-06 DIAGNOSIS — F34.1 PERSISTENT DEPRESSIVE DISORDER: ICD-10-CM

## 2020-05-06 PROCEDURE — 90834 PSYTX W PT 45 MINUTES: CPT | Mod: 95 | Performed by: MARRIAGE & FAMILY THERAPIST

## 2020-05-06 NOTE — PROGRESS NOTES
Progress Note    Patient Name: Miri Naylor  Date: 5/6/20         Service Type: Individual      Session Start Time: 11:00  Session End Time: 11:52     Session Length: 52    Session #: 20    Attendees: Client attended alone    Telemedicine Visit: The patient's condition can be safely assessed and treated via synchronous audio and visual telemedicine encounter.      Reason for Telemedicine Visit: Services only offered telehealth    Originating Site (Patient Location): Patient's home    Distant Site (Provider Location): Provider Remote Setting    Consent:  The patient/guardian has verbally consented to: the potential risks and benefits of telemedicine (video visit) versus in person care; bill my insurance or make self-payment for services provided; and responsibility for payment of non-covered services.     Mode of Communication:  Video Conference via [employer]    As the provider I attest to compliance with applicable laws and regulations related to telemedicine.     Treatment Plan Last Reviewed: 4/8/20, next due 7/8/20.  PHQ-9 / MELISA-7 : not completed.    DATA  Interactive Complexity: No  Crisis: No       Progress Since Last Session (Related to Symptoms / Goals / Homework):   Symptoms: Client reported continuing to experience depression and anxiety.    Homework: Partially completed       Episode of Care Goals: Minimal progress - ACTION (Actively working towards change); Intervened by reinforcing change plan / affirming steps taken     Current / Ongoing Stressors and Concerns:   Client reported experiencing continued depression/anxiety related to coronavirus and its effects and ongoing relationship issues with her spouse.  Client again reported that she completed and sent in Release of Information form to coordinate therapeutic efforts with her spouse's individual therapist, and and continuing feel pessimistic about her spouse's and his therapist's beliefs that their  issues can be resolved in 12 sessions.  Client reported identifying that her negative thought patterns contribute to her marital conflict.     Treatment Objective(s) Addressed in This Session:   use cognitive strategies identified in therapy to challenge anxious thoughts  Decrease frequency and intensity of feeling down, depressed, hopeless  Identify negative self-talk and behaviors: challenge core beliefs, myths, and actions      Intervention:   CBT: re-reviewed with client focusing on what she can do/control, letting go of things outside of her control, and challenging her negative thought processes, increasing focus on positive.  Interpersonal Therapy: reviewed with client balancing not wanting to micromanage her spouse's therapy and change process with assertively/directly communicating about her thoughts/values/needs/wants.        ASSESSMENT: Current Emotional / Mental Status (status of significant symptoms):   Risk status (Self / Other harm or suicidal ideation)   Patient denies current fears or concerns for personal safety.   Patient denies current or recent suicidal ideation or behaviors.   Patientdenies current or recent homicidal ideation or behaviors.   Patient denies current or recent self injurious behavior or ideation.   Patient denies other safety concerns.   Patient Patient reports there has been no change in risk factors since their last session.     PatientPatient reports there has been no change in protective factors since their last session.     Recommended that patient call 911 or go to the local ED should there be a change in any of these risk factors.     Appearance:   Appropriate    Eye Contact:   Good    Psychomotor Behavior: Normal    Attitude:   Cooperative    Orientation:   All   Speech    Rate / Production: Normal     Volume:  Normal    Mood:    Anxious  Depressed  Normal   Affect:    Appropriate  Blunted  Worrisome    Thought Content:  Clear    Thought Form:  Coherent  Logical     Insight:    Fair  and Intellectual Insight     Medication Review:   No current psychiatric medications prescribed     Medication Compliance:   NA     Changes in Health Issues:   None reported     Chemical Use Review:   Substance Use: Chemical use reviewed, no active concerns identified      Tobacco Use: No current tobacco use.      Diagnosis:  1. Generalized anxiety disorder    2. Persistent depressive disorder        Collateral Reports Completed:   Not Applicable    PLAN: (Patient Tasks / Therapist Tasks / Other)  Client agreed to continue to work on focusing on what she can do in her control, letting go of things outside of her control, challenging her negative thought processes/focusing on positives, and balancing her goals of not micro-managing her spouse's therapy/change process with assertively/directly communicating her thoughts/values/needs/wants.        Loki Anderson Crowe, LMFT 5/6/2020                                                          ______________________________________________________________________    Treatment Plan    Patient's Name: Miri Naylor  YOB: 1955    Date: 4/8/20    DSM5 Diagnoses: 300.4 (F34.1) Persistent Depressive Disorder, Early onset or 300.02 (F41.1) Generalized Anxiety Disorder  Psychosocial / Contextual Factors: marital conflict, history of loss/trauma  WHODAS: 29    Referral / Collaboration:  Referral to another professional/service is not indicated at this time..    Anticipated number of session or this episode of care: 21-30      MeasurableTreatment Goal(s) related to diagnosis / functional impairment(s)  Goal 1: Client will successfully process through past trauma defined as reporting 0 Subjective Units of Distress related to trauma on 0-10 scale and 7 on Validity of (positive) Cognition scale about self on 1-7 scale   I will know I've met my goal when I am less impacted by my past loss/trauma.       Objective #A (Client Action)    Status:  Conitnued - Date: 4/8/20      Client will identify past traumatic events/memories which are causing current distress.     Intervention(s)  Therapist will take client's history and facilitate client's identification of targets for EMDR.     Objective #B  Client will complete needed assessment(s) and [facility] EM[facility] resourcing to confirm readiness for EMDR.    Status: Continued - Date: 4/8/20      Intervention(s)  Therapist will administer Dissociative Experiences Scale, Multidimensional Inventory of Dissociation as needed and complete [facility] EMDR resourcing with client.     Objective #C  Client will engage in installing at least 2 EMDR resources.  Status: Continued - Date: 4/8/20      Intervention(s)  Therapist will complete EMDR resourcing (Container, Remote Control, grounding/progressive muscle relaxation, Light Stream, Inner Advisor) with client.     Objective #D (Client Action)    Status: Continued - Date: 4/8/20      Client will engage in reprocessing all past traumatic event/memory targets.     Intervention(s)   Therapist will complete EMDR reprocessing with client.    Goal 2:  Client will improve overall baseline mood regulation by 2 points on a 1-10 Likert scale per self-report (10 = optimal mood/regulation).    I will know I've met my goal when I feel better/less depressed overall.      Objective #A (Client Action)    Status: Continued - Date:  4/8/20    Client will Identify negative self-talk and behaviors: challenge core beliefs, myths, and actions.    Intervention(s)  Therapist will teach emotional regulation skills. CBT/REBT ABCD model.    Objective #B  Client will Increase interest, engagement, and pleasure in doing things  Decrease frequency and intensity of feeling down, depressed, hopeless  Improve concentration, focus, and mindfulness in daily activities .    Status: Continued - Date:  4/8/20    Intervention(s)  Therapist will teach emotional regulation skills. DBT Core Mindfulness, Distress  Tolerance, Emotion Regulation, and Interpersonal Effetiveness skills.    Patient has reviewed and agreed to the above plan.      Loki Crowe, LMFT  April 8, 2020

## 2020-05-13 ENCOUNTER — VIRTUAL VISIT (OUTPATIENT)
Dept: PSYCHOLOGY | Facility: CLINIC | Age: 65
End: 2020-05-13
Payer: COMMERCIAL

## 2020-05-13 DIAGNOSIS — F41.1 GENERALIZED ANXIETY DISORDER: Primary | ICD-10-CM

## 2020-05-13 DIAGNOSIS — F34.1 PERSISTENT DEPRESSIVE DISORDER: ICD-10-CM

## 2020-05-13 PROCEDURE — 90834 PSYTX W PT 45 MINUTES: CPT | Mod: 95 | Performed by: MARRIAGE & FAMILY THERAPIST

## 2020-05-13 ASSESSMENT — ANXIETY QUESTIONNAIRES
2. NOT BEING ABLE TO STOP OR CONTROL WORRYING: SEVERAL DAYS
6. BECOMING EASILY ANNOYED OR IRRITABLE: SEVERAL DAYS
7. FEELING AFRAID AS IF SOMETHING AWFUL MIGHT HAPPEN: SEVERAL DAYS
1. FEELING NERVOUS, ANXIOUS, OR ON EDGE: SEVERAL DAYS
4. TROUBLE RELAXING: SEVERAL DAYS
3. WORRYING TOO MUCH ABOUT DIFFERENT THINGS: SEVERAL DAYS
GAD7 TOTAL SCORE: 7
7. FEELING AFRAID AS IF SOMETHING AWFUL MIGHT HAPPEN: SEVERAL DAYS
5. BEING SO RESTLESS THAT IT IS HARD TO SIT STILL: SEVERAL DAYS

## 2020-05-13 NOTE — PROGRESS NOTES
Progress Note    Patient Name: Miri Naylor  Date: 5/13/20         Service Type: Individual      Session Start Time: 1:00  Session End Time: 1:52     Session Length: 52    Session #: 21    Attendees: Client attended alone    Telemedicine Visit: The patient's condition can be safely assessed and treated via synchronous audio and visual telemedicine encounter.      Reason for Telemedicine Visit: Services only offered telehealth    Originating Site (Patient Location): Patient's home    Distant Site (Provider Location): Provider Remote Setting    Consent:  The patient/guardian has verbally consented to: the potential risks and benefits of telemedicine (video visit) versus in person care; bill my insurance or make self-payment for services provided; and responsibility for payment of non-covered services.     Mode of Communication:  Video Conference via [employer]    As the provider I attest to compliance with applicable laws and regulations related to telemedicine.     Treatment Plan Last Reviewed: 4/8/20, next due 7/8/20.  PHQ-9 / MELISA-7 : not completed.    DATA  Interactive Complexity: No  Crisis: No       Progress Since Last Session (Related to Symptoms / Goals / Homework):   Symptoms: Client reported continuing to experience significant anxiety and depression.    Homework: Partially completed       Episode of Care Goals: Minimal progress - ACTION (Actively working towards change); Intervened by reinforcing change plan / affirming steps taken     Current / Ongoing Stressors and Concerns:   Client reported experiencing continued anxiety/depression related to her health and ongoing relationship issues with her spouse.  Client reported that she has been experiencing unspecific neurological concerns lately which are adding to her uncertainty and stress level.  Client reported identifying that she doesn't fully trust her spouse.     Treatment Objective(s) Addressed in This  Session:   use at least some coping skills for anxiety management in the next few weeks  Decrease frequency and intensity of feeling down, depressed, hopeless  compile a list of boundaries that they would like to set with others. spouse      Intervention:   DBT: reviewed with client relationship between her physical and mental health concerns, encouraging her to continue to address her physical health concerns.  Interpersonal Therapy: taught/reviewed with client creating hopeless/hopeful relationships characteristics lists, focusing on trust deficit, as a roadmap for relationship improvement.        ASSESSMENT: Current Emotional / Mental Status (status of significant symptoms):   Risk status (Self / Other harm or suicidal ideation)   Patient denies current fears or concerns for personal safety.   Patient denies current or recent suicidal ideation or behaviors.   Patientdenies current or recent homicidal ideation or behaviors.   Patient denies current or recent self injurious behavior or ideation.   Patient denies other safety concerns.   Patient Patient reports there has been no change in risk factors since their last session.     PatientPatient reports there has been no change in protective factors since their last session.     Recommended that patient call 911 or go to the local ED should there be a change in any of these risk factors.     Appearance:   Appropriate    Eye Contact:   Good    Psychomotor Behavior: Normal    Attitude:   Cooperative  Friendly Pleasant Attentive   Orientation:   All   Speech    Rate / Production: Normal     Volume:  Normal    Mood:    Anxious  Depressed  Normal   Affect:    Appropriate  Blunted  Worrisome    Thought Content:  Clear    Thought Form:  Coherent  Logical    Insight:    Fair  and Intellectual Insight     Medication Review:   No current psychiatric medications prescribed     Medication Compliance:   NA     Changes in Health Issues:   None reported     Chemical Use  Review:   Substance Use: Chemical use reviewed, no active concerns identified      Tobacco Use: No current tobacco use.      Diagnosis:  1. Generalized anxiety disorder    2. Persistent depressive disorder        Collateral Reports Completed:   Not Applicable    PLAN: (Patient Tasks / Therapist Tasks / Other)  Client agreed to continue to work on addressing her physical health concerns, and to contemplate hopeless/hopeful relationships characteristics, focusing on her needs for trust-building, as a roadmap for relationship improvement.        Loki Anderson Crowe, LMFT 5/13/2020                                                          ______________________________________________________________________    Treatment Plan    Patient's Name: Miri Naylor  YOB: 1955    Date: 4/8/20    DSM5 Diagnoses: 300.4 (F34.1) Persistent Depressive Disorder, Early onset or 300.02 (F41.1) Generalized Anxiety Disorder  Psychosocial / Contextual Factors: marital conflict, history of loss/trauma  WHODAS: 29    Referral / Collaboration:  Referral to another professional/service is not indicated at this time..    Anticipated number of session or this episode of care: 21-30      MeasurableTreatment Goal(s) related to diagnosis / functional impairment(s)  Goal 1: Client will successfully process through past trauma defined as reporting 0 Subjective Units of Distress related to trauma on 0-10 scale and 7 on Validity of (positive) Cognition scale about self on 1-7 scale   I will know I've met my goal when I am less impacted by my past loss/trauma.       Objective #A (Client Action)    Status: Conitnued - Date: 4/8/20      Client will identify past traumatic events/memories which are causing current distress.     Intervention(s)  Therapist will take client's history and facilitate client's identification of targets for EMDR.     Objective #B  Client will complete needed assessment(s) and [facility] resourcing to  confirm readiness for EMDR.    Status: Continued - Date: 4/8/20      Intervention(s)  Therapist will administer Dissociative Experiences Scale, Multidimensional Inventory of Dissociation as needed and complete [facility] EMDR resourcing with client.     Objective #C  Client will engage in installing at least 2 EMDR resources.  Status: Continued - Date: 4/8/20      Intervention(s)  Therapist will complete EMDR resourcing (Container, Remote Control, grounding/progressive muscle relaxation, Light Stream, Inner Advisor) with client.     Objective #D (Client Action)    Status: Continued - Date: 4/8/20      Client will engage in reprocessing all past traumatic event/memory targets.     Intervention(s)   Therapist will complete EMDR reprocessing with client.    Goal 2:  Client will improve overall baseline mood regulation by 2 points on a 1-10 Likert scale per self-report (10 = optimal mood/regulation).    I will know I've met my goal when I feel better/less depressed overall.      Objective #A (Client Action)    Status: Continued - Date:  4/8/20    Client will Identify negative self-talk and behaviors: challenge core beliefs, myths, and actions.    Intervention(s)  Therapist will teach emotional regulation skills. CBT/REBT ABCD model.    Objective #B  Client will Increase interest, engagement, and pleasure in doing things  Decrease frequency and intensity of feeling down, depressed, hopeless  Improve concentration, focus, and mindfulness in daily activities .    Status: Continued - Date:  4/8/20    Intervention(s)  Therapist will teach emotional regulation skills. DBT Core Mindfulness, Distress Tolerance, Emotion Regulation, and Interpersonal Effetiveness skills.    Patient has reviewed and agreed to the above plan.      Loki Crowe, LMFT  April 8, 2020

## 2020-05-14 ASSESSMENT — ANXIETY QUESTIONNAIRES: GAD7 TOTAL SCORE: 7

## 2020-05-14 ASSESSMENT — PATIENT HEALTH QUESTIONNAIRE - PHQ9: SUM OF ALL RESPONSES TO PHQ QUESTIONS 1-9: 9

## 2020-05-19 ENCOUNTER — VIRTUAL VISIT (OUTPATIENT)
Dept: FAMILY MEDICINE | Facility: CLINIC | Age: 65
End: 2020-05-19
Payer: COMMERCIAL

## 2020-05-19 DIAGNOSIS — F32.5 MAJOR DEPRESSION IN COMPLETE REMISSION (H): ICD-10-CM

## 2020-05-19 DIAGNOSIS — M25.50 POLYARTHRALGIA: ICD-10-CM

## 2020-05-19 DIAGNOSIS — I10 ESSENTIAL HYPERTENSION: Primary | ICD-10-CM

## 2020-05-19 DIAGNOSIS — M79.10 MYALGIA: ICD-10-CM

## 2020-05-19 PROCEDURE — 99214 OFFICE O/P EST MOD 30 MIN: CPT | Mod: 95 | Performed by: FAMILY MEDICINE

## 2020-05-19 RX ORDER — ESCITALOPRAM OXALATE 10 MG/1
10 TABLET ORAL DAILY
Qty: 90 TABLET | Refills: 0 | Status: SHIPPED | OUTPATIENT
Start: 2020-05-19 | End: 2020-09-29

## 2020-05-19 RX ORDER — LISINOPRIL 40 MG/1
40 TABLET ORAL DAILY
Qty: 90 TABLET | Refills: 0 | Status: SHIPPED | OUTPATIENT
Start: 2020-05-19 | End: 2020-08-19

## 2020-05-19 NOTE — PROGRESS NOTES
"Miri Naylor is a 64 year old female who is being evaluated via a billable video visit.      The patient has been notified of following:     \"This video visit will be conducted via a call between you and your physician/provider. We have found that certain health care needs can be provided without the need for an in-person physical exam.  This service lets us provide the care you need with a video conversation.  If a prescription is necessary we can send it directly to your pharmacy.  If lab work is needed we can place an order for that and you can then stop by our lab to have the test done at a later time.    Video visits are billed at different rates depending on your insurance coverage.  Please reach out to your insurance provider with any questions.    If during the course of the call the physician/provider feels a video visit is not appropriate, you will not be charged for this service.\"    Patient has given verbal consent for Video visit? Yes    How would you like to obtain your AVS? Jeff    Patient would like the video invitation sent by: Send to e-mail at: [email or web address]    Will anyone else be joining your video visit? No    Subjective     Miri Naylor is a 64 year old female who presents today via video visit for the following health issues:    HPI  Hypertension Follow-up      Do you check your blood pressure regularly outside of the clinic? No     Are you following a low salt diet? Yes    Are your blood pressures ever more than 140 on the top number (systolic) OR more   than 90 on the bottom number (diastolic), for example 140/90? Not checking    Depression Followup    How are you doing with your depression since your last visit? No change/stable    Are you having other symptoms that might be associated with depression? No    Have you had a significant life event?  No     Are you feeling anxious or having panic attacks?   No    Do you have any concerns with your use of alcohol or " "other drugs? No    Social History     Tobacco Use     Smoking status: Never Smoker     Smokeless tobacco: Never Used   Substance Use Topics     Alcohol use: Yes     Comment: one glass of wine daily     Drug use: No     PHQ 3/4/2020 4/15/2020 5/13/2020   PHQ-9 Total Score 8 7 9   Q9: Thoughts of better off dead/self-harm past 2 weeks Not at all Not at all Not at all     MELISA-7 SCORE 2/5/2020 2/26/2020 5/13/2020   Total Score 7 (mild anxiety) 8 (mild anxiety) 7 (mild anxiety)   Total Score 7 8 7       Suicide Assessment Five-step Evaluation and Treatment (SAFE-T)      How many servings of fruits and vegetables do you eat daily?  4 or more    On average, how many sweetened beverages do you drink each day (Examples: soda, juice, sweet tea, etc.  Do NOT count diet or artificially sweetened beverages)?   0    How many days per week do you exercise enough to make your heart beat faster? 4    How many minutes a day do you exercise enough to make your heart beat faster? 30 - 60    How many days per week do you miss taking your medication? 0    Video Start Time:   10:28 AM      Started visiting with PT due to foot pain which has been helpful.      Since August has had pain in \"all her joints\".  Feels like she has swelling all over as well.  Can't sleep well due to her pain.  Feels things are worsening.    Feels a \"dull throbbing\" pain throughout her body.  Especially in her hands and knees but feels like it \"runs down her legs\".  Ankles ache all the time as well but somewhat better since PT.    Feels \"puffy\" all over but also hands and up her arms are swollen.    Has been doing her PT exercises as well as riding her stationary bike.  Does feel exercise helps.  Legs in particular feel better for a while.    Feels like her leg pain wakes her in the middle of the night.   Will have increased pain in the side she is laying on but generally has pain on both sides.  Also usually has numbness in both hands to the middle of her " "forearm which wakes her and takes some time to resolve.   Numbness has been present since August but worsening since December    Hurts in the legs/hips/shoulders/hands.  No limit in ROM, knees hurt with stairs.    Tries not to \"overuse\" her hands because that can make them hurt more as well.    Has been taking tylenol and ibuprofen.  Usually starts with 1000mg of tylenol at 3PM.  Pain is worse in the evening and overnight.    Has RLS and has been on ropinirole from neurology.  Feel it is helpful but \"wears off\".  Takes t 2 hours before bed and then only gets about 6 hours of sleep before her RLS symptoms wake her.  Not sure if her current leg pain symptoms at night are actually RLS symptoms  Feels like a similar type of discomfort  Has tried higher doses of med without effect.    Reviewed and updated as needed this visit by Provider  Tobacco  Allergies  Meds  Med Hx  Surg Hx  Fam Hx  Soc Hx        Review of Systems   Constitutional, HEENT, cardiovascular, pulmonary, gi and gu systems are negative, except as otherwise noted.      Objective    There were no vitals taken for this visit.  Estimated body mass index is 30.27 kg/m  as calculated from the following:    Height as of 3/12/20: 1.803 m (5' 11\").    Weight as of 3/12/20: 98.4 kg (217 lb).  Physical Exam     GENERAL: Healthy, alert and no distress  EYES: Eyes grossly normal to inspection.  No discharge or erythema, or obvious scleral/conjunctival abnormalities.  RESP: No audible wheeze, cough, or visible cyanosis.  No visible retractions or increased work of breathing.    SKIN: Visible skin clear. No significant rash, abnormal pigmentation or lesions.  NEURO: Cranial nerves grossly intact.  Mentation and speech appropriate for age.  PSYCH: Mentation appears normal, affect normal/bright, judgement and insight intact, normal speech and appearance well-groomed.      Diagnostic Test Results:  Labs reviewed in Epic        A/P:      ICD-10-CM    1. Essential " hypertension  I10 lisinopril (ZESTRIL) 40 MG tablet   2. Major depression in complete remission (H)  F32.5 escitalopram (LEXAPRO) 10 MG tablet   3. Polyarthralgia  M25.50    4. Myalgia  M79.10      HTN:  controlled continue med    Mood:  Adequately controlled per pt.      Pain:  Etiology really unclear, reviewed iht pt will need exam and possibly imaging/blood.  Prefers to wait for my next in office week.  Will contact pt to schedue        Video-Visit Details    Type of service:  Video Visit    Video End Time:10:49 AM    Originating Location (pt. Location): Home    Distant Location (provider location):  [facility]     Platform used for Video Visit:     No follow-ups on file.       Elizabeth Corley DO

## 2020-06-03 ENCOUNTER — VIRTUAL VISIT (OUTPATIENT)
Dept: PSYCHOLOGY | Facility: CLINIC | Age: 65
End: 2020-06-03
Payer: COMMERCIAL

## 2020-06-03 DIAGNOSIS — F34.1 PERSISTENT DEPRESSIVE DISORDER: ICD-10-CM

## 2020-06-03 DIAGNOSIS — F41.1 GENERALIZED ANXIETY DISORDER: Primary | ICD-10-CM

## 2020-06-03 PROCEDURE — 90834 PSYTX W PT 45 MINUTES: CPT | Mod: 95 | Performed by: MARRIAGE & FAMILY THERAPIST

## 2020-06-04 NOTE — PROGRESS NOTES
"                                           Progress Note    Patient Name: Miri Naylor  Date: 6/3/20         Service Type: Individual      Session Start Time: 2:06  Session End Time: 2:58     Session Length: 52    Session #: 22    Attendees: Client attended alone    Telemedicine Visit: The patient's condition can be safely assessed and treated via synchronous audio and visual telemedicine encounter.      Reason for Telemedicine Visit: Services only offered telehealth    Originating Site (Patient Location): Patient's home    Distant Site (Provider Location): Provider Remote Setting    Consent:  The patient/guardian has verbally consented to: the potential risks and benefits of telemedicine (video visit) versus in person care; bill my insurance or make self-payment for services provided; and responsibility for payment of non-covered services.     Mode of Communication:  Video Conference via [employer]    As the provider I attest to compliance with applicable laws and regulations related to telemedicine.     Treatment Plan Last Reviewed: 4/8/20, next due 7/8/20.  PHQ-9 / MELISA-7 : not completed.    DATA  Interactive Complexity: No  Crisis: No       Progress Since Last Session (Related to Symptoms / Goals / Homework):   Symptoms: Client reported continuing to experience significant anxiety and depression.    Homework: Partially completed       Episode of Care Goals: Minimal progress - ACTION (Actively working towards change); Intervened by reinforcing change plan / affirming steps taken     Current / Ongoing Stressors and Concerns:   Client reported experiencing continued anxiety/depression related to her ongoing relationship issues with her spouse and recent death of a man in police custody.  Client reported that she has been struggling with \"White privilege\" and related guilt feelings.  Client reported continuing to feel pessimistic about her spouse's progress in his therapy, her belief that he may only be " "interested in making superficial changes, and not wanting to direct his change process.     Treatment Objective(s) Addressed in This Session:   use cognitive strategies identified in therapy to challenge anxious thoughts  Identify negative self-talk and behaviors: challenge core beliefs, myths, and actions  learn & utilize at least some assertive communication skills weekly      Intervention:   CBT: processed with/validated client regarding her experience of guilt related to \"White privilege.\"  Interpersonal Therapy: reviewed with client identifying and assertively/directly communicating her relationships wants and needs to her spouse to improve relationship.        ASSESSMENT: Current Emotional / Mental Status (status of significant symptoms):   Risk status (Self / Other harm or suicidal ideation)   Patient denies current fears or concerns for personal safety.   Patient denies current or recent suicidal ideation or behaviors.   Patientdenies current or recent homicidal ideation or behaviors.   Patient denies current or recent self injurious behavior or ideation.   Patient denies other safety concerns.   Patient Patient reports there has been no change in risk factors since their last session.     PatientPatient reports there has been no change in protective factors since their last session.     Recommended that patient call 911 or go to the local ED should there be a change in any of these risk factors.     Appearance:   Appropriate    Eye Contact:   Good    Psychomotor Behavior: Normal    Attitude:   Cooperative  Friendly Pleasant Attentive   Orientation:   All   Speech    Rate / Production: Normal     Volume:  Normal    Mood:    Anxious  Depressed  Normal Sad    Affect:    Appropriate  Worrisome    Thought Content:  Clear    Thought Form:  Coherent  Logical    Insight:    Fair  and Intellectual Insight     Medication Review:   No current psychiatric medications prescribed     Medication " Compliance:   NA     Changes in Health Issues:   None reported     Chemical Use Review:   Substance Use: Chemical use reviewed, no active concerns identified      Tobacco Use: No current tobacco use.      Diagnosis:  1. Generalized anxiety disorder    2. Persistent depressive disorder        Collateral Reports Completed:   Not Applicable    PLAN: (Patient Tasks / Therapist Tasks / Other)  Client agreed to work on identifying and assertively/directly communicating her relationship wants and needs to her spouse to facilitate possible relationship improvement.        Loki Barron Amrita, LMFT 6/3/2020                                                          ______________________________________________________________________    Treatment Plan    Patient's Name: Miri Naylor  YOB: 1955    Date: 4/8/20    DSM5 Diagnoses: 300.4 (F34.1) Persistent Depressive Disorder, Early onset or 300.02 (F41.1) Generalized Anxiety Disorder  Psychosocial / Contextual Factors: marital conflict, history of loss/trauma  WHODAS: 29    Referral / Collaboration:  Referral to another professional/service is not indicated at this time..    Anticipated number of session or this episode of care: 21-30      MeasurableTreatment Goal(s) related to diagnosis / functional impairment(s)  Goal 1: Client will successfully process through past trauma defined as reporting 0 Subjective Units of Distress related to trauma on 0-10 scale and 7 on Validity of (positive) Cognition scale about self on 1-7 scale   I will know I've met my goal when I am less impacted by my past loss/trauma.       Objective #A (Client Action)    Status: Conitnued - Date: 4/8/20      Client will identify past traumatic events/memories which are causing current distress.     Intervention(s)  Therapist will take client's history and facilitate client's identification of targets for EMDR.     Objective #B  Client will complete needed assessment(s) and [facility]  EMDR resourcing to confirm readiness for EMDR.    Status: Continued - Date: 4/8/20      Intervention(s)  Therapist will administer Dissociative Experiences Scale, Multidimensional Inventory of Dissociation as needed and complete [facility] EM[facility] resourcing with client.     Objective #C  Client will engage in installing at least 2 EMDR resources.  Status: Continued - Date: 4/8/20      Intervention(s)  Therapist will complete EMDR resourcing (Container, Remote Control, grounding/progressive muscle relaxation, Light Stream, Inner Advisor) with client.     Objective #D (Client Action)    Status: Continued - Date: 4/8/20      Client will engage in reprocessing all past traumatic event/memory targets.     Intervention(s)   Therapist will complete EMDR reprocessing with client.    Goal 2:  Client will improve overall baseline mood regulation by 2 points on a 1-10 Likert scale per self-report (10 = optimal mood/regulation).    I will know I've met my goal when I feel better/less depressed overall.      Objective #A (Client Action)    Status: Continued - Date:  4/8/20    Client will Identify negative self-talk and behaviors: challenge core beliefs, myths, and actions.    Intervention(s)  Therapist will teach emotional regulation skills. CBT/REBT ABCD model.    Objective #B  Client will Increase interest, engagement, and pleasure in doing things  Decrease frequency and intensity of feeling down, depressed, hopeless  Improve concentration, focus, and mindfulness in daily activities .    Status: Continued - Date:  4/8/20    Intervention(s)  Therapist will teach emotional regulation skills. DBT Core Mindfulness, Distress Tolerance, Emotion Regulation, and Interpersonal Effetiveness skills.    Patient has reviewed and agreed to the above plan.      Loki Crowe, LMFT  April 8, 2020

## 2020-06-09 ENCOUNTER — OFFICE VISIT (OUTPATIENT)
Dept: FAMILY MEDICINE | Facility: CLINIC | Age: 65
End: 2020-06-09
Payer: COMMERCIAL

## 2020-06-09 VITALS
SYSTOLIC BLOOD PRESSURE: 142 MMHG | BODY MASS INDEX: 30.41 KG/M2 | WEIGHT: 217.2 LBS | TEMPERATURE: 97.4 F | HEIGHT: 71 IN | DIASTOLIC BLOOD PRESSURE: 74 MMHG | HEART RATE: 49 BPM

## 2020-06-09 DIAGNOSIS — R10.11 RUQ ABDOMINAL PAIN: ICD-10-CM

## 2020-06-09 DIAGNOSIS — R20.2 PARESTHESIA: ICD-10-CM

## 2020-06-09 DIAGNOSIS — R11.0 NAUSEA: Primary | ICD-10-CM

## 2020-06-09 DIAGNOSIS — M79.10 MYALGIA: ICD-10-CM

## 2020-06-09 DIAGNOSIS — R60.0 LOCALIZED EDEMA: ICD-10-CM

## 2020-06-09 LAB
ALBUMIN SERPL-MCNC: 3.9 G/DL (ref 3.4–5)
ALP SERPL-CCNC: 71 U/L (ref 40–150)
ALT SERPL W P-5'-P-CCNC: 41 U/L (ref 0–50)
ANION GAP SERPL CALCULATED.3IONS-SCNC: 9 MMOL/L (ref 3–14)
AST SERPL W P-5'-P-CCNC: 30 U/L (ref 0–45)
BILIRUB DIRECT SERPL-MCNC: <0.1 MG/DL (ref 0–0.2)
BILIRUB SERPL-MCNC: 0.2 MG/DL (ref 0.2–1.3)
BUN SERPL-MCNC: 11 MG/DL (ref 7–30)
CALCIUM SERPL-MCNC: 9.1 MG/DL (ref 8.5–10.1)
CHLORIDE SERPL-SCNC: 100 MMOL/L (ref 94–109)
CK SERPL-CCNC: 202 U/L (ref 30–225)
CO2 SERPL-SCNC: 27 MMOL/L (ref 20–32)
CREAT SERPL-MCNC: 0.9 MG/DL (ref 0.52–1.04)
ERYTHROCYTE [DISTWIDTH] IN BLOOD BY AUTOMATED COUNT: 12.4 % (ref 10–15)
FERRITIN SERPL-MCNC: 144 NG/ML (ref 8–252)
GFR SERPL CREATININE-BSD FRML MDRD: 68 ML/MIN/{1.73_M2}
GLUCOSE SERPL-MCNC: 96 MG/DL (ref 70–99)
HCT VFR BLD AUTO: 34.5 % (ref 35–47)
HGB BLD-MCNC: 11.4 G/DL (ref 11.7–15.7)
LIPASE SERPL-CCNC: 121 U/L (ref 73–393)
MCH RBC QN AUTO: 31.1 PG (ref 26.5–33)
MCHC RBC AUTO-ENTMCNC: 33 G/DL (ref 31.5–36.5)
MCV RBC AUTO: 94 FL (ref 78–100)
PLATELET # BLD AUTO: 340 10E9/L (ref 150–450)
POTASSIUM SERPL-SCNC: 4.1 MMOL/L (ref 3.4–5.3)
PROT SERPL-MCNC: 7.2 G/DL (ref 6.8–8.8)
RBC # BLD AUTO: 3.66 10E12/L (ref 3.8–5.2)
SODIUM SERPL-SCNC: 136 MMOL/L (ref 133–144)
TSH SERPL DL<=0.005 MIU/L-ACNC: 1.66 MU/L (ref 0.4–4)
VIT B12 SERPL-MCNC: 642 PG/ML (ref 193–986)
WBC # BLD AUTO: 5.8 10E9/L (ref 4–11)

## 2020-06-09 PROCEDURE — 82607 VITAMIN B-12: CPT | Performed by: FAMILY MEDICINE

## 2020-06-09 PROCEDURE — 36415 COLL VENOUS BLD VENIPUNCTURE: CPT | Performed by: FAMILY MEDICINE

## 2020-06-09 PROCEDURE — 99214 OFFICE O/P EST MOD 30 MIN: CPT | Performed by: FAMILY MEDICINE

## 2020-06-09 PROCEDURE — 83690 ASSAY OF LIPASE: CPT | Performed by: FAMILY MEDICINE

## 2020-06-09 PROCEDURE — 84443 ASSAY THYROID STIM HORMONE: CPT | Performed by: FAMILY MEDICINE

## 2020-06-09 PROCEDURE — 82550 ASSAY OF CK (CPK): CPT | Performed by: FAMILY MEDICINE

## 2020-06-09 PROCEDURE — 80076 HEPATIC FUNCTION PANEL: CPT | Performed by: FAMILY MEDICINE

## 2020-06-09 PROCEDURE — 85027 COMPLETE CBC AUTOMATED: CPT | Performed by: FAMILY MEDICINE

## 2020-06-09 PROCEDURE — 80048 BASIC METABOLIC PNL TOTAL CA: CPT | Performed by: FAMILY MEDICINE

## 2020-06-09 PROCEDURE — 82728 ASSAY OF FERRITIN: CPT | Performed by: FAMILY MEDICINE

## 2020-06-09 ASSESSMENT — MIFFLIN-ST. JEOR: SCORE: 1636.34

## 2020-06-09 NOTE — NURSING NOTE
"Initial BP (!) 142/74   Pulse (!) 49   Temp 97.4  F (36.3  C) (Tympanic)   Ht 1.803 m (5' 11\")   Wt 98.5 kg (217 lb 3.2 oz)   Breastfeeding No   BMI 30.29 kg/m   Estimated body mass index is 30.29 kg/m  as calculated from the following:    Height as of this encounter: 1.803 m (5' 11\").    Weight as of this encounter: 98.5 kg (217 lb 3.2 oz). .      "

## 2020-06-09 NOTE — PROGRESS NOTES
"Subjective     Miri Naylor is a 64 year old female who presents to clinic today for the following health issues:    HPI     * joint pain - hands, shoulders, neck, hips, knees, ankles    Has some R hip pain at a single spot at her buttocks.  This has been painful since her hip replacement and is really unchanged.  L hip arthritis is worse but she does not feel that her current concerns are relsted to a specific joint pain like arthritis pain.      Feels less that her specific joint hurts, more that she is all over \"achy\".  Just really feels uncomfortable all over.    Finds exercise is helpful.  Pain is worst when she wakes up in the morning before she gets moving..    *  Feels hands are numb and swollen  Numbness in a glove distribution on both hands to mid forearm.  Numbness has been going on for a while but getting more consistent.      * sleep concerns    Waking between 4-5 AM because something hurts, legs, back.  Finds she really cannot sleep longer then this.  Feels her restless legs medication helps her to fall asleep but once it wears off she is up.  She has tried increasing the dose of this medication with no improvement in her sleep. Feels fatigue is contributing to a lot of her current pain as well.    * stomach  Feels nauseated \"all the time\"  Hurts in the RUQ.  RUQ pain present 3-4 days per week.  Not sure what triggers it.  No change with coffee.  Food does not looks good but feels hungry and eats.  Food does to worsen pain.   Can improve with BM but not consistently.    Stools can be \"sticky\" constipated or \"flushing it out\".  If she does not take both senna and Miralx then she is consitapted.        Reviewed and updated as needed this visit by Provider  Tobacco  Meds  Med Hx  Surg Hx  Fam Hx  Soc Hx        Review of Systems   Constitutional, HEENT, cardiovascular, pulmonary, gi and gu systems are negative, except as otherwise noted.      Objective    BP (!) 142/74   Pulse (!) 49   Temp " "97.4  F (36.3  C) (Tympanic)   Ht 1.803 m (5' 11\")   Wt 98.5 kg (217 lb 3.2 oz)   Breastfeeding No   BMI 30.29 kg/m    Body mass index is 30.29 kg/m .  Physical Exam   PE:  VS as above   Gen:  WN/WD/WH female in NAD   Neck:  supple, no LAD appreciated   Heart:  RRR without murmur, nl S1, S2, no rubs or gallops   Lungs CTA hua without rales/ronchi/wheezes   Abd: soft, postive bowel sounds, mild RUQ tenderness, ND, no HSM, no rebounding/guarding/ridigity   Ext:  No pedal edema   MSK:  Tender on palpation of muscles at the shoulders and throughout the arms as well as the thighs hua   Psych: Alert and oriented times 3; coherent speech, normal   rate and volume, able to articulate logical thoughts, able   to abstract reason, no tangential thoughts, no hallucinations   or delusions  Her affect is mildly depressed        Diagnostic Test Results:  Labs reviewed in Epic      Reviewed diagnostic criteria for fibromyalgia with pt.  Responses below    Wide spread pain index  11    Symptom severity score 6    Depression and headache  And symptoms present for >3 months    A/P:      ICD-10-CM    1. Nausea  R11.0 TSH with free T4 reflex     Hepatic panel (Albumin, ALT, AST, Bili, Alk Phos, TP)     CBC with platelets     Lipase   2. Myalgia  M79.10 TSH with free T4 reflex     Hepatic panel (Albumin, ALT, AST, Bili, Alk Phos, TP)     CK total   3. Paresthesia  R20.2 Ferritin     Vitamin B12     TSH with free T4 reflex     Hepatic panel (Albumin, ALT, AST, Bili, Alk Phos, TP)     CBC with platelets   4. Localized edema  R60.0 Basic metabolic panel  (Ca, Cl, CO2, Creat, Gluc, K, Na, BUN)   5. RUQ abdominal pain  R10.11 Lipase     US Abdomen Limited       Unclear etiology of multiple symptoms, primarily myalgia, sleep difficulty and nausea.  No clear physical cause noted.  Pt does seem to meet criteria for fibromyalgia based on history and exam.    Will do some additional blood work today and plan additional eval based on findings.  " Pt is not very interested in additional medication at this point if it can be avoided.    Reviewed importance of sleep and exercise in management if labs wnl.      Patient Instructions   You can call [phone] to schedule the ultrasound of your abdomen     I will let you know lab results from today when available and we'll go from there.

## 2020-06-09 NOTE — PATIENT INSTRUCTIONS
You can call [phone] to schedule the ultrasound of your abdomen     I will let you know lab results from today when available and we'll go from there.

## 2020-06-16 ENCOUNTER — ANCILLARY PROCEDURE (OUTPATIENT)
Dept: ULTRASOUND IMAGING | Facility: CLINIC | Age: 65
End: 2020-06-16
Attending: FAMILY MEDICINE
Payer: COMMERCIAL

## 2020-06-16 DIAGNOSIS — R10.11 RUQ ABDOMINAL PAIN: ICD-10-CM

## 2020-06-16 PROCEDURE — 76705 ECHO EXAM OF ABDOMEN: CPT

## 2020-06-17 ENCOUNTER — VIRTUAL VISIT (OUTPATIENT)
Dept: PSYCHOLOGY | Facility: CLINIC | Age: 65
End: 2020-06-17
Payer: COMMERCIAL

## 2020-06-17 DIAGNOSIS — F41.1 GENERALIZED ANXIETY DISORDER: ICD-10-CM

## 2020-06-17 DIAGNOSIS — F34.1 PERSISTENT DEPRESSIVE DISORDER: Primary | ICD-10-CM

## 2020-06-17 PROCEDURE — 90834 PSYTX W PT 45 MINUTES: CPT | Mod: 95 | Performed by: MARRIAGE & FAMILY THERAPIST

## 2020-06-18 NOTE — PROGRESS NOTES
Progress Note    Patient Name: Miri Naylor  Date: 6/17/20         Service Type: Individual      Session Start Time: 11:03  Session End Time: 11:55     Session Length: 52    Session #: 23    Attendees: Client attended alone    Telemedicine Visit: The patient's condition can be safely assessed and treated via synchronous audio and visual telemedicine encounter.      Reason for Telemedicine Visit: Services only offered telehealth    Originating Site (Patient Location): Patient's home    Distant Site (Provider Location): Provider Remote Setting    Consent:  The patient/guardian has verbally consented to: the potential risks and benefits of telemedicine (video visit) versus in person care; bill my insurance or make self-payment for services provided; and responsibility for payment of non-covered services.     Mode of Communication:  Video Conference via [employer]    As the provider I attest to compliance with applicable laws and regulations related to telemedicine.     Treatment Plan Last Reviewed: 4/8/20, next due 7/8/20.  PHQ-9 / MELISA-7 : not completed.    DATA  Interactive Complexity: No  Crisis: No       Progress Since Last Session (Related to Symptoms / Goals / Homework):   Symptoms: Client reported continuing to experience anxiety and depression.    Homework: Partially completed       Episode of Care Goals: Minimal progress - ACTION (Actively working towards change); Intervened by reinforcing change plan / affirming steps taken     Current / Ongoing Stressors and Concerns:   Client reported experiencing continued anxiety/depression related to her ongoing relationship issues with her spouse.  Client reported that her spouse has been making some efforts at changing, but also experiencing significant work-related stress, which adversely impacts the amount of time/energy he can devote to their relationship.  Client reported regarding her self-concept of having been a  failure and related self-doubt about her abilities/competence.     Treatment Objective(s) Addressed in This Session:   Identify negative self-talk and behaviors: challenge core beliefs, myths, and actions      Intervention:   CBT: reviewed with client challenging her narrative of being a failure and her self-doubt regarding her abilities/competence.        ASSESSMENT: Current Emotional / Mental Status (status of significant symptoms):   Risk status (Self / Other harm or suicidal ideation)   Patient denies current fears or concerns for personal safety.   Patient denies current or recent suicidal ideation or behaviors.   Patientdenies current or recent homicidal ideation or behaviors.   Patient denies current or recent self injurious behavior or ideation.   Patient denies other safety concerns.   Patient Patient reports there has been no change in risk factors since their last session.     PatientPatient reports there has been no change in protective factors since their last session.     Recommended that patient call 911 or go to the local ED should there be a change in any of these risk factors.     Appearance:   Appropriate    Eye Contact:   Good    Psychomotor Behavior: Normal    Attitude:   Cooperative  Interested Friendly Pleasant Attentive   Orientation:   All   Speech    Rate / Production: Normal     Volume:  Normal    Mood:    Anxious  Depressed  Normal Sad  Expansive   Affect:    Appropriate  Expansive  Worrisome    Thought Content:  Clear    Thought Form:  Coherent  Logical    Insight:    Fair  and Intellectual Insight     Medication Review:   No current psychiatric medications prescribed     Medication Compliance:   NA     Changes in Health Issues:   None reported     Chemical Use Review:   Substance Use: Chemical use reviewed, no active concerns identified      Tobacco Use: No current tobacco use.      Diagnosis:  1. Persistent depressive disorder    2. Generalized anxiety disorder        Collateral  Reports Completed:   Not Applicable    PLAN: (Patient Tasks / Therapist Tasks / Other)  Client agreed to work on challenging her negative self-concept of being a failure, focusing on her abilities/competence.        Loki Crowe, LMFT 6/17/2020                                                          ______________________________________________________________________    Treatment Plan    Patient's Name: Miri Naylor  YOB: 1955    Date: 4/8/20    DSM5 Diagnoses: 300.4 (F34.1) Persistent Depressive Disorder, Early onset or 300.02 (F41.1) Generalized Anxiety Disorder  Psychosocial / Contextual Factors: marital conflict, history of loss/trauma  WHODAS: 29    Referral / Collaboration:  Referral to another professional/service is not indicated at this time..    Anticipated number of session or this episode of care: 21-30      MeasurableTreatment Goal(s) related to diagnosis / functional impairment(s)  Goal 1: Client will successfully process through past trauma defined as reporting 0 Subjective Units of Distress related to trauma on 0-10 scale and 7 on Validity of (positive) Cognition scale about self on 1-7 scale   I will know I've met my goal when I am less impacted by my past loss/trauma.       Objective #A (Client Action)    Status: Conitnued - Date: 4/8/20      Client will identify past traumatic events/memories which are causing current distress.     Intervention(s)  Therapist will take client's history and facilitate client's identification of targets for EMDR.     Objective #B  Client will complete needed assessment(s) and [facility] EMDR resourcing to confirm readiness for EMDR.    Status: Continued - Date: 4/8/20      Intervention(s)  Therapist will administer Dissociative Experiences Scale, Multidimensional Inventory of Dissociation as needed and complete [facility] EMDR resourcing with client.     Objective #C  Client will engage in installing at least 2 EMDR  resources.  Status: Continued - Date: 4/8/20      Intervention(s)  Therapist will complete EMDR resourcing (Container, Remote Control, grounding/progressive muscle relaxation, Light Stream, Inner Advisor) with client.     Objective #D (Client Action)    Status: Continued - Date: 4/8/20      Client will engage in reprocessing all past traumatic event/memory targets.     Intervention(s)   Therapist will complete EMDR reprocessing with client.    Goal 2:  Client will improve overall baseline mood regulation by 2 points on a 1-10 Likert scale per self-report (10 = optimal mood/regulation).    I will know I've met my goal when I feel better/less depressed overall.      Objective #A (Client Action)    Status: Continued - Date:  4/8/20    Client will Identify negative self-talk and behaviors: challenge core beliefs, myths, and actions.    Intervention(s)  Therapist will teach emotional regulation skills. CBT/REBT ABCD model.    Objective #B  Client will Increase interest, engagement, and pleasure in doing things  Decrease frequency and intensity of feeling down, depressed, hopeless  Improve concentration, focus, and mindfulness in daily activities .    Status: Continued - Date:  4/8/20    Intervention(s)  Therapist will teach emotional regulation skills. DBT Core Mindfulness, Distress Tolerance, Emotion Regulation, and Interpersonal Effetiveness skills.    Patient has reviewed and agreed to the above plan.      Loki Crowe, LMFT  April 8, 2020

## 2020-07-08 ENCOUNTER — VIRTUAL VISIT (OUTPATIENT)
Dept: PSYCHOLOGY | Facility: CLINIC | Age: 65
End: 2020-07-08
Payer: COMMERCIAL

## 2020-07-08 DIAGNOSIS — F34.1 PERSISTENT DEPRESSIVE DISORDER: ICD-10-CM

## 2020-07-08 DIAGNOSIS — F41.1 GENERALIZED ANXIETY DISORDER: Primary | ICD-10-CM

## 2020-07-08 PROCEDURE — 90834 PSYTX W PT 45 MINUTES: CPT | Mod: 95 | Performed by: MARRIAGE & FAMILY THERAPIST

## 2020-07-08 NOTE — PROGRESS NOTES
"                                           Progress Note    Patient Name: Miri Naylor  Date: 7/8/20         Service Type: Individual      Session Start Time: 12:05  Session End Time: 12:57     Session Length: 52    Session #: 24    Attendees: Client attended alone    The patient has been notified of the following:      \"We have found that certain health care needs can be provided without the need for a face to face visit.  This service lets us provide the care you need with a phone conversation.       I will have full access to your [facility] medical record during this entire phone call.   I will be taking notes for your medical record.      Since this is like an office visit, we will bill your insurance company for this service.       There are potential benefits and risks of telephone visits (e.g. limits to patient confidentiality) that differ from in-person visits.?  Confidentiality still applies for telephone services, and nobody will record the visit.  It is important to be in a quiet, private space that is free of distractions (including cell phone or other devices) during the visit.??      If during the course of the call I believe a telephone visit is not appropriate, you will not be charged for this service\"     Consent has been obtained for this service by care team member: Yes  Video visit failed--switched to phone visit.     Treatment Plan Last Reviewed: 4/8/20, next due 7/8/20.  PHQ-9 / MELISA-7 : not completed.    DATA  Interactive Complexity: No  Crisis: No       Progress Since Last Session (Related to Symptoms / Goals / Homework):   Symptoms: Client reported continuing to experience anxiety and depression.    Homework: Partially completed       Episode of Care Goals: Minimal progress - ACTION (Actively working towards change); Intervened by reinforcing change plan / affirming steps taken     Current / Ongoing Stressors and Concerns:   Client reported experiencing continued anxiety/depression " "related to her ongoing relationship issues with her spouse.  Client reported that her spouse has mad some efforts to change, but also declines to do some things (e.g. reading a recommended book, looking at reasons behind/alternatives to his anger/responses).  Client reported feeling like she has been complicit and contributed to conflict by \"allowing some things to happen.\"     Treatment Objective(s) Addressed in This Session:   use cognitive strategies identified in therapy to challenge anxious thoughts  Decrease frequency and intensity of feeling down, depressed, hopeless  Identify negative self-talk and behaviors: challenge core beliefs, myths, and actions      Intervention:   CBT: reviewed with client challenging her felt ownership of her 's in/actions in addressing their relationship issues.        ASSESSMENT: Current Emotional / Mental Status (status of significant symptoms):   Risk status (Self / Other harm or suicidal ideation)   Patient denies current fears or concerns for personal safety.   Patient denies current or recent suicidal ideation or behaviors.   Patientdenies current or recent homicidal ideation or behaviors.   Patient denies current or recent self injurious behavior or ideation.   Patient denies other safety concerns.   Patient Patient reports there has been no change in risk factors since their last session.     PatientPatient reports there has been no change in protective factors since their last session.     Recommended that patient call 911 or go to the local ED should there be a change in any of these risk factors.     Appearance:   Appropriate    Eye Contact:   Good    Psychomotor Behavior: Normal    Attitude:   Cooperative  Interested Friendly Pleasant Attentive   Orientation:   All   Speech    Rate / Production: Normal     Volume:  Normal    Mood:    Anxious  Depressed  Normal Expansive   Affect:    Appropriate  Expansive  Worrisome    Thought Content:  Clear    Thought " Form:  Coherent  Logical    Insight:    Fair  and Intellectual Insight     Medication Review:   No current psychiatric medications prescribed     Medication Compliance:   NA     Changes in Health Issues:   None reported     Chemical Use Review:   Substance Use: Chemical use reviewed, no active concerns identified      Tobacco Use: No current tobacco use.      Diagnosis:  1. Generalized anxiety disorder    2. Persistent depressive disorder        Collateral Reports Completed:   Not Applicable    PLAN: (Patient Tasks / Therapist Tasks / Other)  Client agreed to work on challenging her felt ownership of her 's in/actions regarding their relationship issues.        Loki Anderson Crowe, [facility] 7/8/2020                                                          ______________________________________________________________________    Treatment Plan    Patient's Name: Miri Naylor  YOB: 1955    Date: 7/8/20    DSM5 Diagnoses: 300.4 (F34.1) Persistent Depressive Disorder, Early onset or 300.02 (F41.1) Generalized Anxiety Disorder  Psychosocial / Contextual Factors: marital conflict, history of loss/trauma  WHODAS: 29    Referral / Collaboration:  Referral to another professional/service is not indicated at this time..    Anticipated number of session or this episode of care: 31+      MeasurableTreatment Goal(s) related to diagnosis / functional impairment(s)  Goal 1: Client will successfully process through past trauma defined as reporting 0 Subjective Units of Distress related to trauma on 0-10 scale and 7 on Validity of (positive) Cognition scale about self on 1-7 scale   I will know I've met my goal when I am less impacted by my past loss/trauma.       Objective #A (Client Action)    Status: Conitnued - Date: 7/8/20      Client will identify past traumatic events/memories which are causing current distress.     Intervention(s)  Therapist will take client's history and facilitate client's  identification of targets for EMDR.     Objective #B  Client will complete needed assessment(s) and [facility] EMDR resourcing to confirm readiness for EMDR.    Status: Continued - Date: 7/8/20      Intervention(s)  Therapist will administer Dissociative Experiences Scale, Multidimensional Inventory of Dissociation as needed and complete [facility] EMDR resourcing with client.     Objective #C  Client will engage in installing at least 2 EMDR resources.  Status: Continued - Date: 7/8/20      Intervention(s)  Therapist will complete EMDR resourcing (Container, Remote Control, grounding/progressive muscle relaxation, Light Stream, Inner Advisor) with client.     Objective #D (Client Action)    Status: Continued - Date: 7/8/20      Client will engage in reprocessing all past traumatic event/memory targets.     Intervention(s)   Therapist will complete EMDR reprocessing with client.    Goal 2:  Client will improve overall baseline mood regulation by 2 points on a 1-10 Likert scale per self-report (10 = optimal mood/regulation).    I will know I've met my goal when I feel better/less depressed overall.      Objective #A (Client Action)    Status: Continued - Date:  7/8/20    Client will Identify negative self-talk and behaviors: challenge core beliefs, myths, and actions.    Intervention(s)  Therapist will teach emotional regulation skills. CBT/REBT ABCD model.    Objective #B  Client will Increase interest, engagement, and pleasure in doing things  Decrease frequency and intensity of feeling down, depressed, hopeless  Improve concentration, focus, and mindfulness in daily activities .    Status: Continued - Date:  7/8/20    Intervention(s)  Therapist will teach emotional regulation skills. DBT Core Mindfulness, Distress Tolerance, Emotion Regulation, and Interpersonal Effetiveness skills.    Patient has reviewed and agreed to the above plan.      Loki Crowe, LMFT  July 8, 2020

## 2020-07-14 ENCOUNTER — ANCILLARY PROCEDURE (OUTPATIENT)
Dept: NUCLEAR MEDICINE | Facility: CLINIC | Age: 65
End: 2020-07-14
Attending: FAMILY MEDICINE
Payer: COMMERCIAL

## 2020-07-14 DIAGNOSIS — R10.11 RUQ ABDOMINAL PAIN: ICD-10-CM

## 2020-07-14 PROCEDURE — 78227 HEPATOBIL SYST IMAGE W/DRUG: CPT | Performed by: RADIOLOGY

## 2020-07-14 PROCEDURE — A9537 TC99M MEBROFENIN: HCPCS | Performed by: RADIOLOGY

## 2020-07-14 RX ORDER — KIT FOR THE PREPARATION OF TECHNETIUM TC 99M MEBROFENIN 45 MG/10ML
5 INJECTION, POWDER, LYOPHILIZED, FOR SOLUTION INTRAVENOUS ONCE
Status: COMPLETED | OUTPATIENT
Start: 2020-07-14 | End: 2020-07-14

## 2020-07-14 RX ADMIN — KIT FOR THE PREPARATION OF TECHNETIUM TC 99M MEBROFENIN 5.5 MILLICURIE: 45 INJECTION, POWDER, LYOPHILIZED, FOR SOLUTION INTRAVENOUS at 08:44

## 2020-07-15 ENCOUNTER — VIRTUAL VISIT (OUTPATIENT)
Dept: PSYCHOLOGY | Facility: CLINIC | Age: 65
End: 2020-07-15
Payer: COMMERCIAL

## 2020-07-15 DIAGNOSIS — F34.1 PERSISTENT DEPRESSIVE DISORDER: ICD-10-CM

## 2020-07-15 DIAGNOSIS — F41.1 GENERALIZED ANXIETY DISORDER: Primary | ICD-10-CM

## 2020-07-15 PROCEDURE — 90834 PSYTX W PT 45 MINUTES: CPT | Mod: 95 | Performed by: MARRIAGE & FAMILY THERAPIST

## 2020-07-16 NOTE — PROGRESS NOTES
"                                           Progress Note    Patient Name: Miri Naylor  Date: 7/15/20         Service Type: Individual      Session Start Time: 9:02  Session End Time: 9:54     Session Length: 52    Session #: 25    Attendees: Client attended alone    The patient has been notified of the following:      \"We have found that certain health care needs can be provided without the need for a face to face visit.  This service lets us provide the care you need with a phone conversation.       I will have full access to your [facility] medical record during this entire phone call.   I will be taking notes for your medical record.      Since this is like an office visit, we will bill your insurance company for this service.       There are potential benefits and risks of telephone visits (e.g. limits to patient confidentiality) that differ from in-person visits.?  Confidentiality still applies for telephone services, and nobody will record the visit.  It is important to be in a quiet, private space that is free of distractions (including cell phone or other devices) during the visit.??      If during the course of the call I believe a telephone visit is not appropriate, you will not be charged for this service\"     Consent has been obtained for this service by care team member: Yes  Video visit failed--switched to phone visit.     Treatment Plan Last Reviewed: 7/8/20, next due 10/8/20.  PHQ-9 / MELISA-7 : not completed.    DATA  Interactive Complexity: No  Crisis: No       Progress Since Last Session (Related to Symptoms / Goals / Homework):   Symptoms: Client reported continuing to experience anxiety and depression.    Homework: Partially completed       Episode of Care Goals: Minimal progress - ACTION (Actively working towards change); Intervened by reinforcing change plan / affirming steps taken     Current / Ongoing Stressors and Concerns:   Client reported experiencing continued anxiety/depression " related to her ongoing relationship issues with her spouse.  Client reported that her spouse has told client directly that he doesn't think that he should have to change and doesn't intend to, and that he believes that client has more problems to work out and wants her to make changes.  Client reported feeling uncertain about how to proceed from here.     Treatment Objective(s) Addressed in This Session:   compile a list of boundaries that they would like to set with others. spouse      Intervention:   Interpersonal Therapy: reviewed with client considering what boundaries/expectations to set with her spouse and how/when to do so.        ASSESSMENT: Current Emotional / Mental Status (status of significant symptoms):   Risk status (Self / Other harm or suicidal ideation)   Patient denies current fears or concerns for personal safety.   Patient denies current or recent suicidal ideation or behaviors.   Patientdenies current or recent homicidal ideation or behaviors.   Patient denies current or recent self injurious behavior or ideation.   Patient denies other safety concerns.   Patient Patient reports there has been no change in risk factors since their last session.     PatientPatient reports there has been no change in protective factors since their last session.     Recommended that patient call 911 or go to the local ED should there be a change in any of these risk factors.     Appearance:   Appropriate    Eye Contact:   Good    Psychomotor Behavior: Normal    Attitude:   Cooperative  Interested Friendly Pleasant Attentive   Orientation:   All   Speech    Rate / Production: Normal     Volume:  Normal    Mood:    Anxious  Depressed  Normal Expansive   Affect:    Appropriate  Expansive  Worrisome    Thought Content:  Clear    Thought Form:  Coherent  Logical    Insight:    Fair  and Intellectual Insight     Medication Review:   No current psychiatric medications prescribed     Medication Compliance:   NA     Changes  in Health Issues:   None reported     Chemical Use Review:   Substance Use: Chemical use reviewed, no active concerns identified      Tobacco Use: No current tobacco use.      Diagnosis:  1. Generalized anxiety disorder    2. Persistent depressive disorder        Collateral Reports Completed:   Not Applicable    PLAN: (Patient Tasks / Therapist Tasks / Other)  Client agreed to work on considerng what boundaries/expectations to set with her spouse and how/when to do so, and to consider her plans for making a life worth living based on expectations of her spouse not making changes to improve relationship.        Loki Barron Amrita, [facility] 7/15/2020                                                          ______________________________________________________________________    Treatment Plan    Patient's Name: Miri Naylor  YOB: 1955    Date: 7/8/20    DSM5 Diagnoses: 300.4 (F34.1) Persistent Depressive Disorder, Early onset or 300.02 (F41.1) Generalized Anxiety Disorder  Psychosocial / Contextual Factors: marital conflict, history of loss/trauma  WHODAS: 29    Referral / Collaboration:  Referral to another professional/service is not indicated at this time..    Anticipated number of session or this episode of care: 31+      MeasurableTreatment Goal(s) related to diagnosis / functional impairment(s)  Goal 1: Client will successfully process through past trauma defined as reporting 0 Subjective Units of Distress related to trauma on 0-10 scale and 7 on Validity of (positive) Cognition scale about self on 1-7 scale   I will know I've met my goal when I am less impacted by my past loss/trauma.       Objective #A (Client Action)    Status: Conitnued - Date: 7/8/20      Client will identify past traumatic events/memories which are causing current distress.     Intervention(s)  Therapist will take client's history and facilitate client's identification of targets for EMDR.     Objective #B  Client  will complete needed assessment(s) and [facility] EMDR resourcing to confirm readiness for EMDR.    Status: Continued - Date: 7/8/20      Intervention(s)  Therapist will administer Dissociative Experiences Scale, Multidimensional Inventory of Dissociation as needed and complete [facility] EMDR resourcing with client.     Objective #C  Client will engage in installing at least 2 EMDR resources.  Status: Continued - Date: 7/8/20      Intervention(s)  Therapist will complete EMDR resourcing (Container, Remote Control, grounding/progressive muscle relaxation, Light Stream, Inner Advisor) with client.     Objective #D (Client Action)    Status: Continued - Date: 7/8/20      Client will engage in reprocessing all past traumatic event/memory targets.     Intervention(s)   Therapist will complete EMDR reprocessing with client.    Goal 2:  Client will improve overall baseline mood regulation by 2 points on a 1-10 Likert scale per self-report (10 = optimal mood/regulation).    I will know I've met my goal when I feel better/less depressed overall.      Objective #A (Client Action)    Status: Continued - Date:  7/8/20    Client will Identify negative self-talk and behaviors: challenge core beliefs, myths, and actions.    Intervention(s)  Therapist will teach emotional regulation skills. CBT/REBT ABCD model.    Objective #B  Client will Increase interest, engagement, and pleasure in doing things  Decrease frequency and intensity of feeling down, depressed, hopeless  Improve concentration, focus, and mindfulness in daily activities .    Status: Continued - Date:  7/8/20    Intervention(s)  Therapist will teach emotional regulation skills. DBT Core Mindfulness, Distress Tolerance, Emotion Regulation, and Interpersonal Effetiveness skills.    Patient has reviewed and agreed to the above plan.      Loki Crowe, LMFT  July 8, 2020

## 2020-07-18 DIAGNOSIS — I10 ESSENTIAL HYPERTENSION: ICD-10-CM

## 2020-07-19 ENCOUNTER — MYC MEDICAL ADVICE (OUTPATIENT)
Dept: FAMILY MEDICINE | Facility: CLINIC | Age: 65
End: 2020-07-19

## 2020-07-19 DIAGNOSIS — R10.11 RUQ ABDOMINAL PAIN: Primary | ICD-10-CM

## 2020-07-20 NOTE — TELEPHONE ENCOUNTER
"Requested Prescriptions   Pending Prescriptions Disp Refills     spironolactone (ALDACTONE) 25 MG tablet [Pharmacy Med Name: SPIRONOLACTONE TABS 25MG] 45 tablet 3     Sig: TAKE ONE-HALF (1/2) TABLET DAILY       Diuretics (Including Combos) Protocol Failed - 7/18/2020 12:35 AM        Failed - Blood pressure under 140/90 in past 12 months     BP Readings from Last 3 Encounters:   06/09/20 (!) 142/74   03/12/20 (!) 147/80   02/13/20 127/78                 Passed - Recent (12 mo) or future (30 days) visit within the authorizing provider's specialty     Patient has had an office visit with the authorizing provider or a provider within the authorizing providers department within the previous 12 mos or has a future within next 30 days. See \"Patient Info\" tab in inbasket, or \"Choose Columns\" in Meds & Orders section of the refill encounter.              Passed - Medication is active on med list        Passed - Patient is age 18 or older        Passed - No active pregancy on record        Passed - Normal serum creatinine on file in past 12 months     Recent Labs   Lab Test 06/09/20  1606   CR 0.90              Passed - Normal serum potassium on file in past 12 months     Recent Labs   Lab Test 06/09/20  1606   POTASSIUM 4.1                    Passed - Normal serum sodium on file in past 12 months     Recent Labs   Lab Test 06/09/20  1606                 Passed - No positive pregnancy test in past 12 months             "

## 2020-07-21 RX ORDER — SPIRONOLACTONE 25 MG/1
TABLET ORAL
Qty: 45 TABLET | Refills: 3 | Status: SHIPPED | OUTPATIENT
Start: 2020-07-21 | End: 2021-07-16

## 2020-07-22 ENCOUNTER — TELEPHONE (OUTPATIENT)
Dept: ALLERGY | Facility: CLINIC | Age: 65
End: 2020-07-22

## 2020-07-22 DIAGNOSIS — J31.0 RHINOCONJUNCTIVITIS: Primary | ICD-10-CM

## 2020-07-22 DIAGNOSIS — H10.9 RHINOCONJUNCTIVITIS: Primary | ICD-10-CM

## 2020-07-22 RX ORDER — FLUTICASONE PROPIONATE 50 MCG
2 SPRAY, SUSPENSION (ML) NASAL DAILY
Qty: 16 G | Refills: 11 | Status: SHIPPED | OUTPATIENT
Start: 2020-07-22 | End: 2021-07-23

## 2020-07-22 RX ORDER — AZELASTINE 1 MG/ML
2 SPRAY, METERED NASAL 2 TIMES DAILY
Qty: 1 BOTTLE | Refills: 11 | Status: SHIPPED | OUTPATIENT
Start: 2020-07-22 | End: 2021-07-14

## 2020-07-22 NOTE — TELEPHONE ENCOUNTER
Pharmacy calling states script for dymista is not covered by insurance. States insurance will cover the fluticasone or azelastine. Please call to advise give reference # [id number]

## 2020-07-22 NOTE — TELEPHONE ENCOUNTER
Forwarding to Dr. Stockton.  dymista not covered.  Insurance will cover fluticasone and/or azelastine.  Please advise, thanks.    Kerri Ramires RN

## 2020-07-23 NOTE — TELEPHONE ENCOUNTER
RN left detailed message for patient (ok per chart) regarding medication changes.  Asked her to return call to [phone] if she has questions.    Kerri Ramires RN

## 2020-07-28 DIAGNOSIS — Z53.9 ERRONEOUS ENCOUNTER--DISREGARD: Primary | ICD-10-CM

## 2020-07-28 DIAGNOSIS — J45.40 MODERATE PERSISTENT ASTHMA WITHOUT COMPLICATION: ICD-10-CM

## 2020-07-28 RX ORDER — BUDESONIDE AND FORMOTEROL FUMARATE DIHYDRATE 160; 4.5 UG/1; UG/1
2 AEROSOL RESPIRATORY (INHALATION) 2 TIMES DAILY
Qty: 3 INHALER | Refills: 1 | Status: SHIPPED | OUTPATIENT
Start: 2020-07-28 | End: 2020-11-18

## 2020-07-28 RX ORDER — BUDESONIDE AND FORMOTEROL FUMARATE DIHYDRATE 160; 4.5 UG/1; UG/1
2 AEROSOL RESPIRATORY (INHALATION) 2 TIMES DAILY
Qty: 3 INHALER | Refills: 1 | Status: CANCELLED | OUTPATIENT
Start: 2020-07-28

## 2020-07-28 NOTE — TELEPHONE ENCOUNTER
Received fax from pharmacy requesting 90 day supply of Symbicort.     Last office visit: 3/12/2020    Edita Chand RN

## 2020-07-28 NOTE — TELEPHONE ENCOUNTER
Received fax from [facility] stating that patient would like a 90 day supply of medication.    Last office visit 3/12/2020.    Edita Chand RN

## 2020-07-28 NOTE — TELEPHONE ENCOUNTER
"Requested Prescriptions   Pending Prescriptions Disp Refills     budesonide-formoterol (SYMBICORT) 160-4.5 MCG/ACT Inhaler 3 Inhaler 1     Sig: Inhale 2 puffs into the lungs 2 times daily       Long-Acting Beta Agonist Inhalers Protocol  Failed - 7/28/2020  2:22 PM        Failed - Order for Serevent, Striverdi, or Foradil and pt has steroid inhaler        Passed - Patient is age 12 or older        Passed - Asthma control assessment score within normal limits in last 6 months     Please review ACT score.           Passed - Medication is active on med list        Passed - Recent (6 mo) or future (30 days) visit within the authorizing provider's specialty     Patient had office visit in the last 6 months or has a visit in the next 30 days with authorizing provider or within the authorizing provider's specialty.  See \"Patient Info\" tab in inbasket, or \"Choose Columns\" in Meds & Orders section of the refill encounter.           Inhaled Steroids Protocol Passed - 7/28/2020  2:22 PM        Passed - Patient is age 12 or older        Passed - Asthma control assessment score within normal limits in last 6 months     Please review ACT score.           Passed - Medication is active on med list        Passed - Recent (6 mo) or future (30 days) visit within the authorizing provider's specialty     Patient had office visit in the last 6 months or has a visit in the next 30 days with authorizing provider or within the authorizing provider's specialty.  See \"Patient Info\" tab in inbasket, or \"Choose Columns\" in Meds & Orders section of the refill encounter.               Routing refill request to provider for review/approval because:  Failed protocol      Edita Chand RN    "

## 2020-07-28 NOTE — TELEPHONE ENCOUNTER
"Requested Prescriptions   Pending Prescriptions Disp Refills     budesonide-formoterol (SYMBICORT) 160-4.5 MCG/ACT Inhaler 3 Inhaler 1     Sig: Inhale 2 puffs into the lungs 2 times daily       Long-Acting Beta Agonist Inhalers Protocol  Failed - 7/28/2020  3:20 PM        Failed - Order for Serevent, Striverdi, or Foradil and pt has steroid inhaler        Passed - Patient is age 12 or older        Passed - Asthma control assessment score within normal limits in last 6 months     Please review ACT score.           Passed - Medication is active on med list        Passed - Recent (6 mo) or future (30 days) visit within the authorizing provider's specialty     Patient had office visit in the last 6 months or has a visit in the next 30 days with authorizing provider or within the authorizing provider's specialty.  See \"Patient Info\" tab in inbasket, or \"Choose Columns\" in Meds & Orders section of the refill encounter.           Inhaled Steroids Protocol Passed - 7/28/2020  3:20 PM        Passed - Patient is age 12 or older        Passed - Asthma control assessment score within normal limits in last 6 months     Please review ACT score.           Passed - Medication is active on med list        Passed - Recent (6 mo) or future (30 days) visit within the authorizing provider's specialty     Patient had office visit in the last 6 months or has a visit in the next 30 days with authorizing provider or within the authorizing provider's specialty.  See \"Patient Info\" tab in inbasket, or \"Choose Columns\" in Meds & Orders section of the refill encounter.               Routing refill request to provider for review/approval because:  Failed protocol      Edita Chand RN    "

## 2020-08-11 ENCOUNTER — VIRTUAL VISIT (OUTPATIENT)
Dept: GASTROENTEROLOGY | Facility: CLINIC | Age: 65
End: 2020-08-11
Attending: FAMILY MEDICINE
Payer: COMMERCIAL

## 2020-08-11 ENCOUNTER — VIRTUAL VISIT (OUTPATIENT)
Dept: PSYCHOLOGY | Facility: CLINIC | Age: 65
End: 2020-08-11
Payer: COMMERCIAL

## 2020-08-11 DIAGNOSIS — F41.1 GENERALIZED ANXIETY DISORDER: Primary | ICD-10-CM

## 2020-08-11 DIAGNOSIS — F34.1 PERSISTENT DEPRESSIVE DISORDER: ICD-10-CM

## 2020-08-11 DIAGNOSIS — R10.11 RUQ ABDOMINAL PAIN: ICD-10-CM

## 2020-08-11 PROCEDURE — 90834 PSYTX W PT 45 MINUTES: CPT | Mod: 95 | Performed by: MARRIAGE & FAMILY THERAPIST

## 2020-08-11 PROCEDURE — 99203 OFFICE O/P NEW LOW 30 MIN: CPT | Mod: 95 | Performed by: INTERNAL MEDICINE

## 2020-08-11 ASSESSMENT — PATIENT HEALTH QUESTIONNAIRE - PHQ9
SUM OF ALL RESPONSES TO PHQ QUESTIONS 1-9: 10
10. IF YOU CHECKED OFF ANY PROBLEMS, HOW DIFFICULT HAVE THESE PROBLEMS MADE IT FOR YOU TO DO YOUR WORK, TAKE CARE OF THINGS AT HOME, OR GET ALONG WITH OTHER PEOPLE: SOMEWHAT DIFFICULT
SUM OF ALL RESPONSES TO PHQ QUESTIONS 1-9: 10

## 2020-08-11 ASSESSMENT — ANXIETY QUESTIONNAIRES
3. WORRYING TOO MUCH ABOUT DIFFERENT THINGS: SEVERAL DAYS
GAD7 TOTAL SCORE: 8
1. FEELING NERVOUS, ANXIOUS, OR ON EDGE: SEVERAL DAYS
5. BEING SO RESTLESS THAT IT IS HARD TO SIT STILL: SEVERAL DAYS
2. NOT BEING ABLE TO STOP OR CONTROL WORRYING: SEVERAL DAYS
7. FEELING AFRAID AS IF SOMETHING AWFUL MIGHT HAPPEN: SEVERAL DAYS
GAD7 TOTAL SCORE: 8
6. BECOMING EASILY ANNOYED OR IRRITABLE: SEVERAL DAYS
GAD7 TOTAL SCORE: 8
4. TROUBLE RELAXING: MORE THAN HALF THE DAYS
7. FEELING AFRAID AS IF SOMETHING AWFUL MIGHT HAPPEN: SEVERAL DAYS

## 2020-08-11 NOTE — PROGRESS NOTES
"Miri Naylor is a 65 year old female who is being evaluated via a billable video visit.      The patient has been notified of following:     \"This video visit will be conducted via a call between you and your physician/provider. We have found that certain health care needs can be provided without the need for an in-person physical exam.  This service lets us provide the care you need with a video conversation.  If a prescription is necessary we can send it directly to your pharmacy.  If lab work is needed we can place an order for that and you can then stop by our lab to have the test done at a later time.    Video visits are billed at different rates depending on your insurance coverage.  Please reach out to your insurance provider with any questions.    If during the course of the call the physician/provider feels a video visit is not appropriate, you will not be charged for this service.\"    Patient has given verbal consent for Video visit? Yes  How would you like to obtain your AVS? MyChart  If you are dropped from the video visit, the video invite should be resent to: Text to cell phone: [phone]  Will anyone else be joining your video visit? No        Wendy Lopez LPN on 8/11/2020 at 3:08 PM    "

## 2020-08-12 ASSESSMENT — ANXIETY QUESTIONNAIRES: GAD7 TOTAL SCORE: 8

## 2020-08-12 ASSESSMENT — PATIENT HEALTH QUESTIONNAIRE - PHQ9: SUM OF ALL RESPONSES TO PHQ QUESTIONS 1-9: 10

## 2020-08-12 NOTE — PROGRESS NOTES
Progress Note    Patient Name: Miri Naylor  Date: 8/11/20         Service Type: Individual      Session Start Time: 2:02  Session End Time: 2:54     Session Length: 52    Session #: 26    Attendees: Client attended alone    Telemedicine Visit: The patient's condition can be safely assessed and treated via synchronous audio and visual telemedicine encounter.      Reason for Telemedicine Visit: Services only offered telehealth    Originating Site (Patient Location): Patient's home    Distant Site (Provider Location): Provider Remote Setting    Consent:  The patient/guardian has verbally consented to: the potential risks and benefits of telemedicine (video visit) versus in person care; bill my insurance or make self-payment for services provided; and responsibility for payment of non-covered services.     Mode of Communication:  Video Conference via Everyclick    As the provider I attest to compliance with applicable laws and regulations related to telemedicine.     Treatment Plan Last Reviewed: 7/8/20, next due 10/8/20.  PHQ-9 / MELISA-7 : completed.    DATA  Interactive Complexity: No  Crisis: No       Progress Since Last Session (Related to Symptoms / Goals / Homework):   Symptoms: Client reported continuing to experience anxiety and depression.    Homework: Partially completed       Episode of Care Goals: Minimal progress - ACTION (Actively working towards change); Intervened by reinforcing change plan / affirming steps taken     Current / Ongoing Stressors and Concerns:   Client reported experiencing continued anxiety/depression related to her ongoing relationship issues with her spouse.  Client reported regarding her history of her spiritual development, and the role it played in her marital conflict and her self-concept.  Client reported that her spouse seems to be increasing in his insight/recognition of their differences and his contribution to their conflict,  but they remain uncertain of how to start working on translating that newfound insight into action/changes.     Treatment Objective(s) Addressed in This Session:   use cognitive strategies identified in therapy to challenge anxious thoughts  Decrease frequency and intensity of feeling down, depressed, hopeless  Identify negative self-talk and behaviors: challenge core beliefs, myths, and actions      Intervention:   CBT: reviewed with client challenging her external locus of control, instead focusing on what she can do/control.        ASSESSMENT: Current Emotional / Mental Status (status of significant symptoms):   Risk status (Self / Other harm or suicidal ideation)   Patient denies current fears or concerns for personal safety.   Patient denies current or recent suicidal ideation or behaviors.   Patientdenies current or recent homicidal ideation or behaviors.   Patient denies current or recent self injurious behavior or ideation.   Patient denies other safety concerns.   Patient Patient reports there has been no change in risk factors since their last session.     PatientPatient reports there has been no change in protective factors since their last session.     Recommended that patient call 911 or go to the local ED should there be a change in any of these risk factors.     Appearance:   Appropriate    Eye Contact:   Good    Psychomotor Behavior: Normal    Attitude:   Cooperative  Interested Friendly Pleasant Attentive   Orientation:   All   Speech    Rate / Production: Normal     Volume:  Normal    Mood:    Anxious  Depressed  Normal Expansive   Affect:    Appropriate  Expansive  Worrisome    Thought Content:  Clear    Thought Form:  Coherent  Logical    Insight:    Fair  and Intellectual Insight     Medication Review:   No current psychiatric medications prescribed     Medication Compliance:   NA     Changes in Health Issues:   None reported     Chemical Use Review:   Substance Use: Chemical use reviewed, no  active concerns identified      Tobacco Use: No current tobacco use.      Diagnosis:  1. Generalized anxiety disorder    2. Persistent depressive disorder        Collateral Reports Completed:   Not Applicable    PLAN: (Patient Tasks / Therapist Tasks / Other)  Client agreed to work on challenging her focus on external causes and focusing on what she can do/control to make self-improvements.        Loki Crowe, LMFT 8/11/2020                                                          ______________________________________________________________________    Treatment Plan    Patient's Name: Miri Naylor  YOB: 1955    Date: 7/8/20    DSM5 Diagnoses: 300.4 (F34.1) Persistent Depressive Disorder, Early onset or 300.02 (F41.1) Generalized Anxiety Disorder  Psychosocial / Contextual Factors: marital conflict, history of loss/trauma  WHODAS: 29    Referral / Collaboration:  Referral to another professional/service is not indicated at this time..    Anticipated number of session or this episode of care: 31+      MeasurableTreatment Goal(s) related to diagnosis / functional impairment(s)  Goal 1: Client will successfully process through past trauma defined as reporting 0 Subjective Units of Distress related to trauma on 0-10 scale and 7 on Validity of (positive) Cognition scale about self on 1-7 scale   I will know I've met my goal when I am less impacted by my past loss/trauma.       Objective #A (Client Action)    Status: Conitnued - Date: 7/8/20      Client will identify past traumatic events/memories which are causing current distress.     Intervention(s)  Therapist will take client's history and facilitate client's identification of targets for EMDR.     Objective #B  Client will complete needed assessment(s) and [facility] EMDR resourcing to confirm readiness for EMDR.    Status: Continued - Date: 7/8/20      Intervention(s)  Therapist will administer Dissociative Experiences Scale,  Multidimensional Inventory of Dissociation as needed and complete [facility] EM[facility] resourcing with client.     Objective #C  Client will engage in installing at least 2 EMDR resources.  Status: Continued - Date: 7/8/20      Intervention(s)  Therapist will complete EMDR resourcing (Container, Remote Control, grounding/progressive muscle relaxation, Light Stream, Inner Advisor) with client.     Objective #D (Client Action)    Status: Continued - Date: 7/8/20      Client will engage in reprocessing all past traumatic event/memory targets.     Intervention(s)   Therapist will complete EMDR reprocessing with client.    Goal 2:  Client will improve overall baseline mood regulation by 2 points on a 1-10 Likert scale per self-report (10 = optimal mood/regulation).    I will know I've met my goal when I feel better/less depressed overall.      Objective #A (Client Action)    Status: Continued - Date:  7/8/20    Client will Identify negative self-talk and behaviors: challenge core beliefs, myths, and actions.    Intervention(s)  Therapist will teach emotional regulation skills. CBT/REBT ABCD model.    Objective #B  Client will Increase interest, engagement, and pleasure in doing things  Decrease frequency and intensity of feeling down, depressed, hopeless  Improve concentration, focus, and mindfulness in daily activities .    Status: Continued - Date:  7/8/20    Intervention(s)  Therapist will teach emotional regulation skills. DBT Core Mindfulness, Distress Tolerance, Emotion Regulation, and Interpersonal Effetiveness skills.    Patient has reviewed and agreed to the above plan.      Loki Crowe, LMFT  July 8, 2020

## 2020-08-13 NOTE — PROGRESS NOTES
GASTROENTEROLOGY NEW PATIENT VIDEO VISIT    CC/REFERRING MD:    Elizabeth Corley    REASON FOR CONSULTATION:   Elizabeth Corley for   Chief Complaint   Patient presents with     New Patient     RUQ abdominal pain       HISTORY OF PRESENT ILLNESS:    Miri Naylor is a 65 year old female who is being evaluated via a billable video visit.      We evaluated her for chronic right upper quadrant abdominal pain.  She notes that she has had chronic right upper quadrant pain for the last several years.  She notes that she develops this abdominal pain and pressure which is sometimes associated with eating.  She does get bloating after she eats.  She also notices that she has some inability to taste.  She notes that the pain is somewhat positional in nature.  The pain is worse if she tries to sit up from a lying position.  It is constant and occurs daily.  The pain is somewhat relieved after a bowel movement but then quickly returns.  She has tried using Metamucil and senna in the past.  She notes that she also does use NSAIDs with ibuprofen approximately 3-4 times daily.  She has been evaluated for this abdominal pain in the past and has had ultrasound which was normal nuclear medicine gallbladder scan which was normal.  She had upper endoscopy in 2008 which noted a hiatal hernia.  She had a colonoscopy in 2018 with a 4 mm polyp removed.  She reports that she has not lost weight because of this.  She had gained a significant amount of weight 10 years ago and has been attempting to lose weight.  Surgical history is otherwise unremarkable.  Family history notes no history of inflammatory bowel disease or GI cancers.    I have reviewed and updated the patient's Past Medical History, Social History, Family History and Medication List.    PERTINENT PAST MEDICAL HISTORY:  (I personally reviewed this history with the patient at today's visit)   Past Medical History:   Diagnosis Date     Depressive  disorder, not elsewhere classified      Drug allergy, multiple 9/23/14--passed graded oral challenge for Zithromax.     7/3/14 POS PRE-PEN skin test. 7/30/14 NEG skin tests and oral challenge to Cefzil     Hx of abnormal Pap smear ?    no specifics given     lumbar degeneration      Raynaud's disease      Uncomplicated asthma      Unspecified essential hypertension          PREVIOUS SURGERIES: (I personally reviewed this history with the patient at today's visit)   Past Surgical History:   Procedure Laterality Date     BIOPSY OF BREAST, NEEDLE CORE       C PELVIS/HIP JOINT SURGERY UNLISTED Right 7/19/16    total hip arthroplasty     COLONOSCOPY       HIP SURGERY Right 07/19/2016       ALLERGIES:     Allergies   Allergen Reactions     Levofloxacin Rash     Other reaction(s): Contact Dermatitis     Penicillins Hives and Rash     Confirmed by skin prick testing 7/3/14     Amoxicillin Hives and Rash     Amoxicillin-Pot Clavulanate Rash     Azithromycin Rash     Cephalexin Rash     Clindamycin Rash and Hives     Atorvastatin Other (See Comments)     Felt sick, intolerance     Clindamycin Hcl Hives     Levaquin      tendonitis     Augmentin Rash       PERTINENT MEDICATIONS:    Current Outpatient Medications:      albuterol (PROAIR HFA) 108 (90 Base) MCG/ACT inhaler, Inhale 2 puffs into the lungs every 4 hours as needed for shortness of breath / dyspnea or wheezing, Disp: 1 Inhaler, Rfl: 1     aspirin (ASA) 81 MG EC tablet, Take 81 mg by mouth daily, Disp: , Rfl:      atenolol (TENORMIN) 25 MG tablet, Take 1 tablet (25 mg) by mouth daily, Disp: 90 tablet, Rfl: 3     azelastine (ASTELIN) 0.1 % nasal spray, Spray 2 sprays into both nostrils 2 times daily, Disp: 1 Bottle, Rfl: 11     budesonide-formoterol (SYMBICORT) 160-4.5 MCG/ACT Inhaler, Inhale 2 puffs into the lungs 2 times daily, Disp: 3 Inhaler, Rfl: 1     CALCIUM 600+D PO, twice daily, Disp: , Rfl:      cetirizine (ZYRTEC) 10 MG tablet, Take 10 mg by mouth daily.,  Disp: , Rfl:      Cholecalciferol (VITAMIN D-3 PO), Take 2,000 Int'l Units by mouth daily, Disp: , Rfl:      DAILY MULTIVITAMIN PO, 1 tablet daily, Disp: , Rfl:      escitalopram (LEXAPRO) 10 MG tablet, Take 1 tablet (10 mg) by mouth daily, Disp: 90 tablet, Rfl: 0     ferrous sulfate (IRON) 325 (65 Fe) MG tablet, Take 325 mg by mouth daily (with breakfast), Disp: , Rfl:      FLAXSEED OIL 1000 MG PO CAPS, 1 tablet daily, Disp: , Rfl:      fluticasone (FLONASE) 50 MCG/ACT nasal spray, Spray 2 sprays into both nostrils daily, Disp: 16 g, Rfl: 11     gabapentin (NEURONTIN) 300 MG capsule, 1 capsule TID with additional 1-2 capsules if headache is severe., Disp: 270 capsule, Rfl: 3     GLUCOSAMINE-CHONDROITIN PO TABS, 1500 mg glucosamine and 1200mg chondroitin daily-2 tablets daily, Disp: , Rfl:      hydrocortisone 2.5 % cream, Apply topically 2 times daily (Patient taking differently: Apply topically 2 times daily as needed ), Disp: 60 g, Rfl: 1     ibuprofen (ADVIL,MOTRIN) 200 MG tablet, take 1 tablet (200 mg) by oral route every 6 hours as needed with food, Disp: , Rfl:      lisinopril (ZESTRIL) 40 MG tablet, Take 1 tablet (40 mg) by mouth daily, Disp: 90 tablet, Rfl: 0     Magnesium 400 MG CAPS, Take by mouth daily, Disp: , Rfl:      metoclopramide (REGLAN) 10 MG tablet, Take 1 tablet (10 mg) by mouth 3 times daily as needed (headache), Disp: 20 tablet, Rfl: 3     montelukast (SINGULAIR) 10 MG tablet, Take 1 tablet (10 mg) by mouth At Bedtime, Disp: 30 tablet, Rfl: 11     naratriptan (AMERGE) 2.5 MG tablet, Take 1 tablet (2.5 mg) by mouth at onset of headache for migraine (repeat in 4 hours if needed) May repeat in 4 hours. Max 2 tablets/24 hours., Disp: 18 tablet, Rfl: 3     pantoprazole (PROTONIX) 40 MG EC tablet, TAKE 1 TABLET DAILY 30 TO 60 MINUTES BEFORE A MEAL, Disp: 90 tablet, Rfl: 4     polyethylene glycol (MIRALAX) powder, Take 1 capful by mouth daily, Disp: , Rfl:      pravastatin (PRAVACHOL) 40 MG tablet,  TAKE 1 TABLET DAILY, Disp: 90 tablet, Rfl: 4     rOPINIRole (REQUIP) 0.25 MG tablet, Take 2 tablets (0.5 mg) by mouth At Bedtime, Disp: 60 tablet, Rfl: 11     S-ADENOSYLMETHIONINE 200 MG PO TABS, 2 tablets daily, Disp: , Rfl:      senna (SENOKOT) 8.6 MG tablet, Take 2 tablets by mouth daily, Disp: , Rfl:      spironolactone (ALDACTONE) 25 MG tablet, TAKE ONE-HALF (1/2) TABLET DAILY, Disp: 45 tablet, Rfl: 3     SUMAtriptan (IMITREX) 50 MG tablet, Take 50 mg by mouth at onset of headache , Disp: , Rfl:      TURMERIC PO, Take 2,000 mg by mouth daily, Disp: , Rfl:      vitamin B complex with vitamin C (VITAMIN  B COMPLEX) TABS tablet, Take 3 tablets by mouth daily, Disp: , Rfl:      VITAMIN C 500 MG PO TABS, 2 TABLET DAILY, Disp: , Rfl:      vitamin E (E-400) 400 UNIT capsule, Take one pill by mouth once daily, Disp: , Rfl:      ACIDOPHILUS OR TABS, 1 tablet daily, Disp: , Rfl:      Ketotifen Fumarate (ZADITOR OP), Apply to eye daily as needed, Disp: , Rfl:      triamcinolone (KENALOG) 0.1 % cream, Apply topically 2 times daily (Patient not taking: Reported on 8/11/2020), Disp: 60 g, Rfl: 3    SOCIAL HISTORY:  Social History     Socioeconomic History     Marital status:      Spouse name: Not on file     Number of children: Not on file     Years of education: Not on file     Highest education level: Not on file   Occupational History     Not on file   Social Needs     Financial resource strain: Not on file     Food insecurity     Worry: Not on file     Inability: Not on file     Transportation needs     Medical: Not on file     Non-medical: Not on file   Tobacco Use     Smoking status: Never Smoker     Smokeless tobacco: Never Used   Substance and Sexual Activity     Alcohol use: Yes     Comment: one glass of wine daily     Drug use: No     Sexual activity: Yes     Partners: Male   Lifestyle     Physical activity     Days per week: Not on file     Minutes per session: Not on file     Stress: Not on file    Relationships     Social connections     Talks on phone: Not on file     Gets together: Not on file     Attends [employer] service: Not on file     Active member of club or organization: Not on file     Attends meetings of clubs or organizations: Not on file     Relationship status: Not on file     Intimate partner violence     Fear of current or ex partner: Not on file     Emotionally abused: Not on file     Physically abused: Not on file     Forced sexual activity: Not on file   Other Topics Concern     Parent/sibling w/ CABG, MI or angioplasty before 65F 55M? No   Social History Narrative     Not on file       FAMILY HISTORY:   Family History   Problem Relation Age of Onset     Arthritis Mother      Hyperlipidemia Mother      Other Cancer Mother         Glioblastoma Multiforme     Osteoporosis Mother      Blood Disease Father      Hypertension Father      Hyperlipidemia Father      Other Cancer Father         lung cancer     Depression Son      Depression Daughter      Hyperlipidemia Brother      Anxiety Disorder Son      Skin Cancer No family hx of         Exam:    General appearance:  Healthy appearing adult, in no acute distress  Eyes:  Sclera anicteric, Pupils round and reactive to light  Ears, nose, mouth and throat:  No obvious external lesions of ears and nose.  Hearing intact  Neck:  Symmetric, No obvious external lesions  Respiratory:  Normal respiration, no use of accessory muscles   MSK:  No visual upper extremity, neck or facial muscle atrophy  ABD:  No visual abdominal distention, no audible borborygmi  Skin:  No rashes or jaundice   Psychiatric:  Oriented to person, place and time, Appropriate mood and affect.   Neurologic:  Peripheral muscle function and dexterity appear to be intact      PERTINENT STUDIES have been reviewed.    ASSESSMENT/PLAN:    iMri Naylor is a 65 year old female who presents for evaluation of chronic right upper quadrant abdominal pain.  The pain is exacerbated by  trying to sit up from a lying position.  She does get some bloating or pressure after eating food and there is some minimal improvement for a short period of time after bowel movements.  However really the pain is constant day and night and not significantly changed by much.  She has had some evaluation in the past for this pain.  I would like to get some laboratory studies to evaluate liver tests and pancreas test.  We will hold schedule her for an upper endoscopy.  Depending on the results of this, if there is no etiology found then we could consider cross-sectional imaging with something like a CT scan or MRI.  Ultimately, given the pain is constant and exacerbated by sitting up, I would consider that this could be abdominal wall pain.  If our evaluation is unremarkable, perhaps evaluation and management through the pain clinic for neuromodulation and/or abdominal wall nerve injections could be considered.      Video-Visit Details    Type of service:  Video Visit    Video Start Time:  3:14pm  Video End Time:  3:42pm  Total Video Time: 28 min    Originating Location (pt. Location): Home    Distant Location (provider location):  [facility]     Mode of Communication:  Video Conference via BooknGo    Campbell Henry MD    RTC 3 months    Thank you for this consultation.  It was a pleasure to participate in the care of this patient; please contact us with any further questions.      This note was created with voice recognition software, and while reviewed for accuracy, typos may remain.     Campbell Henry MD  Division of Gastroenterology, Hepatology and Nutrition  [facility]  [phone]

## 2020-08-17 DIAGNOSIS — I10 ESSENTIAL HYPERTENSION: ICD-10-CM

## 2020-08-18 NOTE — TELEPHONE ENCOUNTER
"Requested Prescriptions   Pending Prescriptions Disp Refills     lisinopril (ZESTRIL) 40 MG tablet [Pharmacy Med Name: LISINOPRIL TABS 40MG] 90 tablet 3     Sig: TAKE 1 TABLET DAILY       ACE Inhibitors (Including Combos) Protocol Failed - 8/17/2020 12:07 AM        Failed - Blood pressure under 140/90 in past 12 months     BP Readings from Last 3 Encounters:   06/09/20 (!) 142/74   03/12/20 (!) 147/80   02/13/20 127/78                 Passed - Recent (12 mo) or future (30 days) visit within the authorizing provider's specialty     Patient has had an office visit with the authorizing provider or a provider within the authorizing providers department within the previous 12 mos or has a future within next 30 days. See \"Patient Info\" tab in inbasket, or \"Choose Columns\" in Meds & Orders section of the refill encounter.              Passed - Medication is active on med list        Passed - Patient is age 18 or older        Passed - No active pregnancy on record        Passed - Normal serum creatinine on file in past 12 months     Recent Labs   Lab Test 06/09/20  1606   CR 0.90       Ok to refill medication if creatinine is low          Passed - Normal serum potassium on file in past 12 months     Recent Labs   Lab Test 06/09/20  1606   POTASSIUM 4.1             Passed - No positive pregnancy test within past 12 months             "

## 2020-08-19 ENCOUNTER — VIRTUAL VISIT (OUTPATIENT)
Dept: PSYCHOLOGY | Facility: CLINIC | Age: 65
End: 2020-08-19
Payer: COMMERCIAL

## 2020-08-19 DIAGNOSIS — Z11.59 ENCOUNTER FOR SCREENING FOR OTHER VIRAL DISEASES: Primary | ICD-10-CM

## 2020-08-19 DIAGNOSIS — F41.1 GENERALIZED ANXIETY DISORDER: Primary | ICD-10-CM

## 2020-08-19 DIAGNOSIS — F34.1 PERSISTENT DEPRESSIVE DISORDER: ICD-10-CM

## 2020-08-19 PROCEDURE — 90834 PSYTX W PT 45 MINUTES: CPT | Mod: 95 | Performed by: MARRIAGE & FAMILY THERAPIST

## 2020-08-19 RX ORDER — LISINOPRIL 40 MG/1
TABLET ORAL
Qty: 90 TABLET | Refills: 3 | Status: SHIPPED | OUTPATIENT
Start: 2020-08-19 | End: 2021-08-10

## 2020-08-20 NOTE — PROGRESS NOTES
Progress Note    Patient Name: Miri Naylor  Date: 8/19/20         Service Type: Individual      Session Start Time: 1:01  Session End Time: 1:53     Session Length: 52    Session #: 27    Attendees: Client attended alone    Telemedicine Visit: The patient's condition can be safely assessed and treated via synchronous audio and visual telemedicine encounter.      Reason for Telemedicine Visit: Services only offered telehealth    Originating Site (Patient Location): Patient's home    Distant Site (Provider Location): Provider Remote Setting    Consent:  The patient/guardian has verbally consented to: the potential risks and benefits of telemedicine (video visit) versus in person care; bill my insurance or make self-payment for services provided; and responsibility for payment of non-covered services.     Mode of Communication:  Video Conference via [employer]    As the provider I attest to compliance with applicable laws and regulations related to telemedicine.     Treatment Plan Last Reviewed: 7/8/20, next due 10/8/20.  PHQ-9 / MELISA-7 : not completed.    DATA  Interactive Complexity: No  Crisis: No       Progress Since Last Session (Related to Symptoms / Goals / Homework):   Symptoms: Client reported continuing to experience anxiety and depression.    Homework: Partially completed       Episode of Care Goals: Minimal progress - ACTION (Actively working towards change); Intervened by reinforcing change plan / affirming steps taken     Current / Ongoing Stressors and Concerns:   Client reported experiencing continued anxiety/depression related to her ongoing relationship issues with her spouse.  Client again reported regarding her Restoration/spiritual life and its impact on her marital conflict historically.  Client reported her insight that she did not have same choices/options available to her in the past due to social/Restoration constructs.     Treatment Objective(s)  Addressed in This Session:   use cognitive strategies identified in therapy to challenge anxious thoughts  Decrease frequency and intensity of feeling down, depressed, hopeless  Identify negative self-talk and behaviors: challenge core beliefs, myths, and actions      Intervention:   CBT: reviewed with client focusing on what she can do/control in the present, letting go of past barriers.        ASSESSMENT: Current Emotional / Mental Status (status of significant symptoms):   Risk status (Self / Other harm or suicidal ideation)   Patient denies current fears or concerns for personal safety.   Patient denies current or recent suicidal ideation or behaviors.   Patientdenies current or recent homicidal ideation or behaviors.   Patient denies current or recent self injurious behavior or ideation.   Patient denies other safety concerns.   Patient Patient reports there has been no change in risk factors since their last session.     PatientPatient reports there has been no change in protective factors since their last session.     Recommended that patient call 911 or go to the local ED should there be a change in any of these risk factors.     Appearance:   Appropriate    Eye Contact:   Good    Psychomotor Behavior: Normal    Attitude:   Cooperative  Interested Friendly Pleasant Attentive   Orientation:   All   Speech    Rate / Production: Normal     Volume:  Normal    Mood:    Anxious  Depressed  Normal Expansive   Affect:    Appropriate  Expansive  Worrisome    Thought Content:  Clear    Thought Form:  Coherent  Logical    Insight:    Good  and Intellectual Insight     Medication Review:   No current psychiatric medications prescribed     Medication Compliance:   NA     Changes in Health Issues:   None reported     Chemical Use Review:   Substance Use: Chemical use reviewed, no active concerns identified      Tobacco Use: No current tobacco use.      Diagnosis:  1. Generalized anxiety disorder    2. Persistent  depressive disorder        Collateral Reports Completed:   Not Applicable    PLAN: (Patient Tasks / Therapist Tasks / Other)  Client agreed to work on focusing on what she can do/control in the present, letting go of past obstacles.        Loki Crowe, [facility] 8/19/2020                                                          ______________________________________________________________________    Treatment Plan    Patient's Name: Miri Naylor  YOB: 1955    Date: 7/8/20    DSM5 Diagnoses: 300.4 (F34.1) Persistent Depressive Disorder, Early onset or 300.02 (F41.1) Generalized Anxiety Disorder  Psychosocial / Contextual Factors: marital conflict, history of loss/trauma  WHODAS: 29    Referral / Collaboration:  Referral to another professional/service is not indicated at this time..    Anticipated number of session or this episode of care: 31+      MeasurableTreatment Goal(s) related to diagnosis / functional impairment(s)  Goal 1: Client will successfully process through past trauma defined as reporting 0 Subjective Units of Distress related to trauma on 0-10 scale and 7 on Validity of (positive) Cognition scale about self on 1-7 scale   I will know I've met my goal when I am less impacted by my past loss/trauma.       Objective #A (Client Action)    Status: Conitnued - Date: 7/8/20      Client will identify past traumatic events/memories which are causing current distress.     Intervention(s)  Therapist will take client's history and facilitate client's identification of targets for EMDR.     Objective #B  Client will complete needed assessment(s) and [facility] resourcing to confirm readiness for EMDR.    Status: Continued - Date: 7/8/20      Intervention(s)  Therapist will administer Dissociative Experiences Scale, Multidimensional Inventory of Dissociation as needed and complete [facility] EMDR resourcing with client.     Objective #C  Client will engage in installing at least 2  EMDR resources.  Status: Continued - Date: 7/8/20      Intervention(s)  Therapist will complete EMDR resourcing (Container, Remote Control, grounding/progressive muscle relaxation, Light Stream, Inner Advisor) with client.     Objective #D (Client Action)    Status: Continued - Date: 7/8/20      Client will engage in reprocessing all past traumatic event/memory targets.     Intervention(s)   Therapist will complete EMDR reprocessing with client.    Goal 2:  Client will improve overall baseline mood regulation by 2 points on a 1-10 Likert scale per self-report (10 = optimal mood/regulation).    I will know I've met my goal when I feel better/less depressed overall.      Objective #A (Client Action)    Status: Continued - Date:  7/8/20    Client will Identify negative self-talk and behaviors: challenge core beliefs, myths, and actions.    Intervention(s)  Therapist will teach emotional regulation skills. CBT/REBT ABCD model.    Objective #B  Client will Increase interest, engagement, and pleasure in doing things  Decrease frequency and intensity of feeling down, depressed, hopeless  Improve concentration, focus, and mindfulness in daily activities .    Status: Continued - Date:  7/8/20    Intervention(s)  Therapist will teach emotional regulation skills. DBT Core Mindfulness, Distress Tolerance, Emotion Regulation, and Interpersonal Effetiveness skills.    Patient has reviewed and agreed to the above plan.      LUIS ENRIQUE Briseno  July 8, 2020

## 2020-08-25 ENCOUNTER — MYC MEDICAL ADVICE (OUTPATIENT)
Dept: ALLERGY | Facility: CLINIC | Age: 65
End: 2020-08-25

## 2020-08-25 DIAGNOSIS — J45.40 MODERATE PERSISTENT ASTHMA WITHOUT COMPLICATION: ICD-10-CM

## 2020-08-26 ENCOUNTER — VIRTUAL VISIT (OUTPATIENT)
Dept: PSYCHOLOGY | Facility: CLINIC | Age: 65
End: 2020-08-26
Payer: COMMERCIAL

## 2020-08-26 DIAGNOSIS — F41.1 GENERALIZED ANXIETY DISORDER: Primary | ICD-10-CM

## 2020-08-26 DIAGNOSIS — F34.1 PERSISTENT DEPRESSIVE DISORDER: ICD-10-CM

## 2020-08-26 PROCEDURE — 90834 PSYTX W PT 45 MINUTES: CPT | Mod: 95 | Performed by: MARRIAGE & FAMILY THERAPIST

## 2020-08-26 RX ORDER — MONTELUKAST SODIUM 10 MG/1
10 TABLET ORAL AT BEDTIME
Qty: 30 TABLET | Refills: 11 | Status: SHIPPED | OUTPATIENT
Start: 2020-08-26 | End: 2020-11-18

## 2020-08-26 NOTE — PROGRESS NOTES
"                                           Progress Note    Patient Name: Miri Naylor  Date: 8/26/20         Service Type: Individual      Session Start Time: 2:03  Session End Time: 2:53     Session Length: 50    Session #: 28    Attendees: Client attended alone    Telemedicine Visit: The patient's condition can be safely assessed and treated via synchronous audio and visual telemedicine encounter.      Reason for Telemedicine Visit: Services only offered telehealth    Originating Site (Patient Location): Patient's home    Distant Site (Provider Location): Provider Remote Setting    Consent:  The patient/guardian has verbally consented to: the potential risks and benefits of telemedicine (video visit) versus in person care; bill my insurance or make self-payment for services provided; and responsibility for payment of non-covered services.     Mode of Communication:  Video Conference via [employer]    As the provider I attest to compliance with applicable laws and regulations related to telemedicine.     Treatment Plan Last Reviewed: 7/8/20, next due 10/8/20.  PHQ-9 / MELISA-7 : not completed.    DATA  Interactive Complexity: No  Crisis: No       Progress Since Last Session (Related to Symptoms / Goals / Homework):   Symptoms: Client reported continuing to experience anxiety and depression.    Homework: Partially completed       Episode of Care Goals: Minimal progress - ACTION (Actively working towards change); Intervened by reinforcing change plan / affirming steps taken     Current / Ongoing Stressors and Concerns:   Client reported experiencing continued anxiety/depression related to her ongoing relationship issues with her spouse.  Client again reported regarding her Shinto/spiritual life/development and its impact on her marital conflict historically and currently.  Client reported her insight that her spouse desires a \"fairy tale\" relationship with her, and wants to return to the way things were " between them when they first met, and that she does not want the same things, nor does she see a return to the way things were as possible.     Treatment Objective(s) Addressed in This Session:   learn & utilize at least some assertive communication skills weekly      Intervention:   Interpersonal Therapy: reviewed with client assertively/directly communicating to her spouse her relationship needs/wants/vision.        ASSESSMENT: Current Emotional / Mental Status (status of significant symptoms):   Risk status (Self / Other harm or suicidal ideation)   Patient denies current fears or concerns for personal safety.   Patient denies current or recent suicidal ideation or behaviors.   Patientdenies current or recent homicidal ideation or behaviors.   Patient denies current or recent self injurious behavior or ideation.   Patient denies other safety concerns.   Patient Patient reports there has been no change in risk factors since their last session.     PatientPatient reports there has been no change in protective factors since their last session.     Recommended that patient call 911 or go to the local ED should there be a change in any of these risk factors.     Appearance:   Appropriate    Eye Contact:   Good    Psychomotor Behavior: Normal    Attitude:   Cooperative  Interested Friendly Pleasant Attentive   Orientation:   All   Speech    Rate / Production: Normal     Volume:  Normal    Mood:    Anxious  Depressed  Normal Expansive   Affect:    Appropriate  Expansive  Worrisome    Thought Content:  Clear    Thought Form:  Coherent  Logical    Insight:    Good  and Intellectual Insight     Medication Review:   No current psychiatric medications prescribed     Medication Compliance:   NA     Changes in Health Issues:   None reported     Chemical Use Review:   Substance Use: Chemical use reviewed, no active concerns identified      Tobacco Use: No current tobacco use.      Diagnosis:  1. Generalized anxiety disorder     2. Persistent depressive disorder        Collateral Reports Completed:   Not Applicable    PLAN: (Patient Tasks / Therapist Tasks / Other)  Client agreed to work on considering how and when to communicate her relationship needs/wants/vision to her spouse.        Loki Crowe, [facility] 8/26/2020                                                          ______________________________________________________________________    Treatment Plan    Patient's Name: Miri Naylor  YOB: 1955    Date: 7/8/20    DSM5 Diagnoses: 300.4 (F34.1) Persistent Depressive Disorder, Early onset or 300.02 (F41.1) Generalized Anxiety Disorder  Psychosocial / Contextual Factors: marital conflict, history of loss/trauma  WHODAS: 29    Referral / Collaboration:  Referral to another professional/service is not indicated at this time..    Anticipated number of session or this episode of care: 31+      MeasurableTreatment Goal(s) related to diagnosis / functional impairment(s)  Goal 1: Client will successfully process through past trauma defined as reporting 0 Subjective Units of Distress related to trauma on 0-10 scale and 7 on Validity of (positive) Cognition scale about self on 1-7 scale   I will know I've met my goal when I am less impacted by my past loss/trauma.       Objective #A (Client Action)    Status: Conitnued - Date: 7/8/20      Client will identify past traumatic events/memories which are causing current distress.     Intervention(s)  Therapist will take client's history and facilitate client's identification of targets for EMDR.     Objective #B  Client will complete needed assessment(s) and [facility] EM[facility] resourcing to confirm readiness for EMDR.    Status: Continued - Date: 7/8/20      Intervention(s)  Therapist will administer Dissociative Experiences Scale, Multidimensional Inventory of Dissociation as needed and complete [facility] EMDR resourcing with client.     Objective #C  Client will engage  in installing at least 2 EMDR resources.  Status: Continued - Date: 7/8/20      Intervention(s)  Therapist will complete EMDR resourcing (Container, Remote Control, grounding/progressive muscle relaxation, Light Stream, Inner Advisor) with client.     Objective #D (Client Action)    Status: Continued - Date: 7/8/20      Client will engage in reprocessing all past traumatic event/memory targets.     Intervention(s)   Therapist will complete EMDR reprocessing with client.    Goal 2:  Client will improve overall baseline mood regulation by 2 points on a 1-10 Likert scale per self-report (10 = optimal mood/regulation).    I will know I've met my goal when I feel better/less depressed overall.      Objective #A (Client Action)    Status: Continued - Date:  7/8/20    Client will Identify negative self-talk and behaviors: challenge core beliefs, myths, and actions.    Intervention(s)  Therapist will teach emotional regulation skills. CBT/REBT ABCD model.    Objective #B  Client will Increase interest, engagement, and pleasure in doing things  Decrease frequency and intensity of feeling down, depressed, hopeless  Improve concentration, focus, and mindfulness in daily activities .    Status: Continued - Date:  7/8/20    Intervention(s)  Therapist will teach emotional regulation skills. DBT Core Mindfulness, Distress Tolerance, Emotion Regulation, and Interpersonal Effetiveness skills.    Patient has reviewed and agreed to the above plan.      Loki Crowe, LMFT  July 8, 2020

## 2020-08-26 NOTE — TELEPHONE ENCOUNTER
Pending Prescriptions:                       Disp   Refills    montelukast (SINGULAIR) 10 MG tablet      30 tab*0            Sig: Take 1 tablet (10 mg) by mouth At Bedtime    Routing refill request to provider for review/approval because:  Patient needs to be seen because:  Was due back in July.  Patient states she is out of medication.  She's scheduled for follow up next week.    Kerri Ramires RN

## 2020-09-01 DIAGNOSIS — Z11.59 ENCOUNTER FOR SCREENING FOR OTHER VIRAL DISEASES: ICD-10-CM

## 2020-09-01 PROCEDURE — U0003 INFECTIOUS AGENT DETECTION BY NUCLEIC ACID (DNA OR RNA); SEVERE ACUTE RESPIRATORY SYNDROME CORONAVIRUS 2 (SARS-COV-2) (CORONAVIRUS DISEASE [COVID-19]), AMPLIFIED PROBE TECHNIQUE, MAKING USE OF HIGH THROUGHPUT TECHNOLOGIES AS DESCRIBED BY CMS-2020-01-R: HCPCS | Performed by: INTERNAL MEDICINE

## 2020-09-02 ENCOUNTER — VIRTUAL VISIT (OUTPATIENT)
Dept: ALLERGY | Facility: CLINIC | Age: 65
End: 2020-09-02
Payer: COMMERCIAL

## 2020-09-02 VITALS — BODY MASS INDEX: 30.29 KG/M2 | HEIGHT: 71 IN

## 2020-09-02 DIAGNOSIS — H10.9 RHINOCONJUNCTIVITIS: ICD-10-CM

## 2020-09-02 DIAGNOSIS — J45.40 MODERATE PERSISTENT ASTHMA WITHOUT COMPLICATION: Primary | ICD-10-CM

## 2020-09-02 DIAGNOSIS — J31.0 RHINOCONJUNCTIVITIS: ICD-10-CM

## 2020-09-02 PROBLEM — J01.90 ACUTE SINUSITIS WITH SYMPTOMS > 10 DAYS: Status: RESOLVED | Noted: 2017-07-24 | Resolved: 2020-09-02

## 2020-09-02 LAB
SARS-COV-2 RNA SPEC QL NAA+PROBE: NOT DETECTED
SPECIMEN SOURCE: NORMAL

## 2020-09-02 PROCEDURE — 99213 OFFICE O/P EST LOW 20 MIN: CPT | Mod: 95 | Performed by: ALLERGY & IMMUNOLOGY

## 2020-09-02 ASSESSMENT — ASTHMA QUESTIONNAIRES
QUESTION_1 LAST FOUR WEEKS HOW MUCH OF THE TIME DID YOUR ASTHMA KEEP YOU FROM GETTING AS MUCH DONE AT WORK, SCHOOL OR AT HOME: NONE OF THE TIME
QUESTION_5 LAST FOUR WEEKS HOW WOULD YOU RATE YOUR ASTHMA CONTROL: COMPLETELY CONTROLLED
QUESTION_2 LAST FOUR WEEKS HOW OFTEN HAVE YOU HAD SHORTNESS OF BREATH: ONCE OR TWICE A WEEK
QUESTION_4 LAST FOUR WEEKS HOW OFTEN HAVE YOU USED YOUR RESCUE INHALER OR NEBULIZER MEDICATION (SUCH AS ALBUTEROL): ONCE A WEEK OR LESS
QUESTION_3 LAST FOUR WEEKS HOW OFTEN DID YOUR ASTHMA SYMPTOMS (WHEEZING, COUGHING, SHORTNESS OF BREATH, CHEST TIGHTNESS OR PAIN) WAKE YOU UP AT NIGHT OR EARLIER THAN USUAL IN THE MORNING: NOT AT ALL
ACT_TOTALSCORE: 23

## 2020-09-02 NOTE — PROGRESS NOTES
"Miri Naylor is a 65 year old female who is being evaluated via a billable telephone visit.      The patient has been notified of following:      \"This telephone visit will be conducted via a call between you and your physician/provider. We have found that certain health care needs can be provided without the need for an in-person physical exam.  This service lets us provide the care you need with a telephone conversation.  If a prescription is necessary we can send it directly to your pharmacy.  If lab work is needed we can place an order for that and you can then stop by our lab to have the test done at a later time.     If during the course of the call the physician/provider feels a telephone visit is not appropriate, you will not be charged for this service.\"    Patient has given verbal consent for telephone visit? Yes     I have reviewed and updated the patient's Past Medical History, Social History, Family History and Medication List.    On Symbicort 160/4.5mcg 2 puffs twice daily and Singulair 10mg PO daily. Complete pft ordered but not done secondary to covid. Chest x-ray normal from 3/2020. Historically she has had increased chest symptoms in the fall. COVID testing yesterday for upcoming endoscopy. Having some coughing recently that has been minimal. Some shortness of breath with outdoor physical activity.     Non allergic rhinitis. Having post nasal drainage. Having increased sneezing. On Zyrtec 10mg daily. On Singulair 10mg PO daily. On Flonase 2 sprays/nostril daily. Using Azelastine 2 sprays/nostril twice daily.      ACT Total Scores 9/2/2020   ACT TOTAL SCORE (Goal Greater than or Equal to 20) 23   In the past 12 months, how many times did you visit the emergency room for your asthma without being admitted to the hospital? 0   In the past 12 months, how many times were you hospitalized overnight because of your asthma? 0           Past Medical History:   Diagnosis Date     Depressive disorder, " not elsewhere classified      Drug allergy, multiple 9/23/14--passed graded oral challenge for Zithromax.     7/3/14 POS PRE-PEN skin test. 7/30/14 NEG skin tests and oral challenge to Cefzil     Hx of abnormal Pap smear ?    no specifics given     lumbar degeneration      Raynaud's disease      Uncomplicated asthma      Unspecified essential hypertension      Family History   Problem Relation Age of Onset     Arthritis Mother      Hyperlipidemia Mother      Other Cancer Mother         Glioblastoma Multiforme     Osteoporosis Mother      Blood Disease Father      Hypertension Father      Hyperlipidemia Father      Other Cancer Father         lung cancer     Depression Son      Depression Daughter      Hyperlipidemia Brother      Anxiety Disorder Son      Skin Cancer No family hx of      Past Surgical History:   Procedure Laterality Date     BIOPSY OF BREAST, NEEDLE CORE       C PELVIS/HIP JOINT SURGERY UNLISTED Right 7/19/16    total hip arthroplasty     COLONOSCOPY       HIP SURGERY Right 07/19/2016    replacement       REVIEW OF SYSTEMS:  General: positive  for weight gain. positive  for weight loss. positive  for changes in sleep.   Ears: negative for fullness. negative for hearing loss. negative for dizziness.   Nose: negative for snoring.negative for changes in smell. positive  for drainage.   Eyes: positive  for eye watering. positive  for eye itching. negative for vision changes. positive  for eye redness.  Throat: negative for hoarseness. negative for sore throat. negative for trouble swallowing.   Lungs: negative for shortness of breath.negative for wheezing. positive  for sputum production.   Cardiovascular: negative for chest pain. positive  for swelling of ankles. negative for fast or irregular heartbeat.   Gastrointestinal: positive  for nausea. positive  for heartburn. positive  for acid reflux.   Musculoskeletal: positive  for joint pain. positive  for joint stiffness. negative for joint swelling.    Neurologic: negative for seizures. negative for fainting. negative for weakness.   Psychiatric: negative for changes in mood. negative for anxiety.   Endocrine: negative for cold intolerance. positive  for heat intolerance. negative for tremors.   Lymphatic: negative for lower extremity swelling. negative for lymph node swelling.   Hematologic: negative for easy bruising. negative for easy bleeding.  Integumentary: negative for rash. negative for scaling. negative for nail changes.       Current Outpatient Medications:      ACIDOPHILUS OR TABS, 1 tablet daily, Disp: , Rfl:      albuterol (PROAIR HFA) 108 (90 Base) MCG/ACT inhaler, Inhale 2 puffs into the lungs every 4 hours as needed for shortness of breath / dyspnea or wheezing, Disp: 1 Inhaler, Rfl: 1     aspirin (ASA) 81 MG EC tablet, Take 81 mg by mouth daily, Disp: , Rfl:      atenolol (TENORMIN) 25 MG tablet, Take 1 tablet (25 mg) by mouth daily, Disp: 90 tablet, Rfl: 3     azelastine (ASTELIN) 0.1 % nasal spray, Spray 2 sprays into both nostrils 2 times daily, Disp: 1 Bottle, Rfl: 11     budesonide-formoterol (SYMBICORT) 160-4.5 MCG/ACT Inhaler, Inhale 2 puffs into the lungs 2 times daily, Disp: 3 Inhaler, Rfl: 1     CALCIUM 600+D PO, twice daily, Disp: , Rfl:      cetirizine (ZYRTEC) 10 MG tablet, Take 10 mg by mouth daily., Disp: , Rfl:      DAILY MULTIVITAMIN PO, 1 tablet daily, Disp: , Rfl:      escitalopram (LEXAPRO) 10 MG tablet, Take 1 tablet (10 mg) by mouth daily, Disp: 90 tablet, Rfl: 0     FLAXSEED OIL 1000 MG PO CAPS, 1 tablet daily, Disp: , Rfl:      fluticasone (FLONASE) 50 MCG/ACT nasal spray, Spray 2 sprays into both nostrils daily, Disp: 16 g, Rfl: 11     gabapentin (NEURONTIN) 300 MG capsule, 1 capsule TID with additional 1-2 capsules if headache is severe., Disp: 270 capsule, Rfl: 3     GLUCOSAMINE-CHONDROITIN PO TABS, 1500 mg glucosamine and 1200mg chondroitin daily-2 tablets daily, Disp: , Rfl:      hydrocortisone 2.5 % cream, Apply  topically 2 times daily (Patient taking differently: Apply topically 2 times daily as needed ), Disp: 60 g, Rfl: 1     ibuprofen (ADVIL,MOTRIN) 200 MG tablet, take 1 tablet (200 mg) by oral route every 6 hours as needed with food, Disp: , Rfl:      Ketotifen Fumarate (ZADITOR OP), Apply to eye daily as needed, Disp: , Rfl:      lisinopril (ZESTRIL) 40 MG tablet, TAKE 1 TABLET DAILY, Disp: 90 tablet, Rfl: 3     metoclopramide (REGLAN) 10 MG tablet, Take 1 tablet (10 mg) by mouth 3 times daily as needed (headache), Disp: 20 tablet, Rfl: 3     montelukast (SINGULAIR) 10 MG tablet, Take 1 tablet (10 mg) by mouth At Bedtime, Disp: 30 tablet, Rfl: 11     naratriptan (AMERGE) 2.5 MG tablet, Take 1 tablet (2.5 mg) by mouth at onset of headache for migraine (repeat in 4 hours if needed) May repeat in 4 hours. Max 2 tablets/24 hours., Disp: 18 tablet, Rfl: 3     pantoprazole (PROTONIX) 40 MG EC tablet, TAKE 1 TABLET DAILY 30 TO 60 MINUTES BEFORE A MEAL, Disp: 90 tablet, Rfl: 4     polyethylene glycol (MIRALAX) powder, Take 1 capful by mouth daily, Disp: , Rfl:      pravastatin (PRAVACHOL) 40 MG tablet, TAKE 1 TABLET DAILY, Disp: 90 tablet, Rfl: 4     rOPINIRole (REQUIP) 0.25 MG tablet, Take 2 tablets (0.5 mg) by mouth At Bedtime, Disp: 60 tablet, Rfl: 11     S-ADENOSYLMETHIONINE 200 MG PO TABS, 2 tablets daily, Disp: , Rfl:      senna (SENOKOT) 8.6 MG tablet, Take 2 tablets by mouth daily, Disp: , Rfl:      spironolactone (ALDACTONE) 25 MG tablet, TAKE ONE-HALF (1/2) TABLET DAILY, Disp: 45 tablet, Rfl: 3     SUMAtriptan (IMITREX) 50 MG tablet, Take 50 mg by mouth at onset of headache , Disp: , Rfl:      triamcinolone (KENALOG) 0.1 % cream, Apply topically 2 times daily, Disp: 60 g, Rfl: 3     VITAMIN C 500 MG PO TABS, 2 TABLET DAILY, Disp: , Rfl:      Cholecalciferol (VITAMIN D-3 PO), Take 2,000 Int'l Units by mouth daily, Disp: , Rfl:      ferrous sulfate (IRON) 325 (65 Fe) MG tablet, Take 325 mg by mouth daily (with  breakfast), Disp: , Rfl:      Magnesium 400 MG CAPS, Take by mouth daily, Disp: , Rfl:      TURMERIC PO, Take 2,000 mg by mouth daily, Disp: , Rfl:      vitamin B complex with vitamin C (VITAMIN  B COMPLEX) TABS tablet, Take 3 tablets by mouth daily, Disp: , Rfl:      vitamin E (E-400) 400 UNIT capsule, Take one pill by mouth once daily, Disp: , Rfl:   Immunization History   Administered Date(s) Administered     Influenza Quad, Recombinant, p-free (RIV4) 09/10/2019     Influenza Vaccine IM > 6 months Valent IIV4 12/03/2013, 11/16/2015, 10/01/2017, 09/13/2018     TD (ADULT, 7+) 03/06/2008     TDAP Vaccine (Adacel) 08/03/2012     Twinrix A/B 10/12/2017, 12/27/2017, 04/24/2018     Zoster vaccine, live 09/14/2012     Allergies   Allergen Reactions     Levofloxacin Rash     Other reaction(s): Contact Dermatitis     Penicillins Hives and Rash     Confirmed by skin prick testing 7/3/14     Amoxicillin Hives and Rash     Amoxicillin-Pot Clavulanate Rash     Azithromycin Rash     Cephalexin Rash     Clindamycin Rash and Hives     Atorvastatin Other (See Comments)     Felt sick, intolerance     Clindamycin Hcl Hives     Levaquin      tendonitis     Augmentin Rash       ASSESSMENT/PLAN:  Problem List Items Addressed This Visit        Respiratory    Moderate persistent asthma without complication - Primary     Flares with URI, cold air and humid air.  Well controlled. Current treatment is Symbicort 160/4.5mcg 2 puff inhaled daily and Singulair 10mg PO daily.     ACT 23  Spirometry with restriction in past. Ordered complete pft but not done secondary to covid.       - Albuterol 2-4 puffs inhaled (use a spacer unless using a Proair Respiclick device) every 4 hours as needed for chest tightness, wheezing, shortness of breath and/or coughing.    - Avoid asthma triggers.  - Symbicort 160/4.5mcg 2 puffs inhaled twice daily with a spacer.   - Singulair 10mg by mouth daily at night.            Infectious/Inflammatory     Rhinoconjunctivitis     History of perennial nasal and ocular symptoms that get worse in the spring and fall.  atrovent initially thought to be beneficial she no longer thinks is helpful. Flonase has been helpful.    Tried on Dymista and somewhat beneficial.  Negative allergy testing.  Increased symptoms around cats.  Zyrtec is helpful.  Singulair is helpful.   Either vasomotor rhinitis versus local allergic rhinitis. Follows with ENT.        - Flonase 2 sprays/nostril daily.   - Azelastine 2 sprays/nostril twice daily as needed.   - Zyrtec 10 to 20 mg as needed.   - Singulair 10mg by mouth daily at night.                Return in 6 months or sooner if needed.     Chart documentation with Dragon Voice recognition Software. Although reviewed after completion, some words and grammatical errors may remain.    I have reviewed the note as documented above.  This accurately captures the substance of my conversation with the patient.    Contact time  Visit started at 0936  Visit ended at 0941    Nish Stockton DO FAAAAI  Allergy/Immunology  [place], [place]

## 2020-09-02 NOTE — ASSESSMENT & PLAN NOTE
History of perennial nasal and ocular symptoms that get worse in the spring and fall.  atrovent initially thought to be beneficial she no longer thinks is helpful. Flonase has been helpful.    Tried on Dymista and somewhat beneficial.  Negative allergy testing.  Increased symptoms around cats.  Zyrtec is helpful.  Singulair is helpful.   Either vasomotor rhinitis versus local allergic rhinitis. Follows with ENT.        - Flonase 2 sprays/nostril daily.   - Azelastine 2 sprays/nostril twice daily as needed.   - Zyrtec 10 to 20 mg as needed.   - Singulair 10mg by mouth daily at night.

## 2020-09-02 NOTE — ASSESSMENT & PLAN NOTE
Flares with URI, cold air and humid air.  Well controlled. Current treatment is Symbicort 160/4.5mcg 2 puff inhaled daily and Singulair 10mg PO daily.     ACT 23  Spirometry with restriction in past. Ordered complete pft but not done secondary to covid.       - Albuterol 2-4 puffs inhaled (use a spacer unless using a Proair Respiclick device) every 4 hours as needed for chest tightness, wheezing, shortness of breath and/or coughing.    - Avoid asthma triggers.  - Symbicort 160/4.5mcg 2 puffs inhaled twice daily with a spacer.   - Singulair 10mg by mouth daily at night.

## 2020-09-02 NOTE — LETTER
"    9/2/2020         RE: Miri Naylor  [address] [id number]        Dear Colleague,    Thank you for referring your patient, Miri Naylor, to the [facility]. Please see a copy of my visit note below.    Miri Naylor is a 65 year old female who is being evaluated via a billable telephone visit.      The patient has been notified of following:      \"This telephone visit will be conducted via a call between you and your physician/provider. We have found that certain health care needs can be provided without the need for an in-person physical exam.  This service lets us provide the care you need with a telephone conversation.  If a prescription is necessary we can send it directly to your pharmacy.  If lab work is needed we can place an order for that and you can then stop by our lab to have the test done at a later time.     If during the course of the call the physician/provider feels a telephone visit is not appropriate, you will not be charged for this service.\"    Patient has given verbal consent for telephone visit? Yes     I have reviewed and updated the patient's Past Medical History, Social History, Family History and Medication List.    On Symbicort 160/4.5mcg 2 puffs twice daily and Singulair 10mg PO daily. Complete pft ordered but not done secondary to covid. Chest x-ray normal from 3/2020. Historically she has had increased chest symptoms in the fall. COVID testing yesterday for upcoming endoscopy. Having some coughing recently that has been minimal. Some shortness of breath with outdoor physical activity.     Non allergic rhinitis. Having post nasal drainage. Having increased sneezing. On Zyrtec 10mg daily. On Singulair 10mg PO daily. On Flonase 2 sprays/nostril daily. Using Azelastine 2 sprays/nostril twice daily.      ACT Total Scores 9/2/2020   ACT TOTAL SCORE (Goal Greater than or Equal to 20) 23   In the past 12 months, how many times did you " visit the emergency room for your asthma without being admitted to the hospital? 0   In the past 12 months, how many times were you hospitalized overnight because of your asthma? 0           Past Medical History:   Diagnosis Date     Depressive disorder, not elsewhere classified      Drug allergy, multiple 9/23/14--passed graded oral challenge for Zithromax.     7/3/14 POS PRE-PEN skin test. 7/30/14 NEG skin tests and oral challenge to Cefzil     Hx of abnormal Pap smear ?    no specifics given     lumbar degeneration      Raynaud's disease      Uncomplicated asthma      Unspecified essential hypertension      Family History   Problem Relation Age of Onset     Arthritis Mother      Hyperlipidemia Mother      Other Cancer Mother         Glioblastoma Multiforme     Osteoporosis Mother      Blood Disease Father      Hypertension Father      Hyperlipidemia Father      Other Cancer Father         lung cancer     Depression Son      Depression Daughter      Hyperlipidemia Brother      Anxiety Disorder Son      Skin Cancer No family hx of      Past Surgical History:   Procedure Laterality Date     BIOPSY OF BREAST, NEEDLE CORE       C PELVIS/HIP JOINT SURGERY UNLISTED Right 7/19/16    total hip arthroplasty     COLONOSCOPY       HIP SURGERY Right 07/19/2016    replacement       REVIEW OF SYSTEMS:  General: positive  for weight gain. positive  for weight loss. positive  for changes in sleep.   Ears: negative for fullness. negative for hearing loss. negative for dizziness.   Nose: negative for snoring.negative for changes in smell. positive  for drainage.   Eyes: positive  for eye watering. positive  for eye itching. negative for vision changes. positive  for eye redness.  Throat: negative for hoarseness. negative for sore throat. negative for trouble swallowing.   Lungs: negative for shortness of breath.negative for wheezing. positive  for sputum production.   Cardiovascular: negative for chest pain. positive  for swelling  of ankles. negative for fast or irregular heartbeat.   Gastrointestinal: positive  for nausea. positive  for heartburn. positive  for acid reflux.   Musculoskeletal: positive  for joint pain. positive  for joint stiffness. negative for joint swelling.   Neurologic: negative for seizures. negative for fainting. negative for weakness.   Psychiatric: negative for changes in mood. negative for anxiety.   Endocrine: negative for cold intolerance. positive  for heat intolerance. negative for tremors.   Lymphatic: negative for lower extremity swelling. negative for lymph node swelling.   Hematologic: negative for easy bruising. negative for easy bleeding.  Integumentary: negative for rash. negative for scaling. negative for nail changes.       Current Outpatient Medications:      ACIDOPHILUS OR TABS, 1 tablet daily, Disp: , Rfl:      albuterol (PROAIR HFA) 108 (90 Base) MCG/ACT inhaler, Inhale 2 puffs into the lungs every 4 hours as needed for shortness of breath / dyspnea or wheezing, Disp: 1 Inhaler, Rfl: 1     aspirin (ASA) 81 MG EC tablet, Take 81 mg by mouth daily, Disp: , Rfl:      atenolol (TENORMIN) 25 MG tablet, Take 1 tablet (25 mg) by mouth daily, Disp: 90 tablet, Rfl: 3     azelastine (ASTELIN) 0.1 % nasal spray, Spray 2 sprays into both nostrils 2 times daily, Disp: 1 Bottle, Rfl: 11     budesonide-formoterol (SYMBICORT) 160-4.5 MCG/ACT Inhaler, Inhale 2 puffs into the lungs 2 times daily, Disp: 3 Inhaler, Rfl: 1     CALCIUM 600+D PO, twice daily, Disp: , Rfl:      cetirizine (ZYRTEC) 10 MG tablet, Take 10 mg by mouth daily., Disp: , Rfl:      DAILY MULTIVITAMIN PO, 1 tablet daily, Disp: , Rfl:      escitalopram (LEXAPRO) 10 MG tablet, Take 1 tablet (10 mg) by mouth daily, Disp: 90 tablet, Rfl: 0     FLAXSEED OIL 1000 MG PO CAPS, 1 tablet daily, Disp: , Rfl:      fluticasone (FLONASE) 50 MCG/ACT nasal spray, Spray 2 sprays into both nostrils daily, Disp: 16 g, Rfl: 11     gabapentin (NEURONTIN) 300 MG capsule,  1 capsule TID with additional 1-2 capsules if headache is severe., Disp: 270 capsule, Rfl: 3     GLUCOSAMINE-CHONDROITIN PO TABS, 1500 mg glucosamine and 1200mg chondroitin daily-2 tablets daily, Disp: , Rfl:      hydrocortisone 2.5 % cream, Apply topically 2 times daily (Patient taking differently: Apply topically 2 times daily as needed ), Disp: 60 g, Rfl: 1     ibuprofen (ADVIL,MOTRIN) 200 MG tablet, take 1 tablet (200 mg) by oral route every 6 hours as needed with food, Disp: , Rfl:      Ketotifen Fumarate (ZADITOR OP), Apply to eye daily as needed, Disp: , Rfl:      lisinopril (ZESTRIL) 40 MG tablet, TAKE 1 TABLET DAILY, Disp: 90 tablet, Rfl: 3     metoclopramide (REGLAN) 10 MG tablet, Take 1 tablet (10 mg) by mouth 3 times daily as needed (headache), Disp: 20 tablet, Rfl: 3     montelukast (SINGULAIR) 10 MG tablet, Take 1 tablet (10 mg) by mouth At Bedtime, Disp: 30 tablet, Rfl: 11     naratriptan (AMERGE) 2.5 MG tablet, Take 1 tablet (2.5 mg) by mouth at onset of headache for migraine (repeat in 4 hours if needed) May repeat in 4 hours. Max 2 tablets/24 hours., Disp: 18 tablet, Rfl: 3     pantoprazole (PROTONIX) 40 MG EC tablet, TAKE 1 TABLET DAILY 30 TO 60 MINUTES BEFORE A MEAL, Disp: 90 tablet, Rfl: 4     polyethylene glycol (MIRALAX) powder, Take 1 capful by mouth daily, Disp: , Rfl:      pravastatin (PRAVACHOL) 40 MG tablet, TAKE 1 TABLET DAILY, Disp: 90 tablet, Rfl: 4     rOPINIRole (REQUIP) 0.25 MG tablet, Take 2 tablets (0.5 mg) by mouth At Bedtime, Disp: 60 tablet, Rfl: 11     S-ADENOSYLMETHIONINE 200 MG PO TABS, 2 tablets daily, Disp: , Rfl:      senna (SENOKOT) 8.6 MG tablet, Take 2 tablets by mouth daily, Disp: , Rfl:      spironolactone (ALDACTONE) 25 MG tablet, TAKE ONE-HALF (1/2) TABLET DAILY, Disp: 45 tablet, Rfl: 3     SUMAtriptan (IMITREX) 50 MG tablet, Take 50 mg by mouth at onset of headache , Disp: , Rfl:      triamcinolone (KENALOG) 0.1 % cream, Apply topically 2 times daily, Disp: 60 g,  Rfl: 3     VITAMIN C 500 MG PO TABS, 2 TABLET DAILY, Disp: , Rfl:      Cholecalciferol (VITAMIN D-3 PO), Take 2,000 Int'l Units by mouth daily, Disp: , Rfl:      ferrous sulfate (IRON) 325 (65 Fe) MG tablet, Take 325 mg by mouth daily (with breakfast), Disp: , Rfl:      Magnesium 400 MG CAPS, Take by mouth daily, Disp: , Rfl:      TURMERIC PO, Take 2,000 mg by mouth daily, Disp: , Rfl:      vitamin B complex with vitamin C (VITAMIN  B COMPLEX) TABS tablet, Take 3 tablets by mouth daily, Disp: , Rfl:      vitamin E (E-400) 400 UNIT capsule, Take one pill by mouth once daily, Disp: , Rfl:   Immunization History   Administered Date(s) Administered     Influenza Quad, Recombinant, p-free (RIV4) 09/10/2019     Influenza Vaccine IM > 6 months Valent IIV4 12/03/2013, 11/16/2015, 10/01/2017, 09/13/2018     TD (ADULT, 7+) 03/06/2008     TDAP Vaccine (Adacel) 08/03/2012     Twinrix A/B 10/12/2017, 12/27/2017, 04/24/2018     Zoster vaccine, live 09/14/2012     Allergies   Allergen Reactions     Levofloxacin Rash     Other reaction(s): Contact Dermatitis     Penicillins Hives and Rash     Confirmed by skin prick testing 7/3/14     Amoxicillin Hives and Rash     Amoxicillin-Pot Clavulanate Rash     Azithromycin Rash     Cephalexin Rash     Clindamycin Rash and Hives     Atorvastatin Other (See Comments)     Felt sick, intolerance     Clindamycin Hcl Hives     Levaquin      tendonitis     Augmentin Rash       ASSESSMENT/PLAN:  Problem List Items Addressed This Visit        Respiratory    Moderate persistent asthma without complication - Primary     Flares with URI, cold air and humid air.  Well controlled. Current treatment is Symbicort 160/4.5mcg 2 puff inhaled daily and Singulair 10mg PO daily.     ACT 23  Spirometry with restriction in past. Ordered complete pft but not done secondary to covid.       - Albuterol 2-4 puffs inhaled (use a spacer unless using a Proair Respiclick device) every 4 hours as needed for chest  tightness, wheezing, shortness of breath and/or coughing.    - Avoid asthma triggers.  - Symbicort 160/4.5mcg 2 puffs inhaled twice daily with a spacer.   - Singulair 10mg by mouth daily at night.            Infectious/Inflammatory    Rhinoconjunctivitis     History of perennial nasal and ocular symptoms that get worse in the spring and fall.  atrovent initially thought to be beneficial she no longer thinks is helpful. Flonase has been helpful.    Tried on Dymista and somewhat beneficial.  Negative allergy testing.  Increased symptoms around cats.  Zyrtec is helpful.  Singulair is helpful.   Either vasomotor rhinitis versus local allergic rhinitis. Follows with ENT.        - Flonase 2 sprays/nostril daily.   - Azelastine 2 sprays/nostril twice daily as needed.   - Zyrtec 10 to 20 mg as needed.   - Singulair 10mg by mouth daily at night.                Return in 6 months or sooner if needed.     Chart documentation with Dragon Voice recognition Software. Although reviewed after completion, some words and grammatical errors may remain.    I have reviewed the note as documented above.  This accurately captures the substance of my conversation with the patient.    Contact time  Visit started at 0936  Visit ended at 0941    Nish Stockton DO FAAAAI  Allergy/Immunology  [facility] and [place], [place]      Again, thank you for allowing me to participate in the care of your patient.        Sincerely,        Nish Stockton DO

## 2020-09-02 NOTE — PATIENT INSTRUCTIONS
Allergy Staff Appt Hours Shot Hours Locations    Physician     Nish Stockton DO       Support Staff     MEMO Levy, MADELIN  Tuesday:        [place] 7-4:20     Wednesday:        [place]: 7-5     Thursday:                    [place] 7-6:40     Friday:  [place]  7-2:40   [place]        Thursday: 1-5:50        Friday: 7-10:50     [place]        Tuesday: 7- 3:20        Wednesday: 7-4:20     [place] Monday: 7-4:20        Tuesday: 1-6:20         [facility]  [address] [place] [address]  Appt Line: [phone]  Allergy RN:  [phone]    [facility]  [address] [place] [address]  Appt Line: [phone]  Allergy RN:  [phone]       Important Scheduling Information  Aspirin Desensitization: Appt will last 2 clinic days. Please call the Allergy RN line for your clinic to schedule. Discontinue antihistamines 7 days prior to the appointment.     Food Challenges: Appt will last 3-4 hours. Please call the Allergy RN line for your clinic to schedule. Discontinue antihistamines 7 days prior to the appointment.     Penicillin Testing: Appt will last 2-3 hours. Please call the Allergy RN line for your clinic to schedule. Discontinue antihistamines 7 days prior to the appointment.     Skin Testing: Appt will about 40 minutes. Call the appointment line for your clinic to schedule. Discontinue antihistamines 7 days prior to the appointment.     Venom Testing: Appt will last 2-3 hours. Please call the Allergy RN line for your clinic to schedule. Discontinue antihistamines 7 days prior to the appointment.     Thank you for trusting us with your Allergy, Asthma, and Immunology care. Please feel free to contact us with any questions or concerns you may have.

## 2020-09-03 ENCOUNTER — SURGERY (OUTPATIENT)
Age: 65
End: 2020-09-03
Payer: COMMERCIAL

## 2020-09-03 ENCOUNTER — HOSPITAL ENCOUNTER (OUTPATIENT)
Facility: AMBULATORY SURGERY CENTER | Age: 65
Discharge: HOME | End: 2020-09-03
Attending: INTERNAL MEDICINE | Admitting: INTERNAL MEDICINE
Payer: COMMERCIAL

## 2020-09-03 VITALS
RESPIRATION RATE: 16 BRPM | HEART RATE: 53 BPM | DIASTOLIC BLOOD PRESSURE: 74 MMHG | TEMPERATURE: 97.7 F | SYSTOLIC BLOOD PRESSURE: 118 MMHG | OXYGEN SATURATION: 95 %

## 2020-09-03 LAB — UPPER GI ENDOSCOPY: NORMAL

## 2020-09-03 PROCEDURE — G8918 PT W/O PREOP ORDER IV AB PRO: HCPCS

## 2020-09-03 PROCEDURE — 43239 EGD BIOPSY SINGLE/MULTIPLE: CPT

## 2020-09-03 PROCEDURE — 43239 EGD BIOPSY SINGLE/MULTIPLE: CPT | Performed by: INTERNAL MEDICINE

## 2020-09-03 PROCEDURE — 88305 TISSUE EXAM BY PATHOLOGIST: CPT | Performed by: INTERNAL MEDICINE

## 2020-09-03 PROCEDURE — G8907 PT DOC NO EVENTS ON DISCHARG: HCPCS

## 2020-09-03 RX ORDER — ONDANSETRON 4 MG/1
4 TABLET, ORALLY DISINTEGRATING ORAL EVERY 6 HOURS PRN
Status: DISCONTINUED | OUTPATIENT
Start: 2020-09-03 | End: 2020-09-04 | Stop reason: HOSPADM

## 2020-09-03 RX ORDER — NALOXONE HYDROCHLORIDE 0.4 MG/ML
.1-.4 INJECTION, SOLUTION INTRAMUSCULAR; INTRAVENOUS; SUBCUTANEOUS
Status: DISCONTINUED | OUTPATIENT
Start: 2020-09-03 | End: 2020-09-04 | Stop reason: HOSPADM

## 2020-09-03 RX ORDER — FENTANYL CITRATE 50 UG/ML
INJECTION, SOLUTION INTRAMUSCULAR; INTRAVENOUS PRN
Status: DISCONTINUED | OUTPATIENT
Start: 2020-09-03 | End: 2020-09-03 | Stop reason: HOSPADM

## 2020-09-03 RX ORDER — ONDANSETRON 2 MG/ML
4 INJECTION INTRAMUSCULAR; INTRAVENOUS
Status: DISCONTINUED | OUTPATIENT
Start: 2020-09-03 | End: 2020-09-04 | Stop reason: HOSPADM

## 2020-09-03 RX ORDER — FLUMAZENIL 0.1 MG/ML
0.2 INJECTION, SOLUTION INTRAVENOUS
Status: SHIPPED | OUTPATIENT
Start: 2020-09-03 | End: 2020-09-03

## 2020-09-03 RX ORDER — LIDOCAINE 40 MG/G
CREAM TOPICAL
Status: DISCONTINUED | OUTPATIENT
Start: 2020-09-03 | End: 2020-09-04 | Stop reason: HOSPADM

## 2020-09-03 RX ORDER — ONDANSETRON 2 MG/ML
4 INJECTION INTRAMUSCULAR; INTRAVENOUS EVERY 6 HOURS PRN
Status: DISCONTINUED | OUTPATIENT
Start: 2020-09-03 | End: 2020-09-04 | Stop reason: HOSPADM

## 2020-09-03 RX ADMIN — FENTANYL CITRATE 50 MCG: 50 INJECTION, SOLUTION INTRAMUSCULAR; INTRAVENOUS at 10:01

## 2020-09-03 RX ADMIN — FENTANYL CITRATE 25 MCG: 50 INJECTION, SOLUTION INTRAMUSCULAR; INTRAVENOUS at 10:04

## 2020-09-03 ASSESSMENT — ASTHMA QUESTIONNAIRES: ACT_TOTALSCORE: 23

## 2020-09-08 LAB — COPATH REPORT: NORMAL

## 2020-09-12 DIAGNOSIS — R10.11 RUQ ABDOMINAL PAIN: ICD-10-CM

## 2020-09-12 LAB
BASOPHILS # BLD AUTO: 0 10E9/L (ref 0–0.2)
BASOPHILS NFR BLD AUTO: 0.6 %
DIFFERENTIAL METHOD BLD: ABNORMAL
EOSINOPHIL # BLD AUTO: 0.2 10E9/L (ref 0–0.7)
EOSINOPHIL NFR BLD AUTO: 3.1 %
ERYTHROCYTE [DISTWIDTH] IN BLOOD BY AUTOMATED COUNT: 12.1 % (ref 10–15)
HCT VFR BLD AUTO: 34.4 % (ref 35–47)
HGB BLD-MCNC: 11.4 G/DL (ref 11.7–15.7)
LYMPHOCYTES # BLD AUTO: 2 10E9/L (ref 0.8–5.3)
LYMPHOCYTES NFR BLD AUTO: 38.2 %
MCH RBC QN AUTO: 30.4 PG (ref 26.5–33)
MCHC RBC AUTO-ENTMCNC: 33.1 G/DL (ref 31.5–36.5)
MCV RBC AUTO: 92 FL (ref 78–100)
MONOCYTES # BLD AUTO: 0.4 10E9/L (ref 0–1.3)
MONOCYTES NFR BLD AUTO: 7.1 %
NEUTROPHILS # BLD AUTO: 2.7 10E9/L (ref 1.6–8.3)
NEUTROPHILS NFR BLD AUTO: 51 %
PLATELET # BLD AUTO: 334 10E9/L (ref 150–450)
RBC # BLD AUTO: 3.75 10E12/L (ref 3.8–5.2)
WBC # BLD AUTO: 5.2 10E9/L (ref 4–11)

## 2020-09-12 PROCEDURE — 86140 C-REACTIVE PROTEIN: CPT | Performed by: INTERNAL MEDICINE

## 2020-09-12 PROCEDURE — 83690 ASSAY OF LIPASE: CPT | Performed by: INTERNAL MEDICINE

## 2020-09-12 PROCEDURE — 85025 COMPLETE CBC W/AUTO DIFF WBC: CPT | Performed by: INTERNAL MEDICINE

## 2020-09-12 PROCEDURE — 36415 COLL VENOUS BLD VENIPUNCTURE: CPT | Performed by: INTERNAL MEDICINE

## 2020-09-12 PROCEDURE — 80053 COMPREHEN METABOLIC PANEL: CPT | Performed by: INTERNAL MEDICINE

## 2020-09-12 PROCEDURE — 82150 ASSAY OF AMYLASE: CPT | Performed by: INTERNAL MEDICINE

## 2020-09-14 LAB
ALBUMIN SERPL-MCNC: 4.1 G/DL (ref 3.4–5)
ALP SERPL-CCNC: 72 U/L (ref 40–150)
ALT SERPL W P-5'-P-CCNC: 31 U/L (ref 0–50)
AMYLASE SERPL-CCNC: 64 U/L (ref 30–110)
ANION GAP SERPL CALCULATED.3IONS-SCNC: 8 MMOL/L (ref 3–14)
AST SERPL W P-5'-P-CCNC: 23 U/L (ref 0–45)
BILIRUB SERPL-MCNC: 0.4 MG/DL (ref 0.2–1.3)
BUN SERPL-MCNC: 11 MG/DL (ref 7–30)
CALCIUM SERPL-MCNC: 9.4 MG/DL (ref 8.5–10.1)
CHLORIDE SERPL-SCNC: 100 MMOL/L (ref 94–109)
CO2 SERPL-SCNC: 26 MMOL/L (ref 20–32)
CREAT SERPL-MCNC: 0.92 MG/DL (ref 0.52–1.04)
CRP SERPL-MCNC: <2.9 MG/L (ref 0–8)
GFR SERPL CREATININE-BSD FRML MDRD: 66 ML/MIN/{1.73_M2}
GLUCOSE SERPL-MCNC: 93 MG/DL (ref 70–99)
LIPASE SERPL-CCNC: 147 U/L (ref 73–393)
POTASSIUM SERPL-SCNC: 3.9 MMOL/L (ref 3.4–5.3)
PROT SERPL-MCNC: 7.4 G/DL (ref 6.8–8.8)
SODIUM SERPL-SCNC: 134 MMOL/L (ref 133–144)

## 2020-09-22 ENCOUNTER — TELEPHONE (OUTPATIENT)
Dept: PSYCHOLOGY | Facility: CLINIC | Age: 65
End: 2020-09-22

## 2020-09-23 DIAGNOSIS — I10 HYPERTENSION GOAL BP (BLOOD PRESSURE) < 140/90: ICD-10-CM

## 2020-09-25 RX ORDER — ATENOLOL 25 MG/1
TABLET ORAL
Qty: 90 TABLET | Refills: 1 | Status: SHIPPED | OUTPATIENT
Start: 2020-09-25 | End: 2021-03-11

## 2020-09-25 NOTE — TELEPHONE ENCOUNTER
"Prescription approved per [facility] Refill Protocol.  Ne Gardiner RN    Last office visit: 6/9/2020 with prescribing provider:  Barney     Requested Prescriptions   Pending Prescriptions Disp Refills     atenolol (TENORMIN) 25 MG tablet [Pharmacy Med Name: ATENOLOL TABS 25MG] 90 tablet 3     Sig: TAKE 1 TABLET DAILY       Beta-Blockers Protocol Passed - 9/23/2020 12:07 AM        Passed - Blood pressure under 140/90 in past 12 months     BP Readings from Last 3 Encounters:   09/03/20 118/74   06/09/20 (!) 142/74   03/12/20 (!) 147/80                 Passed - Patient is age 6 or older        Passed - Recent (12 mo) or future (30 days) visit within the authorizing provider's specialty     Patient has had an office visit with the authorizing provider or a provider within the authorizing providers department within the previous 12 mos or has a future within next 30 days. See \"Patient Info\" tab in inbasket, or \"Choose Columns\" in Meds & Orders section of the refill encounter.              Passed - Medication is active on med list             "

## 2020-09-30 ENCOUNTER — VIRTUAL VISIT (OUTPATIENT)
Dept: PSYCHOLOGY | Facility: CLINIC | Age: 65
End: 2020-09-30
Payer: COMMERCIAL

## 2020-09-30 DIAGNOSIS — F34.1 PERSISTENT DEPRESSIVE DISORDER: ICD-10-CM

## 2020-09-30 DIAGNOSIS — F41.1 GENERALIZED ANXIETY DISORDER: Primary | ICD-10-CM

## 2020-09-30 PROCEDURE — 90834 PSYTX W PT 45 MINUTES: CPT | Mod: 95 | Performed by: MARRIAGE & FAMILY THERAPIST

## 2020-09-30 NOTE — PROGRESS NOTES
"                                           Progress Note    Patient Name: Miri Naylor  Date: 9/30/20         Service Type: Individual      Session Start Time: 1:03  Session End Time: 1:53     Session Length: 50    Session #: 29    Attendees: Client attended alone    The patient has been notified of the following:      \"We have found that certain health care needs can be provided without the need for a face to face visit.  This service lets us provide the care you need with a phone conversation.       I will have full access to your [facility] medical record during this entire phone call.   I will be taking notes for your medical record.      Since this is like an office visit, we will bill your insurance company for this service.       There are potential benefits and risks of telephone visits (e.g. limits to patient confidentiality) that differ from in-person visits.?  Confidentiality still applies for telephone services, and nobody will record the visit.  It is important to be in a quiet, private space that is free of distractions (including cell phone or other devices) during the visit.??      If during the course of the call I believe a telephone visit is not appropriate, you will not be charged for this service\"     Consent has been obtained for this service by care team member: Yes  Video visit audio failed--switched to phone visit.     Treatment Plan Last Reviewed: 7/8/20, next due 10/8/20.  PHQ-9 / MELISA-7 : not completed.    DATA  Interactive Complexity: No  Crisis: No       Progress Since Last Session (Related to Symptoms / Goals / Homework):   Symptoms: continued anxiety and depression.    Homework: Partially completed       Episode of Care Goals: Minimal progress - ACTION (Actively working towards change); Intervened by reinforcing change plan / affirming steps taken     Current / Ongoing Stressors and Concerns:   Client reported experiencing continued anxiety/depression related to current political " climate and her ongoing relationship issues with her spouse.  Client  reported regarding her spouse's tendency to talk about himself and be emotionally disconnected from client's perspective.  Client reported her anxiety regarding consistent desire to fix things herself coupled with her self-doubt.     Treatment Objective(s) Addressed in This Session:   use cognitive strategies identified in therapy to challenge anxious thoughts  Identify negative self-talk and behaviors: challenge core beliefs, myths, and actions  learn & utilize at least some assertive communication skills weekly      Intervention:   CBT: reviewed with client challenging her self-doubt, focusing on her strengths and resources, and not owning others' emotions/actions/outcomes.  Interpersonal Therapy: reviewed with client assertively communicating with her spouse regarding her emotional responses and determining the meanings of his words.        ASSESSMENT: Current Emotional / Mental Status (status of significant symptoms):   Risk status (Self / Other harm or suicidal ideation)   Patient denies current fears or concerns for personal safety.   Patient denies current or recent suicidal ideation or behaviors.   Patientdenies current or recent homicidal ideation or behaviors.   Patient denies current or recent self injurious behavior or ideation.   Patient denies other safety concerns.   Patient Patient reports there has been no change in risk factors since their last session.     PatientPatient reports there has been no change in protective factors since their last session.     Recommended that patient call 911 or go to the local ED should there be a change in any of these risk factors.     Appearance:   Appropriate    Eye Contact:   Good    Psychomotor Behavior: Normal    Attitude:   Cooperative  Interested Friendly Pleasant Attentive   Orientation:   All   Speech    Rate / Production: Normal     Volume:  Normal    Mood:    Anxious  Depressed  Normal  Expansive   Affect:    Appropriate  Expansive  Worrisome    Thought Content:  Clear    Thought Form:  Coherent  Logical    Insight:    Good  and Intellectual Insight     Medication Review:   No current psychiatric medications prescribed     Medication Compliance:   NA     Changes in Health Issues:   None reported     Chemical Use Review:   Substance Use: Chemical use reviewed, no active concerns identified      Tobacco Use: No current tobacco use.      Diagnosis:  1. Generalized anxiety disorder    2. Persistent depressive disorder        Collateral Reports Completed:   Not Applicable    PLAN: (Patient Tasks / Therapist Tasks / Other)  Client agreed to work on challenging her self-doubt, focusing on her strengths, and communicating her feelings with her spouse.        Loki Barron Amrita, [facility] 9/30/2020                                                          ______________________________________________________________________    Treatment Plan    Patient's Name: Miri Naylor  YOB: 1955    Date: 7/8/20    DSM5 Diagnoses: 300.4 (F34.1) Persistent Depressive Disorder, Early onset or 300.02 (F41.1) Generalized Anxiety Disorder  Psychosocial / Contextual Factors: marital conflict, history of loss/trauma  WHODAS: 29    Referral / Collaboration:  Referral to another professional/service is not indicated at this time..    Anticipated number of session or this episode of care: 31+      MeasurableTreatment Goal(s) related to diagnosis / functional impairment(s)  Goal 1: Client will successfully process through past trauma defined as reporting 0 Subjective Units of Distress related to trauma on 0-10 scale and 7 on Validity of (positive) Cognition scale about self on 1-7 scale   I will know I've met my goal when I am less impacted by my past loss/trauma.       Objective #A (Client Action)    Status: Conitnued - Date: 7/8/20      Client will identify past traumatic events/memories which are causing  current distress.     Intervention(s)  Therapist will take client's history and facilitate client's identification of targets for EMDR.     Objective #B  Client will complete needed assessment(s) and [facility] EMDR resourcing to confirm readiness for EMDR.    Status: Continued - Date: 7/8/20      Intervention(s)  Therapist will administer Dissociative Experiences Scale, Multidimensional Inventory of Dissociation as needed and complete [facility] EMDR resourcing with client.     Objective #C  Client will engage in installing at least 2 EMDR resources.  Status: Continued - Date: 7/8/20      Intervention(s)  Therapist will complete EMDR resourcing (Container, Remote Control, grounding/progressive muscle relaxation, Light Stream, Inner Advisor) with client.     Objective #D (Client Action)    Status: Continued - Date: 7/8/20      Client will engage in reprocessing all past traumatic event/memory targets.     Intervention(s)   Therapist will complete EMDR reprocessing with client.    Goal 2:  Client will improve overall baseline mood regulation by 2 points on a 1-10 Likert scale per self-report (10 = optimal mood/regulation).    I will know I've met my goal when I feel better/less depressed overall.      Objective #A (Client Action)    Status: Continued - Date:  7/8/20    Client will Identify negative self-talk and behaviors: challenge core beliefs, myths, and actions.    Intervention(s)  Therapist will teach emotional regulation skills. CBT/REBT ABCD model.    Objective #B  Client will Increase interest, engagement, and pleasure in doing things  Decrease frequency and intensity of feeling down, depressed, hopeless  Improve concentration, focus, and mindfulness in daily activities .    Status: Continued - Date:  7/8/20    Intervention(s)  Therapist will teach emotional regulation skills. DBT Core Mindfulness, Distress Tolerance, Emotion Regulation, and Interpersonal Effetiveness skills.    Patient has reviewed and  agreed to the above plan.      Loki Crowe, LMFT  July 8, 2020

## 2020-10-20 ENCOUNTER — MYC MEDICAL ADVICE (OUTPATIENT)
Dept: GASTROENTEROLOGY | Facility: CLINIC | Age: 65
End: 2020-10-20

## 2020-10-21 DIAGNOSIS — R10.11 RUQ ABDOMINAL PAIN: Primary | ICD-10-CM

## 2020-10-21 NOTE — TELEPHONE ENCOUNTER
Writer called patient who was still driving. Will try to call later to schedule appointment.  Patient understood and had no further questions.  Chay Stanton MA

## 2020-10-21 NOTE — TELEPHONE ENCOUNTER
Writer called patient to schedule an MRI, per Dr. Henry.  Patient was driving and writer told patient he would call later due to weather/driving /safety reasons.  Writer told patient that he would call at a later time to safely schedule MRCP at [facility].  Chay Stanton MA

## 2020-10-22 NOTE — TELEPHONE ENCOUNTER
LPN spoke with patient and provided warm transfer to imaging scheduling to schedule the MRCP. Patient scheduled for MRCP on 11/17/20.    Maria Ines Palencia LPN

## 2020-10-28 ENCOUNTER — VIRTUAL VISIT (OUTPATIENT)
Dept: PSYCHOLOGY | Facility: CLINIC | Age: 65
End: 2020-10-28
Payer: COMMERCIAL

## 2020-10-28 DIAGNOSIS — F34.1 PERSISTENT DEPRESSIVE DISORDER: ICD-10-CM

## 2020-10-28 DIAGNOSIS — F41.1 GENERALIZED ANXIETY DISORDER: Primary | ICD-10-CM

## 2020-10-28 PROCEDURE — 90834 PSYTX W PT 45 MINUTES: CPT | Mod: 95 | Performed by: MARRIAGE & FAMILY THERAPIST

## 2020-10-28 NOTE — PROGRESS NOTES
"                                           Progress Note    Patient Name: Miri Naylor  Date: 10/28/20         Service Type: Individual      Session Start Time: 12:03  Session End Time: 12:55     Session Length: 52    Session #: 30    Attendees: Client attended alone    The patient has been notified of the following:      \"We have found that certain health care needs can be provided without the need for a face to face visit.  This service lets us provide the care you need with a phone conversation.       I will have full access to your [place] medical record during this entire phone call.   I will be taking notes for your medical record.      Since this is like an office visit, we will bill your insurance company for this service.       There are potential benefits and risks of telephone visits (e.g. limits to patient confidentiality) that differ from in-person visits.?  Confidentiality still applies for telephone services, and nobody will record the visit.  It is important to be in a quiet, private space that is free of distractions (including cell phone or other devices) during the visit.??      If during the course of the call I believe a telephone visit is not appropriate, you will not be charged for this service\"     Consent has been obtained for this service by care team member: Yes  Video visit failed on client's end--switched to phone visit.     Treatment Plan Last Reviewed: 10/28/20, next due 1/28/21.  PHQ-9 / MELISA-7 : not completed.    DATA  Interactive Complexity: No  Crisis: No       Progress Since Last Session (Related to Symptoms / Goals / Homework):   Symptoms: continued anxiety and depression.    Homework: Partially completed       Episode of Care Goals: Minimal progress - ACTION (Actively working towards change); Intervened by reinforcing change plan / affirming steps taken     Current / Ongoing Stressors and Concerns:   Client reported experiencing continued anxiety/depression related to her " ongoing relationship conflict/issues with her spouse.  Client  reported regarding her spouse's ongoing self-focus and her taking the complimentary role of focusing on him and not herself.  Client reported that she was invited to participate in her spouse's therapy, about which she has anxiety as she thinks that her concerns/perspective will be invalidated.     Treatment Objective(s) Addressed in This Session:   use cognitive strategies identified in therapy to challenge anxious thoughts  Identify negative self-talk and behaviors: challenge core beliefs, myths, and actions  compile a list of boundaries that they would like to set with others. spouse and self      Intervention:   CBT: reviewed with client challenging her reluctance to self-advocate, focusing on self-validation.  Also prompted client to consider her relationship values/needs/wants.        ASSESSMENT: Current Emotional / Mental Status (status of significant symptoms):   Risk status (Self / Other harm or suicidal ideation)   Patient denies current fears or concerns for personal safety.   Patient denies current or recent suicidal ideation or behaviors.   Patientdenies current or recent homicidal ideation or behaviors.   Patient denies current or recent self injurious behavior or ideation.   Patient denies other safety concerns.   Patient Patient reports there has been no change in risk factors since their last session.     PatientPatient reports there has been no change in protective factors since their last session.     Recommended that patient call 911 or go to the local ED should there be a change in any of these risk factors.     Appearance:   Appropriate    Eye Contact:   Good    Psychomotor Behavior: Normal    Attitude:   Cooperative  Interested Friendly Pleasant Attentive   Orientation:   All   Speech    Rate / Production: Normal     Volume:  Normal    Mood:    Anxious  Depressed  Normal Expansive   Affect:    Appropriate  Expansive  Worrisome     Thought Content:  Clear    Thought Form:  Coherent  Logical    Insight:    Good  and Intellectual Insight     Medication Review:   No current psychiatric medications prescribed     Medication Compliance:   NA     Changes in Health Issues:   None reported     Chemical Use Review:   Substance Use: Chemical use reviewed, no active concerns identified      Tobacco Use: No current tobacco use.      Diagnosis:  1. Generalized anxiety disorder    2. Persistent depressive disorder        Collateral Reports Completed:   Not Applicable    PLAN: (Patient Tasks / Therapist Tasks / Other)  Client agreed to work on challenging her reluctance to self-advocate, focusing on self-validation, and considering her relationship values/needs/wants.        Loki Barron Amrita, LMFT 10/28/2020                                                          ______________________________________________________________________    Treatment Plan    Patient's Name: Miri Naylor  YOB: 1955    Date: 10/28/20    DSM5 Diagnoses: 300.4 (F34.1) Persistent Depressive Disorder, Early onset or 300.02 (F41.1) Generalized Anxiety Disorder  Psychosocial / Contextual Factors: marital conflict, history of loss/trauma  WHODAS: 29    Referral / Collaboration:  Referral to another professional/service is not indicated at this time..    Anticipated number of session or this episode of care: 31+      MeasurableTreatment Goal(s) related to diagnosis / functional impairment(s)  Goal 1: Client will successfully process through past trauma defined as reporting 0 Subjective Units of Distress related to trauma on 0-10 scale and 7 on Validity of (positive) Cognition scale about self on 1-7 scale   I will know I've met my goal when I am less impacted by my past loss/trauma.       Objective #A (Client Action)    Status: Conitnued - Date: 10/28/20      Client will identify past traumatic events/memories which are causing current  distress.     Intervention(s)  Therapist will take client's history and facilitate client's identification of targets for EMDR.     Objective #B  Client will complete needed assessment(s) and [facility] EMDR resourcing to confirm readiness for EMDR.    Status: Continued - Date: 10/28/20      Intervention(s)  Therapist will administer Dissociative Experiences Scale, Multidimensional Inventory of Dissociation as needed and complete [facility] EMDR resourcing with client.     Objective #C  Client will engage in installing at least 2 EMDR resources.  Status: Continued - Date: 10/28/20      Intervention(s)  Therapist will complete EMDR resourcing (Container, Remote Control, grounding/progressive muscle relaxation, Light Stream, Inner Advisor) with client.     Objective #D (Client Action)    Status: Continued - Date: 10/28/20      Client will engage in reprocessing all past traumatic event/memory targets.     Intervention(s)   Therapist will complete EMDR reprocessing with client.    Goal 2:  Client will improve overall baseline mood regulation by 2 points on a 1-10 Likert scale per self-report (10 = optimal mood/regulation).    I will know I've met my goal when I feel better/less depressed overall.      Objective #A (Client Action)    Status: Continued - Date:  10/28/20    Client will Identify negative self-talk and behaviors: challenge core beliefs, myths, and actions.    Intervention(s)  Therapist will teach emotional regulation skills. CBT/REBT ABCD model.    Objective #B  Client will Increase interest, engagement, and pleasure in doing things  Decrease frequency and intensity of feeling down, depressed, hopeless  Improve concentration, focus, and mindfulness in daily activities .    Status: Continued - Date:  10/28/20    Intervention(s)  Therapist will teach emotional regulation skills. DBT Core Mindfulness, Distress Tolerance, Emotion Regulation, and Interpersonal Effetiveness skills.    Patient has reviewed and  agreed to the above plan.      Loki Crowe, LMFT  October 28, 2020

## 2020-11-11 ENCOUNTER — VIRTUAL VISIT (OUTPATIENT)
Dept: PSYCHOLOGY | Facility: CLINIC | Age: 65
End: 2020-11-11
Payer: COMMERCIAL

## 2020-11-11 DIAGNOSIS — F34.1 PERSISTENT DEPRESSIVE DISORDER: ICD-10-CM

## 2020-11-11 DIAGNOSIS — F41.1 GENERALIZED ANXIETY DISORDER: Primary | ICD-10-CM

## 2020-11-11 PROCEDURE — 90834 PSYTX W PT 45 MINUTES: CPT | Mod: 95 | Performed by: MARRIAGE & FAMILY THERAPIST
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
## 2020-11-17 ENCOUNTER — MYC MEDICAL ADVICE (OUTPATIENT)
Dept: GASTROENTEROLOGY | Facility: CLINIC | Age: 65
End: 2020-11-17

## 2020-11-17 ENCOUNTER — ANCILLARY PROCEDURE (OUTPATIENT)
Dept: MRI IMAGING | Facility: CLINIC | Age: 65
End: 2020-11-17
Attending: INTERNAL MEDICINE
Payer: COMMERCIAL

## 2020-11-17 DIAGNOSIS — R10.11 RUQ ABDOMINAL PAIN: ICD-10-CM

## 2020-11-17 PROCEDURE — 74183 MRI ABD W/O CNTR FLWD CNTR: CPT | Performed by: RADIOLOGY

## 2020-11-17 PROCEDURE — A9585 GADOBUTROL INJECTION: HCPCS | Performed by: INTERNAL MEDICINE

## 2020-11-17 RX ORDER — GADOBUTROL 604.72 MG/ML
10 INJECTION INTRAVENOUS ONCE
Status: COMPLETED | OUTPATIENT
Start: 2020-11-17 | End: 2020-11-17

## 2020-11-17 RX ADMIN — GADOBUTROL 10 ML: 604.72 INJECTION INTRAVENOUS at 11:49

## 2020-11-18 ENCOUNTER — VIRTUAL VISIT (OUTPATIENT)
Dept: ALLERGY | Facility: OTHER | Age: 65
End: 2020-11-18
Payer: COMMERCIAL

## 2020-11-18 VITALS — HEIGHT: 71 IN | BODY MASS INDEX: 30.1 KG/M2 | WEIGHT: 215 LBS

## 2020-11-18 DIAGNOSIS — J31.0 RHINOCONJUNCTIVITIS: ICD-10-CM

## 2020-11-18 DIAGNOSIS — H10.9 RHINOCONJUNCTIVITIS: ICD-10-CM

## 2020-11-18 DIAGNOSIS — J45.40 MODERATE PERSISTENT ASTHMA WITHOUT COMPLICATION: ICD-10-CM

## 2020-11-18 PROCEDURE — 99213 OFFICE O/P EST LOW 20 MIN: CPT | Mod: 95 | Performed by: ALLERGY & IMMUNOLOGY

## 2020-11-18 RX ORDER — BUDESONIDE AND FORMOTEROL FUMARATE DIHYDRATE 160; 4.5 UG/1; UG/1
2 AEROSOL RESPIRATORY (INHALATION) 2 TIMES DAILY
Qty: 3 INHALER | Refills: 1 | Status: SHIPPED | OUTPATIENT
Start: 2020-11-18 | End: 2021-07-14

## 2020-11-18 RX ORDER — MONTELUKAST SODIUM 10 MG/1
10 TABLET ORAL AT BEDTIME
Qty: 90 TABLET | Refills: 3 | Status: SHIPPED | OUTPATIENT
Start: 2020-11-18 | End: 2021-07-14

## 2020-11-18 ASSESSMENT — MIFFLIN-ST. JEOR: SCORE: 1621.36

## 2020-11-18 NOTE — ASSESSMENT & PLAN NOTE
Flares with URI, cold air and humid air.  Mild cough this fall. Current treatment is Symbicort 160/4.5mcg 2 puff inhaled daily and Singulair 10mg PO daily.         - Albuterol 2-4 puffs inhaled (use a spacer unless using a Proair Respiclick device) every 4 hours as needed for chest tightness, wheezing, shortness of breath and/or coughing.    - Avoid asthma triggers.  - Symbicort 160/4.5mcg 2 puffs inhaled twice daily with a spacer.   - She is going to try off of Singulair 10mg by mouth daily at night.

## 2020-11-18 NOTE — PATIENT INSTRUCTIONS
Allergy Staff Appt Hours Shot Hours Locations    Physician     Nish Stockton DO       Support Staff     MEMO Levy CMA  Tuesday:        [place] 7-5 Wednesday:        [place]: 7-5 Thursday:                    [place] 7-6     Friday:  [place]  7-2   [place]        Thursday: 8-5:20        Friday: 7-12     [place]        Tuesday: 7- 3:20 Wednesday: 7-4:20     [place] Monday: 7-2:20 Tuesday: 9-5:20         [facility]  [address] [place] [address]  Appt Line: [phone]  Allergy RN:  [phone]    [facility]  [address] [place] [address]  Appt Line: [phone]  Allergy RN:  [phone]       Important Scheduling Information  Aspirin Desensitization: Appt will last 2 clinic days. Please call the Allergy RN line for your clinic to schedule. Discontinue antihistamines 7 days prior to the appointment.     Food Challenges: Appt will last 3-4 hours. Please call the Allergy RN line for your clinic to schedule. Discontinue antihistamines 7 days prior to the appointment.     Penicillin Testing: Appt will last 2-3 hours. Please call the Allergy RN line for your clinic to schedule. Discontinue antihistamines 7 days prior to the appointment.     Skin Testing: Appt will about 40 minutes. Call the appointment line for your clinic to schedule. Discontinue antihistamines 7 days prior to the appointment.     Venom Testing: Appt will last 2-3 hours. Please call the Allergy RN line for your clinic to schedule. Discontinue antihistamines 7 days prior to the appointment.     Thank you for trusting us with your Allergy, Asthma, and Immunology care. Please feel free to contact us with any questions or concerns you may have.

## 2020-11-18 NOTE — ASSESSMENT & PLAN NOTE
History of perennial nasal and ocular symptoms that get worse in the spring and fall.  atrovent initially thought to be beneficial she no longer thinks is helpful. Flonase has been helpful.    Tried on Dymista and somewhat beneficial.  Negative allergy testing.  Increased symptoms around cats.  Zyrtec is helpful.  Singulair is helpful.   Either vasomotor rhinitis versus local allergic rhinitis. Follows with ENT.        - Flonase 2 sprays/nostril daily.   - Azelastine 2 sprays/nostril twice daily as needed.   - Zyrtec 10 to 20 mg as needed.   - Try off of Singulair 10mg by mouth daily at night.

## 2020-11-18 NOTE — LETTER
"    11/18/2020         RE: Miri Naylor  [address] [id number]        Dear Colleague,    Thank you for referring your patient, Miri Naylor, to the [facility]. Please see a copy of my visit note below.    Miri Naylor is a 65 year old female who is being evaluated via a billable video visit.      The patient has been notified of following:      \"This video visit will be conducted via a call between you and your physician/provider. We have found that certain health care needs can be provided without the need for an in-person physical exam.  This service lets us provide the care you need with a video conversation.  If a prescription is necessary we can send it directly to your pharmacy.  If lab work is needed we can place an order for that and you can then stop by our lab to have the test done at a later time.     If during the course of the call the physician/provider feels a video visit is not appropriate, you will not be charged for this service.\"    Patient has given verbal consent for Video visit? Yes     Patient would like the video invitation sent by: [phone]     I have reviewed and updated the patient's Past Medical History, Social History, Family History and Medication List.    VIDEO SOFTWARE NOT WORKING: TRANSITIONED TO PHONE VISIT      Remains on Symbicort 160/4.5mcg 2 puffs twice daily and Singulair 10mg daily. Started having a cough in September. Typically cough will persist until prolonged freeze. No wheezing or shortness of breath. Dusting can cause her to have symptoms. No nocturnal symptoms. No use of albuterol used for the cough. Typically resolves in under 1 min.     Remains on Flonase, Azelastine, Singulair and Zyrtec as noted. Having congestion and rhinorrhea. However, this is baseline for her.     ACT Total Scores 9/2/2020   ACT TOTAL SCORE (Goal Greater than or Equal to 20) 23   In the past 12 months, how many times did " you visit the emergency room for your asthma without being admitted to the hospital? 0   In the past 12 months, how many times were you hospitalized overnight because of your asthma? 0            Past Medical History:   Diagnosis Date     Depressive disorder, not elsewhere classified      Drug allergy, multiple 9/23/14--passed graded oral challenge for Zithromax.     7/3/14 POS PRE-PEN skin test. 7/30/14 NEG skin tests and oral challenge to Cefzil     Hx of abnormal Pap smear ?    no specifics given     lumbar degeneration      Raynaud's disease      Uncomplicated asthma      Unspecified essential hypertension      Family History   Problem Relation Age of Onset     Arthritis Mother      Hyperlipidemia Mother      Other Cancer Mother         Glioblastoma Multiforme     Osteoporosis Mother      Blood Disease Father      Hypertension Father      Hyperlipidemia Father      Other Cancer Father         lung cancer     Depression Son      Depression Daughter      Hyperlipidemia Brother      Anxiety Disorder Son      Skin Cancer No family hx of      Past Surgical History:   Procedure Laterality Date     BIOPSY OF BREAST, NEEDLE CORE       C PELVIS/HIP JOINT SURGERY UNLISTED Right 7/19/16    total hip arthroplasty     COLONOSCOPY       COMBINED ESOPHAGOSCOPY, GASTROSCOPY, DUODENOSCOPY (EGD) WITH CO2 INSUFFLATION N/A 9/3/2020    Procedure: ESOPHAGOGASTRODUODENOSCOPY, WITH CO2 INSUFFLATION;  Surgeon: Campbell Henry MD;  Location:  OR     ESOPHAGOSCOPY, GASTROSCOPY, DUODENOSCOPY (EGD), COMBINED N/A 9/3/2020    Procedure: Esophagogastroduodenoscopy, With Biopsy;  Surgeon: Campbell Henry MD;  Location: [facility] OR     HIP SURGERY Right 07/19/2016    replacement       REVIEW OF SYSTEMS:  General: negative for weight gain. negative for weight loss. negative for changes in sleep.   Ears: negative for fullness. negative for hearing loss. negative for dizziness.   Nose: negative for snoring.negative for changes  in smell. negative for drainage.   Eyes: negative for eye watering. negative for eye itching. negative for vision changes. negative for eye redness.  Throat: negative for hoarseness. negative for sore throat. negative for trouble swallowing.   Lungs: negative for shortness of breath.negative for wheezing. positive  for sputum production.   Cardiovascular: negative for chest pain. negative for swelling of ankles. negative for fast or irregular heartbeat.   Gastrointestinal: negative for nausea. negative for heartburn. negative for acid reflux.   Musculoskeletal: negative for joint pain. negative for joint stiffness. negative for joint swelling.   Neurologic: negative for seizures. negative for fainting. negative for weakness.   Psychiatric: negative for changes in mood. negative for anxiety.   Endocrine: negative for cold intolerance. negative for heat intolerance. negative for tremors.   Lymphatic: negative for lower extremity swelling. negative for lymph node swelling.   Hematologic: negative for easy bruising. negative for easy bleeding.  Integumentary: negative for rash. negative for scaling. negative for nail changes.       Current Outpatient Medications:      ACIDOPHILUS OR TABS, 1 tablet daily, Disp: , Rfl:      albuterol (PROAIR HFA) 108 (90 Base) MCG/ACT inhaler, Inhale 2 puffs into the lungs every 4 hours as needed for shortness of breath / dyspnea or wheezing, Disp: 1 Inhaler, Rfl: 1     aspirin (ASA) 81 MG EC tablet, Take 81 mg by mouth daily, Disp: , Rfl:      atenolol (TENORMIN) 25 MG tablet, TAKE 1 TABLET DAILY, Disp: 90 tablet, Rfl: 1     azelastine (ASTELIN) 0.1 % nasal spray, Spray 2 sprays into both nostrils 2 times daily, Disp: 1 Bottle, Rfl: 11     budesonide-formoterol (SYMBICORT) 160-4.5 MCG/ACT Inhaler, Inhale 2 puffs into the lungs 2 times daily, Disp: 3 Inhaler, Rfl: 1     CALCIUM 600+D PO, twice daily, Disp: , Rfl:      cetirizine (ZYRTEC) 10 MG tablet, Take 10 mg by mouth daily., Disp: ,  Rfl:      Cholecalciferol (VITAMIN D-3 PO), Take 2,000 Int'l Units by mouth daily, Disp: , Rfl:      DAILY MULTIVITAMIN PO, 1 tablet daily, Disp: , Rfl:      escitalopram (LEXAPRO) 10 MG tablet, TAKE 1 TABLET DAILY, Disp: 90 tablet, Rfl: 0     ferrous sulfate (IRON) 325 (65 Fe) MG tablet, Take 325 mg by mouth daily (with breakfast), Disp: , Rfl:      FLAXSEED OIL 1000 MG PO CAPS, 1 tablet daily, Disp: , Rfl:      fluticasone (FLONASE) 50 MCG/ACT nasal spray, Spray 2 sprays into both nostrils daily, Disp: 16 g, Rfl: 11     gabapentin (NEURONTIN) 300 MG capsule, 1 capsule TID with additional 1-2 capsules if headache is severe., Disp: 270 capsule, Rfl: 3     GLUCOSAMINE-CHONDROITIN PO TABS, 1500 mg glucosamine and 1200mg chondroitin daily-2 tablets daily, Disp: , Rfl:      hydrocortisone 2.5 % cream, Apply topically 2 times daily (Patient taking differently: Apply topically 2 times daily as needed ), Disp: 60 g, Rfl: 1     ibuprofen (ADVIL,MOTRIN) 200 MG tablet, take 1 tablet (200 mg) by oral route every 6 hours as needed with food, Disp: , Rfl:      Ketotifen Fumarate (ZADITOR OP), Apply to eye daily as needed, Disp: , Rfl:      lisinopril (ZESTRIL) 40 MG tablet, TAKE 1 TABLET DAILY, Disp: 90 tablet, Rfl: 3     Magnesium 400 MG CAPS, Take by mouth daily, Disp: , Rfl:      metoclopramide (REGLAN) 10 MG tablet, Take 1 tablet (10 mg) by mouth 3 times daily as needed (headache), Disp: 20 tablet, Rfl: 3     montelukast (SINGULAIR) 10 MG tablet, Take 1 tablet (10 mg) by mouth At Bedtime, Disp: 90 tablet, Rfl: 3     naratriptan (AMERGE) 2.5 MG tablet, Take 1 tablet (2.5 mg) by mouth at onset of headache for migraine (repeat in 4 hours if needed) May repeat in 4 hours. Max 2 tablets/24 hours., Disp: 18 tablet, Rfl: 3     pantoprazole (PROTONIX) 40 MG EC tablet, TAKE 1 TABLET DAILY 30 TO 60 MINUTES BEFORE A MEAL, Disp: 90 tablet, Rfl: 4     polyethylene glycol (MIRALAX) powder, Take 1 capful by mouth daily, Disp: , Rfl:       pravastatin (PRAVACHOL) 40 MG tablet, TAKE 1 TABLET DAILY, Disp: 90 tablet, Rfl: 4     rOPINIRole (REQUIP) 0.25 MG tablet, Take 2 tablets (0.5 mg) by mouth At Bedtime, Disp: 60 tablet, Rfl: 11     senna (SENOKOT) 8.6 MG tablet, Take 2 tablets by mouth daily, Disp: , Rfl:      spironolactone (ALDACTONE) 25 MG tablet, TAKE ONE-HALF (1/2) TABLET DAILY, Disp: 45 tablet, Rfl: 3     SUMAtriptan (IMITREX) 50 MG tablet, Take 50 mg by mouth at onset of headache , Disp: , Rfl:      triamcinolone (KENALOG) 0.1 % cream, Apply topically 2 times daily, Disp: 60 g, Rfl: 3     TURMERIC PO, Take 2,000 mg by mouth daily, Disp: , Rfl:      vitamin B complex with vitamin C (VITAMIN  B COMPLEX) TABS tablet, Take 3 tablets by mouth daily, Disp: , Rfl:      VITAMIN C 500 MG PO TABS, 2 TABLET DAILY, Disp: , Rfl:      vitamin E (E-400) 400 UNIT capsule, Take one pill by mouth once daily, Disp: , Rfl:   Immunization History   Administered Date(s) Administered     Influenza Quad, Recombinant, p-free (RIV4) 09/10/2019     Influenza Vaccine IM > 6 months Valent IIV4 12/03/2013, 11/16/2015, 10/01/2017, 09/13/2018     TD (ADULT, 7+) 03/06/2008     TDAP Vaccine (Adacel) 08/03/2012     Twinrix A/B 10/12/2017, 12/27/2017, 04/24/2018     Zoster vaccine, live 09/14/2012     Allergies   Allergen Reactions     Levofloxacin Rash     Other reaction(s): Contact Dermatitis     Penicillins Hives and Rash     Confirmed by skin prick testing 7/3/14     Amoxicillin Hives and Rash     Amoxicillin-Pot Clavulanate Rash     Azithromycin Rash     Cephalexin Rash     Clindamycin Rash and Hives     Atorvastatin Other (See Comments)     Felt sick, intolerance     Clindamycin Hcl Hives     Levaquin      tendonitis     Augmentin Rash       ASSESSMENT/PLAN:  Problem List Items Addressed This Visit        Respiratory    Moderate persistent asthma without complication     Flares with URI, cold air and humid air.  Mild cough this fall. Current treatment is Symbicort  160/4.5mcg 2 puff inhaled daily and Singulair 10mg PO daily.         - Albuterol 2-4 puffs inhaled (use a spacer unless using a Proair Respiclick device) every 4 hours as needed for chest tightness, wheezing, shortness of breath and/or coughing.    - Avoid asthma triggers.  - Symbicort 160/4.5mcg 2 puffs inhaled twice daily with a spacer.   - She is going to try off of Singulair 10mg by mouth daily at night.         Relevant Medications    budesonide-formoterol (SYMBICORT) 160-4.5 MCG/ACT Inhaler    montelukast (SINGULAIR) 10 MG tablet       Infectious/Inflammatory    Rhinoconjunctivitis     History of perennial nasal and ocular symptoms that get worse in the spring and fall.  atrovent initially thought to be beneficial she no longer thinks is helpful. Flonase has been helpful.    Tried on Dymista and somewhat beneficial.  Negative allergy testing.  Increased symptoms around cats.  Zyrtec is helpful.  Singulair is helpful.   Either vasomotor rhinitis versus local allergic rhinitis. Follows with ENT.        - Flonase 2 sprays/nostril daily.   - Azelastine 2 sprays/nostril twice daily as needed.   - Zyrtec 10 to 20 mg as needed.   - Try off of Singulair 10mg by mouth daily at night.                Chart documentation with Dragon Voice recognition Software. Although reviewed after completion, some words and grammatical errors may remain.    I have reviewed the note as documented above.  This accurately captures the substance of my conversation with the patient.    Video visit contact time  Video visit started at 1604  Video visit ended at 1613      Nish Stockton DO FAAAAI  Allergy/Immunology  [place], [place]        Again, thank you for allowing me to participate in the care of your patient.        Sincerely,        Nish Stockton DO

## 2020-11-18 NOTE — PROGRESS NOTES
"Miri Naylor is a 65 year old female who is being evaluated via a billable video visit.      The patient has been notified of following:      \"This video visit will be conducted via a call between you and your physician/provider. We have found that certain health care needs can be provided without the need for an in-person physical exam.  This service lets us provide the care you need with a video conversation.  If a prescription is necessary we can send it directly to your pharmacy.  If lab work is needed we can place an order for that and you can then stop by our lab to have the test done at a later time.     If during the course of the call the physician/provider feels a video visit is not appropriate, you will not be charged for this service.\"    Patient has given verbal consent for Video visit? Yes     Patient would like the video invitation sent by: [phone]     I have reviewed and updated the patient's Past Medical History, Social History, Family History and Medication List.    VIDEO SOFTWARE NOT WORKING: TRANSITIONED TO PHONE VISIT      Remains on Symbicort 160/4.5mcg 2 puffs twice daily and Singulair 10mg daily. Started having a cough in September. Typically cough will persist until prolonged freeze. No wheezing or shortness of breath. Dusting can cause her to have symptoms. No nocturnal symptoms. No use of albuterol used for the cough. Typically resolves in under 1 min.     Remains on Flonase, Azelastine, Singulair and Zyrtec as noted. Having congestion and rhinorrhea. However, this is baseline for her.     ACT Total Scores 9/2/2020   ACT TOTAL SCORE (Goal Greater than or Equal to 20) 23   In the past 12 months, how many times did you visit the emergency room for your asthma without being admitted to the hospital? 0   In the past 12 months, how many times were you hospitalized overnight because of your asthma? 0            Past Medical History:   Diagnosis Date     Depressive disorder, not " elsewhere classified      Drug allergy, multiple 9/23/14--passed graded oral challenge for Zithromax.     7/3/14 POS PRE-PEN skin test. 7/30/14 NEG skin tests and oral challenge to Cefzil     Hx of abnormal Pap smear ?    no specifics given     lumbar degeneration      Raynaud's disease      Uncomplicated asthma      Unspecified essential hypertension      Family History   Problem Relation Age of Onset     Arthritis Mother      Hyperlipidemia Mother      Other Cancer Mother         Glioblastoma Multiforme     Osteoporosis Mother      Blood Disease Father      Hypertension Father      Hyperlipidemia Father      Other Cancer Father         lung cancer     Depression Son      Depression Daughter      Hyperlipidemia Brother      Anxiety Disorder Son      Skin Cancer No family hx of      Past Surgical History:   Procedure Laterality Date     BIOPSY OF BREAST, NEEDLE CORE       C PELVIS/HIP JOINT SURGERY UNLISTED Right 7/19/16    total hip arthroplasty     COLONOSCOPY       COMBINED ESOPHAGOSCOPY, GASTROSCOPY, DUODENOSCOPY (EGD) WITH CO2 INSUFFLATION N/A 9/3/2020    Procedure: ESOPHAGOGASTRODUODENOSCOPY, WITH CO2 INSUFFLATION;  Surgeon: Campbell Henry MD;  Location: [facility] OR     ESOPHAGOSCOPY, GASTROSCOPY, DUODENOSCOPY (EGD), COMBINED N/A 9/3/2020    Procedure: Esophagogastroduodenoscopy, With Biopsy;  Surgeon: Campbell Henry MD;  Location: [facility] OR     HIP SURGERY Right 07/19/2016    replacement       REVIEW OF SYSTEMS:  General: negative for weight gain. negative for weight loss. negative for changes in sleep.   Ears: negative for fullness. negative for hearing loss. negative for dizziness.   Nose: negative for snoring.negative for changes in smell. negative for drainage.   Eyes: negative for eye watering. negative for eye itching. negative for vision changes. negative for eye redness.  Throat: negative for hoarseness. negative for sore throat. negative for trouble swallowing.   Lungs: negative for  shortness of breath.negative for wheezing. positive  for sputum production.   Cardiovascular: negative for chest pain. negative for swelling of ankles. negative for fast or irregular heartbeat.   Gastrointestinal: negative for nausea. negative for heartburn. negative for acid reflux.   Musculoskeletal: negative for joint pain. negative for joint stiffness. negative for joint swelling.   Neurologic: negative for seizures. negative for fainting. negative for weakness.   Psychiatric: negative for changes in mood. negative for anxiety.   Endocrine: negative for cold intolerance. negative for heat intolerance. negative for tremors.   Lymphatic: negative for lower extremity swelling. negative for lymph node swelling.   Hematologic: negative for easy bruising. negative for easy bleeding.  Integumentary: negative for rash. negative for scaling. negative for nail changes.       Current Outpatient Medications:      ACIDOPHILUS OR TABS, 1 tablet daily, Disp: , Rfl:      albuterol (PROAIR HFA) 108 (90 Base) MCG/ACT inhaler, Inhale 2 puffs into the lungs every 4 hours as needed for shortness of breath / dyspnea or wheezing, Disp: 1 Inhaler, Rfl: 1     aspirin (ASA) 81 MG EC tablet, Take 81 mg by mouth daily, Disp: , Rfl:      atenolol (TENORMIN) 25 MG tablet, TAKE 1 TABLET DAILY, Disp: 90 tablet, Rfl: 1     azelastine (ASTELIN) 0.1 % nasal spray, Spray 2 sprays into both nostrils 2 times daily, Disp: 1 Bottle, Rfl: 11     budesonide-formoterol (SYMBICORT) 160-4.5 MCG/ACT Inhaler, Inhale 2 puffs into the lungs 2 times daily, Disp: 3 Inhaler, Rfl: 1     CALCIUM 600+D PO, twice daily, Disp: , Rfl:      cetirizine (ZYRTEC) 10 MG tablet, Take 10 mg by mouth daily., Disp: , Rfl:      Cholecalciferol (VITAMIN D-3 PO), Take 2,000 Int'l Units by mouth daily, Disp: , Rfl:      DAILY MULTIVITAMIN PO, 1 tablet daily, Disp: , Rfl:      escitalopram (LEXAPRO) 10 MG tablet, TAKE 1 TABLET DAILY, Disp: 90 tablet, Rfl: 0     ferrous sulfate  (IRON) 325 (65 Fe) MG tablet, Take 325 mg by mouth daily (with breakfast), Disp: , Rfl:      FLAXSEED OIL 1000 MG PO CAPS, 1 tablet daily, Disp: , Rfl:      fluticasone (FLONASE) 50 MCG/ACT nasal spray, Spray 2 sprays into both nostrils daily, Disp: 16 g, Rfl: 11     gabapentin (NEURONTIN) 300 MG capsule, 1 capsule TID with additional 1-2 capsules if headache is severe., Disp: 270 capsule, Rfl: 3     GLUCOSAMINE-CHONDROITIN PO TABS, 1500 mg glucosamine and 1200mg chondroitin daily-2 tablets daily, Disp: , Rfl:      hydrocortisone 2.5 % cream, Apply topically 2 times daily (Patient taking differently: Apply topically 2 times daily as needed ), Disp: 60 g, Rfl: 1     ibuprofen (ADVIL,MOTRIN) 200 MG tablet, take 1 tablet (200 mg) by oral route every 6 hours as needed with food, Disp: , Rfl:      Ketotifen Fumarate (ZADITOR OP), Apply to eye daily as needed, Disp: , Rfl:      lisinopril (ZESTRIL) 40 MG tablet, TAKE 1 TABLET DAILY, Disp: 90 tablet, Rfl: 3     Magnesium 400 MG CAPS, Take by mouth daily, Disp: , Rfl:      metoclopramide (REGLAN) 10 MG tablet, Take 1 tablet (10 mg) by mouth 3 times daily as needed (headache), Disp: 20 tablet, Rfl: 3     montelukast (SINGULAIR) 10 MG tablet, Take 1 tablet (10 mg) by mouth At Bedtime, Disp: 90 tablet, Rfl: 3     naratriptan (AMERGE) 2.5 MG tablet, Take 1 tablet (2.5 mg) by mouth at onset of headache for migraine (repeat in 4 hours if needed) May repeat in 4 hours. Max 2 tablets/24 hours., Disp: 18 tablet, Rfl: 3     pantoprazole (PROTONIX) 40 MG EC tablet, TAKE 1 TABLET DAILY 30 TO 60 MINUTES BEFORE A MEAL, Disp: 90 tablet, Rfl: 4     polyethylene glycol (MIRALAX) powder, Take 1 capful by mouth daily, Disp: , Rfl:      pravastatin (PRAVACHOL) 40 MG tablet, TAKE 1 TABLET DAILY, Disp: 90 tablet, Rfl: 4     rOPINIRole (REQUIP) 0.25 MG tablet, Take 2 tablets (0.5 mg) by mouth At Bedtime, Disp: 60 tablet, Rfl: 11     senna (SENOKOT) 8.6 MG tablet, Take 2 tablets by mouth daily,  Disp: , Rfl:      spironolactone (ALDACTONE) 25 MG tablet, TAKE ONE-HALF (1/2) TABLET DAILY, Disp: 45 tablet, Rfl: 3     SUMAtriptan (IMITREX) 50 MG tablet, Take 50 mg by mouth at onset of headache , Disp: , Rfl:      triamcinolone (KENALOG) 0.1 % cream, Apply topically 2 times daily, Disp: 60 g, Rfl: 3     TURMERIC PO, Take 2,000 mg by mouth daily, Disp: , Rfl:      vitamin B complex with vitamin C (VITAMIN  B COMPLEX) TABS tablet, Take 3 tablets by mouth daily, Disp: , Rfl:      VITAMIN C 500 MG PO TABS, 2 TABLET DAILY, Disp: , Rfl:      vitamin E (E-400) 400 UNIT capsule, Take one pill by mouth once daily, Disp: , Rfl:   Immunization History   Administered Date(s) Administered     Influenza Quad, Recombinant, p-free (RIV4) 09/10/2019     Influenza Vaccine IM > 6 months Valent IIV4 12/03/2013, 11/16/2015, 10/01/2017, 09/13/2018     TD (ADULT, 7+) 03/06/2008     TDAP Vaccine (Adacel) 08/03/2012     Twinrix A/B 10/12/2017, 12/27/2017, 04/24/2018     Zoster vaccine, live 09/14/2012     Allergies   Allergen Reactions     Levofloxacin Rash     Other reaction(s): Contact Dermatitis     Penicillins Hives and Rash     Confirmed by skin prick testing 7/3/14     Amoxicillin Hives and Rash     Amoxicillin-Pot Clavulanate Rash     Azithromycin Rash     Cephalexin Rash     Clindamycin Rash and Hives     Atorvastatin Other (See Comments)     Felt sick, intolerance     Clindamycin Hcl Hives     Levaquin      tendonitis     Augmentin Rash       ASSESSMENT/PLAN:  Problem List Items Addressed This Visit        Respiratory    Moderate persistent asthma without complication     Flares with URI, cold air and humid air.  Mild cough this fall. Current treatment is Symbicort 160/4.5mcg 2 puff inhaled daily and Singulair 10mg PO daily.         - Albuterol 2-4 puffs inhaled (use a spacer unless using a Proair Respiclick device) every 4 hours as needed for chest tightness, wheezing, shortness of breath and/or coughing.    - Avoid asthma  triggers.  - Symbicort 160/4.5mcg 2 puffs inhaled twice daily with a spacer.   - She is going to try off of Singulair 10mg by mouth daily at night.         Relevant Medications    budesonide-formoterol (SYMBICORT) 160-4.5 MCG/ACT Inhaler    montelukast (SINGULAIR) 10 MG tablet       Infectious/Inflammatory    Rhinoconjunctivitis     History of perennial nasal and ocular symptoms that get worse in the spring and fall.  atrovent initially thought to be beneficial she no longer thinks is helpful. Flonase has been helpful.    Tried on Dymista and somewhat beneficial.  Negative allergy testing.  Increased symptoms around cats.  Zyrtec is helpful.  Singulair is helpful.   Either vasomotor rhinitis versus local allergic rhinitis. Follows with ENT.        - Flonase 2 sprays/nostril daily.   - Azelastine 2 sprays/nostril twice daily as needed.   - Zyrtec 10 to 20 mg as needed.   - Try off of Singulair 10mg by mouth daily at night.                Chart documentation with Dragon Voice recognition Software. Although reviewed after completion, some words and grammatical errors may remain.    I have reviewed the note as documented above.  This accurately captures the substance of my conversation with the patient.    Video visit contact time  Video visit started at 1604  Video visit ended at 1613      Nish Stockton DO FAAAAI  Allergy/Immunology  [place], [place]

## 2020-11-19 DIAGNOSIS — R10.11 RUQ ABDOMINAL PAIN: Primary | ICD-10-CM

## 2020-11-19 NOTE — TELEPHONE ENCOUNTER
LPN spoke with patient and notified her that pain clinic referral has been placed and they should contact her to schedule. LPN provided patient with the telephone number to call if she does not get a call from them in about a week. Patient also stated that she would send a EMKineticst message in a few months when she is ready to repeat the blood work so orders can be placed. Patient had no further questions at this time.     Campbell Henry MD Thompson, Danielle, RN; [facility] 22 minutes ago (1:46 PM)     I put in the referral for the pain clinic.  Can you please let the patient know to expect a call?  Thank you!    Message text      Maria Ines Palencia LPN

## 2020-11-20 DIAGNOSIS — E78.5 HYPERLIPIDEMIA LDL GOAL <130: ICD-10-CM

## 2020-11-24 RX ORDER — PRAVASTATIN SODIUM 40 MG
TABLET ORAL
Qty: 90 TABLET | Refills: 3 | Status: SHIPPED | OUTPATIENT
Start: 2020-11-24 | End: 2021-11-17

## 2020-12-02 ENCOUNTER — VIRTUAL VISIT (OUTPATIENT)
Dept: FAMILY MEDICINE | Facility: CLINIC | Age: 65
End: 2020-12-02
Payer: COMMERCIAL

## 2020-12-02 DIAGNOSIS — K21.9 GASTROESOPHAGEAL REFLUX DISEASE: ICD-10-CM

## 2020-12-02 DIAGNOSIS — R07.0 THROAT PAIN: ICD-10-CM

## 2020-12-02 DIAGNOSIS — Z20.818 EXPOSURE TO STREP THROAT: ICD-10-CM

## 2020-12-02 DIAGNOSIS — Z20.818 EXPOSURE TO STREP THROAT: Primary | ICD-10-CM

## 2020-12-02 LAB
DEPRECATED S PYO AG THROAT QL EIA: NEGATIVE
SPECIMEN SOURCE: NORMAL
SPECIMEN SOURCE: NORMAL
STREP GROUP A PCR: NOT DETECTED

## 2020-12-02 PROCEDURE — 99213 OFFICE O/P EST LOW 20 MIN: CPT | Mod: 95 | Performed by: NURSE PRACTITIONER

## 2020-12-02 PROCEDURE — U0003 INFECTIOUS AGENT DETECTION BY NUCLEIC ACID (DNA OR RNA); SEVERE ACUTE RESPIRATORY SYNDROME CORONAVIRUS 2 (SARS-COV-2) (CORONAVIRUS DISEASE [COVID-19]), AMPLIFIED PROBE TECHNIQUE, MAKING USE OF HIGH THROUGHPUT TECHNOLOGIES AS DESCRIBED BY CMS-2020-01-R: HCPCS | Performed by: NURSE PRACTITIONER

## 2020-12-02 PROCEDURE — 99N1174 PR STATISTIC STREP A RAPID: Performed by: NURSE PRACTITIONER

## 2020-12-02 PROCEDURE — 87651 STREP A DNA AMP PROBE: CPT | Performed by: NURSE PRACTITIONER

## 2020-12-02 RX ORDER — DOXYCYCLINE 100 MG/1
100 CAPSULE ORAL 2 TIMES DAILY
Qty: 14 CAPSULE | Refills: 0 | Status: SHIPPED | OUTPATIENT
Start: 2020-12-02 | End: 2020-12-09

## 2020-12-02 NOTE — PATIENT INSTRUCTIONS
Instructions for Patients  It is recommended that you have a test for coronavirus (COVID-19). This illness can cause fever, cough and trouble breathing. Many people get a mild case and get better on their own. Some people can get very sick.     Please follow these steps:    1. We will call to schedule your test.  2. A member of our care team will ask you some questions. Then, they will use a swab to collect samples from your nose and throat.     Our testing team will send you your test results.    How can I protect others?    Stay home and away from others (self-isolate) until:    You ve had no fever--and no medicine that reduces fever--for 1 full day (24 hours). And      Your other symptoms have resolved (gotten better). For example, your cough or breathing has improved. And     At least 10 days have passed since your symptoms started.    Stay at least 6 feet away from others. (If someone will drive you to your test, stay in the backseat, as far away from the  as you can.)     Don t go to work, school or anywhere else. When it s time for your test, go straight to the testing site. Don t make any stops on the way there or back.     Wash your hands and face often. Use soap and water.     Cover your mouth and nose with a mask, tissue or washcloth.     Don t touch anyone. No hugging, kissing or handshakes.    How can I take care of myself?    1. Get lots of rest. Drink extra fluids (unless a doctor has told you not to).     2. Take Tylenol (acetaminophen) for fever or pain. If you have liver or kidney problems, ask your family doctor if it's okay to take Tylenol.     Adults can take either:     650 mg (two 325 mg pills) every 4 to 6 hours, or     1,000 mg (two 500 mg pills) every 8 hours as needed.     Note: Don't take more than 3,000 mg in one day.   Acetaminophen is found in many medicines (both prescribed and over-the-counter medicines). Read all labels to be sure you don't take too much.   For children, check  the Tylenol bottle for the right dose. The dose is based on  the child's age or weight.    3. If you have other health problems (like cancer, heart failure, an organ transplant or severe kidney disease): Call your specialty clinic if you don't feel better in the next 2 days.    4. Know when to call 911: If your breathing is so bad that it keeps you from doing normal activities, call 911 or go to the emergency room. Tell them that you've been staying home and may have COVID-19.      Thank you for taking steps to prevent the spread of this virus.  o Limit your contact with others.  o Wear a simple mask to cover your cough.  o Wash your hands well and often.  o If you need medical care, go to [email or web address] or contact your health care provider.     For more about COVID-19 and caring for yourself at home, visit the CDC website at https://www.cdc.gov/coronavirus/2019-ncov/about/steps-when-sick.html.     To learn about care at [facility], please go to [email or web address].     [facility] clinical trials (COVID-19 research studies): [email or web address]-clinical-trials.    Below are the COVID-19 hotlines at the [facility] ([facility]). Interpreters are available.     For health questions: Call [phone] or [phone] (7 a.m. to 7 p.m.)    For questions about schools and childcare: Call 912-076-6292 or [phone] (7 a.m. to 7 p.m.)

## 2020-12-02 NOTE — PROGRESS NOTES
"Miri Naylor is a 65 year old female who is being evaluated via a billable video visit.      The patient has been notified of following:     \"This video visit will be conducted via a call between you and your physician/provider. We have found that certain health care needs can be provided without the need for an in-person physical exam.  This service lets us provide the care you need with a video conversation.  If a prescription is necessary we can send it directly to your pharmacy.  If lab work is needed we can place an order for that and you can then stop by our lab to have the test done at a later time.    Video visits are billed at different rates depending on your insurance coverage.  Please reach out to your insurance provider with any questions.    If during the course of the call the physician/provider feels a video visit is not appropriate, you will not be charged for this service.\"    Patient has given verbal consent for Video visit? Yes  How would you like to obtain your AVS? MyChart  If you are dropped from the video visit, the video invite should be resent to: Text to cell phone: [phone]  Will anyone else be joining your video visit? No    Subjective     Miri Naylor is a 65 year old female who presents today via video visit for the following health issues:    HPI     Acute Illness  Acute illness concerns: Sore throat  Onset/Duration: 3day  Symptoms:  Fever: no  Chills/Sweats: YES  Headache (location?): YES  Sinus Pressure: YES  Conjunctivitis:  YES  Ear Pain: no  Rhinorrhea: no  Congestion: no  Sore Throat: YES  Cough: YES  Wheeze: no  Decreased Appetite: YES  Nausea: YES  Vomiting: no  Diarrhea: no  Dysuria/Freq.: no  Dysuria or Hematuria: no  Fatigue/Achiness: YES  Sick/Strep Exposure: YES-   Therapies tried and outcome: Tylenol and ibu   was exposed by grandson.  She watches him daily. He was diagnosed 10 days ago.  He was tested for covid at the time and was negative.   Patient " "denies fever.      Video Start Time: 1:05 PM      Review of Systems   Constitutional, HEENT, cardiovascular, pulmonary, GI, , musculoskeletal, neuro, skin, endocrine and psych systems are negative, except as otherwise noted.      Objective           Vitals:  No vitals were obtained today due to virtual visit.    Physical Exam     GENERAL: Healthy, alert and no distress  EYES: Eyes grossly normal to inspection.  No discharge or erythema, or obvious scleral/conjunctival abnormalities.  RESP: No audible wheeze, cough, or visible cyanosis.  No visible retractions or increased work of breathing.    SKIN: Visible skin clear. No significant rash, abnormal pigmentation or lesions.  NEURO: Cranial nerves grossly intact.  Mentation and speech appropriate for age.  PSYCH: Mentation appears normal, affect normal/bright, judgement and insight intact, normal speech and appearance well-groomed.              Assessment & Plan     Exposure to strep throat  Will test for strep instead of just treating based on symptoms due to patient's multiple medication allergies.  The caveat to that would be if she becomes very uncomfortable prior to being tested, she can start antibiotics (only tolerates Doxycycline).  Rule out COVID as well.   - Streptococcus A Rapid Scr w Reflx to PCR; Future  - doxycycline hyclate (VIBRAMYCIN) 100 MG capsule; Take 1 capsule (100 mg) by mouth 2 times daily for 7 days    Throat pain  - Symptomatic COVID-19 Virus (Coronavirus) by PCR; Future  - Streptococcus A Rapid Scr w Reflx to PCR; Future  - doxycycline hyclate (VIBRAMYCIN) 100 MG capsule; Take 1 capsule (100 mg) by mouth 2 times daily for 7 days     BMI:   Estimated body mass index is 29.99 kg/m  as calculated from the following:    Height as of 11/18/20: 1.803 m (5' 11\").    Weight as of 11/18/20: 97.5 kg (215 lb).            There are no Patient Instructions on file for this visit.    No follow-ups on file.    [facility] " [facility]      Video-Visit Details    Type of service:  Video Visit    Video End Time:1:18 PM    Originating Location (pt. Location): Home    Distant Location (provider location):  [facility]     Platform used for Video Visit: [facility] Shift

## 2020-12-03 LAB
SARS-COV-2 RNA SPEC QL NAA+PROBE: NOT DETECTED
SPECIMEN SOURCE: NORMAL

## 2020-12-03 RX ORDER — PANTOPRAZOLE SODIUM 40 MG/1
TABLET, DELAYED RELEASE ORAL
Qty: 90 TABLET | Refills: 1 | Status: SHIPPED | OUTPATIENT
Start: 2020-12-03 | End: 2021-06-01

## 2020-12-09 ENCOUNTER — VIRTUAL VISIT (OUTPATIENT)
Dept: PSYCHOLOGY | Facility: CLINIC | Age: 65
End: 2020-12-09
Payer: COMMERCIAL

## 2020-12-09 DIAGNOSIS — F34.1 PERSISTENT DEPRESSIVE DISORDER: ICD-10-CM

## 2020-12-09 DIAGNOSIS — F41.1 GENERALIZED ANXIETY DISORDER: Primary | ICD-10-CM

## 2020-12-09 PROCEDURE — 90834 PSYTX W PT 45 MINUTES: CPT | Mod: 95 | Performed by: MARRIAGE & FAMILY THERAPIST

## 2020-12-10 NOTE — PROGRESS NOTES
Progress Note    Patient Name: Miri Naylor  Date: 12/9/20         Service Type: Individual      Session Start Time: 12:05  Session End Time: 12:57     Session Length: 52    Session #: 32    Attendees: Client attended alone    Telemedicine Visit: The patient's condition can be safely assessed and treated via synchronous audio and visual telemedicine encounter.      Reason for Telemedicine Visit: Services only offered telehealth    Originating Site (Patient Location): Patient's home    Distant Site (Provider Location): Provider Remote Setting    Consent:  The patient/guardian has verbally consented to: the potential risks and benefits of telemedicine (video visit) versus in person care; bill my insurance or make self-payment for services provided; and responsibility for payment of non-covered services.     Mode of Communication:  Video Conference via [employer]    As the provider I attest to compliance with applicable laws and regulations related to telemedicine.     Treatment Plan Last Reviewed: 10/28/20, next due 1/28/21.  PHQ-9 / MELISA-7 : not completed.    DATA  Interactive Complexity: No  Crisis: No       Progress Since Last Session (Related to Symptoms / Goals / Homework):   Symptoms: continued anxiety and depression.    Homework: Partially completed       Episode of Care Goals: Minimal progress - ACTION (Actively working towards change); Intervened by reinforcing change plan / affirming steps taken     Current / Ongoing Stressors and Concerns:   Client reported experiencing continued anxiety/depression related to her ongoing relationship conflict/issues with her spouse.  Client reported regarding struggling with her past narratives of avoiding conflict and going with her spouse's desires, even if they conflicted with her values.  Client reported regarding her ongoing struggle to be true to herself and her values.     Treatment Objective(s) Addressed in This  Session:   use cognitive strategies identified in therapy to challenge anxious thoughts  Decrease frequency and intensity of feeling down, depressed, hopeless  Identify negative self-talk and behaviors: challenge core beliefs, myths, and actions      Intervention:   CBT: reviewed with client Serenity Prayer concepts of focusing on doing what she can/control while letting go of things outside of her control, and being true to her values.        ASSESSMENT: Current Emotional / Mental Status (status of significant symptoms):   Risk status (Self / Other harm or suicidal ideation)   Patient denies current fears or concerns for personal safety.   Patient denies current or recent suicidal ideation or behaviors.   Patientdenies current or recent homicidal ideation or behaviors.   Patient denies current or recent self injurious behavior or ideation.   Patient denies other safety concerns.   Patient Patient reports there has been no change in risk factors since their last session.     PatientPatient reports there has been no change in protective factors since their last session.     Recommended that patient call 911 or go to the local ED should there be a change in any of these risk factors.     Appearance:   Appropriate    Eye Contact:   Good    Psychomotor Behavior: Normal    Attitude:   Cooperative  Interested Friendly Pleasant Attentive   Orientation:   All   Speech    Rate / Production: Normal     Volume:  Normal    Mood:    Anxious  Depressed  Normal Expansive   Affect:    Appropriate  Expansive  Worrisome    Thought Content:  Clear    Thought Form:  Coherent  Logical    Insight:    Good  and Intellectual Insight     Medication Review:   No current psychiatric medications prescribed     Medication Compliance:   NA     Changes in Health Issues:   None reported     Chemical Use Review:   Substance Use: Chemical use reviewed, no active concerns identified      Tobacco Use: No current tobacco use.      Diagnosis:  1.  Generalized anxiety disorder    2. Persistent depressive disorder        Collateral Reports Completed:   Not Applicable    PLAN: (Patient Tasks / Therapist Tasks / Other)  Client agreed to work on focusing on doing what she can/control while letting go of things outside of her control, and sticking true to her values.        Loki Crowe, LMFT 12/9/2020                                                          ______________________________________________________________________    Treatment Plan    Patient's Name: Miri Naylor  YOB: 1955    Date: 10/28/20    DSM5 Diagnoses: 300.4 (F34.1) Persistent Depressive Disorder, Early onset or 300.02 (F41.1) Generalized Anxiety Disorder  Psychosocial / Contextual Factors: marital conflict, history of loss/trauma  WHODAS: 29    Referral / Collaboration:  Referral to another professional/service is not indicated at this time..    Anticipated number of session or this episode of care: 31+      MeasurableTreatment Goal(s) related to diagnosis / functional impairment(s)  Goal 1: Client will successfully process through past trauma defined as reporting 0 Subjective Units of Distress related to trauma on 0-10 scale and 7 on Validity of (positive) Cognition scale about self on 1-7 scale   I will know I've met my goal when I am less impacted by my past loss/trauma.       Objective #A (Client Action)    Status: Conitnued - Date: 10/28/20      Client will identify past traumatic events/memories which are causing current distress.     Intervention(s)  Therapist will take client's history and facilitate client's identification of targets for EMDR.     Objective #B  Client will complete needed assessment(s) and [facility] EMDR resourcing to confirm readiness for EMDR.    Status: Continued - Date: 10/28/20      Intervention(s)  Therapist will administer Dissociative Experiences Scale, Multidimensional Inventory of Dissociation as needed and complete [facility] EMDR resourcing with client.     Objective #C  Client will engage in installing at least 2 EMDR resources.  Status: Continued - Date: 10/28/20      Intervention(s)  Therapist will complete EMDR resourcing (Container, Remote Control, grounding/progressive muscle relaxation, Light Stream, Inner Advisor) with client.     Objective #D (Client Action)    Status: Continued - Date: 10/28/20      Client will engage in reprocessing all past traumatic event/memory targets.     Intervention(s)   Therapist will complete EMDR reprocessing with client.    Goal 2:  Client will improve overall baseline mood regulation by 2 points on a 1-10 Likert scale per self-report (10 = optimal mood/regulation).    I will know I've met my goal when I feel better/less depressed overall.      Objective #A (Client Action)    Status: Continued - Date:  10/28/20    Client will Identify negative self-talk and behaviors: challenge core beliefs, myths, and actions.    Intervention(s)  Therapist will teach emotional regulation skills. CBT/REBT ABCD model.    Objective #B  Client will Increase interest, engagement, and pleasure in doing things  Decrease frequency and intensity of feeling down, depressed, hopeless  Improve concentration, focus, and mindfulness in daily activities .    Status: Continued - Date:  10/28/20    Intervention(s)  Therapist will teach emotional regulation skills. DBT Core Mindfulness, Distress Tolerance, Emotion Regulation, and Interpersonal Effetiveness skills.    Patient has reviewed and agreed to the above plan.      Loki Crowe, LMFT  October 28, 2020

## 2020-12-16 ENCOUNTER — OFFICE VISIT (OUTPATIENT)
Dept: ORTHOPEDICS | Facility: CLINIC | Age: 65
End: 2020-12-16
Payer: COMMERCIAL

## 2020-12-16 ENCOUNTER — ANCILLARY PROCEDURE (OUTPATIENT)
Dept: GENERAL RADIOLOGY | Facility: CLINIC | Age: 65
End: 2020-12-16
Attending: PHYSICIAN ASSISTANT
Payer: COMMERCIAL

## 2020-12-16 VITALS
WEIGHT: 223.2 LBS | SYSTOLIC BLOOD PRESSURE: 146 MMHG | DIASTOLIC BLOOD PRESSURE: 89 MMHG | BODY MASS INDEX: 31.25 KG/M2 | HEIGHT: 71 IN

## 2020-12-16 DIAGNOSIS — M16.12 PRIMARY OSTEOARTHRITIS OF LEFT HIP: ICD-10-CM

## 2020-12-16 DIAGNOSIS — M25.552 CHRONIC LEFT HIP PAIN: ICD-10-CM

## 2020-12-16 DIAGNOSIS — G89.29 CHRONIC LEFT HIP PAIN: ICD-10-CM

## 2020-12-16 DIAGNOSIS — M16.12 PRIMARY OSTEOARTHRITIS OF LEFT HIP: Primary | ICD-10-CM

## 2020-12-16 PROCEDURE — 99214 OFFICE O/P EST MOD 30 MIN: CPT | Performed by: ORTHOPAEDIC SURGERY

## 2020-12-16 PROCEDURE — 73502 X-RAY EXAM HIP UNI 2-3 VIEWS: CPT | Mod: LT | Performed by: RADIOLOGY

## 2020-12-16 ASSESSMENT — PAIN SCALES - GENERAL: PAINLEVEL: SEVERE PAIN (6)

## 2020-12-16 ASSESSMENT — MIFFLIN-ST. JEOR: SCORE: 1658.56

## 2020-12-16 NOTE — LETTER
12/16/2020         RE: Miri Naylor  [address] [id number]        Dear Colleague,    Thank you for referring your patient, Miri Naylor, to the [facility]. Please see a copy of my visit note below.    Chief Complaint   Patient presents with     Left Hip - Pain     Increasing left hip pain. Mainly in the groin, lateral hip and down thigh. Worse the last few weeks, interfering with sleep and activities of daily living.      Hip Pain     She continues to ride the stationary bike 3x weekly for 40mins and do a ballet class 2x/week on zoom. She is taking more OTC pain relievers. She had a right total hip arthroplasty - posterior on 7/19/16.       HISTORY OF PRESENT ILLNESS: Miri Naylor is a 65 year old female seen left hip pain. Started 12 years ago. Gradually started worsening. Today she has mild pain, 3/10. Pain is located over anterolateral hip and groin.  Pain is worsened with hip range of motion, such as with pulling the leg up, external rotation and stairs. Occasional shooting pain into the groin. Pain with sit to stand and stand to sit. Taking tylenol, ibuprofen for pain. She has done Physical Therapy in the past and continues to be active with home exercise program, riding stationary bike, ballet.    Pain is interfering with sleep and ADLs. Continues to ride stationary bike 3x/week.    Known low back pain. Some numbness and tingling.    S/p right total hip arthroplasty 7/2016 (posterior approach), doing well. Some scar tissue buildup and scar tenderness but otherwise doing well.      PAST MEDICAL HISTORY:   Past Medical History:   Diagnosis Date     Depressive disorder, not elsewhere classified      Drug allergy, multiple 9/23/14--passed graded oral challenge for Zithromax.     7/3/14 POS PRE-PEN skin test. 7/30/14 NEG skin tests and oral challenge to Cefzil     Hx of abnormal Pap smear ?    no specifics given     lumbar degeneration       Raynaud's disease      Uncomplicated asthma      Unspecified essential hypertension        PAST SURGICAL HISTORY:   Past Surgical History:   Procedure Laterality Date     BIOPSY OF BREAST, NEEDLE CORE       C PELVIS/HIP JOINT SURGERY UNLISTED Right 7/19/16    total hip arthroplasty     COLONOSCOPY       COMBINED ESOPHAGOSCOPY, GASTROSCOPY, DUODENOSCOPY (EGD) WITH CO2 INSUFFLATION N/A 9/3/2020    Procedure: ESOPHAGOGASTRODUODENOSCOPY, WITH CO2 INSUFFLATION;  Surgeon: Campbell Henry MD;  Location: [facility] OR     ESOPHAGOSCOPY, GASTROSCOPY, DUODENOSCOPY (EGD), COMBINED N/A 9/3/2020    Procedure: Esophagogastroduodenoscopy, With Biopsy;  Surgeon: Campbell Henry MD;  Location: [facility] OR     HIP SURGERY Right 07/19/2016    replacement       Medications:   Current Outpatient Medications:      ACIDOPHILUS OR TABS, 1 tablet daily, Disp: , Rfl:      albuterol (PROAIR HFA) 108 (90 Base) MCG/ACT inhaler, Inhale 2 puffs into the lungs every 4 hours as needed for shortness of breath / dyspnea or wheezing, Disp: 1 Inhaler, Rfl: 1     aspirin (ASA) 81 MG EC tablet, Take 81 mg by mouth daily, Disp: , Rfl:      atenolol (TENORMIN) 25 MG tablet, TAKE 1 TABLET DAILY, Disp: 90 tablet, Rfl: 1     azelastine (ASTELIN) 0.1 % nasal spray, Spray 2 sprays into both nostrils 2 times daily, Disp: 1 Bottle, Rfl: 11     budesonide-formoterol (SYMBICORT) 160-4.5 MCG/ACT Inhaler, Inhale 2 puffs into the lungs 2 times daily, Disp: 3 Inhaler, Rfl: 1     CALCIUM 600+D PO, twice daily, Disp: , Rfl:      cetirizine (ZYRTEC) 10 MG tablet, Take 10 mg by mouth daily., Disp: , Rfl:      Cholecalciferol (VITAMIN D-3 PO), Take 2,000 Int'l Units by mouth daily, Disp: , Rfl:      DAILY MULTIVITAMIN PO, 1 tablet daily, Disp: , Rfl:      escitalopram (LEXAPRO) 10 MG tablet, TAKE 1 TABLET DAILY, Disp: 90 tablet, Rfl: 0     ferrous sulfate (IRON) 325 (65 Fe) MG tablet, Take 325 mg by mouth daily (with breakfast), Disp: , Rfl:      FLAXSEED OIL  1000 MG PO CAPS, 1 tablet daily, Disp: , Rfl:      fluticasone (FLONASE) 50 MCG/ACT nasal spray, Spray 2 sprays into both nostrils daily, Disp: 16 g, Rfl: 11     gabapentin (NEURONTIN) 300 MG capsule, 1 capsule TID with additional 1-2 capsules if headache is severe., Disp: 270 capsule, Rfl: 3     GLUCOSAMINE-CHONDROITIN PO TABS, 1500 mg glucosamine and 1200mg chondroitin daily-2 tablets daily, Disp: , Rfl:      hydrocortisone 2.5 % cream, Apply topically 2 times daily (Patient taking differently: Apply topically 2 times daily as needed ), Disp: 60 g, Rfl: 1     ibuprofen (ADVIL,MOTRIN) 200 MG tablet, take 1 tablet (200 mg) by oral route every 6 hours as needed with food, Disp: , Rfl:      Ketotifen Fumarate (ZADITOR OP), Apply to eye daily as needed, Disp: , Rfl:      lisinopril (ZESTRIL) 40 MG tablet, TAKE 1 TABLET DAILY, Disp: 90 tablet, Rfl: 3     Magnesium 400 MG CAPS, Take by mouth daily, Disp: , Rfl:      metoclopramide (REGLAN) 10 MG tablet, Take 1 tablet (10 mg) by mouth 3 times daily as needed (headache), Disp: 20 tablet, Rfl: 3     montelukast (SINGULAIR) 10 MG tablet, Take 1 tablet (10 mg) by mouth At Bedtime, Disp: 90 tablet, Rfl: 3     naratriptan (AMERGE) 2.5 MG tablet, Take 1 tablet (2.5 mg) by mouth at onset of headache for migraine (repeat in 4 hours if needed) May repeat in 4 hours. Max 2 tablets/24 hours., Disp: 18 tablet, Rfl: 3     pantoprazole (PROTONIX) 40 MG EC tablet, TAKE 1 TABLET DAILY 30 TO 60 MINUTES BEFORE A MEAL, Disp: 90 tablet, Rfl: 1     pravastatin (PRAVACHOL) 40 MG tablet, TAKE 1 TABLET DAILY, Disp: 90 tablet, Rfl: 3     rOPINIRole (REQUIP) 0.25 MG tablet, Take 2 tablets (0.5 mg) by mouth At Bedtime, Disp: 60 tablet, Rfl: 11     senna (SENOKOT) 8.6 MG tablet, Take 2 tablets by mouth daily, Disp: , Rfl:      spironolactone (ALDACTONE) 25 MG tablet, TAKE ONE-HALF (1/2) TABLET DAILY, Disp: 45 tablet, Rfl: 3     SUMAtriptan (IMITREX) 50 MG tablet, Take 50 mg by mouth at onset of  "headache , Disp: , Rfl:      triamcinolone (KENALOG) 0.1 % cream, Apply topically 2 times daily, Disp: 60 g, Rfl: 3     vitamin B complex with vitamin C (VITAMIN  B COMPLEX) TABS tablet, Take 3 tablets by mouth daily, Disp: , Rfl:      VITAMIN C 500 MG PO TABS, 2 TABLET DAILY, Disp: , Rfl:      vitamin E (E-400) 400 UNIT capsule, Take one pill by mouth once daily, Disp: , Rfl:      polyethylene glycol (MIRALAX) powder, Take 1 capful by mouth daily, Disp: , Rfl:      TURMERIC PO, Take 2,000 mg by mouth daily, Disp: , Rfl:     Allergies:   Allergies   Allergen Reactions     Levofloxacin Rash     Other reaction(s): Contact Dermatitis     Penicillins Hives and Rash     Confirmed by skin prick testing 7/3/14     Amoxicillin Hives and Rash     Amoxicillin-Pot Clavulanate Rash     Azithromycin Rash     Cephalexin Rash     Clindamycin Rash and Hives     Atorvastatin Other (See Comments)     Felt sick, intolerance     Clindamycin Hcl Hives     Levaquin      tendonitis     Augmentin Rash         REVIEW OF SYSTEMS:  CONSTITUTIONAL:NEGATIVE for fever, chills, night sweats, change in weight  INTEGUMENTARY/SKIN: NEGATIVE for worrisome rashes, moles or lesions  MUSCULOSKELETAL:See HPI above  NEURO: NEGATIVE for weakness, dizziness or paresthesias      PHYSICAL EXAM:  BP (!) 146/89   Ht 1.803 m (5' 11\")   Wt 101.2 kg (223 lb 3.2 oz)   BMI 31.13 kg/m     GENERAL APPEARANCE: healthy, alert, no distress  SKIN: no suspicious lesions or rashes  NEURO: Normal strength and tone, mentation intact and speech normal  PSYCH:  mentation appears normal and affect normal  RESPIRATORY: No increased work of breathing.    BILATERAL LOWER EXTREMITIES:  Gait: slight favors left.    Right lower extremity:  No gross deformities or masses.  mild Quad atrophy  Intact sensation deep peroneal nerve, superficial peroneal nerve, med/lat tibial nerve, sural nerve, saphenous nerve  Intact EHL, EDL, TA, FHL, GS, quadriceps hamstrings and hip flexors  Toes " "warm and well perfused, brisk capillary refill. Palpable 2+ dp pulses.  calf soft and minimal tender to squeeze.  Edema: trace    RIGHT HIP EXAM:    Palpation: tender none.  Strength:  Grossly intact, 5-/5  Hip range of motion: grossly intact and painfree.   Leg lengths: grossly symmetric at medial malleolus    LEFT HIP EXAM:    Palpation: Tender: anterior hip/groin, rectus,  iliotibial band from hip to knee, greater trochanter, gluteus medius  Strength:  4/5  Special tests:  Irritability (flexion/adduction/internal rotation) positive   Hip range of motion: flexion 100 degrees with obligatory external rotation, External rotation 45, internal rotation 5 ; pain with extremes of both internal rotation and external rotation.         X-RAY:  AP pelvis, lateral views left hip from 12/16/2020 were reviewed in clinic today. No obvious fractures or dislocations. Moderate-severe left hip degenerative changes with mild progression from previous. Prominent femoral head/neck osteophytes, inferior acetabular osteophyte. Right total hip arthroplasty in place, stable alignment.    Impression: 65 year old female with  Left hip chronic pain, progressive moderate-severe left hip primary osteoarthritis. Stable right total hip arthroplasty.        Plan:   LEFT HIP  * discussed pain in groin/hip likely from moderate-severe left hip arthritis, this has progressed some from images in 2018. This is wearing of the cartilage within the hip joint either due to normal \"wear and tear\" or following an injury. Any low back / buttock / radiating pain likely coming from the low back.  *  treatment options for hip arthritis and pain include: do nothing, NSAIDS, activity modification, Physical Therapy, injections, total hip arthroplasty. Risks and benefits of each discussed.     * at this time, patient would like to continue with non op treatment.   * Rest  * Activity modification - avoid activities that aggravate symptoms.  * NSAIDS - regular use for " inflammation, with food, as long as no contra-indications. Please discuss with pcp if needed.  * continue home exercise program for strengthening, stretching and range of motion exercises  * Tylenol as needed for pain  * Injections: patient elects for referral to Sports Medicine for intra-articular cortisone injection, image-guided. Referral placed today.  * Return to clinic as needed           Nir Massey M.D., M.S.  Dept. of Orthopaedic Surgery  [facility]      Again, thank you for allowing me to participate in the care of your patient.        Sincerely,        Nir Massey MD

## 2020-12-16 NOTE — PROGRESS NOTES
Chief Complaint   Patient presents with     Left Hip - Pain     Increasing left hip pain. Mainly in the groin, lateral hip and down thigh. Worse the last few weeks, interfering with sleep and activities of daily living.      Hip Pain     She continues to ride the stationary bike 3x weekly for 40mins and do a ballet class 2x/week on zoom. She is taking more OTC pain relievers. She had a right total hip arthroplasty - posterior on 7/19/16.       HISTORY OF PRESENT ILLNESS: Miri Naylor is a 65 year old female seen left hip pain. Started 12 years ago. Gradually started worsening. Today she has mild pain, 3/10. Pain is located over anterolateral hip and groin.  Pain is worsened with hip range of motion, such as with pulling the leg up, external rotation and stairs. Occasional shooting pain into the groin. Pain with sit to stand and stand to sit. Taking tylenol, ibuprofen for pain. She has done Physical Therapy in the past and continues to be active with home exercise program, riding stationary bike, ballet.    Pain is interfering with sleep and ADLs. Continues to ride stationary bike 3x/week.    Known low back pain. Some numbness and tingling.    S/p right total hip arthroplasty 7/2016 (posterior approach), doing well. Some scar tissue buildup and scar tenderness but otherwise doing well.      PAST MEDICAL HISTORY:   Past Medical History:   Diagnosis Date     Depressive disorder, not elsewhere classified      Drug allergy, multiple 9/23/14--passed graded oral challenge for Zithromax.     7/3/14 POS PRE-PEN skin test. 7/30/14 NEG skin tests and oral challenge to Cefzil     Hx of abnormal Pap smear ?    no specifics given     lumbar degeneration      Raynaud's disease      Uncomplicated asthma      Unspecified essential hypertension        PAST SURGICAL HISTORY:   Past Surgical History:   Procedure Laterality Date     BIOPSY OF BREAST, NEEDLE CORE       C PELVIS/HIP JOINT SURGERY UNLISTED Right 7/19/16    total hip  arthroplasty     COLONOSCOPY       COMBINED ESOPHAGOSCOPY, GASTROSCOPY, DUODENOSCOPY (EGD) WITH CO2 INSUFFLATION N/A 9/3/2020    Procedure: ESOPHAGOGASTRODUODENOSCOPY, WITH CO2 INSUFFLATION;  Surgeon: Campbell Henry MD;  Location: [facility] OR     ESOPHAGOSCOPY, GASTROSCOPY, DUODENOSCOPY (EGD), COMBINED N/A 9/3/2020    Procedure: Esophagogastroduodenoscopy, With Biopsy;  Surgeon: Campbell Henry MD;  Location: [facility] OR     HIP SURGERY Right 07/19/2016    replacement       Medications:   Current Outpatient Medications:      ACIDOPHILUS OR TABS, 1 tablet daily, Disp: , Rfl:      albuterol (PROAIR HFA) 108 (90 Base) MCG/ACT inhaler, Inhale 2 puffs into the lungs every 4 hours as needed for shortness of breath / dyspnea or wheezing, Disp: 1 Inhaler, Rfl: 1     aspirin (ASA) 81 MG EC tablet, Take 81 mg by mouth daily, Disp: , Rfl:      atenolol (TENORMIN) 25 MG tablet, TAKE 1 TABLET DAILY, Disp: 90 tablet, Rfl: 1     azelastine (ASTELIN) 0.1 % nasal spray, Spray 2 sprays into both nostrils 2 times daily, Disp: 1 Bottle, Rfl: 11     budesonide-formoterol (SYMBICORT) 160-4.5 MCG/ACT Inhaler, Inhale 2 puffs into the lungs 2 times daily, Disp: 3 Inhaler, Rfl: 1     CALCIUM 600+D PO, twice daily, Disp: , Rfl:      cetirizine (ZYRTEC) 10 MG tablet, Take 10 mg by mouth daily., Disp: , Rfl:      Cholecalciferol (VITAMIN D-3 PO), Take 2,000 Int'l Units by mouth daily, Disp: , Rfl:      DAILY MULTIVITAMIN PO, 1 tablet daily, Disp: , Rfl:      escitalopram (LEXAPRO) 10 MG tablet, TAKE 1 TABLET DAILY, Disp: 90 tablet, Rfl: 0     ferrous sulfate (IRON) 325 (65 Fe) MG tablet, Take 325 mg by mouth daily (with breakfast), Disp: , Rfl:      FLAXSEED OIL 1000 MG PO CAPS, 1 tablet daily, Disp: , Rfl:      fluticasone (FLONASE) 50 MCG/ACT nasal spray, Spray 2 sprays into both nostrils daily, Disp: 16 g, Rfl: 11     gabapentin (NEURONTIN) 300 MG capsule, 1 capsule TID with additional 1-2 capsules if headache is severe., Disp:  270 capsule, Rfl: 3     GLUCOSAMINE-CHONDROITIN PO TABS, 1500 mg glucosamine and 1200mg chondroitin daily-2 tablets daily, Disp: , Rfl:      hydrocortisone 2.5 % cream, Apply topically 2 times daily (Patient taking differently: Apply topically 2 times daily as needed ), Disp: 60 g, Rfl: 1     ibuprofen (ADVIL,MOTRIN) 200 MG tablet, take 1 tablet (200 mg) by oral route every 6 hours as needed with food, Disp: , Rfl:      Ketotifen Fumarate (ZADITOR OP), Apply to eye daily as needed, Disp: , Rfl:      lisinopril (ZESTRIL) 40 MG tablet, TAKE 1 TABLET DAILY, Disp: 90 tablet, Rfl: 3     Magnesium 400 MG CAPS, Take by mouth daily, Disp: , Rfl:      metoclopramide (REGLAN) 10 MG tablet, Take 1 tablet (10 mg) by mouth 3 times daily as needed (headache), Disp: 20 tablet, Rfl: 3     montelukast (SINGULAIR) 10 MG tablet, Take 1 tablet (10 mg) by mouth At Bedtime, Disp: 90 tablet, Rfl: 3     naratriptan (AMERGE) 2.5 MG tablet, Take 1 tablet (2.5 mg) by mouth at onset of headache for migraine (repeat in 4 hours if needed) May repeat in 4 hours. Max 2 tablets/24 hours., Disp: 18 tablet, Rfl: 3     pantoprazole (PROTONIX) 40 MG EC tablet, TAKE 1 TABLET DAILY 30 TO 60 MINUTES BEFORE A MEAL, Disp: 90 tablet, Rfl: 1     pravastatin (PRAVACHOL) 40 MG tablet, TAKE 1 TABLET DAILY, Disp: 90 tablet, Rfl: 3     rOPINIRole (REQUIP) 0.25 MG tablet, Take 2 tablets (0.5 mg) by mouth At Bedtime, Disp: 60 tablet, Rfl: 11     senna (SENOKOT) 8.6 MG tablet, Take 2 tablets by mouth daily, Disp: , Rfl:      spironolactone (ALDACTONE) 25 MG tablet, TAKE ONE-HALF (1/2) TABLET DAILY, Disp: 45 tablet, Rfl: 3     SUMAtriptan (IMITREX) 50 MG tablet, Take 50 mg by mouth at onset of headache , Disp: , Rfl:      triamcinolone (KENALOG) 0.1 % cream, Apply topically 2 times daily, Disp: 60 g, Rfl: 3     vitamin B complex with vitamin C (VITAMIN  B COMPLEX) TABS tablet, Take 3 tablets by mouth daily, Disp: , Rfl:      VITAMIN C 500 MG PO TABS, 2 TABLET DAILY,  "Disp: , Rfl:      vitamin E (E-400) 400 UNIT capsule, Take one pill by mouth once daily, Disp: , Rfl:      polyethylene glycol (MIRALAX) powder, Take 1 capful by mouth daily, Disp: , Rfl:      TURMERIC PO, Take 2,000 mg by mouth daily, Disp: , Rfl:     Allergies:   Allergies   Allergen Reactions     Levofloxacin Rash     Other reaction(s): Contact Dermatitis     Penicillins Hives and Rash     Confirmed by skin prick testing 7/3/14     Amoxicillin Hives and Rash     Amoxicillin-Pot Clavulanate Rash     Azithromycin Rash     Cephalexin Rash     Clindamycin Rash and Hives     Atorvastatin Other (See Comments)     Felt sick, intolerance     Clindamycin Hcl Hives     Levaquin      tendonitis     Augmentin Rash         REVIEW OF SYSTEMS:  CONSTITUTIONAL:NEGATIVE for fever, chills, night sweats, change in weight  INTEGUMENTARY/SKIN: NEGATIVE for worrisome rashes, moles or lesions  MUSCULOSKELETAL:See HPI above  NEURO: NEGATIVE for weakness, dizziness or paresthesias      PHYSICAL EXAM:  BP (!) 146/89   Ht 1.803 m (5' 11\")   Wt 101.2 kg (223 lb 3.2 oz)   BMI 31.13 kg/m     GENERAL APPEARANCE: healthy, alert, no distress  SKIN: no suspicious lesions or rashes  NEURO: Normal strength and tone, mentation intact and speech normal  PSYCH:  mentation appears normal and affect normal  RESPIRATORY: No increased work of breathing.    BILATERAL LOWER EXTREMITIES:  Gait: slight favors left.    Right lower extremity:  No gross deformities or masses.  mild Quad atrophy  Intact sensation deep peroneal nerve, superficial peroneal nerve, med/lat tibial nerve, sural nerve, saphenous nerve  Intact EHL, EDL, TA, FHL, GS, quadriceps hamstrings and hip flexors  Toes warm and well perfused, brisk capillary refill. Palpable 2+ dp pulses.  calf soft and minimal tender to squeeze.  Edema: trace    RIGHT HIP EXAM:    Palpation: tender none.  Strength:  Grossly intact, 5-/5  Hip range of motion: grossly intact and painfree.   Leg lengths: grossly " "symmetric at medial malleolus    LEFT HIP EXAM:    Palpation: Tender: anterior hip/groin, rectus,  iliotibial band from hip to knee, greater trochanter, gluteus medius  Strength:  4/5  Special tests:  Irritability (flexion/adduction/internal rotation) positive   Hip range of motion: flexion 100 degrees with obligatory external rotation, External rotation 45, internal rotation 5 ; pain with extremes of both internal rotation and external rotation.         X-RAY:  AP pelvis, lateral views left hip from 12/16/2020 were reviewed in clinic today. No obvious fractures or dislocations. Moderate-severe left hip degenerative changes with mild progression from previous. Prominent femoral head/neck osteophytes, inferior acetabular osteophyte. Right total hip arthroplasty in place, stable alignment.    Impression: 65 year old female with  Left hip chronic pain, progressive moderate-severe left hip primary osteoarthritis. Stable right total hip arthroplasty.        Plan:   LEFT HIP  * discussed pain in groin/hip likely from moderate-severe left hip arthritis, this has progressed some from images in 2018. This is wearing of the cartilage within the hip joint either due to normal \"wear and tear\" or following an injury. Any low back / buttock / radiating pain likely coming from the low back.  *  treatment options for hip arthritis and pain include: do nothing, NSAIDS, activity modification, Physical Therapy, injections, total hip arthroplasty. Risks and benefits of each discussed.     * at this time, patient would like to continue with non op treatment.   * Rest  * Activity modification - avoid activities that aggravate symptoms.  * NSAIDS - regular use for inflammation, with food, as long as no contra-indications. Please discuss with pcp if needed.  * continue home exercise program for strengthening, stretching and range of motion exercises  * Tylenol as needed for pain  * Injections: patient elects for referral to Sports Medicine " for intra-articular cortisone injection, image-guided. Referral placed today.  * Return to clinic as needed           Nir Massey M.D., M.S.  Dept. of Orthopaedic Surgery  [facility]

## 2020-12-23 ENCOUNTER — OFFICE VISIT (OUTPATIENT)
Dept: ORTHOPEDICS | Facility: CLINIC | Age: 65
End: 2020-12-23
Payer: COMMERCIAL

## 2020-12-23 ENCOUNTER — VIRTUAL VISIT (OUTPATIENT)
Dept: PSYCHOLOGY | Facility: CLINIC | Age: 65
End: 2020-12-23
Payer: COMMERCIAL

## 2020-12-23 VITALS — HEIGHT: 71 IN | WEIGHT: 223 LBS | BODY MASS INDEX: 31.22 KG/M2

## 2020-12-23 DIAGNOSIS — F34.1 PERSISTENT DEPRESSIVE DISORDER: ICD-10-CM

## 2020-12-23 DIAGNOSIS — F41.1 GENERALIZED ANXIETY DISORDER: Primary | ICD-10-CM

## 2020-12-23 DIAGNOSIS — M16.12 PRIMARY OSTEOARTHRITIS OF LEFT HIP: ICD-10-CM

## 2020-12-23 PROCEDURE — 90834 PSYTX W PT 45 MINUTES: CPT | Mod: 95 | Performed by: MARRIAGE & FAMILY THERAPIST

## 2020-12-23 PROCEDURE — 20611 DRAIN/INJ JOINT/BURSA W/US: CPT | Mod: LT | Performed by: FAMILY MEDICINE

## 2020-12-23 RX ADMIN — ROPIVACAINE HYDROCHLORIDE 3 ML: 5 INJECTION, SOLUTION EPIDURAL; INFILTRATION; PERINEURAL at 16:00

## 2020-12-23 RX ADMIN — TRIAMCINOLONE ACETONIDE 40 MG: 40 INJECTION, SUSPENSION INTRA-ARTICULAR; INTRAMUSCULAR at 16:00

## 2020-12-23 ASSESSMENT — MIFFLIN-ST. JEOR: SCORE: 1657.65

## 2020-12-23 NOTE — LETTER
2020         RE: Miri Naylor  [address] [id number]        Dear Colleague,    Thank you for referring your patient, Miri Naylor, to the [facility]. Please see a copy of my visit note below.    Miri Naylor  :  1955  DOS: 2020  MRN: [id number]    Sports Medicine Clinic Procedure    Ultrasound Guided Left Intra-Articular Hip Injection    Clinical History: Gradual onset of left hip/groin pain over the past ~ 1 - 2 years that has been significantly worse over the past 6 weeks.      Diagnosis:   1. Primary osteoarthritis of left hip      Referring Physician: Nir Massey MD  Large Joint Injection/Arthocentesis: L hip joint    Date/Time: 2020 4:00 PM  Performed by: Shawn Parish DO  Authorized by: Shawn Parish DO     Indications:  Pain and diagnostic evaluation  Needle Size:  22 G  Guidance: ultrasound    Approach:  Anterior  Location:  Hip      Site:  L hip joint  Medications:  3 mL ropivacaine 5 MG/ML; 40 mg triamcinolone 40 MG/ML  Outcome:  Tolerated well, no immediate complications  Procedure discussed: discussed risks, benefits, and alternatives    Consent Given by:  Patient  Timeout: timeout called immediately prior to procedure    Prep: patient was prepped and draped in usual sterile fashion     4 ml's of 1% lidocaine was used as local anesthetic prior to injection      Impression:  Successful Left intra-articular hip injection.    Plan:  Follow up as directed by Dr Massey  Expectations and goals of CSI reviewed  Often 2-3 days for steroid effect, and can take up to two weeks for maximum effect  We discussed modified progressive pain-free activity as tolerated  Do not overuse in first two weeks if feeling better due to concern for vulnerability while steroid is working  Supportive care reviewed  All questions were answered today  Contact us with additional questions or  concerns  Signs and sx of concern reviewed      Shawn Parish DO, CAQ  Primary Care Sports Medicine  [facility]         Again, thank you for allowing me to participate in the care of your patient.        Sincerely,        Shawn Parish DO

## 2020-12-23 NOTE — PROGRESS NOTES
Miri Naylor  :  1955  DOS: 2020  MRN: [id number]    Sports Medicine Clinic Procedure    Ultrasound Guided Left Intra-Articular Hip Injection    Clinical History: Gradual onset of left hip/groin pain over the past ~ 1 - 2 years that has been significantly worse over the past 6 weeks.      Diagnosis:   1. Primary osteoarthritis of left hip      Referring Physician: Nir Massey MD  Large Joint Injection/Arthocentesis: L hip joint    Date/Time: 2020 4:00 PM  Performed by: Shawn Parish DO  Authorized by: Shawn Parish DO     Indications:  Pain and diagnostic evaluation  Needle Size:  22 G  Guidance: ultrasound    Approach:  Anterior  Location:  Hip      Site:  L hip joint  Medications:  3 mL ropivacaine 5 MG/ML; 40 mg triamcinolone 40 MG/ML  Outcome:  Tolerated well, no immediate complications  Procedure discussed: discussed risks, benefits, and alternatives    Consent Given by:  Patient  Timeout: timeout called immediately prior to procedure    Prep: patient was prepped and draped in usual sterile fashion     4 ml's of 1% lidocaine was used as local anesthetic prior to injection      Impression:  Successful Left intra-articular hip injection.    Plan:  Follow up as directed by Dr Massey  Expectations and goals of CSI reviewed  Often 2-3 days for steroid effect, and can take up to two weeks for maximum effect  We discussed modified progressive pain-free activity as tolerated  Do not overuse in first two weeks if feeling better due to concern for vulnerability while steroid is working  Supportive care reviewed  All questions were answered today  Contact us with additional questions or concerns  Signs and sx of concern reviewed      Shawn Parish DO, CAQ  Primary Care Sports Medicine  [facility]

## 2020-12-24 DIAGNOSIS — F32.5 MAJOR DEPRESSION IN COMPLETE REMISSION (H): ICD-10-CM

## 2020-12-24 RX ORDER — ESCITALOPRAM OXALATE 10 MG/1
TABLET ORAL
Qty: 90 TABLET | Refills: 3 | Status: SHIPPED | OUTPATIENT
Start: 2020-12-24 | End: 2021-12-02

## 2020-12-24 NOTE — PROGRESS NOTES
Progress Note    Patient Name: Miri Naylor  Date: 12/23/20         Service Type: Individual      Session Start Time: 1:03  Session End Time: 1:45     Session Length: 42    Session #: 33    Attendees: Client attended alone    Telemedicine Visit: The patient's condition can be safely assessed and treated via synchronous audio and visual telemedicine encounter.      Reason for Telemedicine Visit: Services only offered telehealth    Originating Site (Patient Location): Patient's home    Distant Site (Provider Location): Provider Remote Setting    Consent:  The patient/guardian has verbally consented to: the potential risks and benefits of telemedicine (video visit) versus in person care; bill my insurance or make self-payment for services provided; and responsibility for payment of non-covered services.     Mode of Communication:  Video Conference via [employer]    As the provider I attest to compliance with applicable laws and regulations related to telemedicine.     Treatment Plan Last Reviewed: 10/28/20, next due 1/28/21.  PHQ-9 / MELISA-7 : not completed.    DATA  Interactive Complexity: No  Crisis: No       Progress Since Last Session (Related to Symptoms / Goals / Homework):   Symptoms: Improving interpersonal functioning/relationship with spouse.    Homework: Partially completed       Episode of Care Goals: Satisfactory progress - ACTION (Actively working towards change); Intervened by reinforcing change plan / affirming steps taken     Current / Ongoing Stressors and Concerns:   Client reported experiencing continued anxiety/depression, though decreased, related to her ongoing relationship issues with her spouse.  Client reported that they have both been trying to improve/increase their communication, which is still not where she wants it to be but is progressing.  Client reported regarding struggling with not getting together with family for holidays and also  political differences with family members.     Treatment Objective(s) Addressed in This Session:   use cognitive strategies identified in therapy to challenge anxious thoughts  Decrease frequency and intensity of feeling down, depressed, hopeless  Identify negative self-talk and behaviors: challenge core beliefs, myths, and actions      Intervention:   CBT: reviewed with client continuing to focus on her positive progress and letting go of things outside of her control (e.g. others' opinions).        ASSESSMENT: Current Emotional / Mental Status (status of significant symptoms):   Risk status (Self / Other harm or suicidal ideation)   Patient denies current fears or concerns for personal safety.   Patient denies current or recent suicidal ideation or behaviors.   Patientdenies current or recent homicidal ideation or behaviors.   Patient denies current or recent self injurious behavior or ideation.   Patient denies other safety concerns.   Patient Patient reports there has been no change in risk factors since their last session.     PatientPatient reports there has been no change in protective factors since their last session.     Recommended that patient call 911 or go to the local ED should there be a change in any of these risk factors.     Appearance:   Appropriate    Eye Contact:   Good    Psychomotor Behavior: Normal    Attitude:   Cooperative  Interested Friendly Pleasant Attentive   Orientation:   All   Speech    Rate / Production: Normal     Volume:  Normal    Mood:    Anxious  Depressed  Normal   Affect:    Appropriate  Worrisome    Thought Content:  Clear    Thought Form:  Coherent  Logical    Insight:    Good  and Intellectual Insight     Medication Review:   No current psychiatric medications prescribed     Medication Compliance:   NA     Changes in Health Issues:   None reported     Chemical Use Review:   Substance Use: Chemical use reviewed, no active concerns identified      Tobacco Use: No current  tobacco use.      Diagnosis:  1. Generalized anxiety disorder    2. Persistent depressive disorder        Collateral Reports Completed:   Not Applicable    PLAN: (Patient Tasks / Therapist Tasks / Other)  Client agreed to work on focusing on her positive progress whil letting go of things outside of her control (e.g. others' opinions/values).        Loki Crowe, [facility] 12/23/2020                                                          ______________________________________________________________________    Treatment Plan    Patient's Name: Miri Naylor  YOB: 1955    Date: 10/28/20    DSM5 Diagnoses: 300.4 (F34.1) Persistent Depressive Disorder, Early onset or 300.02 (F41.1) Generalized Anxiety Disorder  Psychosocial / Contextual Factors: marital conflict, history of loss/trauma  WHODAS: 29    Referral / Collaboration:  Referral to another professional/service is not indicated at this time..    Anticipated number of session or this episode of care: 31+      MeasurableTreatment Goal(s) related to diagnosis / functional impairment(s)  Goal 1: Client will successfully process through past trauma defined as reporting 0 Subjective Units of Distress related to trauma on 0-10 scale and 7 on Validity of (positive) Cognition scale about self on 1-7 scale   I will know I've met my goal when I am less impacted by my past loss/trauma.       Objective #A (Client Action)    Status: Conitnued - Date: 10/28/20      Client will identify past traumatic events/memories which are causing current distress.     Intervention(s)  Therapist will take client's history and facilitate client's identification of targets for EMDR.     Objective #B  Client will complete needed assessment(s) and [facility] EMDR resourcing to confirm readiness for EMDR.    Status: Continued - Date: 10/28/20      Intervention(s)  Therapist will administer Dissociative Experiences Scale, Multidimensional Inventory of Dissociation as  needed and complete [facility] EMDR resourcing with client.     Objective #C  Client will engage in installing at least 2 EMDR resources.  Status: Continued - Date: 10/28/20      Intervention(s)  Therapist will complete EMDR resourcing (Container, Remote Control, grounding/progressive muscle relaxation, Light Stream, Inner Advisor) with client.     Objective #D (Client Action)    Status: Continued - Date: 10/28/20      Client will engage in reprocessing all past traumatic event/memory targets.     Intervention(s)   Therapist will complete EMDR reprocessing with client.    Goal 2:  Client will improve overall baseline mood regulation by 2 points on a 1-10 Likert scale per self-report (10 = optimal mood/regulation).    I will know I've met my goal when I feel better/less depressed overall.      Objective #A (Client Action)    Status: Continued - Date:  10/28/20    Client will Identify negative self-talk and behaviors: challenge core beliefs, myths, and actions.    Intervention(s)  Therapist will teach emotional regulation skills. CBT/REBT ABCD model.    Objective #B  Client will Increase interest, engagement, and pleasure in doing things  Decrease frequency and intensity of feeling down, depressed, hopeless  Improve concentration, focus, and mindfulness in daily activities .    Status: Continued - Date:  10/28/20    Intervention(s)  Therapist will teach emotional regulation skills. DBT Core Mindfulness, Distress Tolerance, Emotion Regulation, and Interpersonal Effetiveness skills.    Patient has reviewed and agreed to the above plan.      Loki Crowe, LMFT  October 28, 2020

## 2020-12-29 RX ORDER — ROPIVACAINE HYDROCHLORIDE 5 MG/ML
3 INJECTION, SOLUTION EPIDURAL; INFILTRATION; PERINEURAL
Status: DISCONTINUED | OUTPATIENT
Start: 2020-12-23 | End: 2021-02-03

## 2020-12-29 RX ORDER — TRIAMCINOLONE ACETONIDE 40 MG/ML
40 INJECTION, SUSPENSION INTRA-ARTICULAR; INTRAMUSCULAR
Status: DISCONTINUED | OUTPATIENT
Start: 2020-12-23 | End: 2021-02-03

## 2021-01-12 ENCOUNTER — VIRTUAL VISIT (OUTPATIENT)
Dept: PSYCHOLOGY | Facility: CLINIC | Age: 66
End: 2021-01-12
Payer: COMMERCIAL

## 2021-01-12 DIAGNOSIS — F34.1 PERSISTENT DEPRESSIVE DISORDER: ICD-10-CM

## 2021-01-12 DIAGNOSIS — F41.1 GENERALIZED ANXIETY DISORDER: Primary | ICD-10-CM

## 2021-01-12 PROCEDURE — 90834 PSYTX W PT 45 MINUTES: CPT | Mod: 95 | Performed by: MARRIAGE & FAMILY THERAPIST

## 2021-01-12 ASSESSMENT — ANXIETY QUESTIONNAIRES
3. WORRYING TOO MUCH ABOUT DIFFERENT THINGS: MORE THAN HALF THE DAYS
2. NOT BEING ABLE TO STOP OR CONTROL WORRYING: MORE THAN HALF THE DAYS
GAD7 TOTAL SCORE: 13
GAD7 TOTAL SCORE: 13
4. TROUBLE RELAXING: MORE THAN HALF THE DAYS
7. FEELING AFRAID AS IF SOMETHING AWFUL MIGHT HAPPEN: SEVERAL DAYS
6. BECOMING EASILY ANNOYED OR IRRITABLE: SEVERAL DAYS
GAD7 TOTAL SCORE: 13
1. FEELING NERVOUS, ANXIOUS, OR ON EDGE: NEARLY EVERY DAY
5. BEING SO RESTLESS THAT IT IS HARD TO SIT STILL: MORE THAN HALF THE DAYS
7. FEELING AFRAID AS IF SOMETHING AWFUL MIGHT HAPPEN: SEVERAL DAYS

## 2021-01-12 ASSESSMENT — PATIENT HEALTH QUESTIONNAIRE - PHQ9
10. IF YOU CHECKED OFF ANY PROBLEMS, HOW DIFFICULT HAVE THESE PROBLEMS MADE IT FOR YOU TO DO YOUR WORK, TAKE CARE OF THINGS AT HOME, OR GET ALONG WITH OTHER PEOPLE: SOMEWHAT DIFFICULT
SUM OF ALL RESPONSES TO PHQ QUESTIONS 1-9: 4
SUM OF ALL RESPONSES TO PHQ QUESTIONS 1-9: 4

## 2021-01-13 ASSESSMENT — PATIENT HEALTH QUESTIONNAIRE - PHQ9: SUM OF ALL RESPONSES TO PHQ QUESTIONS 1-9: 4

## 2021-01-13 ASSESSMENT — ANXIETY QUESTIONNAIRES: GAD7 TOTAL SCORE: 13

## 2021-01-13 NOTE — PROGRESS NOTES
Progress Note    Patient Name: Miri Naylor  Date: 1/12/21         Service Type: Individual      Session Start Time: 1:03  Session End Time: 1:55     Session Length: 52    Session #: 34    Attendees: Client attended alone    Telemedicine Visit: The patient's condition can be safely assessed and treated via synchronous audio and visual telemedicine encounter.      Reason for Telemedicine Visit: Services only offered telehealth    Originating Site (Patient Location): Patient's home    Distant Site (Provider Location): Provider Remote Setting    Consent:  The patient/guardian has verbally consented to: the potential risks and benefits of telemedicine (video visit) versus in person care; bill my insurance or make self-payment for services provided; and responsibility for payment of non-covered services.     Mode of Communication:  Video Conference via "[employer]    As the provider I attest to compliance with applicable laws and regulations related to telemedicine.     Treatment Plan Last Reviewed: 10/28/20, next due 1/28/21.  PHQ-9 / MELISA-7 : completed.    DATA  Interactive Complexity: No  Crisis: No       Progress Since Last Session (Related to Symptoms / Goals / Homework):   Symptoms: moderate anxiety, minimal depression.    Homework: Partially completed       Episode of Care Goals: Satisfactory progress - ACTION (Actively working towards change); Intervened by reinforcing change plan / affirming steps taken     Current / Ongoing Stressors and Concerns:   Client reported experiencing moderate anxiety/minimal depression related to her ongoing relationship issues with her spouse.  Client reported that they have both been continuing to try to improve/increase their communication with some continued incremental success with her spouse continuing in individual therapy, leading to greater mutual understandin.  Client reported regarding continuing to struggle with felt lack  of control and prioritizing her own self-care and values.     Treatment Objective(s) Addressed in This Session:   use cognitive strategies identified in therapy to challenge anxious thoughts      Intervention:   CBT: reviewed with client continuing to focus on doing what she can/control, letting go of things outside of her control, focusing on positives, and prioritizing her self-care/values.        ASSESSMENT: Current Emotional / Mental Status (status of significant symptoms):   Risk status (Self / Other harm or suicidal ideation)   Patient denies current fears or concerns for personal safety.   Patient denies current or recent suicidal ideation or behaviors.   Patientdenies current or recent homicidal ideation or behaviors.   Patient denies current or recent self injurious behavior or ideation.   Patient denies other safety concerns.   Patient Patient reports there has been no change in risk factors since their last session.     PatientPatient reports there has been no change in protective factors since their last session.     Recommended that patient call 911 or go to the local ED should there be a change in any of these risk factors.     Appearance:   Appropriate    Eye Contact:   Good    Psychomotor Behavior: Normal    Attitude:   Cooperative  Interested Friendly Pleasant Attentive   Orientation:   All   Speech    Rate / Production: Normal     Volume:  Normal    Mood:    Anxious  Depressed  Normal   Affect:    Appropriate  Worrisome    Thought Content:  Clear    Thought Form:  Coherent  Logical    Insight:    Good  and Intellectual Insight     Medication Review:   No current psychiatric medications prescribed     Medication Compliance:   NA     Changes in Health Issues:   None reported     Chemical Use Review:   Substance Use: Chemical use reviewed, no active concerns identified      Tobacco Use: No current tobacco use.      Diagnosis:  1. Generalized anxiety disorder    2. Persistent depressive disorder         Collateral Reports Completed:   Not Applicable    PLAN: (Patient Tasks / Therapist Tasks / Other)  Client agreed to work on focusing on doing what she can/control, letting go of things outside of her control, focusing on positives, and prioritizing her self-care/values.        Loki Hoffmanriman, LMFT 1/12/2021                                                          ______________________________________________________________________    Treatment Plan    Patient's Name: Miri Naylor  YOB: 1955    Date: 10/28/20    DSM5 Diagnoses: 300.4 (F34.1) Persistent Depressive Disorder, Early onset or 300.02 (F41.1) Generalized Anxiety Disorder  Psychosocial / Contextual Factors: marital conflict, history of loss/trauma  WHODAS: 29    Referral / Collaboration:  Referral to another professional/service is not indicated at this time..    Anticipated number of session or this episode of care: 31+      MeasurableTreatment Goal(s) related to diagnosis / functional impairment(s)  Goal 1: Client will successfully process through past trauma defined as reporting 0 Subjective Units of Distress related to trauma on 0-10 scale and 7 on Validity of (positive) Cognition scale about self on 1-7 scale   I will know I've met my goal when I am less impacted by my past loss/trauma.       Objective #A (Client Action)    Status: Conitnued - Date: 10/28/20      Client will identify past traumatic events/memories which are causing current distress.     Intervention(s)  Therapist will take client's history and facilitate client's identification of targets for EMDR.     Objective #B  Client will complete needed assessment(s) and [facility] resourcing to confirm readiness for EMDR.    Status: Continued - Date: 10/28/20      Intervention(s)  Therapist will administer Dissociative Experiences Scale, Multidimensional Inventory of Dissociation as needed and complete [facility] resourcing with  client.     Objective #C  Client will engage in installing at least 2 EMDR resources.  Status: Continued - Date: 10/28/20      Intervention(s)  Therapist will complete EMDR resourcing (Container, Remote Control, grounding/progressive muscle relaxation, Light Stream, Inner Advisor) with client.     Objective #D (Client Action)    Status: Continued - Date: 10/28/20      Client will engage in reprocessing all past traumatic event/memory targets.     Intervention(s)   Therapist will complete EMDR reprocessing with client.    Goal 2:  Client will improve overall baseline mood regulation by 2 points on a 1-10 Likert scale per self-report (10 = optimal mood/regulation).    I will know I've met my goal when I feel better/less depressed overall.      Objective #A (Client Action)    Status: Continued - Date:  10/28/20    Client will Identify negative self-talk and behaviors: challenge core beliefs, myths, and actions.    Intervention(s)  Therapist will teach emotional regulation skills. CBT/REBT ABCD model.    Objective #B  Client will Increase interest, engagement, and pleasure in doing things  Decrease frequency and intensity of feeling down, depressed, hopeless  Improve concentration, focus, and mindfulness in daily activities .    Status: Continued - Date:  10/28/20    Intervention(s)  Therapist will teach emotional regulation skills. DBT Core Mindfulness, Distress Tolerance, Emotion Regulation, and Interpersonal Effetiveness skills.    Patient has reviewed and agreed to the above plan.      Loki Crowe, LMFT  October 28, 2020

## 2021-01-20 ENCOUNTER — MYC MEDICAL ADVICE (OUTPATIENT)
Dept: ORTHOPEDICS | Facility: CLINIC | Age: 66
End: 2021-01-20

## 2021-01-26 ENCOUNTER — VIRTUAL VISIT (OUTPATIENT)
Dept: PSYCHOLOGY | Facility: CLINIC | Age: 66
End: 2021-01-26
Payer: COMMERCIAL

## 2021-01-26 DIAGNOSIS — F41.1 GENERALIZED ANXIETY DISORDER: Primary | ICD-10-CM

## 2021-01-26 DIAGNOSIS — F34.1 PERSISTENT DEPRESSIVE DISORDER: ICD-10-CM

## 2021-01-26 PROCEDURE — 90834 PSYTX W PT 45 MINUTES: CPT | Mod: 95 | Performed by: MARRIAGE & FAMILY THERAPIST

## 2021-01-26 NOTE — PROGRESS NOTES
Progress Note    Patient Name: Miri Naylor  Date: 1/26/21         Service Type: Individual      Session Start Time: 12:02  Session End Time: 12:54     Session Length: 52    Session #: 35    Attendees: Client attended alone    Telemedicine Visit: The patient's condition can be safely assessed and treated via synchronous audio and visual telemedicine encounter.      Reason for Telemedicine Visit: Services only offered telehealth    Originating Site (Patient Location): Patient's home    Distant Site (Provider Location): Provider Remote Setting    Consent:  The patient/guardian has verbally consented to: the potential risks and benefits of telemedicine (video visit) versus in person care; bill my insurance or make self-payment for services provided; and responsibility for payment of non-covered services.     Mode of Communication:  Video Conference via [employer]    As the provider I attest to compliance with applicable laws and regulations related to telemedicine.     Treatment Plan Last Reviewed: 10/28/20, next due 1/28/21.  PHQ-9 / MELISA-7 : not completed.    DATA  Interactive Complexity: No  Crisis: No       Progress Since Last Session (Related to Symptoms / Goals / Homework):   Symptoms: continued anxiety and depression.    Homework: Partially completed       Episode of Care Goals: Satisfactory progress - ACTION (Actively working towards change); Intervened by reinforcing change plan / affirming steps taken     Current / Ongoing Stressors and Concerns:   Client reported experiencing continued anxiety and depression related to her ongoing relationship issues with her spouse.  Client reported regarding her belief that her  likely has Asperger's, which he will not likely identify or seek treatment for.  Client reported regarding her insight of needing to work towards forgiveness of him, but being unsure how to proceed in this and how it will turn  out.     Treatment Objective(s) Addressed in This Session:   use cognitive strategies identified in therapy to challenge anxious thoughts  Identify negative self-talk and behaviors: challenge core beliefs, myths, and actions      Intervention:   CBT: reviewed with client reframing her spouse's treatment of her from the lens of him having Asperger's as she contemplates forgiveness of him.        ASSESSMENT: Current Emotional / Mental Status (status of significant symptoms):   Risk status (Self / Other harm or suicidal ideation)   Patient denies current fears or concerns for personal safety.   Patient denies current or recent suicidal ideation or behaviors.   Patientdenies current or recent homicidal ideation or behaviors.   Patient denies current or recent self injurious behavior or ideation.   Patient denies other safety concerns.   Patient Patient reports there has been no change in risk factors since their last session.     PatientPatient reports there has been no change in protective factors since their last session.     Recommended that patient call 911 or go to the local ED should there be a change in any of these risk factors.     Appearance:   Appropriate    Eye Contact:   Good    Psychomotor Behavior: Normal    Attitude:   Cooperative  Interested Friendly Pleasant Attentive   Orientation:   All   Speech    Rate / Production: Normal     Volume:  Normal    Mood:    Anxious  Depressed  Normal   Affect:    Appropriate  Worrisome    Thought Content:  Clear    Thought Form:  Coherent  Logical    Insight:    Good  and Intellectual Insight     Medication Review:   No current psychiatric medications prescribed     Medication Compliance:   NA     Changes in Health Issues:   None reported     Chemical Use Review:   Substance Use: Chemical use reviewed, no active concerns identified      Tobacco Use: No current tobacco use.      Diagnosis:  1. Generalized anxiety disorder    2. Persistent depressive disorder         Collateral Reports Completed:   Not Applicable    PLAN: (Patient Tasks / Therapist Tasks / Other)  Client agreed to work on reframing her spouse's treatment of her through the lens of him possibly having Asperger's as she contemplates forgiveness of him.        Loki Crowe, LMFT 1/26/2021                                                          ______________________________________________________________________    Treatment Plan    Patient's Name: Miri Naylor  YOB: 1955    Date: 10/28/20    DSM5 Diagnoses: 300.4 (F34.1) Persistent Depressive Disorder, Early onset or 300.02 (F41.1) Generalized Anxiety Disorder  Psychosocial / Contextual Factors: marital conflict, history of loss/trauma  WHODAS: 29    Referral / Collaboration:  Referral to another professional/service is not indicated at this time..    Anticipated number of session or this episode of care: 31+      MeasurableTreatment Goal(s) related to diagnosis / functional impairment(s)  Goal 1: Client will successfully process through past trauma defined as reporting 0 Subjective Units of Distress related to trauma on 0-10 scale and 7 on Validity of (positive) Cognition scale about self on 1-7 scale   I will know I've met my goal when I am less impacted by my past loss/trauma.       Objective #A (Client Action)    Status: Conitnued - Date: 10/28/20      Client will identify past traumatic events/memories which are causing current distress.     Intervention(s)  Therapist will take client's history and facilitate client's identification of targets for EMDR.     Objective #B  Client will complete needed assessment(s) and [facility] resourcing to confirm readiness for EMDR.    Status: Continued - Date: 10/28/20      Intervention(s)  Therapist will administer Dissociative Experiences Scale, Multidimensional Inventory of Dissociation as needed and complete [facility] resourcing with client.     Objective #C  Client  will engage in installing at least 2 EMDR resources.  Status: Continued - Date: 10/28/20      Intervention(s)  Therapist will complete EMDR resourcing (Container, Remote Control, grounding/progressive muscle relaxation, Light Stream, Inner Advisor) with client.     Objective #D (Client Action)    Status: Continued - Date: 10/28/20      Client will engage in reprocessing all past traumatic event/memory targets.     Intervention(s)   Therapist will complete EMDR reprocessing with client.    Goal 2:  Client will improve overall baseline mood regulation by 2 points on a 1-10 Likert scale per self-report (10 = optimal mood/regulation).    I will know I've met my goal when I feel better/less depressed overall.      Objective #A (Client Action)    Status: Continued - Date:  10/28/20    Client will Identify negative self-talk and behaviors: challenge core beliefs, myths, and actions.    Intervention(s)  Therapist will teach emotional regulation skills. CBT/REBT ABCD model.    Objective #B  Client will Increase interest, engagement, and pleasure in doing things  Decrease frequency and intensity of feeling down, depressed, hopeless  Improve concentration, focus, and mindfulness in daily activities .    Status: Continued - Date:  10/28/20    Intervention(s)  Therapist will teach emotional regulation skills. DBT Core Mindfulness, Distress Tolerance, Emotion Regulation, and Interpersonal Effetiveness skills.    Patient has reviewed and agreed to the above plan.      Loki Crowe, LMFT  October 28, 2020

## 2021-01-27 ENCOUNTER — MYC MEDICAL ADVICE (OUTPATIENT)
Dept: ANESTHESIOLOGY | Facility: CLINIC | Age: 66
End: 2021-01-27

## 2021-01-27 ENCOUNTER — VIRTUAL VISIT (OUTPATIENT)
Dept: NEUROLOGY | Facility: CLINIC | Age: 66
End: 2021-01-27
Payer: COMMERCIAL

## 2021-01-27 ENCOUNTER — VIRTUAL VISIT (OUTPATIENT)
Dept: ANESTHESIOLOGY | Facility: CLINIC | Age: 66
End: 2021-01-27
Attending: INTERNAL MEDICINE
Payer: COMMERCIAL

## 2021-01-27 DIAGNOSIS — G43.009 MIGRAINE WITHOUT AURA AND WITHOUT STATUS MIGRAINOSUS, NOT INTRACTABLE: ICD-10-CM

## 2021-01-27 DIAGNOSIS — Z87.820 HISTORY OF TRAUMATIC BRAIN INJURY: ICD-10-CM

## 2021-01-27 DIAGNOSIS — G25.81 RESTLESS LEGS SYNDROME: ICD-10-CM

## 2021-01-27 DIAGNOSIS — R51.9 CHRONIC DAILY HEADACHE: ICD-10-CM

## 2021-01-27 DIAGNOSIS — R10.11 RUQ ABDOMINAL PAIN: Primary | ICD-10-CM

## 2021-01-27 PROCEDURE — 99214 OFFICE O/P EST MOD 30 MIN: CPT | Mod: 95 | Performed by: PSYCHIATRY & NEUROLOGY

## 2021-01-27 PROCEDURE — 99203 OFFICE O/P NEW LOW 30 MIN: CPT | Mod: 95 | Performed by: ANESTHESIOLOGY

## 2021-01-27 RX ORDER — NARATRIPTAN 2.5 MG/1
2.5 TABLET ORAL
Qty: 18 TABLET | Refills: 11 | Status: SHIPPED | OUTPATIENT
Start: 2021-01-27 | End: 2022-04-27

## 2021-01-27 RX ORDER — ROPINIROLE 0.25 MG/1
0.5 TABLET, FILM COATED ORAL AT BEDTIME
Qty: 60 TABLET | Refills: 11 | Status: SHIPPED | OUTPATIENT
Start: 2021-01-27 | End: 2022-01-27

## 2021-01-27 RX ORDER — GABAPENTIN 300 MG/1
CAPSULE ORAL
Qty: 270 CAPSULE | Refills: 3 | Status: SHIPPED | OUTPATIENT
Start: 2021-01-27 | End: 2021-10-08

## 2021-01-27 ASSESSMENT — ENCOUNTER SYMPTOMS
FEVER: 0
NAUSEA: 0
SENSORY CHANGE: 0
CHILLS: 0
HEARTBURN: 1
ABDOMINAL PAIN: 1
BACK PAIN: 0
VOMITING: 0
WEIGHT LOSS: 0
DIARRHEA: 0
TINGLING: 0
BLOOD IN STOOL: 0

## 2021-01-27 ASSESSMENT — PAIN SCALES - GENERAL: PAINLEVEL: MODERATE PAIN (5)

## 2021-01-27 NOTE — PROGRESS NOTES
Miri is a 65 year old who is being evaluated via a billable video visit.      How would you like to obtain your AVS? MyChart  If the video visit is dropped, the invitation should be resent by: Send to e-mail at: [email or web address]  Will anyone else be joining your video visit? Nabila Duque CMA

## 2021-01-27 NOTE — LETTER
1/27/2021       RE: Miri Naylor  [address] [id number]     Dear Colleague,    Thank you for referring your patient, Miri Naylor, to the [facility] FOR COMPREHENSIVE PAIN MANAGEMENT [place] at [facility]. Please see a copy of my visit note below.    Miri is a 65 year old who is being evaluated via a billable video visit.      How would you like to obtain your AVS? MyChart  If the video visit is dropped, the invitation should be resent by: Send to e-mail at: [email or web address]  Will anyone else be joining your video visit? Nabila Duque [facility]          Pain Clinic New Patient Consult Note:    Referring Provider: Carl   Primary care provider: Elizabeth Corley.    Miri Naylor is a 65 year old y.o. old female who presents to the pain clinic with chronic abdominal pain. She has had increasingly worse bloating for 4 years that has become painful over the last 2 years. She has made some dietary changes herself and found that cutting out metamucil and reducing wheat and dairy in her diet has brought her pain down from a constant 6-7/10 to a constant 4-5/10. Spicy food does make it worse, as do positional changes that increase the pressure on her abdomen. She's not sure if the pain radiates, but she does also have some pain in both of her legs. Her pain feels like pressure, and is localized to both the right upper and right lower quadrant.     She has no history of abdominal surgery. She has seen GI, Neurology, and her primary care doc for this pain and is still unclear about it's etiology. Her last colonoscopy was 2 years ago and showed 2 benign polyps, her next colonoscopy is due in 2.5 years. She has also gotten regular pap smears annually and required colposcopies in the past. She has never had any abdominal trauma.     Additionally the patient has low back pain that radiates down the legs  that we will discuss at the next visit.     HPI:  Patient Supplied Answers To the [facility] Pain Questionnaire  No flowsheet data found.        Pain treatments:    Herbal medicines: none except magnesium - stopped a month ago  Physical therapy: Yes for knees, ankles, feet - stopped a year ago, for hip 4 years ago,   Chiropractor: Yes  Pain physician: Not now - but has had spinal injections helped a lot  Surgery: Never  Biofeedback: No  Acupuncture: Yes    Tests/Imaging reviewed with the patient:        Significant Medical History:   Past Medical History:   Diagnosis Date     Depressive disorder, not elsewhere classified      Drug allergy, multiple 9/23/14--passed graded oral challenge for Zithromax.     7/3/14 POS PRE-PEN skin test. 7/30/14 NEG skin tests and oral challenge to Cefzil     Hx of abnormal Pap smear ?    no specifics given     lumbar degeneration      Raynaud's disease      Uncomplicated asthma      Unspecified essential hypertension           Past Surgical History:  Past Surgical History:   Procedure Laterality Date     BIOPSY OF BREAST, NEEDLE CORE       C PELVIS/HIP JOINT SURGERY UNLISTED Right 7/19/16    total hip arthroplasty     COLONOSCOPY       COMBINED ESOPHAGOSCOPY, GASTROSCOPY, DUODENOSCOPY (EGD) WITH CO2 INSUFFLATION N/A 9/3/2020    Procedure: ESOPHAGOGASTRODUODENOSCOPY, WITH CO2 INSUFFLATION;  Surgeon: Campbell Henry MD;  Location: [facility] OR     ESOPHAGOSCOPY, GASTROSCOPY, DUODENOSCOPY (EGD), COMBINED N/A 9/3/2020    Procedure: Esophagogastroduodenoscopy, With Biopsy;  Surgeon: Campbell Henry MD;  Location: [facility] OR     HIP SURGERY Right 07/19/2016    replacement          Family History:  Family History   Problem Relation Age of Onset     Arthritis Mother      Hyperlipidemia Mother      Other Cancer Mother         Glioblastoma Multiforme     Osteoporosis Mother      Blood Disease Father      Hypertension Father      Hyperlipidemia Father      Other Cancer Father         lung  cancer     Depression Son      Depression Daughter      Hyperlipidemia Brother      Anxiety Disorder Son      Skin Cancer No family hx of           Social History:  Social History     Socioeconomic History     Marital status:      Spouse name: Not on file     Number of children: Not on file     Years of education: Not on file     Highest education level: Not on file   Occupational History     Not on file   Social Needs     Financial resource strain: Not on file     Food insecurity     Worry: Not on file     Inability: Not on file     Transportation needs     Medical: Not on file     Non-medical: Not on file   Tobacco Use     Smoking status: Never Smoker     Smokeless tobacco: Never Used   Substance and Sexual Activity     Alcohol use: Yes     Comment: rarely      Drug use: No     Sexual activity: Yes     Partners: Male   Lifestyle     Physical activity     Days per week: Not on file     Minutes per session: Not on file     Stress: Not on file   Relationships     Social connections     Talks on phone: Not on file     Gets together: Not on file     Attends Mosque service: Not on file     Active member of club or organization: Not on file     Attends meetings of clubs or organizations: Not on file     Relationship status: Not on file     Intimate partner violence     Fear of current or ex partner: Not on file     Emotionally abused: Not on file     Physically abused: Not on file     Forced sexual activity: Not on file   Other Topics Concern     Parent/sibling w/ CABG, MI or angioplasty before 65F 55M? No   Social History Narrative     Not on file     Social History     Social History Narrative     Not on file          Allergies:  Allergies   Allergen Reactions     Levofloxacin Rash     Other reaction(s): Contact Dermatitis     Penicillins Hives and Rash     Confirmed by skin prick testing 7/3/14     Amoxicillin Hives and Rash     Amoxicillin-Pot Clavulanate Rash     Azithromycin Rash     Cephalexin Rash      Clindamycin Rash and Hives     Atorvastatin Other (See Comments)     Felt sick, intolerance     Clindamycin Hcl Hives     Levaquin      tendonitis     Augmentin Rash       Current Medications:   Current Outpatient Medications   Medication Sig Dispense Refill     albuterol (PROAIR HFA) 108 (90 Base) MCG/ACT inhaler Inhale 2 puffs into the lungs every 4 hours as needed for shortness of breath / dyspnea or wheezing 1 Inhaler 1     aspirin (ASA) 81 MG EC tablet Take 81 mg by mouth daily       atenolol (TENORMIN) 25 MG tablet TAKE 1 TABLET DAILY 90 tablet 1     azelastine (ASTELIN) 0.1 % nasal spray Spray 2 sprays into both nostrils 2 times daily 1 Bottle 11     budesonide-formoterol (SYMBICORT) 160-4.5 MCG/ACT Inhaler Inhale 2 puffs into the lungs 2 times daily 3 Inhaler 1     cetirizine (ZYRTEC) 10 MG tablet Take 10 mg by mouth daily.       escitalopram (LEXAPRO) 10 MG tablet TAKE 1 TABLET DAILY 90 tablet 3     fluticasone (FLONASE) 50 MCG/ACT nasal spray Spray 2 sprays into both nostrils daily 16 g 11     gabapentin (NEURONTIN) 300 MG capsule 1 capsule TID with additional 1-2 capsules if headache is severe. 270 capsule 3     GLUCOSAMINE-CHONDROITIN PO TABS 1500 mg glucosamine and 1200mg chondroitin daily-2 tablets daily       hydrocortisone 2.5 % cream Apply topically 2 times daily (Patient taking differently: Apply topically 2 times daily as needed ) 60 g 1     ibuprofen (ADVIL,MOTRIN) 200 MG tablet take 1 tablet (200 mg) by oral route every 6 hours as needed with food       Ketotifen Fumarate (ZADITOR OP) Apply to eye daily as needed       lisinopril (ZESTRIL) 40 MG tablet TAKE 1 TABLET DAILY 90 tablet 3     Magnesium 400 MG CAPS Take by mouth daily       metoclopramide (REGLAN) 10 MG tablet Take 1 tablet (10 mg) by mouth 3 times daily as needed (headache) 20 tablet 3     montelukast (SINGULAIR) 10 MG tablet Take 1 tablet (10 mg) by mouth At Bedtime 90 tablet 3     naratriptan (AMERGE) 2.5 MG tablet Take 1 tablet  (2.5 mg) by mouth at onset of headache for migraine (repeat in 4 hours if needed) May repeat in 4 hours. Max 2 tablets/24 hours. 18 tablet 11     pantoprazole (PROTONIX) 40 MG EC tablet TAKE 1 TABLET DAILY 30 TO 60 MINUTES BEFORE A MEAL 90 tablet 1     pravastatin (PRAVACHOL) 40 MG tablet TAKE 1 TABLET DAILY 90 tablet 3     rOPINIRole (REQUIP) 0.25 MG tablet Take 2 tablets (0.5 mg) by mouth At Bedtime 60 tablet 11     spironolactone (ALDACTONE) 25 MG tablet TAKE ONE-HALF (1/2) TABLET DAILY 45 tablet 3     SUMAtriptan (IMITREX) 50 MG tablet Take 50 mg by mouth at onset of headache        triamcinolone (KENALOG) 0.1 % cream Apply topically 2 times daily 60 g 3          Current Pain Medications:  Medications related to Pain Management (From now, onward)    None           Past Pain Medications:    Tylenol, ibuprofen, gabapentin    Blood thinner:    Asprin    Work History: Former dancer and dance instructor  Current work status: Retired    Review of Systems:  Review of Systems   Constitutional: Negative for chills, fever and weight loss.        Positive for weight gain   Gastrointestinal: Positive for abdominal pain, heartburn and melena. Negative for blood in stool, diarrhea, nausea and vomiting.   Musculoskeletal: Positive for joint pain. Negative for back pain.   Neurological: Negative for tingling and sensory change.         Physical Exam:     There were no vitals filed for this visit.    Virtual visit    Laboratory results:  Recent Labs   Lab Test 09/12/20  1229 06/09/20  1606    136   POTASSIUM 3.9 4.1   CHLORIDE 100 100   CO2 26 27   ANIONGAP 8 9   GLC 93 96   BUN 11 11   CR 0.92 0.90   REJI 9.4 9.1       CBC RESULTS:   Recent Labs   Lab Test 09/12/20  1229   WBC 5.2   RBC 3.75*   HGB 11.4*   HCT 34.4*   MCV 92   MCH 30.4   MCHC 33.1   RDW 12.1            Imaging:  No images are attached to the encounter.     ASSESSMENT AND PLAN:     Encounter Diagnosis:    Mrii Naylor is a 65 year old y.o. old  female who presents to the pain clinic with chronic abdominal pain. The patient describes a history of bloating and abdominal pain that is suspicious for dietary etiology, particularly since the pain has improved since she stopped taking metamucil, dairy, and wheat. She should see a nutritionist at this time. She has seen GI, neurology, and her PCP and they have ruled out other causes of her pain. Based on her history and review of her chart, it is unlikely that any intervention would help with her current abdominal symptoms. Furthermore, she is also reluctant to consider any injections at this time. Currently see a limited role for spinal injections/TAP blocks or prescription medications given the underlying likely dietary etiology but can revisit once some dietary changes are made.     Additionally the patient has some low back pain for which she does not have a recent MRI- will order one and discuss more at a follow up visit.     RECOMMENDATIONS:     1. Consult to nutrition  2. Continue acupuncture and chiropractic work  3. Lumbar MRI to be discussed at next visit.    Follow up: After lumbar MRI is conducted as needed to discuss secondary chief complaint of chronic low back pain.    AVS sent to the pt's MyChart.   Domonique Altamirano LPN     Sincerely,    Dasha Garcia MD

## 2021-01-27 NOTE — PATIENT INSTRUCTIONS
Referrals:    Nutritional Referral placed- [phone]    Please call to schedule if you have not heard from their office within.     Imaging:    Lumbar MRI ordered    IMAGING SERVICES HOURS:    All imaging modalities are available from 7 a.m. - 9 p.m. Monday through Friday  X-ray, CT, MRI, and General Ultrasound appointments are available from 7 a.m. -3:30 p.m. on Saturdays  X-ray, CT and MRI appointments are available from 8 a.m. - 4:30 p.m. on Sundays  Please call [phone] to schedule imaging exams    Recommended Follow up:      6-8 weeks with Dr. Garcia to review MRI and address back pain.        Please call [phone], option #1 to schedule your clinic appointment if you don't already have an appointment scheduled.    To speak with a nurse, schedule/reschedule/cancel a clinic appointment, or request a medication refill call: [phone], option #1.    You can also reach us by [employer]: [email or web address]

## 2021-01-27 NOTE — PROGRESS NOTES
"[facility], [place]   Neurology Clinic Note  Miri Naylor  [id number]  01/27/2021    Background. A 65 year old female patient presented today as a follow up of headache. Last time she was seen 1/2020    The plan per last visit was   \"#Chronic post-traumatic headache with migraine phenotype:  Ms. Naylor presents to clinic to establish care for headaches that fit the diagnosis listed above. Currently she is treating these headaches with gabapentin with good benefit. She continues to have daily headaches which are at least a 2/10 and can increase up to a 7/10 if she misses a dose of gabapentin.      Further medication options for better headache control were discussed with her. She has tried topiramate and zonisamide, the latter of which worsened her restless leg syndrome. She is on a beta blocker (atenolol) for blood pressure, however at a low dose; increasing this dose is an option, however there is risk it could worsen her asthma or her depression so we will avoid this. In addition, tricyclic antidepressants such as amitriptyline are contraindicated while she is on citalopram and would not want to change these medications given comorbid depression.      At this point Ms. Naylor is not wanting to make any changes to her preventative treatment. Options to consider in the future would include CGRP inhibitors or botulinum toxin (which she is resistant to the idea due to her friend having botulism from a food outbreak in the 1980s).      For abortive therapy, she was given a prescription for metoclopramide which she found helpful in the ED. In addition, will trial a new triptan medication; will prescribe naratriptan as this tends to have a slower onset and is usually better tolerated in patients with side effects to sumatriptan.      #Restless leg syndrome:  Reports that she is fairly well controlled on her current dose of ropinerole; this will be renewed " "today.     PLAN:  -Continue gabapentin 300 mg TID  -Consider CGRP inhibitors or botulinum toxin injection if further preventative treatment is needed  -Can use tylenol as abortive therapy for mild headaches, not to exceed 14 doses per month  -Naratriptan 2.5 mg as needed for moderate to severe headache, can repeat dose once in 4 hours if needed, not to exceed 9 doses per month  -Metoclopramide 10 mg TID prn for headache-associated nausea or as abortive therapy  -Ropinerole 0.5 mg at bedtime for RLS  -Follow up in one year or sooner if needed\"    Interval History  Her headache have improved. She had only one migraine event since the last time she was seen. She did not know the triggers for migraine. However, she has been dealing with distended stomach and abdominal pain and weight gaining, she has done endoscopy and imaging and they could not find any actual cause for the bloating. She was wondering if that caused by TBI. She gained weight and bloat with no increase appetite.    PMH:  Past Medical History:   Diagnosis Date     Depressive disorder, not elsewhere classified      Drug allergy, multiple 9/23/14--passed graded oral challenge for Zithromax.     7/3/14 POS PRE-PEN skin test. 7/30/14 NEG skin tests and oral challenge to Cefzil     Hx of abnormal Pap smear ?    no specifics given     lumbar degeneration      Raynaud's disease      Uncomplicated asthma      Unspecified essential hypertension        PSH:  Past Surgical History:   Procedure Laterality Date     BIOPSY OF BREAST, NEEDLE CORE       C PELVIS/HIP JOINT SURGERY UNLISTED Right 7/19/16    total hip arthroplasty     COLONOSCOPY       COMBINED ESOPHAGOSCOPY, GASTROSCOPY, DUODENOSCOPY (EGD) WITH CO2 INSUFFLATION N/A 9/3/2020    Procedure: ESOPHAGOGASTRODUODENOSCOPY, WITH CO2 INSUFFLATION;  Surgeon: Campbell Henry MD;  Location:  OR     ESOPHAGOSCOPY, GASTROSCOPY, DUODENOSCOPY (EGD), COMBINED N/A 9/3/2020    Procedure: " Esophagogastroduodenoscopy, With Biopsy;  Surgeon: Campbell Henry MD;  Location: [facility] OR     HIP SURGERY Right 07/19/2016    replacement       Allergies:  Allergies   Allergen Reactions     Levofloxacin Rash     Other reaction(s): Contact Dermatitis     Penicillins Hives and Rash     Confirmed by skin prick testing 7/3/14     Amoxicillin Hives and Rash     Amoxicillin-Pot Clavulanate Rash     Azithromycin Rash     Cephalexin Rash     Clindamycin Rash and Hives     Atorvastatin Other (See Comments)     Felt sick, intolerance     Clindamycin Hcl Hives     Levaquin      tendonitis     Augmentin Rash       Medications:    Current Outpatient Medications:      ACIDOPHILUS OR TABS, 1 tablet daily, Disp: , Rfl:      albuterol (PROAIR HFA) 108 (90 Base) MCG/ACT inhaler, Inhale 2 puffs into the lungs every 4 hours as needed for shortness of breath / dyspnea or wheezing, Disp: 1 Inhaler, Rfl: 1     aspirin (ASA) 81 MG EC tablet, Take 81 mg by mouth daily, Disp: , Rfl:      atenolol (TENORMIN) 25 MG tablet, TAKE 1 TABLET DAILY, Disp: 90 tablet, Rfl: 1     azelastine (ASTELIN) 0.1 % nasal spray, Spray 2 sprays into both nostrils 2 times daily, Disp: 1 Bottle, Rfl: 11     budesonide-formoterol (SYMBICORT) 160-4.5 MCG/ACT Inhaler, Inhale 2 puffs into the lungs 2 times daily, Disp: 3 Inhaler, Rfl: 1     CALCIUM 600+D PO, twice daily, Disp: , Rfl:      cetirizine (ZYRTEC) 10 MG tablet, Take 10 mg by mouth daily., Disp: , Rfl:      Cholecalciferol (VITAMIN D-3 PO), Take 2,000 Int'l Units by mouth daily, Disp: , Rfl:      DAILY MULTIVITAMIN PO, 1 tablet daily, Disp: , Rfl:      escitalopram (LEXAPRO) 10 MG tablet, TAKE 1 TABLET DAILY, Disp: 90 tablet, Rfl: 3     ferrous sulfate (IRON) 325 (65 Fe) MG tablet, Take 325 mg by mouth daily (with breakfast), Disp: , Rfl:      FLAXSEED OIL 1000 MG PO CAPS, 1 tablet daily, Disp: , Rfl:      fluticasone (FLONASE) 50 MCG/ACT nasal spray, Spray 2 sprays into both nostrils daily, Disp:  16 g, Rfl: 11     gabapentin (NEURONTIN) 300 MG capsule, 1 capsule TID with additional 1-2 capsules if headache is severe., Disp: 270 capsule, Rfl: 3     GLUCOSAMINE-CHONDROITIN PO TABS, 1500 mg glucosamine and 1200mg chondroitin daily-2 tablets daily, Disp: , Rfl:      hydrocortisone 2.5 % cream, Apply topically 2 times daily (Patient taking differently: Apply topically 2 times daily as needed ), Disp: 60 g, Rfl: 1     ibuprofen (ADVIL,MOTRIN) 200 MG tablet, take 1 tablet (200 mg) by oral route every 6 hours as needed with food, Disp: , Rfl:      Ketotifen Fumarate (ZADITOR OP), Apply to eye daily as needed, Disp: , Rfl:      lisinopril (ZESTRIL) 40 MG tablet, TAKE 1 TABLET DAILY, Disp: 90 tablet, Rfl: 3     Magnesium 400 MG CAPS, Take by mouth daily, Disp: , Rfl:      metoclopramide (REGLAN) 10 MG tablet, Take 1 tablet (10 mg) by mouth 3 times daily as needed (headache), Disp: 20 tablet, Rfl: 3     montelukast (SINGULAIR) 10 MG tablet, Take 1 tablet (10 mg) by mouth At Bedtime, Disp: 90 tablet, Rfl: 3     naratriptan (AMERGE) 2.5 MG tablet, Take 1 tablet (2.5 mg) by mouth at onset of headache for migraine (repeat in 4 hours if needed) May repeat in 4 hours. Max 2 tablets/24 hours., Disp: 18 tablet, Rfl: 3     pantoprazole (PROTONIX) 40 MG EC tablet, TAKE 1 TABLET DAILY 30 TO 60 MINUTES BEFORE A MEAL, Disp: 90 tablet, Rfl: 1     polyethylene glycol (MIRALAX) powder, Take 1 capful by mouth daily, Disp: , Rfl:      pravastatin (PRAVACHOL) 40 MG tablet, TAKE 1 TABLET DAILY, Disp: 90 tablet, Rfl: 3     rOPINIRole (REQUIP) 0.25 MG tablet, Take 2 tablets (0.5 mg) by mouth At Bedtime, Disp: 60 tablet, Rfl: 11     senna (SENOKOT) 8.6 MG tablet, Take 2 tablets by mouth daily, Disp: , Rfl:      spironolactone (ALDACTONE) 25 MG tablet, TAKE ONE-HALF (1/2) TABLET DAILY, Disp: 45 tablet, Rfl: 3     SUMAtriptan (IMITREX) 50 MG tablet, Take 50 mg by mouth at onset of headache , Disp: , Rfl:      triamcinolone (KENALOG) 0.1 %  cream, Apply topically 2 times daily, Disp: 60 g, Rfl: 3     vitamin B complex with vitamin C (VITAMIN  B COMPLEX) TABS tablet, Take 3 tablets by mouth daily, Disp: , Rfl:      VITAMIN C 500 MG PO TABS, 2 TABLET DAILY, Disp: , Rfl:      vitamin E (E-400) 400 UNIT capsule, Take one pill by mouth once daily, Disp: , Rfl:     Current Facility-Administered Medications:      ropivacaine (NAROPIN) injection 3 mL, 3 mL, , , Shawn Parisher, DO, 3 mL at 12/23/20 1600     triamcinolone (KENALOG-40) injection 40 mg, 40 mg, , , Repa, Shawn Mottaopher, DO, 40 mg at 12/23/20 1600    Social History:  Social History     Tobacco Use     Smoking status: Never Smoker     Smokeless tobacco: Never Used   Substance Use Topics     Alcohol use: Yes     Comment: rarely        Family History:  Family History   Problem Relation Age of Onset     Arthritis Mother      Hyperlipidemia Mother      Other Cancer Mother         Glioblastoma Multiforme     Osteoporosis Mother      Blood Disease Father      Hypertension Father      Hyperlipidemia Father      Other Cancer Father         lung cancer     Depression Son      Depression Daughter      Hyperlipidemia Brother      Anxiety Disorder Son      Skin Cancer No family hx of          Objective   There were no vitals taken for this visit.    Neurological examination:  Mental status:  Patient is alert, attentive, and oriented x 3.  Language is coherent and fluent without dysarthria or aphasia.  Memory, comprehension and ability to follow commands were intact.        Can move 4 extremity antigravity with no weakness    Investigations    No new images     Impression:  Miri Naylor is a 65 year old year old female who presents for f/u of headache    #Chronic post-traumatic headache with migraine phenotype: Improved. She had only one time migraine over one year. She was advised to continue the same medicine.     #Resless leg syndrome: continue ropinirol as prescribed. She tried to be off  this medicine and her RLS got wore in the past.     #GI issues, including distension, bloating and pain: she was advised to be evaluated by OB/GYN to evaluate for possible ovarian cause for the bloated abdomen.  Other potential causes were discussed like side effects from gabapentin/ropinirol or less likely pituitary failure (her last TSH was normal). Also magnesium was recommended as well for constipation.       Recommendations:   -Continue gabapentin 300 mg TID  -Consider CGRP inhibitors or botulinum toxin injection if further preventative treatment is needed  -Can use tylenol as abortive therapy for mild headaches, not to exceed 14 doses per month  -Naratriptan 2.5 mg as needed for moderate to severe headache, can repeat dose once in 4 hours if needed, not to exceed 9 doses per month  -Metoclopramide 10 mg TID prn for headache-associated nausea or as abortive therapy  -Ropinerole 0.5 mg at bedtime for RLS  -Follow up with OB/GYN as discussed above  -Follow up in one year or sooner if needed    RTC yearly     Patient was seen and discussed with attending neurologist, Dr. An Mcgee MD  Neurology G4  [phone]    Physician Attestation   I, Diamond Nolan MD, saw this patient and agree with the findings and plan of care as documented in the note.  I spent 31 minutes on patient care and documentation.    Diamond Nolan MD

## 2021-01-27 NOTE — PROGRESS NOTES
Miri is a 65 year old who is being evaluated via a billable video visit.      How would you like to obtain your AVS? MyChart  If the video visit is dropped, the invitation should be resent by: Send to e-mail at: [email or web address]  Will anyone else be joining your video visit? No      Video Start Time: 10:30 am  Video-Visit Details    Type of service:  Video Visit    Video End Time:11:00 am    Originating Location (pt. Location): Home    Distant Location (provider location):  [facility] NEUROLOGY [facility]     Platform used for Video Visit: [employer]Wst Orozco, EMT

## 2021-01-27 NOTE — PROGRESS NOTES
Pain Clinic New Patient Consult Note:    Referring Provider: Carl   Primary care provider: Elizabeth Corley.    Miri Naylor is a 65 year old y.o. old female who presents to the pain clinic with chronic abdominal pain. She has had increasingly worse bloating for 4 years that has become painful over the last 2 years. She has made some dietary changes herself and found that cutting out metamucil and reducing wheat and dairy in her diet has brought her pain down from a constant 6-7/10 to a constant 4-5/10. Spicy food does make it worse, as do positional changes that increase the pressure on her abdomen. She's not sure if the pain radiates, but she does also have some pain in both of her legs. Her pain feels like pressure, and is localized to both the right upper and right lower quadrant.     She has no history of abdominal surgery. She has seen GI, Neurology, and her primary care doc for this pain and is still unclear about it's etiology. Her last colonoscopy was 2 years ago and showed 2 benign polyps, her next colonoscopy is due in 2.5 years. She has also gotten regular pap smears annually and required colposcopies in the past. She has never had any abdominal trauma.     Additionally the patient has low back pain that radiates down the legs that we will discuss at the next visit.     HPI:  Patient Supplied Answers To the [facility] Pain Questionnaire  No flowsheet data found.        Pain treatments:    Herbal medicines: none except magnesium - stopped a month ago  Physical therapy: Yes for knees, ankles, feet - stopped a year ago, for hip 4 years ago,   Chiropractor: Yes  Pain physician: Not now - but has had spinal injections helped a lot  Surgery: Never  Biofeedback: No  Acupuncture: Yes    Tests/Imaging reviewed with the patient:        Significant Medical History:   Past Medical History:   Diagnosis Date     Depressive disorder, not elsewhere classified      Drug allergy, multiple 9/23/14--passed graded oral  challenge for Zithromax.     7/3/14 POS PRE-PEN skin test. 7/30/14 NEG skin tests and oral challenge to Cefzil     Hx of abnormal Pap smear ?    no specifics given     lumbar degeneration      Raynaud's disease      Uncomplicated asthma      Unspecified essential hypertension           Past Surgical History:  Past Surgical History:   Procedure Laterality Date     BIOPSY OF BREAST, NEEDLE CORE       C PELVIS/HIP JOINT SURGERY UNLISTED Right 7/19/16    total hip arthroplasty     COLONOSCOPY       COMBINED ESOPHAGOSCOPY, GASTROSCOPY, DUODENOSCOPY (EGD) WITH CO2 INSUFFLATION N/A 9/3/2020    Procedure: ESOPHAGOGASTRODUODENOSCOPY, WITH CO2 INSUFFLATION;  Surgeon: Campbell Henry MD;  Location: [facility]     ESOPHAGOSCOPY, GASTROSCOPY, DUODENOSCOPY (EGD), COMBINED N/A 9/3/2020    Procedure: Esophagogastroduodenoscopy, With Biopsy;  Surgeon: Campbell Henry MD;  Location: [facility] OR     HIP SURGERY Right 07/19/2016    replacement          Family History:  Family History   Problem Relation Age of Onset     Arthritis Mother      Hyperlipidemia Mother      Other Cancer Mother         Glioblastoma Multiforme     Osteoporosis Mother      Blood Disease Father      Hypertension Father      Hyperlipidemia Father      Other Cancer Father         lung cancer     Depression Son      Depression Daughter      Hyperlipidemia Brother      Anxiety Disorder Son      Skin Cancer No family hx of           Social History:  Social History     Socioeconomic History     Marital status:      Spouse name: Not on file     Number of children: Not on file     Years of education: Not on file     Highest education level: Not on file   Occupational History     Not on file   Social Needs     Financial resource strain: Not on file     Food insecurity     Worry: Not on file     Inability: Not on file     Transportation needs     Medical: Not on file     Non-medical: Not on file   Tobacco Use     Smoking status: Never Smoker      Smokeless tobacco: Never Used   Substance and Sexual Activity     Alcohol use: Yes     Comment: rarely      Drug use: No     Sexual activity: Yes     Partners: Male   Lifestyle     Physical activity     Days per week: Not on file     Minutes per session: Not on file     Stress: Not on file   Relationships     Social connections     Talks on phone: Not on file     Gets together: Not on file     Attends Christian service: Not on file     Active member of club or organization: Not on file     Attends meetings of clubs or organizations: Not on file     Relationship status: Not on file     Intimate partner violence     Fear of current or ex partner: Not on file     Emotionally abused: Not on file     Physically abused: Not on file     Forced sexual activity: Not on file   Other Topics Concern     Parent/sibling w/ CABG, MI or angioplasty before 65F 55M? No   Social History Narrative     Not on file     Social History     Social History Narrative     Not on file          Allergies:  Allergies   Allergen Reactions     Levofloxacin Rash     Other reaction(s): Contact Dermatitis     Penicillins Hives and Rash     Confirmed by skin prick testing 7/3/14     Amoxicillin Hives and Rash     Amoxicillin-Pot Clavulanate Rash     Azithromycin Rash     Cephalexin Rash     Clindamycin Rash and Hives     Atorvastatin Other (See Comments)     Felt sick, intolerance     Clindamycin Hcl Hives     Levaquin      tendonitis     Augmentin Rash       Current Medications:   Current Outpatient Medications   Medication Sig Dispense Refill     albuterol (PROAIR HFA) 108 (90 Base) MCG/ACT inhaler Inhale 2 puffs into the lungs every 4 hours as needed for shortness of breath / dyspnea or wheezing 1 Inhaler 1     aspirin (ASA) 81 MG EC tablet Take 81 mg by mouth daily       atenolol (TENORMIN) 25 MG tablet TAKE 1 TABLET DAILY 90 tablet 1     azelastine (ASTELIN) 0.1 % nasal spray Spray 2 sprays into both nostrils 2 times daily 1 Bottle 11      budesonide-formoterol (SYMBICORT) 160-4.5 MCG/ACT Inhaler Inhale 2 puffs into the lungs 2 times daily 3 Inhaler 1     cetirizine (ZYRTEC) 10 MG tablet Take 10 mg by mouth daily.       escitalopram (LEXAPRO) 10 MG tablet TAKE 1 TABLET DAILY 90 tablet 3     fluticasone (FLONASE) 50 MCG/ACT nasal spray Spray 2 sprays into both nostrils daily 16 g 11     gabapentin (NEURONTIN) 300 MG capsule 1 capsule TID with additional 1-2 capsules if headache is severe. 270 capsule 3     GLUCOSAMINE-CHONDROITIN PO TABS 1500 mg glucosamine and 1200mg chondroitin daily-2 tablets daily       hydrocortisone 2.5 % cream Apply topically 2 times daily (Patient taking differently: Apply topically 2 times daily as needed ) 60 g 1     ibuprofen (ADVIL,MOTRIN) 200 MG tablet take 1 tablet (200 mg) by oral route every 6 hours as needed with food       Ketotifen Fumarate (ZADITOR OP) Apply to eye daily as needed       lisinopril (ZESTRIL) 40 MG tablet TAKE 1 TABLET DAILY 90 tablet 3     Magnesium 400 MG CAPS Take by mouth daily       metoclopramide (REGLAN) 10 MG tablet Take 1 tablet (10 mg) by mouth 3 times daily as needed (headache) 20 tablet 3     montelukast (SINGULAIR) 10 MG tablet Take 1 tablet (10 mg) by mouth At Bedtime 90 tablet 3     naratriptan (AMERGE) 2.5 MG tablet Take 1 tablet (2.5 mg) by mouth at onset of headache for migraine (repeat in 4 hours if needed) May repeat in 4 hours. Max 2 tablets/24 hours. 18 tablet 11     pantoprazole (PROTONIX) 40 MG EC tablet TAKE 1 TABLET DAILY 30 TO 60 MINUTES BEFORE A MEAL 90 tablet 1     pravastatin (PRAVACHOL) 40 MG tablet TAKE 1 TABLET DAILY 90 tablet 3     rOPINIRole (REQUIP) 0.25 MG tablet Take 2 tablets (0.5 mg) by mouth At Bedtime 60 tablet 11     spironolactone (ALDACTONE) 25 MG tablet TAKE ONE-HALF (1/2) TABLET DAILY 45 tablet 3     SUMAtriptan (IMITREX) 50 MG tablet Take 50 mg by mouth at onset of headache        triamcinolone (KENALOG) 0.1 % cream Apply topically 2 times daily 60 g 3           Current Pain Medications:  Medications related to Pain Management (From now, onward)    None           Past Pain Medications:    Tylenol, ibuprofen, gabapentin    Blood thinner:    Asprin    Work History: Former dancer and dance instructor  Current work status: Retired    Review of Systems:  Review of Systems   Constitutional: Negative for chills, fever and weight loss.        Positive for weight gain   Gastrointestinal: Positive for abdominal pain, heartburn and melena. Negative for blood in stool, diarrhea, nausea and vomiting.   Musculoskeletal: Positive for joint pain. Negative for back pain.   Neurological: Negative for tingling and sensory change.         Physical Exam:     There were no vitals filed for this visit.    Virtual visit    Laboratory results:  Recent Labs   Lab Test 09/12/20  1229 06/09/20  1606    136   POTASSIUM 3.9 4.1   CHLORIDE 100 100   CO2 26 27   ANIONGAP 8 9   GLC 93 96   BUN 11 11   CR 0.92 0.90   REJI 9.4 9.1       CBC RESULTS:   Recent Labs   Lab Test 09/12/20  1229   WBC 5.2   RBC 3.75*   HGB 11.4*   HCT 34.4*   MCV 92   MCH 30.4   MCHC 33.1   RDW 12.1            Imaging:  No images are attached to the encounter.     ASSESSMENT AND PLAN:     Encounter Diagnosis:    Miri Naylor is a 65 year old y.o. old female who presents to the pain clinic with chronic abdominal pain. The patient describes a history of bloating and abdominal pain that is suspicious for dietary etiology, particularly since the pain has improved since she stopped taking metamucil, dairy, and wheat. She should see a nutritionist at this time. She has seen GI, neurology, and her PCP and they have ruled out other causes of her pain. Based on her history and review of her chart, it is unlikely that any intervention would help with her current abdominal symptoms. Furthermore, she is also reluctant to consider any injections at this time. Currently see a limited role for spinal injections/TAP  blocks or prescription medications given the underlying likely dietary etiology but can revisit once some dietary changes are made.     Additionally the patient has some low back pain for which she does not have a recent MRI- will order one and discuss more at a follow up visit.     RECOMMENDATIONS:     1. Consult to nutrition  2. Continue acupuncture and chiropractic work  3. Lumbar MRI to be discussed at next visit.    Follow up: After lumbar MRI is conducted as needed to discuss secondary chief complaint of chronic low back pain.

## 2021-01-27 NOTE — LETTER
"1/27/2021       RE: Miri Naylor  [address] [facility] [id number]     Dear Colleague,    Thank you for referring your patient, Miri Naylor, to the [facility] NEUROLOG[facility]INIC [place] [facility]. Please see a copy of my visit note below.    [facility] [place]   Neurology Clinic Note  Miri Naylor  [id number]  01/27/2021    Background. A 65 year old female patient presented today as a follow up of headache. Last time she was seen 1/2020    The plan per last visit was   \"#Chronic post-traumatic headache with migraine phenotype:  Ms. Naylor presents to clinic to establish care for headaches that fit the diagnosis listed above. Currently she is treating these headaches with gabapentin with good benefit. She continues to have daily headaches which are at least a 2/10 and can increase up to a 7/10 if she misses a dose of gabapentin.      Further medication options for better headache control were discussed with her. She has tried topiramate and zonisamide, the latter of which worsened her restless leg syndrome. She is on a beta blocker (atenolol) for blood pressure, however at a low dose; increasing this dose is an option, however there is risk it could worsen her asthma or her depression so we will avoid this. In addition, tricyclic antidepressants such as amitriptyline are contraindicated while she is on citalopram and would not want to change these medications given comorbid depression.      At this point Ms. Naylor is not wanting to make any changes to her preventative treatment. Options to consider in the future would include CGRP inhibitors or botulinum toxin (which she is resistant to the idea due to her friend having botulism from a food outbreak in the 1980s).      For abortive therapy, she was given a prescription for metoclopramide which she found helpful in the ED. In addition, " "will trial a new triptan medication; will prescribe naratriptan as this tends to have a slower onset and is usually better tolerated in patients with side effects to sumatriptan.      #Restless leg syndrome:  Reports that she is fairly well controlled on her current dose of ropinerole; this will be renewed today.     PLAN:  -Continue gabapentin 300 mg TID  -Consider CGRP inhibitors or botulinum toxin injection if further preventative treatment is needed  -Can use tylenol as abortive therapy for mild headaches, not to exceed 14 doses per month  -Naratriptan 2.5 mg as needed for moderate to severe headache, can repeat dose once in 4 hours if needed, not to exceed 9 doses per month  -Metoclopramide 10 mg TID prn for headache-associated nausea or as abortive therapy  -Ropinerole 0.5 mg at bedtime for RLS  -Follow up in one year or sooner if needed\"    Interval History  Her headache have improved. She had only one migraine event since the last time she was seen. She did not know the triggers for migraine. However, she has been dealing with distended stomach and abdominal pain and weight gaining, she has done endoscopy and imaging and they could not find any actual cause for the bloating. She was wondering if that caused by TBI. She gained weight and bloat with no increase appetite.    PMH:  Past Medical History:   Diagnosis Date     Depressive disorder, not elsewhere classified      Drug allergy, multiple 9/23/14--passed graded oral challenge for Zithromax.     7/3/14 POS PRE-PEN skin test. 7/30/14 NEG skin tests and oral challenge to Cefzil     Hx of abnormal Pap smear ?    no specifics given     lumbar degeneration      Raynaud's disease      Uncomplicated asthma      Unspecified essential hypertension        PSH:  Past Surgical History:   Procedure Laterality Date     BIOPSY OF BREAST, NEEDLE CORE       C PELVIS/HIP JOINT SURGERY UNLISTED Right 7/19/16    total hip arthroplasty     COLONOSCOPY       COMBINED " ESOPHAGOSCOPY, GASTROSCOPY, DUODENOSCOPY (EGD) WITH CO2 INSUFFLATION N/A 9/3/2020    Procedure: ESOPHAGOGASTRODUODENOSCOPY, WITH CO2 INSUFFLATION;  Surgeon: Campbell Henry MD;  Location: [facility] OR     ESOPHAGOSCOPY, GASTROSCOPY, DUODENOSCOPY (EGD), COMBINED N/A 9/3/2020    Procedure: Esophagogastroduodenoscopy, With Biopsy;  Surgeon: Campbell Henry MD;  Location: [facility] OR     HIP SURGERY Right 07/19/2016    replacement       Allergies:  Allergies   Allergen Reactions     Levofloxacin Rash     Other reaction(s): Contact Dermatitis     Penicillins Hives and Rash     Confirmed by skin prick testing 7/3/14     Amoxicillin Hives and Rash     Amoxicillin-Pot Clavulanate Rash     Azithromycin Rash     Cephalexin Rash     Clindamycin Rash and Hives     Atorvastatin Other (See Comments)     Felt sick, intolerance     Clindamycin Hcl Hives     Levaquin      tendonitis     Augmentin Rash       Medications:    Current Outpatient Medications:      ACIDOPHILUS OR TABS, 1 tablet daily, Disp: , Rfl:      albuterol (PROAIR HFA) 108 (90 Base) MCG/ACT inhaler, Inhale 2 puffs into the lungs every 4 hours as needed for shortness of breath / dyspnea or wheezing, Disp: 1 Inhaler, Rfl: 1     aspirin (ASA) 81 MG EC tablet, Take 81 mg by mouth daily, Disp: , Rfl:      atenolol (TENORMIN) 25 MG tablet, TAKE 1 TABLET DAILY, Disp: 90 tablet, Rfl: 1     azelastine (ASTELIN) 0.1 % nasal spray, Spray 2 sprays into both nostrils 2 times daily, Disp: 1 Bottle, Rfl: 11     budesonide-formoterol (SYMBICORT) 160-4.5 MCG/ACT Inhaler, Inhale 2 puffs into the lungs 2 times daily, Disp: 3 Inhaler, Rfl: 1     CALCIUM 600+D PO, twice daily, Disp: , Rfl:      cetirizine (ZYRTEC) 10 MG tablet, Take 10 mg by mouth daily., Disp: , Rfl:      Cholecalciferol (VITAMIN D-3 PO), Take 2,000 Int'l Units by mouth daily, Disp: , Rfl:      DAILY MULTIVITAMIN PO, 1 tablet daily, Disp: , Rfl:      escitalopram (LEXAPRO) 10 MG tablet, TAKE 1 TABLET DAILY,  Disp: 90 tablet, Rfl: 3     ferrous sulfate (IRON) 325 (65 Fe) MG tablet, Take 325 mg by mouth daily (with breakfast), Disp: , Rfl:      FLAXSEED OIL 1000 MG PO CAPS, 1 tablet daily, Disp: , Rfl:      fluticasone (FLONASE) 50 MCG/ACT nasal spray, Spray 2 sprays into both nostrils daily, Disp: 16 g, Rfl: 11     gabapentin (NEURONTIN) 300 MG capsule, 1 capsule TID with additional 1-2 capsules if headache is severe., Disp: 270 capsule, Rfl: 3     GLUCOSAMINE-CHONDROITIN PO TABS, 1500 mg glucosamine and 1200mg chondroitin daily-2 tablets daily, Disp: , Rfl:      hydrocortisone 2.5 % cream, Apply topically 2 times daily (Patient taking differently: Apply topically 2 times daily as needed ), Disp: 60 g, Rfl: 1     ibuprofen (ADVIL,MOTRIN) 200 MG tablet, take 1 tablet (200 mg) by oral route every 6 hours as needed with food, Disp: , Rfl:      Ketotifen Fumarate (ZADITOR OP), Apply to eye daily as needed, Disp: , Rfl:      lisinopril (ZESTRIL) 40 MG tablet, TAKE 1 TABLET DAILY, Disp: 90 tablet, Rfl: 3     Magnesium 400 MG CAPS, Take by mouth daily, Disp: , Rfl:      metoclopramide (REGLAN) 10 MG tablet, Take 1 tablet (10 mg) by mouth 3 times daily as needed (headache), Disp: 20 tablet, Rfl: 3     montelukast (SINGULAIR) 10 MG tablet, Take 1 tablet (10 mg) by mouth At Bedtime, Disp: 90 tablet, Rfl: 3     naratriptan (AMERGE) 2.5 MG tablet, Take 1 tablet (2.5 mg) by mouth at onset of headache for migraine (repeat in 4 hours if needed) May repeat in 4 hours. Max 2 tablets/24 hours., Disp: 18 tablet, Rfl: 3     pantoprazole (PROTONIX) 40 MG EC tablet, TAKE 1 TABLET DAILY 30 TO 60 MINUTES BEFORE A MEAL, Disp: 90 tablet, Rfl: 1     polyethylene glycol (MIRALAX) powder, Take 1 capful by mouth daily, Disp: , Rfl:      pravastatin (PRAVACHOL) 40 MG tablet, TAKE 1 TABLET DAILY, Disp: 90 tablet, Rfl: 3     rOPINIRole (REQUIP) 0.25 MG tablet, Take 2 tablets (0.5 mg) by mouth At Bedtime, Disp: 60 tablet, Rfl: 11     senna (SENOKOT) 8.6  MG tablet, Take 2 tablets by mouth daily, Disp: , Rfl:      spironolactone (ALDACTONE) 25 MG tablet, TAKE ONE-HALF (1/2) TABLET DAILY, Disp: 45 tablet, Rfl: 3     SUMAtriptan (IMITREX) 50 MG tablet, Take 50 mg by mouth at onset of headache , Disp: , Rfl:      triamcinolone (KENALOG) 0.1 % cream, Apply topically 2 times daily, Disp: 60 g, Rfl: 3     vitamin B complex with vitamin C (VITAMIN  B COMPLEX) TABS tablet, Take 3 tablets by mouth daily, Disp: , Rfl:      VITAMIN C 500 MG PO TABS, 2 TABLET DAILY, Disp: , Rfl:      vitamin E (E-400) 400 UNIT capsule, Take one pill by mouth once daily, Disp: , Rfl:     Current Facility-Administered Medications:      ropivacaine (NAROPIN) injection 3 mL, 3 mL, , , Shawn Parish DO, 3 mL at 12/23/20 1600     triamcinolone (KENALOG-40) injection 40 mg, 40 mg, , , Shawn Parish DO, 40 mg at 12/23/20 1600    Social History:  Social History     Tobacco Use     Smoking status: Never Smoker     Smokeless tobacco: Never Used   Substance Use Topics     Alcohol use: Yes     Comment: rarely        Family History:  Family History   Problem Relation Age of Onset     Arthritis Mother      Hyperlipidemia Mother      Other Cancer Mother         Glioblastoma Multiforme     Osteoporosis Mother      Blood Disease Father      Hypertension Father      Hyperlipidemia Father      Other Cancer Father         lung cancer     Depression Son      Depression Daughter      Hyperlipidemia Brother      Anxiety Disorder Son      Skin Cancer No family hx of          Objective   There were no vitals taken for this visit.    Neurological examination:  Mental status:  Patient is alert, attentive, and oriented x 3.  Language is coherent and fluent without dysarthria or aphasia.  Memory, comprehension and ability to follow commands were intact.        Can move 4 extremity antigravity with no weakness    Investigations    No new images     Impression:  Miri Naylor is a 65 year old year  old female who presents for f/u of headache    #Chronic post-traumatic headache with migraine phenotype: Improved. She had only one time migraine over one year. She was advised to continue the same medicine.     #Resless leg syndrome: continue ropinirol as prescribed. She tried to be off this medicine and her RLS got wore in the past.     #GI issues, including distension, bloating and pain: she was advised to be evaluated by OB/GYN to evaluate for possible ovarian cause for the bloated abdomen.  Other potential causes were discussed like side effects from gabapentin/ropinirol or less likely pituitary failure (her last TSH was normal). Also magnesium was recommended as well for constipation.       Recommendations:   -Continue gabapentin 300 mg TID  -Consider CGRP inhibitors or botulinum toxin injection if further preventative treatment is needed  -Can use tylenol as abortive therapy for mild headaches, not to exceed 14 doses per month  -Naratriptan 2.5 mg as needed for moderate to severe headache, can repeat dose once in 4 hours if needed, not to exceed 9 doses per month  -Metoclopramide 10 mg TID prn for headache-associated nausea or as abortive therapy  -Ropinerole 0.5 mg at bedtime for RLS  -Follow up with OB/GYN as discussed above  -Follow up in one year or sooner if needed    RTC yearly     Patient was seen and discussed with attending neurologist, Dr. An Mcgee MD  Neurology G4  [phone]    Physician Attestation   I, Diamond Nolan MD, saw this patient and agree with the findings and plan of care as documented in the note.  I spent 31 minutes on patient care and documentation.    Diamond Nolan MD      Miri is a 65 year old who is being evaluated via a billable video visit.      How would you like to obtain your AVS? MyChart  If the video visit is dropped, the invitation should be resent by: Send to e-mail at: [email or web address]  Will anyone else be joining your video visit?  No      Video-Visit Details    Type of service:  Video Visit    Video Start Time: 10:30 am  Video End Time:11:00 am    Originating Location (pt. Location): Home    Distant Location (provider location):  [facility] NEUROLOGY [facility]     Platform used for Video Visit: [place], EMT

## 2021-02-03 ENCOUNTER — ANCILLARY PROCEDURE (OUTPATIENT)
Dept: GENERAL RADIOLOGY | Facility: CLINIC | Age: 66
End: 2021-02-03
Attending: ORTHOPAEDIC SURGERY
Payer: COMMERCIAL

## 2021-02-03 ENCOUNTER — OFFICE VISIT (OUTPATIENT)
Dept: ORTHOPEDICS | Facility: CLINIC | Age: 66
End: 2021-02-03
Payer: COMMERCIAL

## 2021-02-03 VITALS
SYSTOLIC BLOOD PRESSURE: 117 MMHG | DIASTOLIC BLOOD PRESSURE: 80 MMHG | HEIGHT: 71 IN | BODY MASS INDEX: 31.16 KG/M2 | WEIGHT: 222.6 LBS | HEART RATE: 54 BPM

## 2021-02-03 DIAGNOSIS — M54.31 SCIATICA, RIGHT SIDE: ICD-10-CM

## 2021-02-03 DIAGNOSIS — M25.551 RIGHT HIP PAIN: ICD-10-CM

## 2021-02-03 DIAGNOSIS — M25.551 RIGHT HIP PAIN: Primary | ICD-10-CM

## 2021-02-03 DIAGNOSIS — M53.3 SI (SACROILIAC) JOINT DYSFUNCTION: ICD-10-CM

## 2021-02-03 DIAGNOSIS — M25.551 GREATER TROCHANTERIC PAIN SYNDROME OF RIGHT LOWER EXTREMITY: ICD-10-CM

## 2021-02-03 PROCEDURE — 73502 X-RAY EXAM HIP UNI 2-3 VIEWS: CPT | Mod: RT | Performed by: RADIOLOGY

## 2021-02-03 PROCEDURE — 99213 OFFICE O/P EST LOW 20 MIN: CPT | Performed by: ORTHOPAEDIC SURGERY

## 2021-02-03 ASSESSMENT — MIFFLIN-ST. JEOR: SCORE: 1655.84

## 2021-02-03 ASSESSMENT — PAIN SCALES - GENERAL: PAINLEVEL: MODERATE PAIN (5)

## 2021-02-03 NOTE — LETTER
"    2/3/2021         RE: Miri Naylor  [address] [facility] [id number]        Dear Colleague,    Thank you for referring your patient, Miri Naylor, to the [facility]. Please see a copy of my visit note below.    Chief Complaint   Patient presents with     Right Hip - Pain     S/p total hip arthroplasty on 7/19/16. Patient notes she has had pain since after surgery. It has never gone away. Pain is in the buttocks. Pain is like a burning pain and throbbing. Denies any leg pain. Pain with sitting, exercise bike, ballet exercises     Hip Pain     walking long distances, going up and down stairs. No tx.        SURGERY: Total hip arthroplasty, right hip ( [facility])  DATE OF SURGERY: 7/19/2016      HISTORY OF PRESENT ILLNESS: Miri Naylor is a 65 year old female seen for right hip/buttock pain, she relates since her surgery of right total hip arthroplasty 7/2016. She's noted pain along the posterolateral scar/incisional area in the past, but the pain that bothers her more now is pain in the buttock area. This seems more noticeable now since she had an injection into her left hip. The pain is a \"burning\" type pain, throbs at times. Denies leg/thigh pain or pain in the groin area. Pain is worse with prolonged sitting, laying on side at night, but also with walking/stairs, riding bike. Pain does not radiate. Denies numbness and tingling.      Pre-op symptoms in the groin area have resolved.        PAST MEDICAL HISTORY:   Past Medical History:   Diagnosis Date     Depressive disorder, not elsewhere classified      Drug allergy, multiple 9/23/14--passed graded oral challenge for Zithromax.     7/3/14 POS PRE-PEN skin test. 7/30/14 NEG skin tests and oral challenge to Cefzil     Hx of abnormal Pap smear ?    no specifics given     lumbar degeneration      Raynaud's disease      Uncomplicated asthma      Unspecified essential hypertension        PAST " SURGICAL HISTORY:   Past Surgical History:   Procedure Laterality Date     BIOPSY OF BREAST, NEEDLE CORE       C PELVIS/HIP JOINT SURGERY UNLISTED Right 7/19/16    total hip arthroplasty     COLONOSCOPY       COMBINED ESOPHAGOSCOPY, GASTROSCOPY, DUODENOSCOPY (EGD) WITH CO2 INSUFFLATION N/A 9/3/2020    Procedure: ESOPHAGOGASTRODUODENOSCOPY, WITH CO2 INSUFFLATION;  Surgeon: Campbell Henry MD;  Location: [facility] OR     ESOPHAGOSCOPY, GASTROSCOPY, DUODENOSCOPY (EGD), COMBINED N/A 9/3/2020    Procedure: Esophagogastroduodenoscopy, With Biopsy;  Surgeon: Campbell Henry MD;  Location: [facility] OR     HIP SURGERY Right 07/19/2016    replacement       Medications:   Current Outpatient Medications:      albuterol (PROAIR HFA) 108 (90 Base) MCG/ACT inhaler, Inhale 2 puffs into the lungs every 4 hours as needed for shortness of breath / dyspnea or wheezing, Disp: 1 Inhaler, Rfl: 1     aspirin (ASA) 81 MG EC tablet, Take 81 mg by mouth daily, Disp: , Rfl:      atenolol (TENORMIN) 25 MG tablet, TAKE 1 TABLET DAILY, Disp: 90 tablet, Rfl: 1     azelastine (ASTELIN) 0.1 % nasal spray, Spray 2 sprays into both nostrils 2 times daily, Disp: 1 Bottle, Rfl: 11     budesonide-formoterol (SYMBICORT) 160-4.5 MCG/ACT Inhaler, Inhale 2 puffs into the lungs 2 times daily, Disp: 3 Inhaler, Rfl: 1     cetirizine (ZYRTEC) 10 MG tablet, Take 10 mg by mouth daily., Disp: , Rfl:      escitalopram (LEXAPRO) 10 MG tablet, TAKE 1 TABLET DAILY, Disp: 90 tablet, Rfl: 3     fluticasone (FLONASE) 50 MCG/ACT nasal spray, Spray 2 sprays into both nostrils daily, Disp: 16 g, Rfl: 11     gabapentin (NEURONTIN) 300 MG capsule, 1 capsule TID with additional 1-2 capsules if headache is severe., Disp: 270 capsule, Rfl: 3     hydrocortisone 2.5 % cream, Apply topically 2 times daily (Patient taking differently: Apply topically 2 times daily as needed ), Disp: 60 g, Rfl: 1     ibuprofen (ADVIL,MOTRIN) 200 MG tablet, take 1 tablet (200 mg) by oral  route every 6 hours as needed with food, Disp: , Rfl:      Ketotifen Fumarate (ZADITOR OP), Apply to eye daily as needed, Disp: , Rfl:      lisinopril (ZESTRIL) 40 MG tablet, TAKE 1 TABLET DAILY, Disp: 90 tablet, Rfl: 3     Magnesium 400 MG CAPS, Take by mouth daily, Disp: , Rfl:      metoclopramide (REGLAN) 10 MG tablet, Take 1 tablet (10 mg) by mouth 3 times daily as needed (headache), Disp: 20 tablet, Rfl: 3     montelukast (SINGULAIR) 10 MG tablet, Take 1 tablet (10 mg) by mouth At Bedtime, Disp: 90 tablet, Rfl: 3     naratriptan (AMERGE) 2.5 MG tablet, Take 1 tablet (2.5 mg) by mouth at onset of headache for migraine (repeat in 4 hours if needed) May repeat in 4 hours. Max 2 tablets/24 hours., Disp: 18 tablet, Rfl: 11     pantoprazole (PROTONIX) 40 MG EC tablet, TAKE 1 TABLET DAILY 30 TO 60 MINUTES BEFORE A MEAL, Disp: 90 tablet, Rfl: 1     pravastatin (PRAVACHOL) 40 MG tablet, TAKE 1 TABLET DAILY, Disp: 90 tablet, Rfl: 3     rOPINIRole (REQUIP) 0.25 MG tablet, Take 2 tablets (0.5 mg) by mouth At Bedtime, Disp: 60 tablet, Rfl: 11     spironolactone (ALDACTONE) 25 MG tablet, TAKE ONE-HALF (1/2) TABLET DAILY, Disp: 45 tablet, Rfl: 3     SUMAtriptan (IMITREX) 50 MG tablet, Take 50 mg by mouth at onset of headache , Disp: , Rfl:      triamcinolone (KENALOG) 0.1 % cream, Apply topically 2 times daily, Disp: 60 g, Rfl: 3     GLUCOSAMINE-CHONDROITIN PO TABS, 1500 mg glucosamine and 1200mg chondroitin daily-2 tablets daily, Disp: , Rfl:     Allergies:   Allergies   Allergen Reactions     Levofloxacin Rash     Other reaction(s): Contact Dermatitis     Penicillins Hives and Rash     Confirmed by skin prick testing 7/3/14     Amoxicillin Hives and Rash     Amoxicillin-Pot Clavulanate Rash     Azithromycin Rash     Cephalexin Rash     Clindamycin Rash and Hives     Atorvastatin Other (See Comments)     Felt sick, intolerance     Clindamycin Hcl Hives     Levaquin      tendonitis     Augmentin Rash         REVIEW OF  "SYSTEMS:  CONSTITUTIONAL:NEGATIVE for fever, chills, night sweats, change in weight  INTEGUMENTARY/SKIN: NEGATIVE for worrisome rashes, moles or lesions  MUSCULOSKELETAL:See HPI above  NEURO: NEGATIVE for weakness, dizziness or paresthesias       PHYSICAL EXAM:  /80   Pulse 54   Ht 1.803 m (5' 11\")   Wt 101 kg (222 lb 9.6 oz)   BMI 31.05 kg/m     GENERAL APPEARANCE: healthy, alert, no distress  SKIN: no suspicious lesions or rashes  NEURO: Normal strength and tone, mentation intact and speech normal  PSYCH:  mentation appears normal and affect normal  RESPIRATORY: No increased work of breathing.    BILATERAL LOWER EXTREMITIES:  Gait: normal.    Right lower extremity:  No gross deformities or masses.  Intact sensation deep peroneal nerve, superficial peroneal nerve, med/lat tibial nerve, sural nerve, saphenous nerve  Intact EHL, EDL, TA, FHL, GS, quadriceps hamstrings and hip flexors  Toes warm and well perfused, brisk capillary refill. Palpable 2+ dp pulses.  calf soft and minimal tender to squeeze.  Edema: trace      RIGHT HIP EXAM:    Palpation: tenderness over sacroiliac joint, buttock, gluteals, piriformis, posterior thigh to distal thigh, greater trochanter, iliotibial band to the knee.  Nontender to palpation groin, anterior thigh.  Strength:  Grossly intact, able to quickly flexion at the hip without discomfort.  Hip range of motion: within normal limits, no discomfort.  DANILO causes pain in the back/buttock/SI joint  Leg lengths: grossly symmetric at medial malleolus    X-RAY:  AP pelvis, lateral views right hip from 2/4/2021 were reviewed in clinic today. No obvious fractures or dislocations. Hip arthroplasty components in place without obvious failure, complication, fracture, or dislocation. Mild-moderate left hip degenerative changes.    Impression: 65 year old female with chronic right buttock pain, suspect some sciatica and SI joint dysfunction, possible piriformis syndrome.    Plan:   * " reviewed xrays with patient today showing total hip arthroplasty implants. Stable alignment, well fixed. Don't think pain is related to the hip replacement itself.  * discomfort in buttock likely muscular and SI joint in nature, perhaps sciatica or piriformis.  * lateral pain trochanteric pain syndrome, scar tissues.    * recommend stretching of history, gluteals, iliotibial band.    * possible consider piriformis or trochanteric injection but risk infection.    * return to clinic 5 years for 10 year followup.     Nir Massey M.D., M.S.  Dept. of Orthopaedic Surgery  [facility]      Again, thank you for allowing me to participate in the care of your patient.        Sincerely,        Nir Massey MD

## 2021-02-04 NOTE — PROGRESS NOTES
"Chief Complaint   Patient presents with     Right Hip - Pain     S/p total hip arthroplasty on 7/19/16. Patient notes she has had pain since after surgery. It has never gone away. Pain is in the buttocks. Pain is like a burning pain and throbbing. Denies any leg pain. Pain with sitting, exercise bike, ballet exercises     Hip Pain     walking long distances, going up and down stairs. No tx.        SURGERY: Total hip arthroplasty, right hip ( [facility])  DATE OF SURGERY: 7/19/2016      HISTORY OF PRESENT ILLNESS: Miri Naylor is a 65 year old female seen for right hip/buttock pain, she relates since her surgery of right total hip arthroplasty 7/2016. She's noted pain along the posterolateral scar/incisional area in the past, but the pain that bothers her more now is pain in the buttock area. This seems more noticeable now since she had an injection into her left hip. The pain is a \"burning\" type pain, throbs at times. Denies leg/thigh pain or pain in the groin area. Pain is worse with prolonged sitting, laying on side at night, but also with walking/stairs, riding bike. Pain does not radiate. Denies numbness and tingling.      Pre-op symptoms in the groin area have resolved.        PAST MEDICAL HISTORY:   Past Medical History:   Diagnosis Date     Depressive disorder, not elsewhere classified      Drug allergy, multiple 9/23/14--passed graded oral challenge for Zithromax.     7/3/14 POS PRE-PEN skin test. 7/30/14 NEG skin tests and oral challenge to Cefzil     Hx of abnormal Pap smear ?    no specifics given     lumbar degeneration      Raynaud's disease      Uncomplicated asthma      Unspecified essential hypertension        PAST SURGICAL HISTORY:   Past Surgical History:   Procedure Laterality Date     BIOPSY OF BREAST, NEEDLE CORE       C PELVIS/HIP JOINT SURGERY UNLISTED Right 7/19/16    total hip arthroplasty     COLONOSCOPY       COMBINED ESOPHAGOSCOPY, GASTROSCOPY, DUODENOSCOPY (EGD) WITH CO2 " INSUFFLATION N/A 9/3/2020    Procedure: ESOPHAGOGASTRODUODENOSCOPY, WITH CO2 INSUFFLATION;  Surgeon: Campbell Henry MD;  Location: [facility] OR     ESOPHAGOSCOPY, GASTROSCOPY, DUODENOSCOPY (EGD), COMBINED N/A 9/3/2020    Procedure: Esophagogastroduodenoscopy, With Biopsy;  Surgeon: Campbell Henry MD;  Location: [facility] OR     HIP SURGERY Right 07/19/2016    replacement       Medications:   Current Outpatient Medications:      albuterol (PROAIR HFA) 108 (90 Base) MCG/ACT inhaler, Inhale 2 puffs into the lungs every 4 hours as needed for shortness of breath / dyspnea or wheezing, Disp: 1 Inhaler, Rfl: 1     aspirin (ASA) 81 MG EC tablet, Take 81 mg by mouth daily, Disp: , Rfl:      atenolol (TENORMIN) 25 MG tablet, TAKE 1 TABLET DAILY, Disp: 90 tablet, Rfl: 1     azelastine (ASTELIN) 0.1 % nasal spray, Spray 2 sprays into both nostrils 2 times daily, Disp: 1 Bottle, Rfl: 11     budesonide-formoterol (SYMBICORT) 160-4.5 MCG/ACT Inhaler, Inhale 2 puffs into the lungs 2 times daily, Disp: 3 Inhaler, Rfl: 1     cetirizine (ZYRTEC) 10 MG tablet, Take 10 mg by mouth daily., Disp: , Rfl:      escitalopram (LEXAPRO) 10 MG tablet, TAKE 1 TABLET DAILY, Disp: 90 tablet, Rfl: 3     fluticasone (FLONASE) 50 MCG/ACT nasal spray, Spray 2 sprays into both nostrils daily, Disp: 16 g, Rfl: 11     gabapentin (NEURONTIN) 300 MG capsule, 1 capsule TID with additional 1-2 capsules if headache is severe., Disp: 270 capsule, Rfl: 3     hydrocortisone 2.5 % cream, Apply topically 2 times daily (Patient taking differently: Apply topically 2 times daily as needed ), Disp: 60 g, Rfl: 1     ibuprofen (ADVIL,MOTRIN) 200 MG tablet, take 1 tablet (200 mg) by oral route every 6 hours as needed with food, Disp: , Rfl:      Ketotifen Fumarate (ZADITOR OP), Apply to eye daily as needed, Disp: , Rfl:      lisinopril (ZESTRIL) 40 MG tablet, TAKE 1 TABLET DAILY, Disp: 90 tablet, Rfl: 3     Magnesium 400 MG CAPS, Take by mouth daily, Disp: ,  Rfl:      metoclopramide (REGLAN) 10 MG tablet, Take 1 tablet (10 mg) by mouth 3 times daily as needed (headache), Disp: 20 tablet, Rfl: 3     montelukast (SINGULAIR) 10 MG tablet, Take 1 tablet (10 mg) by mouth At Bedtime, Disp: 90 tablet, Rfl: 3     naratriptan (AMERGE) 2.5 MG tablet, Take 1 tablet (2.5 mg) by mouth at onset of headache for migraine (repeat in 4 hours if needed) May repeat in 4 hours. Max 2 tablets/24 hours., Disp: 18 tablet, Rfl: 11     pantoprazole (PROTONIX) 40 MG EC tablet, TAKE 1 TABLET DAILY 30 TO 60 MINUTES BEFORE A MEAL, Disp: 90 tablet, Rfl: 1     pravastatin (PRAVACHOL) 40 MG tablet, TAKE 1 TABLET DAILY, Disp: 90 tablet, Rfl: 3     rOPINIRole (REQUIP) 0.25 MG tablet, Take 2 tablets (0.5 mg) by mouth At Bedtime, Disp: 60 tablet, Rfl: 11     spironolactone (ALDACTONE) 25 MG tablet, TAKE ONE-HALF (1/2) TABLET DAILY, Disp: 45 tablet, Rfl: 3     SUMAtriptan (IMITREX) 50 MG tablet, Take 50 mg by mouth at onset of headache , Disp: , Rfl:      triamcinolone (KENALOG) 0.1 % cream, Apply topically 2 times daily, Disp: 60 g, Rfl: 3     GLUCOSAMINE-CHONDROITIN PO TABS, 1500 mg glucosamine and 1200mg chondroitin daily-2 tablets daily, Disp: , Rfl:     Allergies:   Allergies   Allergen Reactions     Levofloxacin Rash     Other reaction(s): Contact Dermatitis     Penicillins Hives and Rash     Confirmed by skin prick testing 7/3/14     Amoxicillin Hives and Rash     Amoxicillin-Pot Clavulanate Rash     Azithromycin Rash     Cephalexin Rash     Clindamycin Rash and Hives     Atorvastatin Other (See Comments)     Felt sick, intolerance     Clindamycin Hcl Hives     Levaquin      tendonitis     Augmentin Rash         REVIEW OF SYSTEMS:  CONSTITUTIONAL:NEGATIVE for fever, chills, night sweats, change in weight  INTEGUMENTARY/SKIN: NEGATIVE for worrisome rashes, moles or lesions  MUSCULOSKELETAL:See HPI above  NEURO: NEGATIVE for weakness, dizziness or paresthesias       PHYSICAL EXAM:  /80   Pulse  "54   Ht 1.803 m (5' 11\")   Wt 101 kg (222 lb 9.6 oz)   BMI 31.05 kg/m     GENERAL APPEARANCE: healthy, alert, no distress  SKIN: no suspicious lesions or rashes  NEURO: Normal strength and tone, mentation intact and speech normal  PSYCH:  mentation appears normal and affect normal  RESPIRATORY: No increased work of breathing.    BILATERAL LOWER EXTREMITIES:  Gait: normal.    Right lower extremity:  No gross deformities or masses.  Intact sensation deep peroneal nerve, superficial peroneal nerve, med/lat tibial nerve, sural nerve, saphenous nerve  Intact EHL, EDL, TA, FHL, GS, quadriceps hamstrings and hip flexors  Toes warm and well perfused, brisk capillary refill. Palpable 2+ dp pulses.  calf soft and minimal tender to squeeze.  Edema: trace      RIGHT HIP EXAM:    Palpation: tenderness over sacroiliac joint, buttock, gluteals, piriformis, posterior thigh to distal thigh, greater trochanter, iliotibial band to the knee.  Nontender to palpation groin, anterior thigh.  Strength:  Grossly intact, able to quickly flexion at the hip without discomfort.  Hip range of motion: within normal limits, no discomfort.  DANILO causes pain in the back/buttock/SI joint  Leg lengths: grossly symmetric at medial malleolus    X-RAY:  AP pelvis, lateral views right hip from 2/4/2021 were reviewed in clinic today. No obvious fractures or dislocations. Hip arthroplasty components in place without obvious failure, complication, fracture, or dislocation. Mild-moderate left hip degenerative changes.    Impression: 65 year old female with chronic right buttock pain, suspect some sciatica and SI joint dysfunction, possible piriformis syndrome.    Plan:   * reviewed xrays with patient today showing total hip arthroplasty implants. Stable alignment, well fixed. Don't think pain is related to the hip replacement itself.  * discomfort in buttock likely muscular and SI joint in nature, perhaps sciatica or piriformis.  * lateral pain " trochanteric pain syndrome, scar tissues.    * recommend stretching of history, gluteals, iliotibial band.    * possible consider piriformis or trochanteric injection but risk infection.    * return to clinic 5 years for 10 year followup.     Nir Massey M.D., M.S.  Dept. of Orthopaedic Surgery  [facility]

## 2021-02-09 ENCOUNTER — VIRTUAL VISIT (OUTPATIENT)
Dept: PSYCHOLOGY | Facility: CLINIC | Age: 66
End: 2021-02-09
Payer: COMMERCIAL

## 2021-02-09 DIAGNOSIS — F34.1 PERSISTENT DEPRESSIVE DISORDER: Primary | ICD-10-CM

## 2021-02-09 DIAGNOSIS — F41.1 GENERALIZED ANXIETY DISORDER: ICD-10-CM

## 2021-02-09 PROCEDURE — 90834 PSYTX W PT 45 MINUTES: CPT | Mod: 95 | Performed by: MARRIAGE & FAMILY THERAPIST

## 2021-02-09 ASSESSMENT — ANXIETY QUESTIONNAIRES
7. FEELING AFRAID AS IF SOMETHING AWFUL MIGHT HAPPEN: NOT AT ALL
GAD7 TOTAL SCORE: 6
GAD7 TOTAL SCORE: 6
6. BECOMING EASILY ANNOYED OR IRRITABLE: SEVERAL DAYS
2. NOT BEING ABLE TO STOP OR CONTROL WORRYING: SEVERAL DAYS
1. FEELING NERVOUS, ANXIOUS, OR ON EDGE: SEVERAL DAYS
5. BEING SO RESTLESS THAT IT IS HARD TO SIT STILL: SEVERAL DAYS
4. TROUBLE RELAXING: SEVERAL DAYS
3. WORRYING TOO MUCH ABOUT DIFFERENT THINGS: SEVERAL DAYS
GAD7 TOTAL SCORE: 6
7. FEELING AFRAID AS IF SOMETHING AWFUL MIGHT HAPPEN: NOT AT ALL

## 2021-02-09 ASSESSMENT — PATIENT HEALTH QUESTIONNAIRE - PHQ9
SUM OF ALL RESPONSES TO PHQ QUESTIONS 1-9: 9
10. IF YOU CHECKED OFF ANY PROBLEMS, HOW DIFFICULT HAVE THESE PROBLEMS MADE IT FOR YOU TO DO YOUR WORK, TAKE CARE OF THINGS AT HOME, OR GET ALONG WITH OTHER PEOPLE: SOMEWHAT DIFFICULT
SUM OF ALL RESPONSES TO PHQ QUESTIONS 1-9: 9

## 2021-02-09 NOTE — PROGRESS NOTES
Progress Note    Patient Name: Miri Naylor  Date: 2/9/21         Service Type: Individual      Session Start Time: 1:02  Session End Time: 1:54     Session Length: 52    Session #: 36    Attendees: Client attended alone    Telemedicine Visit: The patient's condition can be safely assessed and treated via synchronous audio and visual telemedicine encounter.      Reason for Telemedicine Visit: Services only offered telehealth    Originating Site (Patient Location): Patient's home    Distant Site (Provider Location): Provider Remote Setting    Consent:  The patient/guardian has verbally consented to: the potential risks and benefits of telemedicine (video visit) versus in person care; bill my insurance or make self-payment for services provided; and responsibility for payment of non-covered services.     Mode of Communication:  Video Conference via [employer]    As the provider I attest to compliance with applicable laws and regulations related to telemedicine.     Treatment Plan Last Reviewed: 2/9/21, next due 5/9/21.  PHQ-9 / MELISA-7 : completed.    DATA  Interactive Complexity: No  Crisis: No       Progress Since Last Session (Related to Symptoms / Goals / Homework):   Symptoms: increased PHQ-9 and decreased MELISA-7 scores.    Homework: Partially completed       Episode of Care Goals: Satisfactory progress - ACTION (Actively working towards change); Intervened by reinforcing change plan / affirming steps taken     Current / Ongoing Stressors and Concerns:   Client reported experiencing increased depression and decreased anxiety related to her ongoing relationship issues with her spouse.  Client reported regarding her 's high alcohol use and likely underreporting of this to his therapist.  Client acknowledged her insight of being unable to change this for him, along with reaffirming her decision to remain and her options contained therein.     Treatment  Objective(s) Addressed in This Session:   Decrease frequency and intensity of feeling down, depressed, hopeless  Identify negative self-talk and behaviors: challenge core beliefs, myths, and actions  learn & utilize at least some assertive communication skills weekly      Intervention:   CBT: reviewed with client challenging her felt lack of options related to her desire not to micromanage her spouse's treatment.  Interpersonal Therapy: reviewed with client assertively/directly/effectively communicating her concerns/perspective to her spouse.        ASSESSMENT: Current Emotional / Mental Status (status of significant symptoms):   Risk status (Self / Other harm or suicidal ideation)   Patient denies current fears or concerns for personal safety.   Patient denies current or recent suicidal ideation or behaviors.   Patientdenies current or recent homicidal ideation or behaviors.   Patient denies current or recent self injurious behavior or ideation.   Patient denies other safety concerns.   Patient Patient reports there has been no change in risk factors since their last session.     PatientPatient reports there has been no change in protective factors since their last session.     Recommended that patient call 911 or go to the local ED should there be a change in any of these risk factors.     Appearance:   Appropriate    Eye Contact:   Good    Psychomotor Behavior: Normal    Attitude:   Cooperative  Interested Friendly Pleasant Attentive   Orientation:   All   Speech    Rate / Production: Normal     Volume:  Normal    Mood:    Anxious  Depressed  Normal   Affect:    Appropriate  Worrisome    Thought Content:  Clear    Thought Form:  Coherent  Logical    Insight:    Good  and Intellectual Insight     Medication Review:   No current psychiatric medications prescribed     Medication Compliance:   NA     Changes in Health Issues:   None reported     Chemical Use Review:   Substance Use: Chemical use reviewed, no active  concerns identified      Tobacco Use: No current tobacco use.      Diagnosis:  1. Persistent depressive disorder    2. Generalized anxiety disorder        Collateral Reports Completed:   Not Applicable    PLAN: (Patient Tasks / Therapist Tasks / Other)  Client agreed to work on challenging her felt lack of options related to her desire to not micro-manage her spouse's treatment, and to assertive/directly/effectively communicate her concerns/perspective to him.        Loki Crowe, LMFT 2/9/2021                                                          ______________________________________________________________________    Treatment Plan    Patient's Name: Miri Naylor  YOB: 1955    Date: 2/9/21    DSM5 Diagnoses: 300.4 (F34.1) Persistent Depressive Disorder, Early onset or 300.02 (F41.1) Generalized Anxiety Disorder  Psychosocial / Contextual Factors: marital conflict, history of loss/trauma  WHODAS: 30    Referral / Collaboration:  Referral to another professional/service is not indicated at this time..    Anticipated number of session or this episode of care: 31+      MeasurableTreatment Goal(s) related to diagnosis / functional impairment(s)  Goal 1: Client will successfully process through past trauma defined as reporting 0 Subjective Units of Distress related to trauma on 0-10 scale and 7 on Validity of (positive) Cognition scale about self on 1-7 scale   I will know I've met my goal when I am less impacted by my past loss/trauma.       Objective #A (Client Action)    Status: Conitnued - Date: 2/9/21      Client will identify past traumatic events/memories which are causing current distress.     Intervention(s)  Therapist will take client's history and facilitate client's identification of targets for EMDR.     Objective #B  Client will complete needed assessment(s) and [facility] EMDR resourcing to confirm readiness for EMDR.    Status: Continued - Date: 2/9/21       Intervention(s)  Therapist will administer Dissociative Experiences Scale, Multidimensional Inventory of Dissociation as needed and complete [facility] EMDR resourcing with client.     Objective #C  Client will engage in installing at least 2 EMDR resources.  Status: Continued - Date: 2/9/21      Intervention(s)  Therapist will complete EMDR resourcing (Container, Remote Control, grounding/progressive muscle relaxation, Light Stream, Inner Advisor) with client.     Objective #D (Client Action)    Status: Continued - Date: 2/9/21      Client will engage in reprocessing all past traumatic event/memory targets.     Intervention(s)   Therapist will complete EMDR reprocessing with client.    Goal 2:  Client will improve overall baseline mood regulation by 2 points on a 1-10 Likert scale per self-report (10 = optimal mood/regulation).    I will know I've met my goal when I feel better/less depressed overall.      Objective #A (Client Action)    Status: Continued - Date:  2/9/21    Client will Identify negative self-talk and behaviors: challenge core beliefs, myths, and actions.    Intervention(s)  Therapist will teach emotional regulation skills. CBT/REBT ABCD model.    Objective #B  Client will Increase interest, engagement, and pleasure in doing things  Decrease frequency and intensity of feeling down, depressed, hopeless  Improve concentration, focus, and mindfulness in daily activities .    Status: Continued - Date:  2/9/21    Intervention(s)  Therapist will teach emotional regulation skills. DBT Core Mindfulness, Distress Tolerance, Emotion Regulation, and Interpersonal Effetiveness skills.    Patient has reviewed and agreed to the above plan.      Loki Crowe, LMFT  February 9, 2021

## 2021-02-10 ENCOUNTER — ANCILLARY PROCEDURE (OUTPATIENT)
Dept: MRI IMAGING | Facility: CLINIC | Age: 66
End: 2021-02-10
Attending: ANESTHESIOLOGY
Payer: COMMERCIAL

## 2021-02-10 PROCEDURE — 72148 MRI LUMBAR SPINE W/O DYE: CPT | Performed by: STUDENT IN AN ORGANIZED HEALTH CARE EDUCATION/TRAINING PROGRAM

## 2021-02-10 ASSESSMENT — ANXIETY QUESTIONNAIRES: GAD7 TOTAL SCORE: 6

## 2021-02-10 ASSESSMENT — PATIENT HEALTH QUESTIONNAIRE - PHQ9: SUM OF ALL RESPONSES TO PHQ QUESTIONS 1-9: 9

## 2021-02-11 ENCOUNTER — MYC MEDICAL ADVICE (OUTPATIENT)
Dept: FAMILY MEDICINE | Facility: CLINIC | Age: 66
End: 2021-02-11

## 2021-02-11 DIAGNOSIS — N28.89 DILATION OF RENAL PELVIS: Primary | ICD-10-CM

## 2021-02-12 ENCOUNTER — ANCILLARY PROCEDURE (OUTPATIENT)
Dept: ULTRASOUND IMAGING | Facility: CLINIC | Age: 66
End: 2021-02-12
Attending: FAMILY MEDICINE
Payer: COMMERCIAL

## 2021-02-12 DIAGNOSIS — N28.89 DILATION OF RENAL PELVIS: ICD-10-CM

## 2021-02-12 PROCEDURE — 76770 US EXAM ABDO BACK WALL COMP: CPT | Performed by: RADIOLOGY

## 2021-02-26 ENCOUNTER — MYC MEDICAL ADVICE (OUTPATIENT)
Dept: FAMILY MEDICINE | Facility: CLINIC | Age: 66
End: 2021-02-26

## 2021-03-11 ENCOUNTER — OFFICE VISIT (OUTPATIENT)
Dept: FAMILY MEDICINE | Facility: CLINIC | Age: 66
End: 2021-03-11
Payer: COMMERCIAL

## 2021-03-11 VITALS
BODY MASS INDEX: 29.99 KG/M2 | HEART RATE: 80 BPM | TEMPERATURE: 97.6 F | WEIGHT: 215 LBS | SYSTOLIC BLOOD PRESSURE: 122 MMHG | DIASTOLIC BLOOD PRESSURE: 76 MMHG

## 2021-03-11 DIAGNOSIS — R14.0 ABDOMINAL BLOATING: Primary | ICD-10-CM

## 2021-03-11 DIAGNOSIS — Z12.4 SCREENING FOR CERVICAL CANCER: ICD-10-CM

## 2021-03-11 DIAGNOSIS — Z11.51 SPECIAL SCREENING EXAMINATION FOR HUMAN PAPILLOMAVIRUS (HPV): ICD-10-CM

## 2021-03-11 DIAGNOSIS — R10.11 RUQ ABDOMINAL PAIN: ICD-10-CM

## 2021-03-11 DIAGNOSIS — D64.9 ANEMIA, UNSPECIFIED TYPE: ICD-10-CM

## 2021-03-11 LAB
ERYTHROCYTE [DISTWIDTH] IN BLOOD BY AUTOMATED COUNT: 12.8 % (ref 10–15)
HCT VFR BLD AUTO: 35.1 % (ref 35–47)
HGB BLD-MCNC: 11.4 G/DL (ref 11.7–15.7)
MCH RBC QN AUTO: 30.7 PG (ref 26.5–33)
MCHC RBC AUTO-ENTMCNC: 32.5 G/DL (ref 31.5–36.5)
MCV RBC AUTO: 95 FL (ref 78–100)
PLATELET # BLD AUTO: 515 10E9/L (ref 150–450)
RBC # BLD AUTO: 3.71 10E12/L (ref 3.8–5.2)
WBC # BLD AUTO: 7.3 10E9/L (ref 4–11)

## 2021-03-11 PROCEDURE — 85027 COMPLETE CBC AUTOMATED: CPT | Performed by: FAMILY MEDICINE

## 2021-03-11 PROCEDURE — G0145 SCR C/V CYTO,THINLAYER,RESCR: HCPCS | Performed by: FAMILY MEDICINE

## 2021-03-11 PROCEDURE — 36415 COLL VENOUS BLD VENIPUNCTURE: CPT | Performed by: FAMILY MEDICINE

## 2021-03-11 PROCEDURE — 87624 HPV HI-RISK TYP POOLED RSLT: CPT | Performed by: FAMILY MEDICINE

## 2021-03-11 PROCEDURE — 99214 OFFICE O/P EST MOD 30 MIN: CPT | Performed by: FAMILY MEDICINE

## 2021-03-11 RX ORDER — ACETAMINOPHEN 500 MG
1000 TABLET ORAL EVERY 6 HOURS PRN
COMMUNITY

## 2021-03-11 NOTE — PATIENT INSTRUCTIONS
Your exam was normal today, good news!  You can call [phone] to schedule the pelvic ultrasound we discussed.  I will let you know those results when I see them    I think a second GI opinion is a good idea.  We could consider [facility] or [place].  Please check with your insurance for coverage and let me know.  I am happy to place a referral.

## 2021-03-11 NOTE — PROGRESS NOTES
A/P:      ICD-10-CM    1. Abdominal bloating  R14.0    2. RUQ abdominal pain  R10.11    3. Anemia, unspecified type  D64.9 CBC with platelets   4. Screening for cervical cancer  Z12.4 Pap imaged thin layer screen with HPV - recommended age 30 - 65 years (select HPV order below)   5. Special screening examination for human papillomavirus (HPV)  Z11.51 HPV High Risk Types DNA Cervical     Reviewed abdominal symptoms.  Discussed MRI lumbar spine not the best for evaluating ovaries.  Pelvic exam in clinic wnl, pt would like to proceed with ultrasound as well which is ordered to confirm no pelvic masses contributing to abdominal distension.    Reviewed that we might not find a specific cause for her ongoing symptoms, might be a case of management however we should be comfortable that the eval has been complete for any potential dangerous underlying causes.  Pt verbalizes understanding but does not feel comfortable to say that now with eval to date.  Plan to seek second opinion as noted below        Patient Instructions   Your exam was normal today, good news!  You can call [phone] to schedule the pelvic ultrasound we discussed.  I will let you know those results when I see them    I think a second GI opinion is a good idea.  We could consider [facility] or [place].  Please check with your insurance for coverage and let me know.  I am happy to place a referral.            Trupti Chery is a 65 year old who presents for the following health issues    HPI         Hospital Follow-up Visit:    Hospital/Nursing Home/IP Rehab Facility: [facility]   Date of Admission: 2/27/21  Date of Discharge: 3/1/21  Reason(s) for Admission: dental infection       Was your hospitalization related to COVID-19? No   Problems taking medications regularly:  None  Medication changes since discharge: None  Problems adhering to non-medication therapy:  None    Summary of hospitalization:  CareEverywhere information  obtained and reviewed  Diagnostic Tests/Treatments reviewed.  Follow up needed: none  Other Healthcare Providers Involved in Patient s Care:         dentist  Update since discharge: improved.   Post Discharge Medication Reconciliation: discharge medications reconciled, continue medications without change.  Plan of care communicated with patient          *  completed antibiotic Monday.  Feels everything is improving now.  Bowels getting back on track.  Has dental follow up next Wednesday.  Will continue to follow with dentist for management of her implant  Now knows she tolerates omnicef and flagyl if antibiotic are needed again for dental procedures      *  Hip replacement is coming.  Working on the right time    *  Remains very frustrated by her abdominal symptoms.  Continues to have pain in the RUQ that radiates towards the RLQ.  Also however continues to feel very bloated.   Stopped metamucil and felt boating improved some.  Sees dietician next week but not sure this is helpful.  Has tried a variety of elimination diets..  Has not noticed difference from elimination diets and stopped her supplements    After eval by GI did not reveal a cause for her pain she was sent to the pain clinic.  They did not have much to offer, states they did an MRI of her back to evaluate her ovaries.    Wondering what next steps are        Review of Systems   Constitutional, HEENT, cardiovascular, pulmonary, gi and gu systems are negative, except as otherwise noted.      Objective    /76   Pulse 80   Temp 97.6  F (36.4  C) (Tympanic)   Wt 97.5 kg (215 lb)   Breastfeeding No   BMI 29.99 kg/m    Body mass index is 29.99 kg/m .  Physical Exam   PE:  VS as above   Gen:  WN/WD/WH female in NAD   Heart:  RRR without murmur, nl S1, S2, no rubs or gallops   Lungs CTA hua without rales/ronchi/wheezes   Abd: soft, postive bowel sounds, tender on palpation of R upper and mid abdomen, ND, no HSM, no rebounding/guarding/ridigity   :   normal appearing external female gentalia without lesions, cervix appears wnl, no lesions.  Bimanual exam reveals normally shaped and sized uterus, no cervical motion or adnexal tenderness        Epic reviewed

## 2021-03-12 ENCOUNTER — MYC MEDICAL ADVICE (OUTPATIENT)
Dept: FAMILY MEDICINE | Facility: CLINIC | Age: 66
End: 2021-03-12

## 2021-03-12 DIAGNOSIS — R14.0 ABDOMINAL BLOATING: Primary | ICD-10-CM

## 2021-03-15 LAB
COPATH REPORT: NORMAL
PAP: NORMAL

## 2021-03-17 ENCOUNTER — HOSPITAL ENCOUNTER (OUTPATIENT)
Dept: NUTRITION | Facility: CLINIC | Age: 66
Discharge: HOME | End: 2021-03-17
Attending: ANESTHESIOLOGY | Admitting: ANESTHESIOLOGY
Payer: COMMERCIAL

## 2021-03-17 PROCEDURE — 97802 MEDICAL NUTRITION INDIV IN: CPT | Mod: GT | Performed by: DIETITIAN, REGISTERED

## 2021-03-17 NOTE — PROGRESS NOTES
Video-Visit Details    Type of service:  Video Visit    Video Start Time (time video started): 10:30    Video End Time (time video stopped): 11:06    Originating Location (pt. Location): Home    Distant Location (provider location):  [facility]     Mode of Communication:  Video Conference via [facility]    OUTPATIENT NUTRITION ASSESSMENT (VIDEO VISIT DUE TO COVID-19)  REASON FOR ASSESSMENT  Miri BAGLEY Cyndy referred by Dr. Campbell Henry for MNT related to RUQ abdominal pain.    Patient accompanied by self.    ASSESSMENT   Nutrition History: From discussion with pt, she has tried FODMAP diet and read books related to this. She states she is lactose intolerant and does not tolerate wheat well either, which has been ongoing for about the past 3 years. Patient used to eat salads often but recently greens have been making her feel sick and states that they taste terrible. Patient was taking metamucil fiber supplement and stopped taking and felt better.  Patient eating more yogurt d/t GI issues with constipation and diarrhea. Also takes a magnesium supplement. Reports being diagnosed with a hiatal hernia ~10 years ago and has had some slight acid reflux as a result. Onions, peppers, and spices also bother her and seem to make acid reflux worse. Reports having a daily BM that is formed and brick red in color.     Diet Recall:  Breakfast: oatmeal muffins (banana, spinach) or bread (10 AM) + 8 oz yogurt occasional fruit + coffee  Lunch: cheese; mac and cheese + lettuce; chicken + cauliflower rice   Dinner: tuna, spinach; beef stew; chicken, potato, mushroom   Snack: popcorn or potato chips; 1/4 cup peanut; fried dill pickles; 1 oz mozzarella; Yazzo chocolate bar; occasional cookie    Beverages: [employer] milk; coffee; 24 oz water 3   Dining out: Minimal         Exercise: Exercise bike 3 times per week 30-40 minutes. Ballet 1 time per week.     NUTRITION FOCUSED PHYSICAL  "ASSESSMENT (NFPA) FOR DIAGNOSING MALNUTRITION  No:  Virtual Visit          Observed:   Significant abdominal distension     Obtained from Chart/Interdisciplinary Team:  - Normal pelvic exam in clinic on 3/11, plan for pelvic ultrasound to rule out ovarian masses   - Increasingly worse bloating for 4 years that has become painful over the past 2 years     LABS  Labs reviewed    MEDICATIONS  Medications reviewed    ANTHROPOMETRICS   Height: 5'11\"  Weight: 215 lbs  BMI (kg/m2): 29.99 kg/m2   Weight Status:  Overweight BMI 25-29.9  %IBW: 139%  Weight History: Wt stable over the past year. Fluctuations likely r/t abdominal distension.   Wt Readings from Last 10 Encounters:   03/11/21 97.5 kg (215 lb)   02/03/21 101 kg (222 lb 9.6 oz)   12/23/20 101.2 kg (223 lb)   12/16/20 101.2 kg (223 lb 3.2 oz)   11/18/20 97.5 kg (215 lb)   06/09/20 98.5 kg (217 lb 3.2 oz)   03/12/20 98.4 kg (217 lb)   02/13/20 96.2 kg (212 lb)   01/14/20 96.2 kg (212 lb)   09/10/19 98.2 kg (216 lb 9.6 oz)     ASSESSED NUTRITION NEEDS  Estimated Energy Needs: 2130-6639 kcals/day (15-20 Kcal/Kg)  Justification:  (overweight)  Estimated Protein Needs: 70-85 grams protein/day (1-1.2 g pro/Kg)  Justification:  (maintenance)  Estimated Fluid Needs: 3920-2765 mL/day (25-30 mL/kg)    ASSESSED MALNUTRITION STATUS  % Weight Loss:  Weight loss does not meet criteria for malnutrition - suspect r/t fluid shifts with abdominal distension vs true weight loss   % Intake:  No decreased intake noted  Subcutaneous Fat Loss:  Unable to fully assess with virtual visit - none observed via video  Loss of Muscle Mass:  Unable to assess per above, non observed via video   Fluid Retention:  None noted    Malnutrition Diagnosis:  Patient does not meet two of the above criteria necessary for diagnosing malnutrition    DIAGNOSIS   Nutrition Diagnosis:  Altered GI function related to compromised GI tract funtion with persistent abdominal discomfort, food intolerances and " aversions per pt report as evidenced by current diet tolerance and abdominal distension     INTERVENTIONS   Nutrition Prescription -   Recommend trial of elimination of FODMAP foods to reduce GI distension     IMPLEMENTATION   Assessed learning needs and learning preference  Teaching Method(s) used: Explanation  Diet Education:  Provided education on low FODMAP diet.   Nutrition Education (Content):   a)  Reviewed current diet and tolerances.  Discussed low FODMAP diet and that onions, garlic, apples, and pears typically are the most problematic FODMAP foods.  Recommend keeping journal for diet tolerances/intolerances.  Supported with the challenge of diet and abdominal distension.    b)  [employer] low FODMAP diet nutrition therapy handout   Nutrition Education (Application):   a)  Discussed current eating plans and recommended elimination of particular food irritants (onions and garlic).     b)  Patient verbalizes understanding of diet by stating will try eliminating onions and garlic in diet.  Patient also stated will limit apple intake.   Expected patient engagement: good     GOALS  Eliminate onions and garlic for one week each and observe for any improvement in GI sx.     FOLLOW UP/MONITORING   Progress towards goals will be monitored and evaluated per protocol and Practice Guidelines  Patient to call with update in 2 weeks  RD name and number provided    Time Spent with Patient  36 minutes    Mahesh Dozier, RD, LD  [facility] Outpatient Dietitian  [phone] (office phone)

## 2021-03-22 ENCOUNTER — TELEPHONE (OUTPATIENT)
Dept: ONCOLOGY | Facility: CLINIC | Age: 66
End: 2021-03-22

## 2021-03-22 NOTE — TELEPHONE ENCOUNTER
RECORDS STATUS - ALL OTHER DIAGNOSIS      RECORDS RECEIVED FROM: [facility]   DATE RECEIVED: 3/22   NOTES STATUS DETAILS   OFFICE NOTE from referring provider Elizabeth Marks DO in  FAMILY [facility]   OFFICE NOTE from medical oncologist     DISCHARGE SUMMARY from hospital [facility] 2/27/21   DISCHARGE REPORT from the ER [facility] 2/26/21   OPERATIVE REPORT Epic 9/3/20: EGD  1/23/18, 8/23/12: Colonoscopy   MEDICATION LIST [facility]    CLINICAL TRIAL TREATMENTS TO DATE     LABS     PATHOLOGY REPORTS     ANYTHING RELATED TO DIAGNOSIS Epic 3/11/21   GENONOMIC TESTING     TYPE:     IMAGING (NEED IMAGES & REPORT)     CT SCANS     MRI     MAMMO     ULTRASOUND     PET

## 2021-03-22 NOTE — TELEPHONE ENCOUNTER
ONCOLOGY INTAKE: Records Information      APPT INFORMATION:  Referring provider:  DO Barney  Referring provider s clinic:  [facility]  Reason for visit/diagnosis:  anemia  Has patient been notified of appointment date and time?: yes    RECORDS INFORMATION:  Were the records received with the referral (via [employer])? no    Has patient been seen for any external appt for this diagnosis? no    If yes, where? n/a    Has patient had any imaging or procedures outside of [place] for this condition? no      If Yes, where? n/a    ADDITIONAL INFORMATION:  none

## 2021-03-23 ENCOUNTER — VIRTUAL VISIT (OUTPATIENT)
Dept: PSYCHOLOGY | Facility: CLINIC | Age: 66
End: 2021-03-23
Payer: COMMERCIAL

## 2021-03-23 ENCOUNTER — TELEPHONE (OUTPATIENT)
Dept: FAMILY MEDICINE | Facility: CLINIC | Age: 66
End: 2021-03-23

## 2021-03-23 DIAGNOSIS — F34.1 PERSISTENT DEPRESSIVE DISORDER: ICD-10-CM

## 2021-03-23 DIAGNOSIS — Z79.2 PROPHYLACTIC ANTIBIOTIC: Primary | ICD-10-CM

## 2021-03-23 DIAGNOSIS — F41.1 GENERALIZED ANXIETY DISORDER: Primary | ICD-10-CM

## 2021-03-23 PROCEDURE — 90834 PSYTX W PT 45 MINUTES: CPT | Mod: 95 | Performed by: MARRIAGE & FAMILY THERAPIST

## 2021-03-23 RX ORDER — CEFDINIR 300 MG/1
CAPSULE ORAL
Qty: 1 CAPSULE | Refills: 0 | Status: SHIPPED | OUTPATIENT
Start: 2021-03-23 | End: 2021-07-27

## 2021-03-23 ASSESSMENT — PATIENT HEALTH QUESTIONNAIRE - PHQ9
SUM OF ALL RESPONSES TO PHQ QUESTIONS 1-9: 10
SUM OF ALL RESPONSES TO PHQ QUESTIONS 1-9: 10
10. IF YOU CHECKED OFF ANY PROBLEMS, HOW DIFFICULT HAVE THESE PROBLEMS MADE IT FOR YOU TO DO YOUR WORK, TAKE CARE OF THINGS AT HOME, OR GET ALONG WITH OTHER PEOPLE: SOMEWHAT DIFFICULT

## 2021-03-23 ASSESSMENT — ANXIETY QUESTIONNAIRES
1. FEELING NERVOUS, ANXIOUS, OR ON EDGE: MORE THAN HALF THE DAYS
4. TROUBLE RELAXING: MORE THAN HALF THE DAYS
GAD7 TOTAL SCORE: 14
2. NOT BEING ABLE TO STOP OR CONTROL WORRYING: MORE THAN HALF THE DAYS
5. BEING SO RESTLESS THAT IT IS HARD TO SIT STILL: MORE THAN HALF THE DAYS
6. BECOMING EASILY ANNOYED OR IRRITABLE: MORE THAN HALF THE DAYS
GAD7 TOTAL SCORE: 14
GAD7 TOTAL SCORE: 14
7. FEELING AFRAID AS IF SOMETHING AWFUL MIGHT HAPPEN: MORE THAN HALF THE DAYS
7. FEELING AFRAID AS IF SOMETHING AWFUL MIGHT HAPPEN: MORE THAN HALF THE DAYS
3. WORRYING TOO MUCH ABOUT DIFFERENT THINGS: MORE THAN HALF THE DAYS

## 2021-03-23 NOTE — TELEPHONE ENCOUNTER
Received call from Dr Greene, [facility].  Cell [phone]  Patient has procedure scheduled for tomorrow,3/24/21.  Patient will be having bone adjacent to dental implant removed.  Invasive procedure, not long duration of procedure per Dr Greene.    Due to patient's many allergies, Dr Greene would like direction from PCP for Omnicef dosing and quantity/duration.  Malia Brown RN

## 2021-03-23 NOTE — PROGRESS NOTES
Progress Note    Patient Name: Miri Naylor  Date: 3/23/21         Service Type: Individual      Session Start Time: 1:02  Session End Time: 1:54     Session Length: 52    Session #: 37    Attendees: Client attended alone    Telemedicine Visit: The patient's condition can be safely assessed and treated via synchronous audio and visual telemedicine encounter.      Reason for Telemedicine Visit: Services only offered telehealth    Originating Site (Patient Location): Patient's home    Distant Site (Provider Location): Provider Remote Setting    Consent:  The patient/guardian has verbally consented to: the potential risks and benefits of telemedicine (video visit) versus in person care; bill my insurance or make self-payment for services provided; and responsibility for payment of non-covered services.     Mode of Communication:  Video Conference via [employer]    As the provider I attest to compliance with applicable laws and regulations related to telemedicine.     Treatment Plan Last Reviewed: 2/9/21, next due 5/9/21.  PHQ-9 / MELISA-7 : completed.    DATA  Interactive Complexity: No  Crisis: No       Progress Since Last Session (Related to Symptoms / Goals / Homework):   Symptoms: moderate anxiety and depression.    Homework: Partially completed       Episode of Care Goals: Satisfactory progress - ACTION (Actively working towards change); Intervened by reinforcing change plan / affirming steps taken     Current / Ongoing Stressors and Concerns:   Client reported experiencing moderate anxiety and depression related to her ongoing interpersonal relationship issues.  Client reported regarding ongoing conflict with her  and improving in her ability to handle it effectively.  Client reported regarding having to set boundaries regarding caring for her grandchildren.     Treatment Objective(s) Addressed in This Session:   practice setting boundaries some times in the  next few weeks      Intervention:   Interpersonal Therapy: reviewed with client setting boundaries as needed with other to avoid engaging in conflicts.        ASSESSMENT: Current Emotional / Mental Status (status of significant symptoms):   Risk status (Self / Other harm or suicidal ideation)   Patient denies current fears or concerns for personal safety.   Patient denies current or recent suicidal ideation or behaviors.   Patientdenies current or recent homicidal ideation or behaviors.   Patient denies current or recent self injurious behavior or ideation.   Patient denies other safety concerns.   Patient Patient reports there has been no change in risk factors since their last session.     PatientPatient reports there has been no change in protective factors since their last session.     Recommended that patient call 911 or go to the local ED should there be a change in any of these risk factors.     Appearance:   Appropriate    Eye Contact:   Good    Psychomotor Behavior: Normal    Attitude:   Cooperative  Interested Friendly Pleasant Attentive   Orientation:   All   Speech    Rate / Production: Normal     Volume:  Normal    Mood:    Anxious  Depressed  Normal   Affect:    Appropriate  Worrisome    Thought Content:  Clear    Thought Form:  Coherent  Logical    Insight:    Good  and Intellectual Insight     Medication Review:   No current psychiatric medications prescribed     Medication Compliance:   NA     Changes in Health Issues:   None reported     Chemical Use Review:   Substance Use: Chemical use reviewed, no active concerns identified      Tobacco Use: No current tobacco use.      Diagnosis:  1. Generalized anxiety disorder    2. Persistent depressive disorder        Collateral Reports Completed:   Not Applicable    PLAN: (Patient Tasks / Therapist Tasks / Other)  Client agreed to work on setting boundaries as needed to avoid being drawn into engaging in conflicts with others, specifically family  members.        Loki Mancerapearl Crowe, LMFT 3/23/2021                                                          ______________________________________________________________________    Treatment Plan    Patient's Name: Miri Naylor  YOB: 1955    Date: 2/9/21    DSM5 Diagnoses: 300.4 (F34.1) Persistent Depressive Disorder, Early onset or 300.02 (F41.1) Generalized Anxiety Disorder  Psychosocial / Contextual Factors: marital conflict, history of loss/trauma  WHODAS: 29    Referral / Collaboration:  Referral to another professional/service is not indicated at this time..    Anticipated number of session or this episode of care: 31+      MeasurableTreatment Goal(s) related to diagnosis / functional impairment(s)  Goal 1: Client will successfully process through past trauma defined as reporting 0 Subjective Units of Distress related to trauma on 0-10 scale and 7 on Validity of (positive) Cognition scale about self on 1-7 scale   I will know I've met my goal when I am less impacted by my past loss/trauma.       Objective #A (Client Action)    Status: Conitnued - Date: 2/9/21      Client will identify past traumatic events/memories which are causing current distress.     Intervention(s)  Therapist will take client's history and facilitate client's identification of targets for EMDR.     Objective #B  Client will complete needed assessment(s) and [facility] resourcing to confirm readiness for EMDR.    Status: Continued - Date: 2/9/21      Intervention(s)  Therapist will administer Dissociative Experiences Scale, Multidimensional Inventory of Dissociation as needed and complete [facility] EM[facility] resourcing with client.     Objective #C  Client will engage in installing at least 2 EMDR resources.  Status: Continued - Date: 2/9/21      Intervention(s)  Therapist will complete EMDR resourcing (Container, Remote Control, grounding/progressive muscle relaxation, Light Stream, Inner Advisor) with  client.     Objective #D (Client Action)    Status: Continued - Date: 2/9/21      Client will engage in reprocessing all past traumatic event/memory targets.     Intervention(s)   Therapist will complete EMDR reprocessing with client.    Goal 2:  Client will improve overall baseline mood regulation by 2 points on a 1-10 Likert scale per self-report (10 = optimal mood/regulation).    I will know I've met my goal when I feel better/less depressed overall.      Objective #A (Client Action)    Status: Continued - Date:  2/9/21    Client will Identify negative self-talk and behaviors: challenge core beliefs, myths, and actions.    Intervention(s)  Therapist will teach emotional regulation skills. CBT/REBT ABCD model.    Objective #B  Client will Increase interest, engagement, and pleasure in doing things  Decrease frequency and intensity of feeling down, depressed, hopeless  Improve concentration, focus, and mindfulness in daily activities .    Status: Continued - Date:  2/9/21    Intervention(s)  Therapist will teach emotional regulation skills. DBT Core Mindfulness, Distress Tolerance, Emotion Regulation, and Interpersonal Effetiveness skills.    Patient has reviewed and agreed to the above plan.      Loki Crowe, LMFT  February 9, 2021

## 2021-03-24 ENCOUNTER — ANCILLARY PROCEDURE (OUTPATIENT)
Dept: ULTRASOUND IMAGING | Facility: CLINIC | Age: 66
End: 2021-03-24
Attending: FAMILY MEDICINE
Payer: COMMERCIAL

## 2021-03-24 ENCOUNTER — MYC MEDICAL ADVICE (OUTPATIENT)
Dept: FAMILY MEDICINE | Facility: CLINIC | Age: 66
End: 2021-03-24

## 2021-03-24 DIAGNOSIS — R14.0 ABDOMINAL BLOATING: Primary | ICD-10-CM

## 2021-03-24 DIAGNOSIS — I10 HYPERTENSION GOAL BP (BLOOD PRESSURE) < 140/90: ICD-10-CM

## 2021-03-24 DIAGNOSIS — R14.0 ABDOMINAL BLOATING: ICD-10-CM

## 2021-03-24 DIAGNOSIS — R10.11 RUQ ABDOMINAL PAIN: ICD-10-CM

## 2021-03-24 RX ORDER — ATENOLOL 25 MG/1
TABLET ORAL
Qty: 90 TABLET | Refills: 3 | OUTPATIENT
Start: 2021-03-24

## 2021-03-24 ASSESSMENT — PATIENT HEALTH QUESTIONNAIRE - PHQ9: SUM OF ALL RESPONSES TO PHQ QUESTIONS 1-9: 10

## 2021-03-24 ASSESSMENT — ANXIETY QUESTIONNAIRES: GAD7 TOTAL SCORE: 14

## 2021-04-05 ENCOUNTER — MYC MEDICAL ADVICE (OUTPATIENT)
Dept: FAMILY MEDICINE | Facility: CLINIC | Age: 66
End: 2021-04-05

## 2021-04-05 DIAGNOSIS — M25.559 HIP PAIN: Primary | ICD-10-CM

## 2021-04-17 ENCOUNTER — HEALTH MAINTENANCE LETTER (OUTPATIENT)
Age: 66
End: 2021-04-17

## 2021-04-26 ENCOUNTER — TELEPHONE (OUTPATIENT)
Dept: NUTRITION | Facility: CLINIC | Age: 66
End: 2021-04-26

## 2021-04-26 NOTE — PROGRESS NOTES
Returned patient's phone call.  Unable to reach.  Left message on voicemail with RD name and number. Await call back from patient.    Mahesh Dozier, RD, LD  [facility]patient Dietitian  [phone] (office phone)

## 2021-05-03 ENCOUNTER — TELEPHONE (OUTPATIENT)
Dept: ONCOLOGY | Facility: CLINIC | Age: 66
End: 2021-05-03

## 2021-05-04 ENCOUNTER — VIRTUAL VISIT (OUTPATIENT)
Dept: PSYCHOLOGY | Facility: CLINIC | Age: 66
End: 2021-05-04
Payer: COMMERCIAL

## 2021-05-04 DIAGNOSIS — F41.1 GENERALIZED ANXIETY DISORDER: Primary | ICD-10-CM

## 2021-05-04 DIAGNOSIS — F34.1 PERSISTENT DEPRESSIVE DISORDER: ICD-10-CM

## 2021-05-04 PROCEDURE — 90834 PSYTX W PT 45 MINUTES: CPT | Mod: 95 | Performed by: MARRIAGE & FAMILY THERAPIST

## 2021-05-04 ASSESSMENT — ANXIETY QUESTIONNAIRES
6. BECOMING EASILY ANNOYED OR IRRITABLE: SEVERAL DAYS
GAD7 TOTAL SCORE: 8
5. BEING SO RESTLESS THAT IT IS HARD TO SIT STILL: MORE THAN HALF THE DAYS
7. FEELING AFRAID AS IF SOMETHING AWFUL MIGHT HAPPEN: SEVERAL DAYS
4. TROUBLE RELAXING: SEVERAL DAYS
3. WORRYING TOO MUCH ABOUT DIFFERENT THINGS: SEVERAL DAYS
7. FEELING AFRAID AS IF SOMETHING AWFUL MIGHT HAPPEN: SEVERAL DAYS
GAD7 TOTAL SCORE: 8
2. NOT BEING ABLE TO STOP OR CONTROL WORRYING: SEVERAL DAYS
1. FEELING NERVOUS, ANXIOUS, OR ON EDGE: SEVERAL DAYS

## 2021-05-04 NOTE — PROGRESS NOTES
Progress Note    Patient Name: Miri Naylor  Date: 5/4/21         Service Type: Individual      Session Start Time: 12:02  Session End Time: 12:54     Session Length: 52    Session #: 38    Attendees: Client attended alone    Telemedicine Visit: The patient's condition can be safely assessed and treated via synchronous audio and visual telemedicine encounter.      Reason for Telemedicine Visit: Services only offered telehealth    Originating Site (Patient Location): Patient's home    Distant Site (Provider Location): Provider Remote Setting    Consent:  The patient/guardian has verbally consented to: the potential risks and benefits of telemedicine (video visit) versus in person care; bill my insurance or make self-payment for services provided; and responsibility for payment of non-covered services.     Mode of Communication:  Video Conference via [employer]    As the provider I attest to compliance with applicable laws and regulations related to telemedicine.     Treatment Plan Last Reviewed: 2/9/21, next due 5/9/21.  PHQ-9 / MELISA-7 : completed.    DATA  Interactive Complexity: No  Crisis: No       Progress Since Last Session (Related to Symptoms / Goals / Homework):   Symptoms: mild anxiety and depression.    Homework: Partially completed       Episode of Care Goals: Satisfactory progress - ACTION (Actively working towards change); Intervened by reinforcing change plan / affirming steps taken     Current / Ongoing Stressors and Concerns:   Client reported experiencing mild anxiety and depression related to worsening conflict with her spouse.  Client reported that her spouse was verbally aggressive and threatening during an argument with her in the past month, and that tension remains high.  Client reported that her spouse's expectation is that she change to solve conflict, which she is unwilling to do, and that she is once again considering leaving him and making  plans to this end.     Treatment Objective(s) Addressed in This Session:   learn & utilize at least some assertive communication skills weekly      Intervention:   Interpersonal Therapy: reviewed with client considering how and when to communicate her plans to her spouse.        ASSESSMENT: Current Emotional / Mental Status (status of significant symptoms):   Risk status (Self / Other harm or suicidal ideation)   Patient denies current fears or concerns for personal safety.   Patient denies current or recent suicidal ideation or behaviors.   Patientdenies current or recent homicidal ideation or behaviors.   Patient denies current or recent self injurious behavior or ideation.   Patient denies other safety concerns.   Patient Patient reports there has been no change in risk factors since their last session.     PatientPatient reports there has been no change in protective factors since their last session.     Recommended that patient call 911 or go to the local ED should there be a change in any of these risk factors.     Appearance:   Appropriate    Eye Contact:   Good    Psychomotor Behavior: Normal    Attitude:   Cooperative  Interested Friendly Pleasant Attentive   Orientation:   All   Speech    Rate / Production: Normal     Volume:  Normal    Mood:    Anxious  Depressed  Normal   Affect:    Appropriate  Worrisome    Thought Content:  Clear    Thought Form:  Coherent  Logical    Insight:    Good  and Intellectual Insight     Medication Review:   No current psychiatric medications prescribed     Medication Compliance:   NA     Changes in Health Issues:   None reported     Chemical Use Review:   Substance Use: Chemical use reviewed, no active concerns identified      Tobacco Use: No current tobacco use.      Diagnosis:  1. Generalized anxiety disorder    2. Persistent depressive disorder        Collateral Reports Completed:   Not Applicable    PLAN: (Patient Tasks / Therapist Tasks / Other)  Client agreed to work  on considering how and when to communicate her plans to her spouse..        Loki Crowe, LMFT 5/4/2021                                                          ______________________________________________________________________    Treatment Plan    Patient's Name: Miri Naylor  YOB: 1955    Date: 2/9/21    DSM5 Diagnoses: 300.4 (F34.1) Persistent Depressive Disorder, Early onset or 300.02 (F41.1) Generalized Anxiety Disorder  Psychosocial / Contextual Factors: marital conflict, history of loss/trauma  WHODAS: 29    Referral / Collaboration:  Referral to another professional/service is not indicated at this time..    Anticipated number of session or this episode of care: 31+      MeasurableTreatment Goal(s) related to diagnosis / functional impairment(s)  Goal 1: Client will successfully process through past trauma defined as reporting 0 Subjective Units of Distress related to trauma on 0-10 scale and 7 on Validity of (positive) Cognition scale about self on 1-7 scale   I will know I've met my goal when I am less impacted by my past loss/trauma.       Objective #A (Client Action)    Status: Conitnued - Date: 2/9/21      Client will identify past traumatic events/memories which are causing current distress.     Intervention(s)  Therapist will take client's history and facilitate client's identification of targets for EMDR.     Objective #B  Client will complete needed assessment(s) and [facility] EM[facility] resourcing to confirm readiness for EMDR.    Status: Continued - Date: 2/9/21      Intervention(s)  Therapist will administer Dissociative Experiences Scale, Multidimensional Inventory of Dissociation as needed and complete [facility] EMDR resourcing with client.     Objective #C  Client will engage in installing at least 2 EMDR resources.  Status: Continued - Date: 2/9/21      Intervention(s)  Therapist will complete EMDR resourcing (Container, Remote Control, grounding/progressive  muscle relaxation, Light Stream, Inner Advisor) with client.     Objective #D (Client Action)    Status: Continued - Date: 2/9/21      Client will engage in reprocessing all past traumatic event/memory targets.     Intervention(s)   Therapist will complete EMDR reprocessing with client.    Goal 2:  Client will improve overall baseline mood regulation by 2 points on a 1-10 Likert scale per self-report (10 = optimal mood/regulation).    I will know I've met my goal when I feel better/less depressed overall.      Objective #A (Client Action)    Status: Continued - Date:  2/9/21    Client will Identify negative self-talk and behaviors: challenge core beliefs, myths, and actions.    Intervention(s)  Therapist will teach emotional regulation skills. CBT/REBT ABCD model.    Objective #B  Client will Increase interest, engagement, and pleasure in doing things  Decrease frequency and intensity of feeling down, depressed, hopeless  Improve concentration, focus, and mindfulness in daily activities .    Status: Continued - Date:  2/9/21    Intervention(s)  Therapist will teach emotional regulation skills. DBT Core Mindfulness, Distress Tolerance, Emotion Regulation, and Interpersonal Effetiveness skills.    Patient has reviewed and agreed to the above plan.      Loki Crowe, LMFT  February 9, 2021

## 2021-05-05 ASSESSMENT — ANXIETY QUESTIONNAIRES: GAD7 TOTAL SCORE: 8

## 2021-05-05 ASSESSMENT — PATIENT HEALTH QUESTIONNAIRE - PHQ9: SUM OF ALL RESPONSES TO PHQ QUESTIONS 1-9: 7

## 2021-05-31 DIAGNOSIS — K21.9 GASTROESOPHAGEAL REFLUX DISEASE: ICD-10-CM

## 2021-06-01 ENCOUNTER — VIRTUAL VISIT (OUTPATIENT)
Dept: PSYCHOLOGY | Facility: CLINIC | Age: 66
End: 2021-06-01
Payer: COMMERCIAL

## 2021-06-01 DIAGNOSIS — F34.1 PERSISTENT DEPRESSIVE DISORDER: ICD-10-CM

## 2021-06-01 DIAGNOSIS — F41.1 GENERALIZED ANXIETY DISORDER: Primary | ICD-10-CM

## 2021-06-01 PROCEDURE — 90834 PSYTX W PT 45 MINUTES: CPT | Mod: 95 | Performed by: MARRIAGE & FAMILY THERAPIST

## 2021-06-01 RX ORDER — PANTOPRAZOLE SODIUM 40 MG/1
TABLET, DELAYED RELEASE ORAL
Qty: 90 TABLET | Refills: 1 | Status: SHIPPED | OUTPATIENT
Start: 2021-06-01 | End: 2021-11-29

## 2021-06-01 ASSESSMENT — ANXIETY QUESTIONNAIRES
7. FEELING AFRAID AS IF SOMETHING AWFUL MIGHT HAPPEN: SEVERAL DAYS
4. TROUBLE RELAXING: SEVERAL DAYS
2. NOT BEING ABLE TO STOP OR CONTROL WORRYING: SEVERAL DAYS
5. BEING SO RESTLESS THAT IT IS HARD TO SIT STILL: SEVERAL DAYS
GAD7 TOTAL SCORE: 7
6. BECOMING EASILY ANNOYED OR IRRITABLE: SEVERAL DAYS
GAD7 TOTAL SCORE: 7
1. FEELING NERVOUS, ANXIOUS, OR ON EDGE: SEVERAL DAYS
3. WORRYING TOO MUCH ABOUT DIFFERENT THINGS: SEVERAL DAYS
7. FEELING AFRAID AS IF SOMETHING AWFUL MIGHT HAPPEN: SEVERAL DAYS
GAD7 TOTAL SCORE: 7

## 2021-06-01 NOTE — PROGRESS NOTES
Progress Note    Patient Name: Miri Naylor  Date: 6/1/21         Service Type: Individual      Session Start Time: 11:05  Session End Time: 11:57     Session Length: 52    Session #: 39    Attendees: Client attended alone    Telemedicine Visit: The patient's condition can be safely assessed and treated via synchronous audio and visual telemedicine encounter.      Reason for Telemedicine Visit: Services only offered telehealth    Originating Site (Patient Location): Patient's home    Distant Site (Provider Location): Provider Remote Setting    Consent:  The patient/guardian has verbally consented to: the potential risks and benefits of telemedicine (video visit) versus in person care; bill my insurance or make self-payment for services provided; and responsibility for payment of non-covered services.     Mode of Communication:  Video Conference via [employer]    As the provider I attest to compliance with applicable laws and regulations related to telemedicine.     Treatment Plan Last Reviewed: 6/1/21, next due 9/1/21.  PHQ-9 / MELISA-7 : completed.    DATA  Interactive Complexity: No  Crisis: No       Progress Since Last Session (Related to Symptoms / Goals / Homework):   Symptoms: increased PHQ-9 and decreased MELISA-7 scores.    Homework: Partially completed       Episode of Care Goals: Satisfactory progress - ACTION (Actively working towards change); Intervened by reinforcing change plan / affirming steps taken     Current / Ongoing Stressors and Concerns:   Client reported experiencing continued anxiety and depression related to issues with her daughter and ongoing conflict with her spouse.  Client reported that she has been watching her grandchildren more recently, being tired as a result, and feeling guilty about not being able to honor her past plans with her daughter, and how this leads to sense of obligation.  Client reported that her spouse is making progress  in his therapy and self-awareness, but that she has concerns about possible memory issues (forgetfulness, lack of comprehension, and family history).     Treatment Objective(s) Addressed in This Session:   use at least some coping skills for anxiety management in the next few weeks  practice setting boundaries some times in the next few weeks   Client identified her desired continued therapy goals.      Intervention:   DBT: reviewed with patient identifying and challenging her unjustified guilt.  Interpersonal Therapy: reviewed with client setting boundaries and limits with her daughter regarding childcare.  Motivational Interviewing: used circular/Socratic questioning to elicit client's identification of her desired continued therapy goals.  Solution Focused: reviewed with client referral to another therapist and getting releases for spouse's medical team.        ASSESSMENT: Current Emotional / Mental Status (status of significant symptoms):   Risk status (Self / Other harm or suicidal ideation)   Patient denies current fears or concerns for personal safety.   Patient denies current or recent suicidal ideation or behaviors.   Patientdenies current or recent homicidal ideation or behaviors.   Patient denies current or recent self injurious behavior or ideation.   Patient denies other safety concerns.   Patient Patient reports there has been no change in risk factors since their last session.     PatientPatient reports there has been no change in protective factors since their last session.     Recommended that patient call 911 or go to the local ED should there be a change in any of these risk factors.     Appearance:   Appropriate    Eye Contact:   Good    Psychomotor Behavior: Normal    Attitude:   Cooperative  Interested Friendly Pleasant Attentive   Orientation:   All   Speech    Rate / Production: Normal     Volume:  Normal    Mood:    Anxious  Depressed  Normal   Affect:    Appropriate  Worrisome    Thought  Content:  Clear    Thought Form:  Coherent  Logical    Insight:    Good  and Intellectual Insight     Medication Review:   No current psychiatric medications prescribed     Medication Compliance:   NA     Changes in Health Issues:   None reported     Chemical Use Review:   Substance Use: Chemical use reviewed, no active concerns identified      Tobacco Use: No current tobacco use.      Diagnosis:  1. Generalized anxiety disorder    2. Persistent depressive disorder        Collateral Reports Completed:   Not Applicable    PLAN: (Patient Tasks / Therapist Tasks / Other)  Client agreed to work on identifying and challenging her unjustified guilt, setting boundaries and limits with her daughter regarding childcare, following through with referral to another therapist and getting releases for spouse's medical team.        Loki Barron Amrita, LMFT 6/1/2021                                                          ______________________________________________________________________    Treatment Plan    Patient's Name: Miri Naylor  YOB: 1955    Date: 6/1/21    DSM5 Diagnoses: 300.4 (F34.1) Persistent Depressive Disorder, Early onset or 300.02 (F41.1) Generalized Anxiety Disorder  Psychosocial / Contextual Factors: marital conflict, history of loss/trauma  WHODAS: 29    Referral / Collaboration:  Referral to another professional/service is not indicated at this time..    Anticipated number of session or this episode of care: 31+      MeasurableTreatment Goal(s) related to diagnosis / functional impairment(s)  Goal 1: Client will successfully process through past trauma defined as reporting 0 Subjective Units of Distress related to trauma on 0-10 scale and 7 on Validity of (positive) Cognition scale about self on 1-7 scale   I will know I've met my goal when I am less impacted by my past loss/trauma.       Objective #A (Client Action)    Status: Conitnued - Date: 6/1/21      Client will identify  past traumatic events/memories which are causing current distress.     Intervention(s)  Therapist will take client's history and facilitate client's identification of targets for EMDR.     Objective #B  Client will complete needed assessment(s) and [facility] EM[facility] resourcing to confirm readiness for EMDR.    Status: Continued - Date: 6/1/21      Intervention(s)  Therapist will administer Dissociative Experiences Scale, Multidimensional Inventory of Dissociation as needed and complete [facility] EMDR resourcing with client.     Objective #C  Client will engage in installing at least 2 EMDR resources.  Status: Continued - Date: 6/1/21      Intervention(s)  Therapist will complete EMDR resourcing (Container, Remote Control, grounding/progressive muscle relaxation, Light Stream, Inner Advisor) with client.     Objective #D (Client Action)    Status: Continued - Date: 6/1/21      Client will engage in reprocessing all past traumatic event/memory targets.     Intervention(s)   Therapist will complete EMDR reprocessing with client.    Goal 2:  Client will improve overall baseline mood regulation by 2 points on a 1-10 Likert scale per self-report (10 = optimal mood/regulation).    I will know I've met my goal when I feel better/less depressed overall.      Objective #A (Client Action)    Status: Continued - Date:  6/1/21    Client will Identify negative self-talk and behaviors: challenge core beliefs, myths, and actions.    Intervention(s)  Therapist will teach emotional regulation skills. CBT/REBT ABCD model.    Objective #B  Client will Increase interest, engagement, and pleasure in doing things  Decrease frequency and intensity of feeling down, depressed, hopeless  Improve concentration, focus, and mindfulness in daily activities .    Status: Continued - Date:  6/1/21    Intervention(s)  Therapist will teach emotional regulation skills. DBT Core Mindfulness, Distress Tolerance, Emotion Regulation, and Interpersonal  Effetiveness skills.    Patient has reviewed and agreed to the above plan.      Loki Crowe, LMFT  June 1, 2021

## 2021-06-02 ASSESSMENT — PATIENT HEALTH QUESTIONNAIRE - PHQ9: SUM OF ALL RESPONSES TO PHQ QUESTIONS 1-9: 8

## 2021-06-02 ASSESSMENT — ANXIETY QUESTIONNAIRES: GAD7 TOTAL SCORE: 7

## 2021-06-15 ENCOUNTER — VIRTUAL VISIT (OUTPATIENT)
Dept: PSYCHOLOGY | Facility: CLINIC | Age: 66
End: 2021-06-15
Payer: COMMERCIAL

## 2021-06-15 DIAGNOSIS — F34.1 PERSISTENT DEPRESSIVE DISORDER: ICD-10-CM

## 2021-06-15 DIAGNOSIS — F41.1 GENERALIZED ANXIETY DISORDER: Primary | ICD-10-CM

## 2021-06-15 PROCEDURE — 90834 PSYTX W PT 45 MINUTES: CPT | Mod: 95 | Performed by: MARRIAGE & FAMILY THERAPIST

## 2021-06-15 ASSESSMENT — ANXIETY QUESTIONNAIRES
7. FEELING AFRAID AS IF SOMETHING AWFUL MIGHT HAPPEN: SEVERAL DAYS
1. FEELING NERVOUS, ANXIOUS, OR ON EDGE: SEVERAL DAYS
5. BEING SO RESTLESS THAT IT IS HARD TO SIT STILL: SEVERAL DAYS
4. TROUBLE RELAXING: SEVERAL DAYS
3. WORRYING TOO MUCH ABOUT DIFFERENT THINGS: SEVERAL DAYS
GAD7 TOTAL SCORE: 7
GAD7 TOTAL SCORE: 7
6. BECOMING EASILY ANNOYED OR IRRITABLE: SEVERAL DAYS
2. NOT BEING ABLE TO STOP OR CONTROL WORRYING: SEVERAL DAYS
GAD7 TOTAL SCORE: 7
7. FEELING AFRAID AS IF SOMETHING AWFUL MIGHT HAPPEN: SEVERAL DAYS

## 2021-06-15 ASSESSMENT — PATIENT HEALTH QUESTIONNAIRE - PHQ9
SUM OF ALL RESPONSES TO PHQ QUESTIONS 1-9: 6
SUM OF ALL RESPONSES TO PHQ QUESTIONS 1-9: 6
10. IF YOU CHECKED OFF ANY PROBLEMS, HOW DIFFICULT HAVE THESE PROBLEMS MADE IT FOR YOU TO DO YOUR WORK, TAKE CARE OF THINGS AT HOME, OR GET ALONG WITH OTHER PEOPLE: SOMEWHAT DIFFICULT

## 2021-06-15 NOTE — PROGRESS NOTES
Progress Note    Patient Name: Miri Naylor  Date: 6/15/21         Service Type: Individual      Session Start Time: 10:05  Session End Time: 10:57     Session Length: 52    Session #: 40    Attendees: Client attended alone    Telemedicine Visit: The patient's condition can be safely assessed and treated via synchronous audio and visual telemedicine encounter.      Reason for Telemedicine Visit: Services only offered telehealth    Originating Site (Patient Location): Patient's home    Distant Site (Provider Location): Provider Remote Setting    Consent:  The patient/guardian has verbally consented to: the potential risks and benefits of telemedicine (video visit) versus in person care; bill my insurance or make self-payment for services provided; and responsibility for payment of non-covered services.     Mode of Communication:  Video Conference via [employer]    As the provider I attest to compliance with applicable laws and regulations related to telemedicine.     Treatment Plan Last Reviewed: 6/1/21, next due 9/1/21.  PHQ-9 / MELISA-7 : completed.    DATA  Interactive Complexity: No  Crisis: No       Progress Since Last Session (Related to Symptoms / Goals / Homework):   Symptoms: Improving decreased PHQ-9 score.    Homework: Partially completed       Episode of Care Goals: Satisfactory progress - ACTION (Actively working towards change); Intervened by reinforcing change plan / affirming steps taken     Current / Ongoing Stressors and Concerns:   Client reported experiencing ongoing anxiety and depression related to ongoing conflict with her spouse, though decreased.  Client reported that her sleep has been poor due to physical health issues and yard work, and overall continuing to be tired due to watching her grandchildren.  Client reported her plans to take a break from therapy after discontinuing with this therapist, having established contingency plans for her  future if her desired outcomes with her spouse don't occur.     Treatment Objective(s) Addressed in This Session:   use at least some coping skills for anxiety management in the next few weeks     Intervention:   Solution Focused: reviewed with client plans to discontinue therapy at this time and options for future therapy, and also for her contingency plans.        ASSESSMENT: Current Emotional / Mental Status (status of significant symptoms):   Risk status (Self / Other harm or suicidal ideation)   Patient denies current fears or concerns for personal safety.   Patient denies current or recent suicidal ideation or behaviors.   Patientdenies current or recent homicidal ideation or behaviors.   Patient denies current or recent self injurious behavior or ideation.   Patient denies other safety concerns.   Patient Patient reports there has been no change in risk factors since their last session.     PatientPatient reports there has been no change in protective factors since their last session.     Recommended that patient call 911 or go to the local ED should there be a change in any of these risk factors.     Appearance:   Appropriate    Eye Contact:   Good    Psychomotor Behavior: Normal    Attitude:   Cooperative  Interested Friendly Pleasant Attentive   Orientation:   All   Speech    Rate / Production: Normal     Volume:  Normal    Mood:    Anxious  Depressed  Normal   Affect:    Appropriate  Worrisome    Thought Content:  Clear    Thought Form:  Coherent  Logical    Insight:    Good  and Intellectual Insight     Medication Review:   No current psychiatric medications prescribed     Medication Compliance:   NA     Changes in Health Issues:   None reported     Chemical Use Review:   Substance Use: Chemical use reviewed, no active concerns identified      Tobacco Use: No current tobacco use.      Diagnosis:  1. Generalized anxiety disorder    2. Persistent depressive disorder        Collateral Reports  Completed:   Not Applicable    PLAN: (Patient Tasks / Therapist Tasks / Other)  Client agreed to work on following through with considering options/contigencies as needed.        Loki Crowe, LMFT 6/15/2021                                                          ______________________________________________________________________    Treatment Plan    Patient's Name: Miri Naylor  YOB: 1955    Date: 6/1/21    DSM5 Diagnoses: 300.4 (F34.1) Persistent Depressive Disorder, Early onset or 300.02 (F41.1) Generalized Anxiety Disorder  Psychosocial / Contextual Factors: marital conflict, history of loss/trauma  WHODAS: 30    Referral / Collaboration:  Referral to another professional/service is not indicated at this time..    Anticipated number of session or this episode of care: 31+      MeasurableTreatment Goal(s) related to diagnosis / functional impairment(s)  Goal 1: Client will successfully process through past trauma defined as reporting 0 Subjective Units of Distress related to trauma on 0-10 scale and 7 on Validity of (positive) Cognition scale about self on 1-7 scale   I will know I've met my goal when I am less impacted by my past loss/trauma.       Objective #A (Client Action)    Status: Conitnued - Date: 6/1/21      Client will identify past traumatic events/memories which are causing current distress.     Intervention(s)  Therapist will take client's history and facilitate client's identification of targets for EMDR.     Objective #B  Client will complete needed assessment(s) and [facility] EMDR resourcing to confirm readiness for EMDR.    Status: Continued - Date: 6/1/21      Intervention(s)  Therapist will administer Dissociative Experiences Scale, Multidimensional Inventory of Dissociation as needed and complete [facility] EMDR resourcing with client.     Objective #C  Client will engage in installing at least 2 EMDR resources.  Status: Continued - Date: 6/1/21       Intervention(s)  Therapist will complete EMDR resourcing (Container, Remote Control, grounding/progressive muscle relaxation, Light Stream, Inner Advisor) with client.     Objective #D (Client Action)    Status: Continued - Date: 6/1/21      Client will engage in reprocessing all past traumatic event/memory targets.     Intervention(s)   Therapist will complete EMDR reprocessing with client.    Goal 2:  Client will improve overall baseline mood regulation by 2 points on a 1-10 Likert scale per self-report (10 = optimal mood/regulation).    I will know I've met my goal when I feel better/less depressed overall.      Objective #A (Client Action)    Status: Continued - Date:  6/1/21    Client will Identify negative self-talk and behaviors: challenge core beliefs, myths, and actions.    Intervention(s)  Therapist will teach emotional regulation skills. CBT/REBT ABCD model.    Objective #B  Client will Increase interest, engagement, and pleasure in doing things  Decrease frequency and intensity of feeling down, depressed, hopeless  Improve concentration, focus, and mindfulness in daily activities .    Status: Continued - Date:  6/1/21    Intervention(s)  Therapist will teach emotional regulation skills. DBT Core Mindfulness, Distress Tolerance, Emotion Regulation, and Interpersonal Effetiveness skills.    Patient has reviewed and agreed to the above plan.      Loki Crowe, LMFT  June 1, 2021

## 2021-06-16 ASSESSMENT — ANXIETY QUESTIONNAIRES: GAD7 TOTAL SCORE: 7

## 2021-06-16 ASSESSMENT — PATIENT HEALTH QUESTIONNAIRE - PHQ9: SUM OF ALL RESPONSES TO PHQ QUESTIONS 1-9: 6

## 2021-07-14 ENCOUNTER — VIRTUAL VISIT (OUTPATIENT)
Dept: ALLERGY | Facility: OTHER | Age: 66
End: 2021-07-14
Payer: COMMERCIAL

## 2021-07-14 VITALS — WEIGHT: 205 LBS | BODY MASS INDEX: 28.7 KG/M2 | HEIGHT: 71 IN

## 2021-07-14 DIAGNOSIS — J45.40 MODERATE PERSISTENT ASTHMA WITHOUT COMPLICATION: ICD-10-CM

## 2021-07-14 DIAGNOSIS — J31.0 RHINOCONJUNCTIVITIS: ICD-10-CM

## 2021-07-14 DIAGNOSIS — I10 ESSENTIAL HYPERTENSION: ICD-10-CM

## 2021-07-14 DIAGNOSIS — H10.9 RHINOCONJUNCTIVITIS: ICD-10-CM

## 2021-07-14 PROCEDURE — 99213 OFFICE O/P EST LOW 20 MIN: CPT | Mod: 95 | Performed by: ALLERGY & IMMUNOLOGY

## 2021-07-14 RX ORDER — AZELASTINE 1 MG/ML
2 SPRAY, METERED NASAL 2 TIMES DAILY
Qty: 30 ML | Refills: 6 | Status: SHIPPED | OUTPATIENT
Start: 2021-07-14

## 2021-07-14 RX ORDER — MONTELUKAST SODIUM 10 MG/1
10 TABLET ORAL AT BEDTIME
Qty: 90 TABLET | Refills: 3 | Status: SHIPPED | OUTPATIENT
Start: 2021-07-14

## 2021-07-14 RX ORDER — BUDESONIDE AND FORMOTEROL FUMARATE DIHYDRATE 160; 4.5 UG/1; UG/1
2 AEROSOL RESPIRATORY (INHALATION) 2 TIMES DAILY
Qty: 10.2 G | Refills: 6 | Status: SHIPPED | OUTPATIENT
Start: 2021-07-14

## 2021-07-14 ASSESSMENT — MIFFLIN-ST. JEOR: SCORE: 1571

## 2021-07-14 NOTE — ASSESSMENT & PLAN NOTE
Flares with URI, cold air and humid air. Current treatment is Symbicort 160/4.5mcg 2 puff inhaled daily and Singulair 10mg PO daily. Tried off of Singulair and increased symptoms.          - Albuterol 2-4 puffs inhaled (use a spacer unless using a Proair Respiclick device) every 4 hours as needed for chest tightness, wheezing, shortness of breath and/or coughing.    - Avoid asthma triggers.  - Symbicort 160/4.5mcg 2 puffs inhaled twice daily with a spacer.   - Singulair 10mg by mouth daily at night.

## 2021-07-14 NOTE — PATIENT INSTRUCTIONS
Allergy Staff Appt Hours Shot Hours Locations    Physician     Nish Stockton DO       Support Staff     MEMO Martinez CMA  Tuesday:   [place] 7-5 Wednesday:  [place]: 7-5 Thursday:                    [place] 7-6     Friday:  [place]  7-2   [place]        Thursday: 7-5:20        Friday: 7-12:20     [place]        Tuesday: 7- 3:20 Wednesday: 7-4:20 [place] Monday: 7-2:20 Tuesday: 9-5:20         [facility]  [address] [place] [address]  Appt Line: [phone]      [facility]  [address] [place] [address]  Appt Line: [phone]         Important Scheduling Information  Aspirin Desensitization: Appt will last 2 clinic days. Please call the Allergy RN line for your clinic to schedule. Discontinue antihistamines 7 days prior to the appointment.     Food Challenges: Appt will last 3-4 hours. Please call the Allergy RN line for your clinic to schedule. Discontinue antihistamines 7 days prior to the appointment.     Penicillin Testing: Appt will last 2-3 hours. Please call the Allergy RN line for your clinic to schedule. Discontinue antihistamines 7 days prior to the appointment.     Skin Testing: Appt will about 40 minutes. Call the appointment line for your clinic to schedule. Discontinue antihistamines 7 days prior to the appointment.     Venom Testing: Appt will last 2-3 hours. Please call the Allergy RN line for your clinic to schedule. Discontinue antihistamines 7 days prior to the appointment.     Thank you for trusting us with your Allergy, Asthma, and Immunology care. Please feel free to contact us with any questions or concerns you may have.

## 2021-07-14 NOTE — PROGRESS NOTES
"Miri Naylor is a 66 year old female who is being evaluated via a billable telephone visit.      The patient has been notified of following:     \"This telephone visit will be conducted via a call between you and your physician/provider. We have found that certain health care needs can be provided without the need for a physical exam.  This service lets us provide the care you need with a short phone conversation.  If a prescription is necessary, we can send it directly to your pharmacy.  If lab work is needed, we can place an order for that and you can then stop by our lab to have the test done at a later time.     If during the course of the call the physician/provider feels a telephone visit is not appropriate, you will not be charged for this service.\"   Consent has been obtained for this service by 1 care team member: yes.     I have reviewed and updated the patient's Past Medical History, Social History, Family History and Medication List.    On Singulair 10mg daily and Symbicort 160/4.5mcg 2 puffs twice daily. Asthma has been controlled. If taking medications she is controlled. If mows the lawn she will develop chest symptoms. Wears mask when mows lawn. Using albuterol prior to mowing lawn. Can additionally develop chest symptoms with exertion.     Off Azelastine for 3 days and increased post nasal drainage and hoarseness. On Flonase and Zyrtec.       Past Medical History:   Diagnosis Date     Depressive disorder, not elsewhere classified      Drug allergy, multiple 9/23/14--passed graded oral challenge for Zithromax.     7/3/14 POS PRE-PEN skin test. 7/30/14 NEG skin tests and oral challenge to Cefzil     Hx of abnormal Pap smear ?    no specifics given     lumbar degeneration      Raynaud's disease      Uncomplicated asthma      Unspecified essential hypertension      Family History   Problem Relation Age of Onset     Arthritis Mother      Hyperlipidemia Mother      Other Cancer Mother         " Glioblastoma Multiforme     Osteoporosis Mother      Blood Disease Father      Hypertension Father      Hyperlipidemia Father      Other Cancer Father         lung cancer     Depression Son      Depression Daughter      Hyperlipidemia Brother      Anxiety Disorder Son      Skin Cancer No family hx of      Past Surgical History:   Procedure Laterality Date     BIOPSY OF BREAST, NEEDLE CORE       C PELVIS/HIP JOINT SURGERY UNLISTED Right 7/19/16    total hip arthroplasty     COLONOSCOPY       COMBINED ESOPHAGOSCOPY, GASTROSCOPY, DUODENOSCOPY (EGD) WITH CO2 INSUFFLATION N/A 9/3/2020    Procedure: ESOPHAGOGASTRODUODENOSCOPY, WITH CO2 INSUFFLATION;  Surgeon: Campbell Henry MD;  Location: [facility] OR     ESOPHAGOSCOPY, GASTROSCOPY, DUODENOSCOPY (EGD), COMBINED N/A 9/3/2020    Procedure: Esophagogastroduodenoscopy, With Biopsy;  Surgeon: Campbell Henry MD;  Location: [facility] OR     HIP SURGERY Right 07/19/2016    replacement       REVIEW OF SYSTEMS:  General: negative for weight gain. negative for weight loss. negative for changes in sleep.   Ears: negative for fullness. negative for hearing loss. negative for dizziness.   Nose: negative for snoring.negative for changes in smell. negative for drainage.   Eyes: negative for eye watering. negative for eye itching. negative for vision changes. negative for eye redness.  Throat: negative for hoarseness. negative for sore throat. negative for trouble swallowing.   Lungs: negative for shortness of breath.negative for wheezing. negative for sputum production.   Cardiovascular: negative for chest pain. negative for swelling of ankles. negative for fast or irregular heartbeat.   Gastrointestinal: negative for nausea. negative for heartburn. negative for acid reflux.   Musculoskeletal: negative for joint pain. negative for joint stiffness. negative for joint swelling.   Neurologic: negative for seizures. negative for fainting. negative for weakness.   Psychiatric:  negative for changes in mood. negative for anxiety.   Endocrine: negative for cold intolerance. negative for heat intolerance. negative for tremors.   Lymphatic: negative for lower extremity swelling. negative for lymph node swelling.   Hematologic: negative for easy bruising. negative for easy bleeding.  Integumentary: negative for rash. negative for scaling. negative for nail changes.       Current Outpatient Medications:      acetaminophen (TYLENOL) 500 MG tablet, Take 1,000 mg by mouth every 6 hours as needed for mild pain, Disp: , Rfl:      albuterol (PROAIR HFA) 108 (90 Base) MCG/ACT inhaler, Inhale 2 puffs into the lungs every 4 hours as needed for shortness of breath / dyspnea or wheezing, Disp: 1 Inhaler, Rfl: 1     aspirin (ASA) 81 MG EC tablet, Take 81 mg by mouth daily, Disp: , Rfl:      azelastine (ASTELIN) 0.1 % nasal spray, Spray 2 sprays into both nostrils 2 times daily, Disp: 30 mL, Rfl: 6     budesonide-formoterol (SYMBICORT) 160-4.5 MCG/ACT Inhaler, Inhale 2 puffs into the lungs 2 times daily, Disp: 10.2 g, Rfl: 6     cefdinir (OMNICEF) 300 MG capsule, Take 1 capsule 60 minutes prior to procedure, Disp: 1 capsule, Rfl: 0     cetirizine (ZYRTEC) 10 MG tablet, Take 10 mg by mouth daily., Disp: , Rfl:      escitalopram (LEXAPRO) 10 MG tablet, TAKE 1 TABLET DAILY, Disp: 90 tablet, Rfl: 3     fluticasone (FLONASE) 50 MCG/ACT nasal spray, Spray 2 sprays into both nostrils daily, Disp: 16 g, Rfl: 11     gabapentin (NEURONTIN) 300 MG capsule, 1 capsule TID with additional 1-2 capsules if headache is severe., Disp: 270 capsule, Rfl: 3     ibuprofen (ADVIL,MOTRIN) 200 MG tablet, take 1 tablet (200 mg) by oral route every 6 hours as needed with food, Disp: , Rfl:      lisinopril (ZESTRIL) 40 MG tablet, TAKE 1 TABLET DAILY, Disp: 90 tablet, Rfl: 3     Magnesium 400 MG CAPS, Take by mouth daily, Disp: , Rfl:      metoclopramide (REGLAN) 10 MG tablet, Take 1 tablet (10 mg) by mouth 3 times daily as needed  (headache), Disp: 20 tablet, Rfl: 3     montelukast (SINGULAIR) 10 MG tablet, Take 1 tablet (10 mg) by mouth At Bedtime, Disp: 90 tablet, Rfl: 3     naratriptan (AMERGE) 2.5 MG tablet, Take 1 tablet (2.5 mg) by mouth at onset of headache for migraine (repeat in 4 hours if needed) May repeat in 4 hours. Max 2 tablets/24 hours., Disp: 18 tablet, Rfl: 11     pantoprazole (PROTONIX) 40 MG EC tablet, TAKE 1 TABLET DAILY 30 TO 60 MINUTES BEFORE A MEAL, Disp: 90 tablet, Rfl: 1     pravastatin (PRAVACHOL) 40 MG tablet, TAKE 1 TABLET DAILY, Disp: 90 tablet, Rfl: 3     rOPINIRole (REQUIP) 0.25 MG tablet, Take 2 tablets (0.5 mg) by mouth At Bedtime, Disp: 60 tablet, Rfl: 11     spironolactone (ALDACTONE) 25 MG tablet, TAKE ONE-HALF (1/2) TABLET DAILY, Disp: 45 tablet, Rfl: 3     SUMAtriptan (IMITREX) 50 MG tablet, Take 50 mg by mouth at onset of headache , Disp: , Rfl:   Immunization History   Administered Date(s) Administered     COVID-19,PF,Pfizer 03/06/2021     Influenza Quad, Recombinant, p-free (RIV4) 09/10/2019     Influenza Vaccine IM > 6 months Valent IIV4 12/03/2013, 11/16/2015, 10/01/2017, 09/13/2018     TD (ADULT, 7+) 03/06/2008     TDAP Vaccine (Adacel) 08/03/2012     Twinrix A/B 10/12/2017, 12/27/2017, 04/24/2018     Zoster vaccine, live 09/14/2012     Allergies   Allergen Reactions     Levofloxacin Rash     Other reaction(s): Contact Dermatitis     Penicillins Hives and Rash     Confirmed by skin prick testing 7/3/14     Amoxicillin Hives and Rash     Amoxicillin-Pot Clavulanate Rash     Azithromycin Rash     Cephalexin Rash     Clindamycin Rash and Hives     Atorvastatin Other (See Comments)     Felt sick, intolerance     Clindamycin Hcl Hives     Levaquin      tendonitis     Augmentin Rash       ASSESSMENT/PLAN:  Problem List Items Addressed This Visit        Respiratory    Moderate persistent asthma without complication     Flares with URI, cold air and humid air. Current treatment is Symbicort 160/4.5mcg 2  puff inhaled daily and Singulair 10mg PO daily. Tried off of Singulair and increased symptoms.          - Albuterol 2-4 puffs inhaled (use a spacer unless using a Proair Respiclick device) every 4 hours as needed for chest tightness, wheezing, shortness of breath and/or coughing.    - Avoid asthma triggers.  - Symbicort 160/4.5mcg 2 puffs inhaled twice daily with a spacer.   - Singulair 10mg by mouth daily at night.         Relevant Medications    budesonide-formoterol (SYMBICORT) 160-4.5 MCG/ACT Inhaler    montelukast (SINGULAIR) 10 MG tablet    azelastine (ASTELIN) 0.1 % nasal spray       Infectious/Inflammatory    Rhinoconjunctivitis     History of perennial nasal and ocular symptoms that get worse in the spring and fall.  atrovent initially thought to be beneficial she no longer thinks is helpful. Flonase has been helpful. Negative allergy testing.  Increased symptoms around cats.  Zyrtec is helpful.  Singulair is helpful.   Either vasomotor rhinitis versus local allergic rhinitis. Follows with ENT.        - Flonase 2 sprays/nostril daily.   - Azelastine 2 sprays/nostril twice daily as needed.   - Zyrtec 10 to 20 mg as needed.   - Singulair 10mg by mouth daily at night.          Relevant Medications    azelastine (ASTELIN) 0.1 % nasal spray          Chart documentation with Dragon Voice recognition Software. Although reviewed after completion, some words and grammatical errors may remain.    I have reviewed the note as documented above.  This accurately captures the substance of my conversation with the patient.    Phone call contact time  Call Started at 1353  Call Ended at 1400    Nish Stockton DO FAAAAI  Allergy/Immunology  [place], [place]

## 2021-07-14 NOTE — ASSESSMENT & PLAN NOTE
History of perennial nasal and ocular symptoms that get worse in the spring and fall.  atrovent initially thought to be beneficial she no longer thinks is helpful. Flonase has been helpful. Negative allergy testing.  Increased symptoms around cats.  Zyrtec is helpful.  Singulair is helpful.   Either vasomotor rhinitis versus local allergic rhinitis. Follows with ENT.        - Flonase 2 sprays/nostril daily.   - Azelastine 2 sprays/nostril twice daily as needed.   - Zyrtec 10 to 20 mg as needed.   - Singulair 10mg by mouth daily at night.

## 2021-07-14 NOTE — LETTER
"    7/14/2021         RE: Miri Naylor  [address] [facility] [address]        Dear Colleague,    Thank you for referring your patient, Miri Naylor, to the [facility]. Please see a copy of my visit note below.    Miri Naylor is a 66 year old female who is being evaluated via a billable telephone visit.      The patient has been notified of following:     \"This telephone visit will be conducted via a call between you and your physician/provider. We have found that certain health care needs can be provided without the need for a physical exam.  This service lets us provide the care you need with a short phone conversation.  If a prescription is necessary, we can send it directly to your pharmacy.  If lab work is needed, we can place an order for that and you can then stop by our lab to have the test done at a later time.     If during the course of the call the physician/provider feels a telephone visit is not appropriate, you will not be charged for this service.\"   Consent has been obtained for this service by 1 care team member: yes.     I have reviewed and updated the patient's Past Medical History, Social History, Family History and Medication List.    On Singulair 10mg daily and Symbicort 160/4.5mcg 2 puffs twice daily. Asthma has been controlled. If taking medications she is controlled. If mows the lawn she will develop chest symptoms. Wears mask when mows lawn. Using albuterol prior to mowing lawn. Can additionally develop chest symptoms with exertion.     Off Azelastine for 3 days and increased post nasal drainage and hoarseness. On Flonase and Zyrtec.       Past Medical History:   Diagnosis Date     Depressive disorder, not elsewhere classified      Drug allergy, multiple 9/23/14--passed graded oral challenge for Zithromax.     7/3/14 POS PRE-PEN skin test. 7/30/14 NEG skin tests and oral challenge to Cefzil     Hx of abnormal Pap smear ?    no " specifics given     lumbar degeneration      Raynaud's disease      Uncomplicated asthma      Unspecified essential hypertension      Family History   Problem Relation Age of Onset     Arthritis Mother      Hyperlipidemia Mother      Other Cancer Mother         Glioblastoma Multiforme     Osteoporosis Mother      Blood Disease Father      Hypertension Father      Hyperlipidemia Father      Other Cancer Father         lung cancer     Depression Son      Depression Daughter      Hyperlipidemia Brother      Anxiety Disorder Son      Skin Cancer No family hx of      Past Surgical History:   Procedure Laterality Date     BIOPSY OF BREAST, NEEDLE CORE       C PELVIS/HIP JOINT SURGERY UNLISTED Right 7/19/16    total hip arthroplasty     COLONOSCOPY       COMBINED ESOPHAGOSCOPY, GASTROSCOPY, DUODENOSCOPY (EGD) WITH CO2 INSUFFLATION N/A 9/3/2020    Procedure: ESOPHAGOGASTRODUODENOSCOPY, WITH CO2 INSUFFLATION;  Surgeon: Campbell Henry MD;  Location: [facility] OR     ESOPHAGOSCOPY, GASTROSCOPY, DUODENOSCOPY (EGD), COMBINED N/A 9/3/2020    Procedure: Esophagogastroduodenoscopy, With Biopsy;  Surgeon: Campbell Henry MD;  Location: [facility] OR     HIP SURGERY Right 07/19/2016    replacement       REVIEW OF SYSTEMS:  General: negative for weight gain. negative for weight loss. negative for changes in sleep.   Ears: negative for fullness. negative for hearing loss. negative for dizziness.   Nose: negative for snoring.negative for changes in smell. negative for drainage.   Eyes: negative for eye watering. negative for eye itching. negative for vision changes. negative for eye redness.  Throat: negative for hoarseness. negative for sore throat. negative for trouble swallowing.   Lungs: negative for shortness of breath.negative for wheezing. negative for sputum production.   Cardiovascular: negative for chest pain. negative for swelling of ankles. negative for fast or irregular heartbeat.   Gastrointestinal: negative for  nausea. negative for heartburn. negative for acid reflux.   Musculoskeletal: negative for joint pain. negative for joint stiffness. negative for joint swelling.   Neurologic: negative for seizures. negative for fainting. negative for weakness.   Psychiatric: negative for changes in mood. negative for anxiety.   Endocrine: negative for cold intolerance. negative for heat intolerance. negative for tremors.   Lymphatic: negative for lower extremity swelling. negative for lymph node swelling.   Hematologic: negative for easy bruising. negative for easy bleeding.  Integumentary: negative for rash. negative for scaling. negative for nail changes.       Current Outpatient Medications:      acetaminophen (TYLENOL) 500 MG tablet, Take 1,000 mg by mouth every 6 hours as needed for mild pain, Disp: , Rfl:      albuterol (PROAIR HFA) 108 (90 Base) MCG/ACT inhaler, Inhale 2 puffs into the lungs every 4 hours as needed for shortness of breath / dyspnea or wheezing, Disp: 1 Inhaler, Rfl: 1     aspirin (ASA) 81 MG EC tablet, Take 81 mg by mouth daily, Disp: , Rfl:      azelastine (ASTELIN) 0.1 % nasal spray, Spray 2 sprays into both nostrils 2 times daily, Disp: 30 mL, Rfl: 6     budesonide-formoterol (SYMBICORT) 160-4.5 MCG/ACT Inhaler, Inhale 2 puffs into the lungs 2 times daily, Disp: 10.2 g, Rfl: 6     cefdinir (OMNICEF) 300 MG capsule, Take 1 capsule 60 minutes prior to procedure, Disp: 1 capsule, Rfl: 0     cetirizine (ZYRTEC) 10 MG tablet, Take 10 mg by mouth daily., Disp: , Rfl:      escitalopram (LEXAPRO) 10 MG tablet, TAKE 1 TABLET DAILY, Disp: 90 tablet, Rfl: 3     fluticasone (FLONASE) 50 MCG/ACT nasal spray, Spray 2 sprays into both nostrils daily, Disp: 16 g, Rfl: 11     gabapentin (NEURONTIN) 300 MG capsule, 1 capsule TID with additional 1-2 capsules if headache is severe., Disp: 270 capsule, Rfl: 3     ibuprofen (ADVIL,MOTRIN) 200 MG tablet, take 1 tablet (200 mg) by oral route every 6 hours as needed with food,  Disp: , Rfl:      lisinopril (ZESTRIL) 40 MG tablet, TAKE 1 TABLET DAILY, Disp: 90 tablet, Rfl: 3     Magnesium 400 MG CAPS, Take by mouth daily, Disp: , Rfl:      metoclopramide (REGLAN) 10 MG tablet, Take 1 tablet (10 mg) by mouth 3 times daily as needed (headache), Disp: 20 tablet, Rfl: 3     montelukast (SINGULAIR) 10 MG tablet, Take 1 tablet (10 mg) by mouth At Bedtime, Disp: 90 tablet, Rfl: 3     naratriptan (AMERGE) 2.5 MG tablet, Take 1 tablet (2.5 mg) by mouth at onset of headache for migraine (repeat in 4 hours if needed) May repeat in 4 hours. Max 2 tablets/24 hours., Disp: 18 tablet, Rfl: 11     pantoprazole (PROTONIX) 40 MG EC tablet, TAKE 1 TABLET DAILY 30 TO 60 MINUTES BEFORE A MEAL, Disp: 90 tablet, Rfl: 1     pravastatin (PRAVACHOL) 40 MG tablet, TAKE 1 TABLET DAILY, Disp: 90 tablet, Rfl: 3     rOPINIRole (REQUIP) 0.25 MG tablet, Take 2 tablets (0.5 mg) by mouth At Bedtime, Disp: 60 tablet, Rfl: 11     spironolactone (ALDACTONE) 25 MG tablet, TAKE ONE-HALF (1/2) TABLET DAILY, Disp: 45 tablet, Rfl: 3     SUMAtriptan (IMITREX) 50 MG tablet, Take 50 mg by mouth at onset of headache , Disp: , Rfl:   Immunization History   Administered Date(s) Administered     COVID-19,PF,Pfizer 03/06/2021     Influenza Quad, Recombinant, p-free (RIV4) 09/10/2019     Influenza Vaccine IM > 6 months Valent IIV4 12/03/2013, 11/16/2015, 10/01/2017, 09/13/2018     TD (ADULT, 7+) 03/06/2008     TDAP Vaccine (Adacel) 08/03/2012     Twinrix A/B 10/12/2017, 12/27/2017, 04/24/2018     Zoster vaccine, live 09/14/2012     Allergies   Allergen Reactions     Levofloxacin Rash     Other reaction(s): Contact Dermatitis     Penicillins Hives and Rash     Confirmed by skin prick testing 7/3/14     Amoxicillin Hives and Rash     Amoxicillin-Pot Clavulanate Rash     Azithromycin Rash     Cephalexin Rash     Clindamycin Rash and Hives     Atorvastatin Other (See Comments)     Felt sick, intolerance     Clindamycin Hcl Hives     Levaquin       tendonitis     Augmentin Rash       ASSESSMENT/PLAN:  Problem List Items Addressed This Visit        Respiratory    Moderate persistent asthma without complication     Flares with URI, cold air and humid air. Current treatment is Symbicort 160/4.5mcg 2 puff inhaled daily and Singulair 10mg PO daily. Tried off of Singulair and increased symptoms.          - Albuterol 2-4 puffs inhaled (use a spacer unless using a Proair Respiclick device) every 4 hours as needed for chest tightness, wheezing, shortness of breath and/or coughing.    - Avoid asthma triggers.  - Symbicort 160/4.5mcg 2 puffs inhaled twice daily with a spacer.   - Singulair 10mg by mouth daily at night.         Relevant Medications    budesonide-formoterol (SYMBICORT) 160-4.5 MCG/ACT Inhaler    montelukast (SINGULAIR) 10 MG tablet    azelastine (ASTELIN) 0.1 % nasal spray       Infectious/Inflammatory    Rhinoconjunctivitis     History of perennial nasal and ocular symptoms that get worse in the spring and fall.  atrovent initially thought to be beneficial she no longer thinks is helpful. Flonase has been helpful. Negative allergy testing.  Increased symptoms around cats.  Zyrtec is helpful.  Singulair is helpful.   Either vasomotor rhinitis versus local allergic rhinitis. Follows with ENT.        - Flonase 2 sprays/nostril daily.   - Azelastine 2 sprays/nostril twice daily as needed.   - Zyrtec 10 to 20 mg as needed.   - Singulair 10mg by mouth daily at night.          Relevant Medications    azelastine (ASTELIN) 0.1 % nasal spray          Chart documentation with Dragon Voice recognition Software. Although reviewed after completion, some words and grammatical errors may remain.    I have reviewed the note as documented above.  This accurately captures the substance of my conversation with the patient.    Phone call contact time  Call Started at 1353  Call Ended at 1400    DO JARRET EatonAAGLEN  Allergy/Immunology  [facility] and  [facility]      Again, thank you for allowing me to participate in the care of your patient.        Sincerely,        Nish Stockton MD

## 2021-07-15 NOTE — TELEPHONE ENCOUNTER
Routing refill request to provider for review/approval because:  Patient needs to be seen because:  Due for annual exam    Clive Reyes RN

## 2021-07-16 RX ORDER — SPIRONOLACTONE 25 MG/1
TABLET ORAL
Qty: 45 TABLET | Refills: 3 | Status: SHIPPED | OUTPATIENT
Start: 2021-07-16 | End: 2022-07-14

## 2021-07-20 ENCOUNTER — TELEPHONE (OUTPATIENT)
Dept: ALLERGY | Facility: OTHER | Age: 66
End: 2021-07-20

## 2021-07-23 DIAGNOSIS — J31.0 RHINOCONJUNCTIVITIS: ICD-10-CM

## 2021-07-23 DIAGNOSIS — H10.9 RHINOCONJUNCTIVITIS: ICD-10-CM

## 2021-07-23 RX ORDER — FLUTICASONE PROPIONATE 50 MCG
SPRAY, SUSPENSION (ML) NASAL
Qty: 16 G | Refills: 11 | Status: SHIPPED | OUTPATIENT
Start: 2021-07-23

## 2021-07-23 NOTE — TELEPHONE ENCOUNTER
Prescription approved per [facility] Refill Protocol.  Adrienne Silver RN, BSN [facility]

## 2021-07-27 ENCOUNTER — VIRTUAL VISIT (OUTPATIENT)
Dept: FAMILY MEDICINE | Facility: CLINIC | Age: 66
End: 2021-07-27
Payer: COMMERCIAL

## 2021-07-27 ENCOUNTER — VIRTUAL VISIT (OUTPATIENT)
Dept: PSYCHOLOGY | Facility: CLINIC | Age: 66
End: 2021-07-27
Payer: COMMERCIAL

## 2021-07-27 DIAGNOSIS — L30.9 DERMATITIS: Primary | ICD-10-CM

## 2021-07-27 DIAGNOSIS — J45.40 MODERATE PERSISTENT ASTHMA WITHOUT COMPLICATION: ICD-10-CM

## 2021-07-27 DIAGNOSIS — F34.1 PERSISTENT DEPRESSIVE DISORDER: ICD-10-CM

## 2021-07-27 DIAGNOSIS — F41.1 GENERALIZED ANXIETY DISORDER: Primary | ICD-10-CM

## 2021-07-27 PROCEDURE — 90834 PSYTX W PT 45 MINUTES: CPT | Mod: 95 | Performed by: MARRIAGE & FAMILY THERAPIST

## 2021-07-27 PROCEDURE — 99213 OFFICE O/P EST LOW 20 MIN: CPT | Mod: 95 | Performed by: FAMILY MEDICINE

## 2021-07-27 ASSESSMENT — ANXIETY QUESTIONNAIRES
2. NOT BEING ABLE TO STOP OR CONTROL WORRYING: SEVERAL DAYS
3. WORRYING TOO MUCH ABOUT DIFFERENT THINGS: SEVERAL DAYS
GAD7 TOTAL SCORE: 6
GAD7 TOTAL SCORE: 6
1. FEELING NERVOUS, ANXIOUS, OR ON EDGE: SEVERAL DAYS
GAD7 TOTAL SCORE: 6
5. BEING SO RESTLESS THAT IT IS HARD TO SIT STILL: SEVERAL DAYS
4. TROUBLE RELAXING: SEVERAL DAYS
7. FEELING AFRAID AS IF SOMETHING AWFUL MIGHT HAPPEN: NOT AT ALL
7. FEELING AFRAID AS IF SOMETHING AWFUL MIGHT HAPPEN: NOT AT ALL
6. BECOMING EASILY ANNOYED OR IRRITABLE: SEVERAL DAYS
8. IF YOU CHECKED OFF ANY PROBLEMS, HOW DIFFICULT HAVE THESE MADE IT FOR YOU TO DO YOUR WORK, TAKE CARE OF THINGS AT HOME, OR GET ALONG WITH OTHER PEOPLE?: SOMEWHAT DIFFICULT

## 2021-07-27 NOTE — PROGRESS NOTES
RECORDS STATUS - ALL OTHER DIAGNOSIS      RECORDS RECEIVED FROM: [facility]   DATE RECEIVED: 8/10/2021   NOTES STATUS DETAILS   OFFICE NOTE from referring provider Complete  Elizabeth Corley DO   OFFICE NOTE from medical oncologist N/A    DISCHARGE SUMMARY from hospital N/A    DISCHARGE REPORT from the ER     OPERATIVE REPORT Complete 9/3/2021 Upper GI Endoscopy    MEDICATION LIST Complete [facility]   CLINICAL TRIAL TREATMENTS TO DATE     LABS     PATHOLOGY REPORTS N/A    ANYTHING RELATED TO DIAGNOSIS Complete  Labs last updated on 3/11/2021   GENONOMIC TESTING     TYPE:     IMAGING (NEED IMAGES & REPORT)     CT SCANS     MRI Complete MRI Abdomen 11/17/2020    MAMMO     ULTRASOUND Complete US Pelvic Complete 3/24/2021    US Renal Complete 2/12/2021   PET

## 2021-07-27 NOTE — LETTER
My Asthma Action Plan    Name: Miri Naylor   YOB: 1955  Date: 7/27/2021   My doctor: Elizabeth Corley, DO   My clinic: [facility]        My Control Medicine: Budesonide + formoterol (Symbicort HFA) -  160/4.5 mcg 2 puffs twice daily  My Rescue Medicine: Albuterol (Proair/Ventolin/Proventil HFA) 2-4 puffs EVERY 4 HOURS as needed. Use a spacer if recommended by your provider.   My Asthma Severity:   Moderate Persistent  Know your asthma triggers:   exercise or sports  Singing            GREEN ZONE   Good Control    I feel good    No cough or wheeze    Can work, sleep and play without asthma symptoms       Take your asthma control medicine every day.     1. If exercise triggers your asthma, take your rescue medication    15 minutes before exercise or sports, and    During exercise if you have asthma symptoms  2. Spacer to use with inhaler: If you have a spacer, make sure to use it with your inhaler             YELLOW ZONE Getting Worse  I have ANY of these:    I do not feel good    Cough or wheeze    Chest feels tight    Wake up at night   1. Keep taking your Green Zone medications  2. Start taking your rescue medicine:    every 20 minutes for up to 1 hour. Then every 4 hours for 24-48 hours.  3. If you stay in the Yellow Zone for more than 12-24 hours, contact your doctor.  4. If you do not return to the Green Zone in 12-24 hours or you get worse, start taking your oral steroid medicine if prescribed by your provider.           RED ZONE Medical Alert - Get Help  I have ANY of these:    I feel awful    Medicine is not helping    Breathing getting harder    Trouble walking or talking    Nose opens wide to breathe       1. Take your rescue medicine NOW  2. If your provider has prescribed an oral steroid medicine, start taking it NOW  3. Call your doctor NOW  4. If you are still in the Red Zone after 20 minutes and you have not reached your doctor:    Take your rescue medicine  again and    Call 911 or go to the emergency room right away    See your regular doctor within 2 weeks of an Emergency Room or Urgent Care visit for follow-up treatment.          Annual Reminders:  Meet with Asthma Educator,  Flu Shot in the Fall, consider Pneumonia Vaccination for patients with asthma (aged 19 and older).    Pharmacy:    [facility] - [place], [place] - [address] [facility] - [place], [place] - [address]  [id number] - [place], [place] - [address] AT [facility] [address]    Electronically signed by Elizabeth Corley, DO   Date: 07/27/21                      Asthma Triggers  How To Control Things That Make Your Asthma Worse    Triggers are things that make your asthma worse.  Look at the list below to help you find your triggers and what you can do about them.  You can help prevent asthma flare-ups by staying away from your triggers.      Trigger                                                          What you can do   Cigarette Smoke  Tobacco smoke can make asthma worse. Do not allow smoking in your home, car or around you.  Be sure no one smokes at a child s day care or school.  If you smoke, ask your health care provider for ways to help you quit.  Ask family members to quit too.  Ask your health care provider for a referral to Quit Plan to help you quit smoking, or call 9-265-906-PLAN.     Colds, Flu, Bronchitis  These are common triggers of asthma. Wash your hands often.  Don t touch your eyes, nose or mouth.  Get a flu shot every year.     Dust Mites  These are tiny bugs that live in cloth or carpet. They are too small to see. Wash sheets and blankets in hot water every week.   Encase pillows and mattress in dust mite proof covers.  Avoid having carpet if you can. If you have carpet, vacuum weekly.   Use a dust mask and HEPA vacuum.   Pollen and Outdoor Mold  Some people are allergic to trees, grass, or weed  pollen, or molds. Try to keep your windows closed.  Limit time out doors when pollen count is high.   Ask you health care provider about taking medicine during allergy season.     Animal Dander  Some people are allergic to skin flakes, urine or saliva from pets with fur or feathers. Keep pets with fur or feathers out of your home.    If you can t keep the pet outdoors, then keep the pet out of your bedroom.  Keep the bedroom door closed.  Keep pets off cloth furniture and away from stuffed toys.     Mice, Rats, and Cockroaches   Some people are allergic to the waste from these pests.   Cover food and garbage.  Clean up spills and food crumbs.  Store grease in the refrigerator.   Keep food out of the bedroom.   Indoor Mold  This can be a trigger if your home has high moisture. Fix leaking faucets, pipes, or other sources of water.   Clean moldy surfaces.  Dehumidify basement if it is damp and smelly.   Smoke, Strong Odors, and Sprays  These can reduce air quality. Stay away from strong odors and sprays, such as perfume, powder, hair spray, paints, smoke incense, paint, cleaning products, candles and new carpet.   Exercise or Sports  Some people with asthma have this trigger. Be active!  Ask your doctor about taking medicine before sports or exercise to prevent symptoms.    Warm up for 5-10 minutes before and after sports or exercise.     Other Triggers of Asthma  Cold air:  Cover your nose and mouth with a scarf.  Sometimes laughing or crying can be a trigger.  Some medicines and food can trigger asthma.

## 2021-07-27 NOTE — PROGRESS NOTES
Miri is a 66 year old who is being evaluated via a billable video visit.      How would you like to obtain your AVS? MyChart  If the video visit is dropped, the invitation should be resent by: Text to cell phone: [phone]  Will anyone else be joining your video visit? No    Video Start Time: 10:30 AM    A/P:      ICD-10-CM    1. Dermatitis  L30.9    2. Moderate persistent asthma without complication  J45.40 Asthma Action Plan (AAP)     Rash around the mouth, not pustular, associated with steroid inhaler and mask use.  Trial of topical clotrimazole.  If not resolved in 1 week then will treat like POD with topical metronidazole.    Subjective   Miri is a 66 year old who presents for the following health issues   HPI     Rash  Onset/Duration: 1 month  Description  Location: face, around mouth   Character: red  Itching: moderate  Intensity:  moderate  Progression of Symptoms:  improving and constant  Accompanying signs and symptoms:   Fever: no  Body aches or joint pain: YES- chronic and unchanged   Sore throat symptoms: no  Recent cold symptoms: YES- has allergies   History:           Previous episodes of similar rash: about a year ago  New exposures:  None  Recent travel: no  Exposure to similar rash: no  Precipitating or alleviating factors: none  Therapies tried and outcome: mupirocin cream 2% is helping some, polysporin is not effective     Feels the rash is associated with the mask she uses on her spacer for her steroid inhaler.  Has been washing this better now and using topical antibiotic ointment and things are a bit better but not resolved.  Has a similar issue in the past that resolved with bastroban    Review of Systems   Constitutional, HEENT, cardiovascular, pulmonary, gi and gu systems are negative, except as otherwise noted.      Objective           Vitals:  No vitals were obtained today due to virtual visit.    Physical Exam   GENERAL: Healthy, alert and no distress  EYES: Eyes grossly normal to  inspection.  No discharge or erythema, or obvious scleral/conjunctival abnormalities.  RESP: No audible wheeze, cough, or visible cyanosis.  No visible retractions or increased work of breathing.    SKIN: patchy erythematous rash on chin, no pustules noted  NEURO: Cranial nerves grossly intact.  Mentation and speech appropriate for age.  PSYCH: Mentation appears normal, affect normal/bright, judgement and insight intact, normal speech and appearance well-groomed.        Video-Visit Details    Type of service:  Video Visit    Video End Time:10:41 AM    Originating Location (pt. Location): Home    Distant Location (provider location):  [facility]     Platform used for Video Visit: [employer]

## 2021-07-27 NOTE — PROGRESS NOTES
Discharge Summary  Multiple Sessions    Client Name: Miri Naylor MRN#: [id number] YOB: 1955    Discharge Date:   July 27, 2021    Service Modality: Video Visit:      Provider verified identity through the following two step process.  Patient provided:  Patient is known previously to provider and Patient was verified at admission/transfer    Telemedicine Visit: The patient's condition can be safely assessed and treated via synchronous audio and visual telemedicine encounter.      Reason for Telemedicine Visit: Services only offered telehealth    Originating Site (Patient Location): Patient's home    Distant Site (Provider Location): Provider Remote Setting- Home Office    Consent:  The patient/guardian has verbally consented to: the potential risks and benefits of telemedicine (video visit) versus in person care; bill my insurance or make self-payment for services provided; and responsibility for payment of non-covered services.     Patient would like the video invitation sent by:  My Chart    Mode of Communication:  Video Conference via [employer]    As the provider I attest to compliance with applicable laws and regulations related to telemedicine.    Service Type: Individual      Session Start Time: 11:02  Session End Time: 11:48      Session Length: 45 - 50     Session #: 41     Attendees: Client attended alone      Focus of Treatment Objective(s):  Client's presenting concerns included: Depressed Mood - improve mood  Anxiety - increase calm mindset  Stage of Change at time of Discharge: ACTION (Actively working towards change)    Medication Adherence:  Yes    Chemical Use:  NA    Assessment: Current Emotional / Mental Status (status of significant symptoms):    Risk status (Self / Other harm or suicidal ideation)  Client denies current fears or concerns for personal safety.  Client denies current or recent suicidal ideation or behaviors.  Client denies current or recent  homicidal ideation or behaviors.  Client denies current or recent self injurious behavior or ideation.  Client denies other safety concerns.  A safety and risk management plan has not been developed at this time, however client was given the after-hours number should there be a change in any of these risk factors.    Appearance:   Appropriate   Eye Contact:   Good   Psychomotor Behavior: Normal   Attitude:   Cooperative  Interested Friendly Pleasant Attentive  Orientation:   All  Speech   Rate / Production: Normal/ Responsive Talkative   Volume:  Normal   Mood:    Anxious  Normal  Affect:    Appropriate  Worrisome   Thought Content:  Clear  Rumination   Thought Form:  Coherent  Logical   Insight:   Good  and Intellectual Insight    DSM5 Diagnoses: (Sustained by DSM5 Criteria Listed Above)  Diagnoses: 300.4 (F34.1) Persistent Depressive Disorder, Early onset  300.02 (F41.1) Generalized Anxiety Disorder  Psychosocial & Contextual Factors: marital conflict, history of loss/trauma  WHODAS 2.0 (12 item) Score: 29    Reason for Discharge:  Client is satisfied with progress, Insurance: upcoming change to Medicare and Referred to Kerri Puri      Aftercare Plan:  Client may resume counseling services at any time in the future by calling the [facility] Intake Office, [phone].      Loki Crowe, LMFT  July 27, 2021

## 2021-07-28 ASSESSMENT — PATIENT HEALTH QUESTIONNAIRE - PHQ9: SUM OF ALL RESPONSES TO PHQ QUESTIONS 1-9: 7

## 2021-07-28 ASSESSMENT — ANXIETY QUESTIONNAIRES: GAD7 TOTAL SCORE: 6

## 2021-07-28 ASSESSMENT — ASTHMA QUESTIONNAIRES: ACT_TOTALSCORE: 22

## 2021-08-01 ENCOUNTER — HEALTH MAINTENANCE LETTER (OUTPATIENT)
Age: 66
End: 2021-08-01

## 2021-08-10 ENCOUNTER — PRE VISIT (OUTPATIENT)
Dept: ONCOLOGY | Facility: CLINIC | Age: 66
End: 2021-08-10

## 2021-08-10 ENCOUNTER — OFFICE VISIT (OUTPATIENT)
Dept: FAMILY MEDICINE | Facility: CLINIC | Age: 66
End: 2021-08-10
Payer: COMMERCIAL

## 2021-08-10 VITALS
SYSTOLIC BLOOD PRESSURE: 130 MMHG | WEIGHT: 211.5 LBS | HEIGHT: 71 IN | HEART RATE: 62 BPM | BODY MASS INDEX: 29.61 KG/M2 | TEMPERATURE: 98.1 F | DIASTOLIC BLOOD PRESSURE: 78 MMHG | OXYGEN SATURATION: 97 %

## 2021-08-10 DIAGNOSIS — D64.9 ANEMIA, UNSPECIFIED TYPE: ICD-10-CM

## 2021-08-10 DIAGNOSIS — Z12.31 VISIT FOR SCREENING MAMMOGRAM: ICD-10-CM

## 2021-08-10 DIAGNOSIS — E78.5 HYPERLIPIDEMIA LDL GOAL <130: ICD-10-CM

## 2021-08-10 DIAGNOSIS — Z00.00 ROUTINE GENERAL MEDICAL EXAMINATION AT A HEALTH CARE FACILITY: Primary | ICD-10-CM

## 2021-08-10 DIAGNOSIS — I10 ESSENTIAL HYPERTENSION: ICD-10-CM

## 2021-08-10 DIAGNOSIS — L30.9 DERMATITIS: ICD-10-CM

## 2021-08-10 LAB
ALBUMIN SERPL-MCNC: 4 G/DL (ref 3.4–5)
ALP SERPL-CCNC: 76 U/L (ref 40–150)
ALT SERPL W P-5'-P-CCNC: 38 U/L (ref 0–50)
ANION GAP SERPL CALCULATED.3IONS-SCNC: 8 MMOL/L (ref 3–14)
AST SERPL W P-5'-P-CCNC: 26 U/L (ref 0–45)
BASOPHILS # BLD AUTO: 0 10E3/UL (ref 0–0.2)
BASOPHILS NFR BLD AUTO: 1 %
BILIRUB SERPL-MCNC: 0.4 MG/DL (ref 0.2–1.3)
BUN SERPL-MCNC: 11 MG/DL (ref 7–30)
CALCIUM SERPL-MCNC: 9.2 MG/DL (ref 8.5–10.1)
CHLORIDE BLD-SCNC: 99 MMOL/L (ref 94–109)
CHOLEST SERPL-MCNC: 235 MG/DL
CO2 SERPL-SCNC: 26 MMOL/L (ref 20–32)
CREAT SERPL-MCNC: 0.85 MG/DL (ref 0.52–1.04)
EOSINOPHIL # BLD AUTO: 0.2 10E3/UL (ref 0–0.7)
EOSINOPHIL NFR BLD AUTO: 3 %
ERYTHROCYTE [DISTWIDTH] IN BLOOD BY AUTOMATED COUNT: 12.5 % (ref 10–15)
FASTING STATUS PATIENT QL REPORTED: YES
GFR SERPL CREATININE-BSD FRML MDRD: 72 ML/MIN/1.73M2
GLUCOSE BLD-MCNC: 92 MG/DL (ref 70–99)
HCT VFR BLD AUTO: 36.4 % (ref 35–47)
HDLC SERPL-MCNC: 64 MG/DL
HGB BLD-MCNC: 12 G/DL (ref 11.7–15.7)
LDLC SERPL CALC-MCNC: 126 MG/DL
LYMPHOCYTES # BLD AUTO: 2.3 10E3/UL (ref 0.8–5.3)
LYMPHOCYTES NFR BLD AUTO: 39 %
MCH RBC QN AUTO: 30.6 PG (ref 26.5–33)
MCHC RBC AUTO-ENTMCNC: 33 G/DL (ref 31.5–36.5)
MCV RBC AUTO: 93 FL (ref 78–100)
MONOCYTES # BLD AUTO: 0.5 10E3/UL (ref 0–1.3)
MONOCYTES NFR BLD AUTO: 9 %
NEUTROPHILS # BLD AUTO: 2.9 10E3/UL (ref 1.6–8.3)
NEUTROPHILS NFR BLD AUTO: 49 %
NONHDLC SERPL-MCNC: 171 MG/DL
PLATELET # BLD AUTO: 373 10E3/UL (ref 150–450)
POTASSIUM BLD-SCNC: 4.4 MMOL/L (ref 3.4–5.3)
PROT SERPL-MCNC: 7.3 G/DL (ref 6.8–8.8)
RBC # BLD AUTO: 3.92 10E6/UL (ref 3.8–5.2)
SODIUM SERPL-SCNC: 133 MMOL/L (ref 133–144)
TRIGL SERPL-MCNC: 224 MG/DL
WBC # BLD AUTO: 5.9 10E3/UL (ref 4–11)

## 2021-08-10 PROCEDURE — 80061 LIPID PANEL: CPT | Performed by: FAMILY MEDICINE

## 2021-08-10 PROCEDURE — 85025 COMPLETE CBC W/AUTO DIFF WBC: CPT | Performed by: FAMILY MEDICINE

## 2021-08-10 PROCEDURE — 90471 IMMUNIZATION ADMIN: CPT | Performed by: FAMILY MEDICINE

## 2021-08-10 PROCEDURE — 90670 PCV13 VACCINE IM: CPT | Performed by: FAMILY MEDICINE

## 2021-08-10 PROCEDURE — 99397 PER PM REEVAL EST PAT 65+ YR: CPT | Mod: 25 | Performed by: FAMILY MEDICINE

## 2021-08-10 PROCEDURE — 36415 COLL VENOUS BLD VENIPUNCTURE: CPT | Performed by: FAMILY MEDICINE

## 2021-08-10 PROCEDURE — 80053 COMPREHEN METABOLIC PANEL: CPT | Performed by: FAMILY MEDICINE

## 2021-08-10 RX ORDER — LISINOPRIL 40 MG/1
40 TABLET ORAL DAILY
Qty: 90 TABLET | Refills: 3 | Status: SHIPPED | OUTPATIENT
Start: 2021-08-10

## 2021-08-10 ASSESSMENT — ENCOUNTER SYMPTOMS
ABDOMINAL PAIN: 1
CHILLS: 0
JOINT SWELLING: 1
FEVER: 0
MYALGIAS: 1
HEARTBURN: 1
HEADACHES: 1
SORE THROAT: 1
CONSTIPATION: 1
HEMATOCHEZIA: 0
PARESTHESIAS: 0
DYSURIA: 0
NERVOUS/ANXIOUS: 1
COUGH: 1
ARTHRALGIAS: 1
DIZZINESS: 1
PALPITATIONS: 0
NAUSEA: 1
DIARRHEA: 1
WEAKNESS: 1
SHORTNESS OF BREATH: 1
FREQUENCY: 1
HEMATURIA: 0
EYE PAIN: 0

## 2021-08-10 ASSESSMENT — ACTIVITIES OF DAILY LIVING (ADL): CURRENT_FUNCTION: NO ASSISTANCE NEEDED

## 2021-08-10 ASSESSMENT — MIFFLIN-ST. JEOR: SCORE: 1600.49

## 2021-08-10 NOTE — PROGRESS NOTES
"SUBJECTIVE:   Miri Naylor is a 66 year old female who presents for Preventive Visit.    Patient has been advised of split billing requirements and indicates understanding: Yes   Are you in the first 12 months of your Medicare coverage?  No  - Still on private insurance, starting on Medicare in a few months.      Healthy Habits:     In general, how would you rate your overall health?  Fair    Frequency of exercise:  4-5 days/week    Duration of exercise:  30-45 minutes    Do you usually eat at least 4 servings of fruit and vegetables a day, include whole grains    & fiber and avoid regularly eating high fat or \"junk\" foods?  No    Taking medications regularly:  Yes    Medication side effects:  Muscle aches, Lightheadedness and Other    Ability to successfully perform activities of daily living:  No assistance needed    Home Safety:  Lack of grab bars in the bathroom    Hearing Impairment:  Feel that people are mumbling or not speaking clearly    In the past 6 months, have you been bothered by leaking of urine? Yes    In general, how would you rate your overall mental or emotional health?  Fair      PHQ-2 Total Score: 2    Additional concerns today:  Yes    Do you feel safe in your environment? Yes    Have you ever done Advance Care Planning? (For example, a Health Directive, POLST, or a discussion with a medical provider or your loved ones about your wishes): Yes, patient states has an Advance Care Planning document and will bring a copy to the clinic.      Fall risk  Fallen 2 or more times in the past year?: No  Any fall with injury in the past year?: No      Cognitive Screening   1) Repeat 3 items (Leader, Season, Table)    2) Clock draw: NORMAL  3) 3 item recall: Recalls 3 objects  Results: NORMAL clock, 1-2 items recalled: COGNITIVE IMPAIRMENT LESS LIKELY  Mini-CogTM Copyright BOBBY Aguila. Licensed by the author for use in [facility]; reprinted with permission (alycia@.[facility]). All rights reserved. "        Do you have sleep apnea, excessive snoring or daytime drowsiness?: no    Reviewed and updated as needed this visit by clinical staff  Tobacco  Allergies  Meds   Med Hx  Surg Hx  Fam Hx  Soc Hx        Reviewed and updated as needed this visit by Provider  Tobacco  Allergies  Meds   Med Hx  Surg Hx  Fam Hx  Soc Hx       Social History     Tobacco Use     Smoking status: Never Smoker     Smokeless tobacco: Never Used   Substance Use Topics     Alcohol use: Not Currently     If you drink alcohol do you typically have >3 drinks per day or >7 drinks per week? No    Alcohol Use 8/10/2021   Prescreen: >3 drinks/day or >7 drinks/week? Not Applicable   Prescreen: >3 drinks/day or >7 drinks/week? -   No flowsheet data found.          Current providers sharing in care for this patient include:   Patient Care Team:  Elizabeth Corley DO as PCP - General (Family Practice)  Diamond Nolan MD as Assigned Neuroscience Provider  Nish Stockton MD as Assigned Allergy Provider  Campbell Henry MD as Assigned Gastroenterology Provider  Nir Massey MD as Assigned Musculoskeletal Provider  Nir Massey MD as MD (Orthopaedic Surgery)  Elizabeth Corley DO as Assigned PCP    The following health maintenance items are reviewed in Epic and correct as of today:  Health Maintenance Due   Topic Date Due     ANNUAL REVIEW OF  ORDERS  Never done     HIV SCREENING  Never done     MAMMO SCREENING  04/26/2021       Any new diagnosis of family breast, ovarian, or bowel cancer?     FHS-7: No flowsheet data found.    Pertinent mammograms are reviewed under the imaging tab.    Review of Systems   Constitutional: Negative for chills and fever.   HENT: Positive for congestion and sore throat. Negative for ear pain and hearing loss.    Eyes: Negative for pain and visual disturbance.   Respiratory: Positive for cough and shortness of breath.    Cardiovascular: Positive for peripheral edema. Negative  "for chest pain and palpitations.   Gastrointestinal: Positive for abdominal pain, constipation, diarrhea, heartburn and nausea. Negative for hematochezia.   Genitourinary: Positive for frequency and urgency. Negative for dysuria, genital sores and hematuria.   Musculoskeletal: Positive for arthralgias, joint swelling and myalgias.   Skin: Positive for rash.   Neurological: Positive for dizziness, weakness and headaches. Negative for paresthesias.   Psychiatric/Behavioral: Negative for mood changes. The patient is nervous/anxious.      HA has been better.  Neurology cancelled her visit.  Wonders if she can fill meds here.  On gabapentin for migraine prophylaxis and uses Amerge and metoclopramide for abortives.  Also prescribed ropinirole for RLS.  Med is effective.  Migraine frequency generally a few per year. Will take over refills for these medications when needed    Hematology has cancelled appointment, wonders if needed.    Following with GI for her abdominal symptoms.  Upcoming colonoscopy.      Started on hip replacement process with Dr Fish.  Reviewing R hip WENCESLAO and repeating steroid injection L hip.    OBJECTIVE:   /78   Pulse 62   Temp 98.1  F (36.7  C) (Tympanic)   Ht 1.803 m (5' 11\")   Wt 95.9 kg (211 lb 8 oz)   SpO2 97%   BMI 29.50 kg/m   Estimated body mass index is 29.5 kg/m  as calculated from the following:    Height as of this encounter: 1.803 m (5' 11\").    Weight as of this encounter: 95.9 kg (211 lb 8 oz).  Physical Exam  GENERAL APPEARANCE: healthy, alert and no distress  EYES: Eyes grossly normal to inspection, PERRL and conjunctivae and sclerae normal  NECK: no adenopathy, no asymmetry, masses, or scars and thyroid normal to palpation  RESP: lungs clear to auscultation - no rales, rhonchi or wheezes  BREAST: normal without masses, tenderness or nipple discharge and no palpable axillary masses or adenopathy  CV: regular rate and rhythm, normal S1 S2, no S3 or S4, no murmur, click " "or rub, no peripheral edema and peripheral pulses strong  ABDOMEN: soft, nontender, no hepatosplenomegaly, no masses and bowel sounds normal  MS: no musculoskeletal defects are noted and gait is age appropriate without ataxia  NEURO: Normal strength and tone, sensory exam grossly normal, mentation intact and speech normal  PSYCH: mentation appears normal and affect normal/bright    Diagnostic Test Results:  Labs reviewed in Epic    ASSESSMENT / PLAN:       ICD-10-CM    1. Hyperlipidemia LDL goal <130  E78.5 Lipid panel reflex to direct LDL Non-fasting     Comprehensive metabolic panel (BMP + Alb, Alk Phos, ALT, AST, Total. Bili, TP)     Lipid panel reflex to direct LDL Non-fasting     Comprehensive metabolic panel (BMP + Alb, Alk Phos, ALT, AST, Total. Bili, TP)   2. Routine general medical examination at a health care facility  Z00.00    3. Visit for screening mammogram  Z12.31    4. Essential hypertension  I10 lisinopril (ZESTRIL) 40 MG tablet     Comprehensive metabolic panel (BMP + Alb, Alk Phos, ALT, AST, Total. Bili, TP)     Comprehensive metabolic panel (BMP + Alb, Alk Phos, ALT, AST, Total. Bili, TP)   5. Dermatitis  L30.9 metroNIDAZOLE (METROCREAM) 0.75 % external cream   6. Anemia, unspecified type  D64.9 CBC with platelets and differential     CBC with platelets and differential       Patient has been advised of split billing requirements and indicates understanding: Yes  COUNSELING:  Reviewed preventive health counseling, as reflected in patient instructions    Estimated body mass index is 29.5 kg/m  as calculated from the following:    Height as of this encounter: 1.803 m (5' 11\").    Weight as of this encounter: 95.9 kg (211 lb 8 oz).    Weight management plan: Discussed healthy diet and exercise guidelines    She reports that she has never smoked. She has never used smokeless tobacco.      Appropriate preventive services were discussed with this patient, including applicable screening as appropriate " for cardiovascular disease, diabetes, osteopenia/osteoporosis, and glaucoma.  As appropriate for age/gender, discussed screening for colorectal cancer, prostate cancer, breast cancer, and cervical cancer. Checklist reviewing preventive services available has been given to the patient.    Reviewed patients plan of care and provided an AVS. The Complex Care Plan (for patients with higher acuity and needing more deliberate coordination of services) for Miri meets the Care Plan requirement. This Care Plan has been established and reviewed with the Patient.    Counseling Resources:  ATP IV Guidelines  Pooled Cohorts Equation Calculator  Breast Cancer Risk Calculator  Breast Cancer: Medication to Reduce Risk  FRAX Risk Assessment  ICSI Preventive Guidelines  Dietary Guidelines for Americans, 2010  USDA's MyPlate  ASA Prophylaxis  Lung CA Screening    DO PIETER Alatorre [facility]    Identified Health Risks:

## 2021-08-10 NOTE — NURSING NOTE
Prior to immunization administration, verified patients identity using patient s name and date of birth. Please see Immunization Activity for additional information.     Screening Questionnaire for Adult Immunization    Are you sick today?   No   Do you have allergies to medications, food, a vaccine component or latex?   No   Have you ever had a serious reaction after receiving a vaccination?   No   Do you have a long-term health problem with heart, lung, kidney, or metabolic disease (e.g., diabetes), asthma, a blood disorder, no spleen, complement component deficiency, a cochlear implant, or a spinal fluid leak?  Are you on long-term aspirin therapy?   No   Do you have cancer, leukemia, HIV/AIDS, or any other immune system problem?   No   Do you have a parent, brother, or sister with an immune system problem?   No   In the past 3 months, have you taken medications that affect  your immune system, such as prednisone, other steroids, or anticancer drugs; drugs for the treatment of rheumatoid arthritis, Crohn s disease, or psoriasis; or have you had radiation treatments?   No   Have you had a seizure, or a brain or other nervous system problem?   No   During the past year, have you received a transfusion of blood or blood    products, or been given immune (gamma) globulin or antiviral drug?   No   For women: Are you pregnant or is there a chance you could become       pregnant during the next month?   No   Have you received any vaccinations in the past 4 weeks?   No     Immunization questionnaire answers were all negative.        Per orders of Dr. Corley, injection of Prevnar 13 given by Leonor Dalal CMA. Patient instructed to remain in clinic for 15 minutes afterwards, and to report any adverse reaction to me immediately.       Screening performed by Leonor Dalal CMA on 8/10/2021 at 12:42 PM.

## 2021-08-10 NOTE — PATIENT INSTRUCTIONS
Preventive Health Recommendations    See your health care provider every year to    Review health changes.     Discuss preventive care.      Review your medicines if your doctor has prescribed any.      You no longer need a yearly Pap test unless you've had an abnormal Pap test in the past 10 years. If you have vaginal symptoms, such as bleeding or discharge, be sure to talk with your provider about a Pap test.      Every 1 to 2 years, have a mammogram.  If you are over 69, talk with your health care provider about whether or not you want to continue having screening mammograms.      Every 10 years, have a colonoscopy. Or, have a yearly FIT test (stool test). These exams will check for colon cancer.       Have a cholesterol test every 5 years, or more often if your doctor advises it.       Have a diabetes test (fasting glucose) every three years. If you are at risk for diabetes, you should have this test more often.       At age 65, have a bone density scan (DEXA) to check for osteoporosis (brittle bone disease).    Shots:    Get a flu shot each year.    Get a tetanus shot every 10 years.    Talk to your doctor about your pneumonia vaccines. There are now two you should receive - Pneumovax (PPSV 23) and Prevnar (PCV 13).    Talk to your pharmacist about the shingles vaccine.    Talk to your doctor about the hepatitis B vaccine.    Nutrition:     Eat at least 5 servings of fruits and vegetables each day.      Eat whole-grain bread, whole-wheat pasta and brown rice instead of white grains and rice.      Get adequate about Calcium and Vitamin D.     Lifestyle    Exercise at least 150 minutes a week (30 minutes a day, 5 days a week). This will help you control your weight and prevent disease.      Limit alcohol to one drink per day.      No smoking.       Wear sunscreen to prevent skin cancer.       See your dentist twice a year for an exam and cleaning.      See your eye doctor every 1 to 2 years to screen for  conditions such as glaucoma, macular degeneration, cataracts, etc.

## 2021-08-21 ENCOUNTER — ANCILLARY PROCEDURE (OUTPATIENT)
Dept: MAMMOGRAPHY | Facility: CLINIC | Age: 66
End: 2021-08-21
Attending: FAMILY MEDICINE
Payer: COMMERCIAL

## 2021-08-21 DIAGNOSIS — Z12.31 VISIT FOR SCREENING MAMMOGRAM: ICD-10-CM

## 2021-08-21 PROCEDURE — 77067 SCR MAMMO BI INCL CAD: CPT | Mod: TC | Performed by: RADIOLOGY

## 2021-08-31 ENCOUNTER — TRANSFERRED RECORDS (OUTPATIENT)
Dept: HEALTH INFORMATION MANAGEMENT | Facility: CLINIC | Age: 66
End: 2021-08-31

## 2021-09-26 ENCOUNTER — HEALTH MAINTENANCE LETTER (OUTPATIENT)
Age: 66
End: 2021-09-26

## 2021-10-08 DIAGNOSIS — G43.009 MIGRAINE WITHOUT AURA AND WITHOUT STATUS MIGRAINOSUS, NOT INTRACTABLE: ICD-10-CM

## 2021-10-08 DIAGNOSIS — Z87.820 HISTORY OF TRAUMATIC BRAIN INJURY: ICD-10-CM

## 2021-10-08 DIAGNOSIS — R51.9 CHRONIC DAILY HEADACHE: ICD-10-CM

## 2021-10-08 RX ORDER — GABAPENTIN 300 MG/1
CAPSULE ORAL
Qty: 270 CAPSULE | Refills: 3 | Status: SHIPPED | OUTPATIENT
Start: 2021-10-08 | End: 2022-01-27

## 2021-10-08 NOTE — TELEPHONE ENCOUNTER
Rx Authorization:    Requested Medication/ Dose gabapentin (NEURONTIN) 300 MG capsule    Date last refill ordered: 1/27/21    Quantity ordered: 270    # refills: 3    Date of last clinic visit with ordering provider: 1/27/21    Date of next clinic visit with ordering provider:     All pertinent protocol data (lab date/result):     Include pertinent information from patients message:

## 2021-11-14 DIAGNOSIS — E78.5 HYPERLIPIDEMIA LDL GOAL <130: ICD-10-CM

## 2021-11-17 RX ORDER — PRAVASTATIN SODIUM 40 MG
TABLET ORAL
Qty: 90 TABLET | Refills: 3 | Status: SHIPPED | OUTPATIENT
Start: 2021-11-17 | End: 2022-11-11

## 2021-11-22 ENCOUNTER — OFFICE VISIT (OUTPATIENT)
Dept: URGENT CARE | Facility: URGENT CARE | Age: 66
End: 2021-11-22
Payer: COMMERCIAL

## 2021-11-22 VITALS
BODY MASS INDEX: 30.6 KG/M2 | TEMPERATURE: 97.5 F | DIASTOLIC BLOOD PRESSURE: 78 MMHG | OXYGEN SATURATION: 97 % | SYSTOLIC BLOOD PRESSURE: 136 MMHG | WEIGHT: 219.4 LBS | HEART RATE: 58 BPM

## 2021-11-22 DIAGNOSIS — J01.00 ACUTE NON-RECURRENT MAXILLARY SINUSITIS: Primary | ICD-10-CM

## 2021-11-22 PROCEDURE — 99213 OFFICE O/P EST LOW 20 MIN: CPT | Performed by: STUDENT IN AN ORGANIZED HEALTH CARE EDUCATION/TRAINING PROGRAM

## 2021-11-22 RX ORDER — CEFDINIR 300 MG/1
300 CAPSULE ORAL 2 TIMES DAILY
Qty: 20 CAPSULE | Refills: 0 | Status: SHIPPED | OUTPATIENT
Start: 2021-11-22 | End: 2021-12-02

## 2021-11-23 NOTE — PROGRESS NOTES
Assessment & Plan     Acute non-recurrent maxillary sinusitis  Is wanting treatment of sinus infection symptoms early as in the past not treating early has lead to asthma exacerbations and other complications and she is limited to cefdinir or metronidazole for antibiotics due to multiple allergies. Follow up if symptoms persist or worsen.  - cefdinir (OMNICEF) 300 MG capsule  Dispense: 20 capsule; Refill: 0             No follow-ups on file.    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 Has had sinus pressure and drainage. No asthma symptoms flaring at this time other than cough and feeling like bottom of lungs are a little tight    Multiple allergies. Okay to take cefdinir and metronidazole       Review of Systems  Constitutional, HEENT, cardiovascular, pulmonary, gi and gu systems are negative, except as otherwise noted.      Objective    /78   Pulse 58   Temp 97.5  F (36.4  C)   Wt 99.5 kg (219 lb 6.4 oz)   SpO2 97%   Breastfeeding No   BMI 30.60 kg/m    Physical Exam   GENERAL: healthy, alert and no distress  EYES: Eyes grossly normal to inspection, PERRL and conjunctivae and sclerae normal  HENT: ear canals and TM's normal, nose and mouth without ulcers or lesions  NECK: no adenopathy, no asymmetry, masses, or scars and thyroid normal to palpation  RESP: lungs clear to auscultation - no rales, rhonchi or wheezes  CV: regular rate and rhythm, normal S1 S2, no S3 or S4, no murmur, click or rub, no peripheral edema and peripheral pulses strong  MS: no gross musculoskeletal defects noted, no edema

## 2021-11-26 ENCOUNTER — OFFICE VISIT (OUTPATIENT)
Dept: URGENT CARE | Facility: URGENT CARE | Age: 66
End: 2021-11-26
Payer: COMMERCIAL

## 2021-11-26 VITALS
HEART RATE: 63 BPM | SYSTOLIC BLOOD PRESSURE: 173 MMHG | RESPIRATION RATE: 20 BRPM | TEMPERATURE: 98.2 F | DIASTOLIC BLOOD PRESSURE: 94 MMHG | OXYGEN SATURATION: 98 %

## 2021-11-26 DIAGNOSIS — I10 ELEVATED BLOOD PRESSURE READING WITH DIAGNOSIS OF HYPERTENSION: ICD-10-CM

## 2021-11-26 DIAGNOSIS — J01.90 ACUTE SINUSITIS TREATED WITH ANTIBIOTICS IN THE PAST 60 DAYS: ICD-10-CM

## 2021-11-26 DIAGNOSIS — J45.901 EXACERBATION OF ASTHMA, UNSPECIFIED ASTHMA SEVERITY, UNSPECIFIED WHETHER PERSISTENT: ICD-10-CM

## 2021-11-26 DIAGNOSIS — R05.9 COUGH: Primary | ICD-10-CM

## 2021-11-26 PROCEDURE — U0003 INFECTIOUS AGENT DETECTION BY NUCLEIC ACID (DNA OR RNA); SEVERE ACUTE RESPIRATORY SYNDROME CORONAVIRUS 2 (SARS-COV-2) (CORONAVIRUS DISEASE [COVID-19]), AMPLIFIED PROBE TECHNIQUE, MAKING USE OF HIGH THROUGHPUT TECHNOLOGIES AS DESCRIBED BY CMS-2020-01-R: HCPCS | Performed by: PHYSICIAN ASSISTANT

## 2021-11-26 PROCEDURE — U0005 INFEC AGEN DETEC AMPLI PROBE: HCPCS | Performed by: PHYSICIAN ASSISTANT

## 2021-11-26 PROCEDURE — 99214 OFFICE O/P EST MOD 30 MIN: CPT | Performed by: PHYSICIAN ASSISTANT

## 2021-11-26 RX ORDER — METHYLPREDNISOLONE 4 MG
TABLET, DOSE PACK ORAL
Qty: 21 TABLET | Refills: 0 | Status: SHIPPED | OUTPATIENT
Start: 2021-11-26 | End: 2021-12-06

## 2021-11-26 NOTE — PROGRESS NOTES
Chief Complaint   Patient presents with     Cough     Coughing, sore throat, bodyache and fatigue. Symptoms not getting better, on antibiotic for 4 days.                   ASSESSMENT:     ICD-10-CM    1. Cough  R05.9 Symptomatic COVID-19 Virus (Coronavirus) by PCR Nose     methylPREDNISolone (MEDROL DOSEPAK) 4 MG tablet therapy pack   2. Exacerbation of asthma, unspecified asthma severity, unspecified whether persistent  J45.901 methylPREDNISolone (MEDROL DOSEPAK) 4 MG tablet therapy pack   3. Acute sinusitis treated with antibiotics in the past 60 days  J01.90    4. Elevated blood pressure reading with diagnosis of hypertension  I10              PLAN:Covid pending. Finish Cefdinir for sinusitis. Medrol dose pack prescribed for wheezing. Recheck BP outside of clinic.  I have discussed clinical findings with patient.  Side effects of medications discussed.  Symptomatic care is discussed.  I have discussed the possibility of  worsening symptoms and indication to RTC or go to the ER if they occur.  All questions are answered, patient indicates understanding of these issues and is in agreement with plan.   Patient care instructions are discussed/given at the end of visit.   Lots of rest and fluids.      Rosa Iraheta PA-C      SUBJECTIVE:  67 yo asthmatic  presents for worsening cough,wheezing. Treated for sinusitis, on cefdinir. Requests covid testing. H/O HTN      Allergies   Allergen Reactions     Levofloxacin Rash     Other reaction(s): Contact Dermatitis     Penicillins Hives and Rash     Confirmed by skin prick testing 7/3/14     Amoxicillin Hives and Rash     Amoxicillin-Pot Clavulanate Rash     Azithromycin Rash     Cephalexin Rash     Clindamycin Rash and Hives     Atorvastatin Other (See Comments)     Felt sick, intolerance     Clindamycin Hcl Hives     Levaquin      tendonitis     Augmentin Rash       Past Medical History:   Diagnosis Date     Arthritis 2015     Depressive disorder 2002     Depressive  disorder, not elsewhere classified      Drug allergy, multiple 9/23/14--passed graded oral challenge for Zithromax.     7/3/14 POS PRE-PEN skin test. 7/30/14 NEG skin tests and oral challenge to Cefzil     Hx of abnormal Pap smear ?    no specifics given     lumbar degeneration      Raynaud's disease      Uncomplicated asthma      Unspecified essential hypertension        acetaminophen (TYLENOL) 500 MG tablet, Take 1,000 mg by mouth every 6 hours as needed for mild pain  albuterol (PROAIR HFA) 108 (90 Base) MCG/ACT inhaler, Inhale 2 puffs into the lungs every 4 hours as needed for shortness of breath / dyspnea or wheezing  aspirin (ASA) 81 MG EC tablet, Take 81 mg by mouth daily  azelastine (ASTELIN) 0.1 % nasal spray, Spray 2 sprays into both nostrils 2 times daily  budesonide-formoterol (SYMBICORT) 160-4.5 MCG/ACT Inhaler, Inhale 2 puffs into the lungs 2 times daily  cefdinir (OMNICEF) 300 MG capsule, Take 1 capsule (300 mg) by mouth 2 times daily for 10 days  cetirizine (ZYRTEC) 10 MG tablet, Take 10 mg by mouth daily.  escitalopram (LEXAPRO) 10 MG tablet, TAKE 1 TABLET DAILY  fluticasone (FLONASE) 50 MCG/ACT nasal spray, USE 2 SPRAYS IN EACH NOSTRIL DAILY  gabapentin (NEURONTIN) 300 MG capsule, 1 capsule TID with additional 1-2 capsules if headache is severe.  ibuprofen (ADVIL,MOTRIN) 200 MG tablet, take 1 tablet (200 mg) by oral route every 6 hours as needed with food  lisinopril (ZESTRIL) 40 MG tablet, Take 1 tablet (40 mg) by mouth daily  Magnesium 400 MG CAPS, Take by mouth 2 times daily   metoclopramide (REGLAN) 10 MG tablet, Take 1 tablet (10 mg) by mouth 3 times daily as needed (headache)  metroNIDAZOLE (METROCREAM) 0.75 % external cream, Apply topically 2 times daily To face  montelukast (SINGULAIR) 10 MG tablet, Take 1 tablet (10 mg) by mouth At Bedtime  naratriptan (AMERGE) 2.5 MG tablet, Take 1 tablet (2.5 mg) by mouth at onset of headache for migraine (repeat in 4 hours if needed) May repeat in 4  hours. Max 2 tablets/24 hours.  pantoprazole (PROTONIX) 40 MG EC tablet, TAKE 1 TABLET DAILY 30 TO 60 MINUTES BEFORE A MEAL  pravastatin (PRAVACHOL) 40 MG tablet, TAKE 1 TABLET DAILY  rOPINIRole (REQUIP) 0.25 MG tablet, Take 2 tablets (0.5 mg) by mouth At Bedtime  spironolactone (ALDACTONE) 25 MG tablet, TAKE ONE-HALF (1/2) TABLET DAILY    No current facility-administered medications on file prior to visit.      Social History     Tobacco Use     Smoking status: Never Smoker     Smokeless tobacco: Never Used   Substance Use Topics     Alcohol use: Not Currently       ROS:  CONSTITUTIONAL: Negative for fatigue or fever.  EYES: Negative for eye problems.  ENT: As above.  RESP: As above.  CV: Negative for chest pains.  GI: Negative for vomiting.  MUSCULOSKELETAL:  Negative for significant muscle or joint pains.  NEUROLOGIC: Negative for headaches.  SKIN: Negative for rash.  PSYCH: Normal mentation for age.    OBJECTIVE:  BP (!) 173/94   Pulse 63   Temp 98.2  F (36.8  C) (Oral)   Resp 20   SpO2 98%   GENERAL APPEARANCE: Healthy, alert and no distress.  EYES:Conjunctiva/sclera clear.  EARS: No cerumen.   Ear canals w/o erythema.  TM's intact w/o erythema.    NOSE/MOUTH: Nose without ulcers, erythema or lesions.  SINUSES: No maxillary sinus tenderness.  THROAT: No erythema w/o tonsillar enlargement . No exudates.  NECK: Supple, nontender, no lymphadenopathy.  RESP: Lungs with exp wheezes both bases.  CV: Regular rate and rhythm, normal S1 S2, no murmur noted.  NEURO: Awake, alert    SKIN: No rashes    No results found for any visits on 11/26/21.    Rosa Iraheta PA-C

## 2021-11-27 LAB — SARS-COV-2 RNA RESP QL NAA+PROBE: NEGATIVE

## 2021-11-28 DIAGNOSIS — K21.9 GASTROESOPHAGEAL REFLUX DISEASE: ICD-10-CM

## 2021-11-29 RX ORDER — PANTOPRAZOLE SODIUM 40 MG/1
TABLET, DELAYED RELEASE ORAL
Qty: 90 TABLET | Refills: 1 | Status: SHIPPED | OUTPATIENT
Start: 2021-11-29 | End: 2022-06-01

## 2021-11-29 NOTE — TELEPHONE ENCOUNTER
Prescription approved per [facility] Refill Protocol.  Malia Brown RN

## 2021-11-30 DIAGNOSIS — F32.5 MAJOR DEPRESSION IN COMPLETE REMISSION (H): ICD-10-CM

## 2021-12-02 RX ORDER — ESCITALOPRAM OXALATE 10 MG/1
TABLET ORAL
Qty: 90 TABLET | Refills: 3 | Status: SHIPPED | OUTPATIENT
Start: 2021-12-02

## 2021-12-06 ENCOUNTER — VIRTUAL VISIT (OUTPATIENT)
Dept: FAMILY MEDICINE | Facility: CLINIC | Age: 66
End: 2021-12-06
Payer: COMMERCIAL

## 2021-12-06 ENCOUNTER — ANCILLARY PROCEDURE (OUTPATIENT)
Dept: GENERAL RADIOLOGY | Facility: CLINIC | Age: 66
End: 2021-12-06
Attending: FAMILY MEDICINE
Payer: COMMERCIAL

## 2021-12-06 VITALS
HEART RATE: 60 BPM | DIASTOLIC BLOOD PRESSURE: 75 MMHG | OXYGEN SATURATION: 98 % | TEMPERATURE: 97.8 F | SYSTOLIC BLOOD PRESSURE: 136 MMHG

## 2021-12-06 DIAGNOSIS — R10.84 ABDOMINAL PAIN, GENERALIZED: ICD-10-CM

## 2021-12-06 DIAGNOSIS — R05.9 COUGH: ICD-10-CM

## 2021-12-06 DIAGNOSIS — R05.9 COUGH: Primary | ICD-10-CM

## 2021-12-06 LAB
ALBUMIN UR-MCNC: NEGATIVE MG/DL
APPEARANCE UR: CLEAR
BACTERIA #/AREA URNS HPF: ABNORMAL /HPF
BILIRUB UR QL STRIP: NEGATIVE
COLOR UR AUTO: YELLOW
GLUCOSE UR STRIP-MCNC: NEGATIVE MG/DL
HGB UR QL STRIP: NEGATIVE
KETONES UR STRIP-MCNC: NEGATIVE MG/DL
LEUKOCYTE ESTERASE UR QL STRIP: ABNORMAL
NITRATE UR QL: NEGATIVE
PH UR STRIP: 8.5 [PH] (ref 5–7)
RBC #/AREA URNS AUTO: ABNORMAL /HPF
SP GR UR STRIP: 1.01 (ref 1–1.03)
UROBILINOGEN UR STRIP-ACNC: 0.2 E.U./DL
WBC #/AREA URNS AUTO: ABNORMAL /HPF

## 2021-12-06 PROCEDURE — 99213 OFFICE O/P EST LOW 20 MIN: CPT | Performed by: FAMILY MEDICINE

## 2021-12-06 PROCEDURE — 71046 X-RAY EXAM CHEST 2 VIEWS: CPT | Mod: FY | Performed by: RADIOLOGY

## 2021-12-06 PROCEDURE — 81001 URINALYSIS AUTO W/SCOPE: CPT | Performed by: FAMILY MEDICINE

## 2021-12-06 RX ORDER — PREDNISONE 20 MG/1
40 TABLET ORAL DAILY
Qty: 10 TABLET | Refills: 0 | Status: SHIPPED | OUTPATIENT
Start: 2021-12-06 | End: 2021-12-11

## 2021-12-06 RX ORDER — DOXYCYCLINE 100 MG/1
100 CAPSULE ORAL 2 TIMES DAILY
Qty: 14 CAPSULE | Refills: 0 | Status: SHIPPED | OUTPATIENT
Start: 2021-12-06 | End: 2021-12-13

## 2021-12-06 ASSESSMENT — ENCOUNTER SYMPTOMS: COUGH: 1

## 2021-12-06 NOTE — PROGRESS NOTES
A/P:      ICD-10-CM    1. Cough  R05.9 XR Chest 2 Views     doxycycline hyclate (VIBRAMYCIN) 100 MG capsule     predniSONE (DELTASONE) 20 MG tablet   2. Abdominal pain, generalized  R10.84 UA Macro with Reflex to Micro and Culture - lab collect     UA Macro with Reflex to Micro and Culture - lab collect     Urine Microscopic     Cough:  Began with viral symptoms after exposure to grandchild with similar symptoms.  Pt was seen and tested as noted below.  Treated for sinusitis without improvement.  Persistent cough and h/o asthma.  Will attempt to treat for bronchitis with doxycycline and repeat prednisone.  CXR normal today.  F/u if not improved.    abd pain:  Chronic, seeing GI.  Worsened with persistent cough.  Pt encouraged to f/u with her GI provider to review next steps.      Subjective     Miri Naylor is a 66 year old female who presents to clinic today for the following health issues accompanied by her :    Cough    History of Present Illness     Asthma:  She presents for follow up of asthma.  She has some cough, some wheezing, and some shortness of breath. She is using a relief medication a few times a month. She does not miss any doses of her controller medication throughout the week.Patient is aware of the following triggers: cold air, dust mites, exercise or sports, gastric reflux, humidity, mold, pollens, strong odors and fumes and upper respiratory infections. The patient has had a visit to the Emergency Room, Urgent Care or Hospital due to asthma since the last clinic visit. She has been to the Emergency Room or Urgent Care 2 times.She has had a Hospitalization 0 times.    Back Pain:  She presents for follow up of back pain. Patient's back pain is a new problem.    Original cause of back pain: other  First noticed back pain: in the last week  Patient feels back pain: constantlyLocation of back pain:  Right upper back  Description of back pain: cramping, dull ache, fullness, gnawing and  "other  Back pain spreads: right thigh    Since patient first noticed back pain, pain is: always present, but gets better and worse  Does back pain interfere with her job:  Yes  On a scale of 1-10 (10 being the worst), patient describes pain as:  6  What makes back pain worse: bending, coughing, certain positions, lying down, sitting and standing  Acupuncture: not tried  Acetaminophen: helpful  Activity or exercise: not helpful  Chiropractor:  Not tried  Cold: helpful  Heat: not tried  Massage: not tried  Muscle relaxants: helpful  NSAIDS: helpful  Opioids: helpful  Physical Therapy: not tried  Rest: not helpful  Steroid Injection: not tried  Stretching: helpful  Surgery: not tried  TENS unit: not tried  Topical pain relievers: helpful  Other healthcare providers patient is seeing for back pain: None    She eats 2-3 servings of fruits and vegetables daily.She consumes 1 sweetened beverage(s) daily.She exercises with enough effort to increase her heart rate 10 to 19 minutes per day.  She exercises with enough effort to increase her heart rate 3 or less days per week.   She is taking medications regularly.       1 month ago got cold from her grandson.  Was haivng cough and congestion.  Was seen at [facility] about 2 weeks in and treated for a sinusitis after testing negative for Covid.  Was then seen at  as few days later due to increased wheezing and cough.  Was also having abdominal pain and back pain at that time.  Was treated with prednisone.  Not sure how much this helped, perhaps helped the cough a bit.  Was given a 6 day tapering course.    Has been over a week since completing and now feels like \"things are worse.    Has pain in the R upper abdomen and R upper back.  Also has pain in the L lower quadrant.    Has chronic abd pain in the R upper and and L lower abd.  Feels like she has to strain to have a BM and urinate.  Which she feels is new since she has been ill.  Irregular bowels are not new.  " Seeing GI     Was treated with 6 days of prednisone taper.   Felt like her cough was better when she was on the prednisone.    On symbicort and singulair.  Used albuteroll twice without benefit.    Has not yet reached out to allergy/asthma as her previous physician left the practice.      Coughing up white foamy sputum.  Is SOB, notices with climbing stairs.      Nothing makes the cough better.  Worse with bending over      Abd pain and back pain is worse with cough    Review of Systems   Respiratory: Positive for cough.       Constitutional, HEENT, cardiovascular, pulmonary, gi and gu systems are negative, except as otherwise noted.      Objective    /75   Pulse 60   Temp 97.8  F (36.6  C) (Tympanic)   SpO2 98%   Breastfeeding No   There is no height or weight on file to calculate BMI.  Physical Exam   PE:  VS as above   Gen:  WN/WD/WH female in NAD   HEENT:  NC/AT, conjunctiva wnl, TM's wnl hua pearly gray with good light reflex, oropharynx clear without exudate/erythema   Neck:  supple, no LAD appreciated   Heart:  RRR without murmur, nl S1, S2, no rubs or gallops   Lungs CTA hua without rales/ronchi/wheezes   Ext:  No pedal edema      Epic reviewed  CXR negative for acute infiltrate per my visualization

## 2021-12-07 PROBLEM — J06.9 UPPER RESPIRATORY TRACT INFECTION, UNSPECIFIED TYPE: Status: RESOLVED | Noted: 2019-02-04 | Resolved: 2021-12-07

## 2022-01-26 ENCOUNTER — VIRTUAL VISIT (OUTPATIENT)
Dept: ANESTHESIOLOGY | Facility: CLINIC | Age: 67
End: 2022-01-26
Payer: COMMERCIAL

## 2022-01-26 DIAGNOSIS — R10.11 RUQ ABDOMINAL PAIN: Primary | ICD-10-CM

## 2022-01-26 PROCEDURE — 99212 OFFICE O/P EST SF 10 MIN: CPT | Mod: 95 | Performed by: ANESTHESIOLOGY

## 2022-01-26 NOTE — LETTER
"1/26/2022       RE: Miri Naylor  [address] [address]     Dear Colleague,    Thank you for referring your patient, Miri Naylor, to the [facility] [place] at [facility]. Please see a copy of my visit note below.    Video Visit  Duration: 16 minutes via [employer]    [facility]    Date of visit: 1/26/2022    Chief complaint:   Chief Complaint   Patient presents with     RECHECK     [facility] RETURN, patient reports, 2/10 head       Interval history:  iMri Naylor was last seen by me on 1/27/2021.      Recommendations/plan at the last visit included:  1. Consult to nutrition  2. Continue acupuncture and chiropractic work  3. Lumbar MRI to be discussed at next visit.    Since her last visit, Miri Naylor reports:  Has been doing well since last visit with improvement in symptoms of her headaches but continues to have abdominal discomfort. She describes this as bloating and \"retaining something\". She also describes pain in the left lower quadrant as well as upper right (subcostal region). She has had GI workup   Has been taking magnesium to stabilize bowel movements    Pain scores:  Pain intensity on average is 4 on a scale of 0-10.         Medications:  Current Outpatient Medications   Medication Sig Dispense Refill     acetaminophen (TYLENOL) 500 MG tablet Take 1,000 mg by mouth every 6 hours as needed for mild pain       albuterol (PROAIR HFA) 108 (90 Base) MCG/ACT inhaler Inhale 2 puffs into the lungs every 4 hours as needed for shortness of breath / dyspnea or wheezing 1 Inhaler 1     aspirin (ASA) 81 MG EC tablet Take 81 mg by mouth daily       azelastine (ASTELIN) 0.1 % nasal spray Spray 2 sprays into both nostrils 2 times daily 30 mL 6     budesonide-formoterol (SYMBICORT) 160-4.5 MCG/ACT Inhaler Inhale 2 puffs into the lungs 2 times daily 10.2 g 6 "     cetirizine (ZYRTEC) 10 MG tablet Take 10 mg by mouth daily.       escitalopram (LEXAPRO) 10 MG tablet TAKE 1 TABLET DAILY 90 tablet 3     fluticasone (FLONASE) 50 MCG/ACT nasal spray USE 2 SPRAYS IN EACH NOSTRIL DAILY 16 g 11     gabapentin (NEURONTIN) 300 MG capsule 1 capsule TID with additional 1-2 capsules if headache is severe. 270 capsule 3     ibuprofen (ADVIL,MOTRIN) 200 MG tablet take 1 tablet (200 mg) by oral route every 6 hours as needed with food       lisinopril (ZESTRIL) 40 MG tablet Take 1 tablet (40 mg) by mouth daily 90 tablet 3     Magnesium 400 MG CAPS Take by mouth 2 times daily        metoclopramide (REGLAN) 10 MG tablet Take 1 tablet (10 mg) by mouth 3 times daily as needed (headache) 20 tablet 3     metroNIDAZOLE (METROCREAM) 0.75 % external cream Apply topically 2 times daily To face 45 g 2     montelukast (SINGULAIR) 10 MG tablet Take 1 tablet (10 mg) by mouth At Bedtime 90 tablet 3     naratriptan (AMERGE) 2.5 MG tablet Take 1 tablet (2.5 mg) by mouth at onset of headache for migraine (repeat in 4 hours if needed) May repeat in 4 hours. Max 2 tablets/24 hours. 18 tablet 11     pantoprazole (PROTONIX) 40 MG EC tablet TAKE 1 TABLET DAILY 30 TO 60 MINUTES BEFORE A MEAL 90 tablet 1     pravastatin (PRAVACHOL) 40 MG tablet TAKE 1 TABLET DAILY 90 tablet 3     rOPINIRole (REQUIP) 0.25 MG tablet Take 2 tablets (0.5 mg) by mouth At Bedtime 60 tablet 11     spironolactone (ALDACTONE) 25 MG tablet TAKE ONE-HALF (1/2) TABLET DAILY 45 tablet 3       Medical History: any changes in medical history since they were last seen? No    Review of Systems:  The 14 system ROS was reviewed from the intake questionnaire, and is positive for: abdominal pain, headaches, restless leg  Any bowel or bladder problems: constipation  Mood: stable    Physical Exam: Virtual Visit  not currently breastfeeding.  General: AAOx3, NAD, appears comfortable via video  Gait: Seated comfortably during video conversation.        Assessment:   Chronic abdominal pain  Hx of Migraines    Miri Naylor is a 66 year old female who is seen at the pain clinic for follow up to discuss her abdominal pain. She has attempted to modify her diet without relief, but continues to have pain. It is difficult to ascertain if her pain is visceral in nature rather than from her abdominal wall. We discussed that she should schedule an in-person visit so that we can perform a physical exam to more closely determine the etiology of her pain. If it is from the abdominal wall we can consider transverse abdominis plane block.     Plan:  1. Physical Therapy:  Continue daily HEPs  2. Clinical Health Psychologist to address issues of relaxation, behavioral change, coping style, and other factors important to improvement.  Not at this time   3. Diagnostic Studies:  none  4. Follow up: schedule in-person visit for physical exam and discuss treatment options.     Dasha Garcia MD    Pain Medicine  Department of Anesthesiology  [facility]                Again, thank you for allowing me to participate in the care of your patient.      Sincerely,    Dasha Garcia MD

## 2022-01-26 NOTE — PROGRESS NOTES
"Video Visit  Duration: 16 minutes via [employer]    [facility]    Date of visit: 1/26/2022    Chief complaint:   Chief Complaint   Patient presents with     RECHECK     [facility] RETURN, patient reports, 2/10 head       Interval history:  Miri Naylor was last seen by me on 1/27/2021.      Recommendations/plan at the last visit included:  1. Consult to nutrition  2. Continue acupuncture and chiropractic work  3. Lumbar MRI to be discussed at next visit.    Since her last visit, Miri Naylor reports:  Has been doing well since last visit with improvement in symptoms of her headaches but continues to have abdominal discomfort. She describes this as bloating and \"retaining something\". She also describes pain in the left lower quadrant as well as upper right (subcostal region). She has had GI workup   Has been taking magnesium to stabilize bowel movements    Pain scores:  Pain intensity on average is 4 on a scale of 0-10.         Medications:  Current Outpatient Medications   Medication Sig Dispense Refill     acetaminophen (TYLENOL) 500 MG tablet Take 1,000 mg by mouth every 6 hours as needed for mild pain       albuterol (PROAIR HFA) 108 (90 Base) MCG/ACT inhaler Inhale 2 puffs into the lungs every 4 hours as needed for shortness of breath / dyspnea or wheezing 1 Inhaler 1     aspirin (ASA) 81 MG EC tablet Take 81 mg by mouth daily       azelastine (ASTELIN) 0.1 % nasal spray Spray 2 sprays into both nostrils 2 times daily 30 mL 6     budesonide-formoterol (SYMBICORT) 160-4.5 MCG/ACT Inhaler Inhale 2 puffs into the lungs 2 times daily 10.2 g 6     cetirizine (ZYRTEC) 10 MG tablet Take 10 mg by mouth daily.       escitalopram (LEXAPRO) 10 MG tablet TAKE 1 TABLET DAILY 90 tablet 3     fluticasone (FLONASE) 50 MCG/ACT nasal spray USE 2 SPRAYS IN EACH NOSTRIL DAILY 16 g 11     gabapentin (NEURONTIN) 300 MG capsule 1 capsule TID with additional 1-2 capsules if headache is severe. 270 capsule 3     " ibuprofen (ADVIL,MOTRIN) 200 MG tablet take 1 tablet (200 mg) by oral route every 6 hours as needed with food       lisinopril (ZESTRIL) 40 MG tablet Take 1 tablet (40 mg) by mouth daily 90 tablet 3     Magnesium 400 MG CAPS Take by mouth 2 times daily        metoclopramide (REGLAN) 10 MG tablet Take 1 tablet (10 mg) by mouth 3 times daily as needed (headache) 20 tablet 3     metroNIDAZOLE (METROCREAM) 0.75 % external cream Apply topically 2 times daily To face 45 g 2     montelukast (SINGULAIR) 10 MG tablet Take 1 tablet (10 mg) by mouth At Bedtime 90 tablet 3     naratriptan (AMERGE) 2.5 MG tablet Take 1 tablet (2.5 mg) by mouth at onset of headache for migraine (repeat in 4 hours if needed) May repeat in 4 hours. Max 2 tablets/24 hours. 18 tablet 11     pantoprazole (PROTONIX) 40 MG EC tablet TAKE 1 TABLET DAILY 30 TO 60 MINUTES BEFORE A MEAL 90 tablet 1     pravastatin (PRAVACHOL) 40 MG tablet TAKE 1 TABLET DAILY 90 tablet 3     rOPINIRole (REQUIP) 0.25 MG tablet Take 2 tablets (0.5 mg) by mouth At Bedtime 60 tablet 11     spironolactone (ALDACTONE) 25 MG tablet TAKE ONE-HALF (1/2) TABLET DAILY 45 tablet 3       Medical History: any changes in medical history since they were last seen? No    Review of Systems:  The 14 system ROS was reviewed from the intake questionnaire, and is positive for: abdominal pain, headaches, restless leg  Any bowel or bladder problems: constipation  Mood: stable    Physical Exam: Virtual Visit  not currently breastfeeding.  General: AAOx3, NAD, appears comfortable via video  Gait: Seated comfortably during video conversation.       Assessment:   Chronic abdominal pain  Hx of Migraines    Miri Naylor is a 66 year old female who is seen at the pain clinic for follow up to discuss her abdominal pain. She has attempted to modify her diet without relief, but continues to have pain. It is difficult to ascertain if her pain is visceral in nature rather than from her abdominal wall. We  discussed that she should schedule an in-person visit so that we can perform a physical exam to more closely determine the etiology of her pain. If it is from the abdominal wall we can consider transverse abdominis plane block.     Plan:  1. Physical Therapy:  Continue daily HEPs  2. Clinical Health Psychologist to address issues of relaxation, behavioral change, coping style, and other factors important to improvement.  Not at this time   3. Diagnostic Studies:  none  4. Follow up: schedule in-person visit for physical exam and discuss treatment options.     Dasha Garcia MD    Pain Medicine  Department of Anesthesiology  [facility]

## 2022-01-26 NOTE — NURSING NOTE
Patient presents with:  RECHECK: [facility] RETURN, patient reports, 2/10 head      Data Unavailable     Pain Medications     Analgesics Other Refills Start End     acetaminophen (TYLENOL) 500 MG tablet          Sig - Route: Take 1,000 mg by mouth every 6 hours as needed for mild pain - Oral    Class: Historical    Salicylates Refills Start End     aspirin (ASA) 81 MG EC tablet          Sig - Route: Take 81 mg by mouth daily - Oral    Class: Historical          What medications are you using for pain?   Acetaminophen          How will you be connecting to the visit? mychart  How would you like to obtain your AVS? MyChart  If the video visit is dropped, the invitation should be resent by: Text to cell phone: [phone]   Will anyone else be joining your video visit? Nabila Dickens, EMT          FACIAL PAIN/PRESSURE/LACERATION/BACK PAIN/PAIN

## 2022-01-27 DIAGNOSIS — Z87.820 HISTORY OF TRAUMATIC BRAIN INJURY: ICD-10-CM

## 2022-01-27 DIAGNOSIS — R51.9 CHRONIC DAILY HEADACHE: ICD-10-CM

## 2022-01-27 DIAGNOSIS — G25.81 RESTLESS LEGS SYNDROME: ICD-10-CM

## 2022-01-27 DIAGNOSIS — G43.009 MIGRAINE WITHOUT AURA AND WITHOUT STATUS MIGRAINOSUS, NOT INTRACTABLE: ICD-10-CM

## 2022-01-27 RX ORDER — GABAPENTIN 300 MG/1
CAPSULE ORAL
Qty: 270 CAPSULE | Refills: 3 | Status: SHIPPED | OUTPATIENT
Start: 2022-01-27 | End: 2022-04-27

## 2022-01-27 RX ORDER — ROPINIROLE 0.25 MG/1
0.5 TABLET, FILM COATED ORAL AT BEDTIME
Qty: 60 TABLET | Refills: 11 | Status: SHIPPED | OUTPATIENT
Start: 2022-01-27 | End: 2022-04-27

## 2022-04-03 NOTE — TELEPHONE ENCOUNTER
Routing refill request to provider for review/approval because:  BP elevated    Edith Vásquez RN           Dr SIVA Kenyon

## 2022-04-27 ENCOUNTER — VIRTUAL VISIT (OUTPATIENT)
Dept: NEUROLOGY | Facility: CLINIC | Age: 67
End: 2022-04-27
Payer: COMMERCIAL

## 2022-04-27 DIAGNOSIS — Z87.820 HISTORY OF TRAUMATIC BRAIN INJURY: ICD-10-CM

## 2022-04-27 DIAGNOSIS — R51.9 CHRONIC DAILY HEADACHE: ICD-10-CM

## 2022-04-27 DIAGNOSIS — G25.81 RESTLESS LEGS SYNDROME: ICD-10-CM

## 2022-04-27 DIAGNOSIS — G43.009 MIGRAINE WITHOUT AURA AND WITHOUT STATUS MIGRAINOSUS, NOT INTRACTABLE: ICD-10-CM

## 2022-04-27 PROCEDURE — 99214 OFFICE O/P EST MOD 30 MIN: CPT | Mod: 95 | Performed by: PSYCHIATRY & NEUROLOGY

## 2022-04-27 RX ORDER — METOCLOPRAMIDE 10 MG/1
10 TABLET ORAL 3 TIMES DAILY PRN
Qty: 20 TABLET | Refills: 3 | Status: SHIPPED | OUTPATIENT
Start: 2022-04-27 | End: 2023-06-19

## 2022-04-27 RX ORDER — GABAPENTIN 300 MG/1
CAPSULE ORAL
Qty: 270 CAPSULE | Refills: 3 | Status: SHIPPED | OUTPATIENT
Start: 2022-04-27 | End: 2023-04-19

## 2022-04-27 RX ORDER — NARATRIPTAN 2.5 MG/1
2.5 TABLET ORAL
Qty: 18 TABLET | Refills: 11 | Status: SHIPPED | OUTPATIENT
Start: 2022-04-27 | End: 2023-04-19

## 2022-04-27 RX ORDER — ROPINIROLE 0.25 MG/1
1 TABLET, FILM COATED ORAL AT BEDTIME
Qty: 360 TABLET | Refills: 3 | Status: SHIPPED | OUTPATIENT
Start: 2022-04-27 | End: 2023-04-19

## 2022-04-27 ASSESSMENT — HEADACHE IMPACT TEST (HIT 6)
HOW OFTEN HAVE YOU FELT FED UP OR IRRITATED BECAUSE OF YOUR HEADACHES: SOMETIMES
HOW OFTEN HAVE YOU FELT FED UP OR IRRITATED BECAUSE OF YOUR HEADACHES: SOMETIMES
HOW OFTEN DO HEADACHES LIMIT YOUR DAILY ACTIVITIES: SOMETIMES
HIT6 TOTAL SCORE: 59
HOW OFTEN HAVE YOU FELT TOO TIRED TO WORK BECAUSE OF YOUR HEADACHES: SOMETIMES
HIT6 TOTAL SCORE: 59
WHEN YOU HAVE A HEADACHE HOW OFTEN DO YOU WISH YOU COULD LIE DOWN: VERY OFTEN
HOW OFTEN DO HEADACHES LIMIT YOUR DAILY ACTIVITIES: SOMETIMES
HOW OFTEN HAVE YOU FELT TOO TIRED TO WORK BECAUSE OF YOUR HEADACHES: SOMETIMES
HOW OFTEN DID HEADACHS LIMIT CONCENTRATION ON WORK OR DAILY ACTIVITY: RARELY
WHEN YOU HAVE A HEADACHE HOW OFTEN DO YOU WISH YOU COULD LIE DOWN: VERY OFTEN
HOW OFTEN DID HEADACHS LIMIT CONCENTRATION ON WORK OR DAILY ACTIVITY: RARELY
WHEN YOU HAVE HEADACHES HOW OFTEN IS THE PAIN SEVERE: SOMETIMES
WHEN YOU HAVE HEADACHES HOW OFTEN IS THE PAIN SEVERE: SOMETIMES

## 2022-04-27 ASSESSMENT — ENCOUNTER SYMPTOMS
SNORES LOUDLY: 0
BOWEL INCONTINENCE: 0
POLYPHAGIA: 0
NAUSEA: 1
HEARTBURN: 1
SPUTUM PRODUCTION: 1
COUGH: 1
COUGH DISTURBING SLEEP: 0
POSTURAL DYSPNEA: 0
WEIGHT LOSS: 0
ABDOMINAL PAIN: 1
CHILLS: 0
ALTERED TEMPERATURE REGULATION: 0
HEMOPTYSIS: 0
WEIGHT GAIN: 1
NIGHT SWEATS: 0
INCREASED ENERGY: 0
DYSPNEA ON EXERTION: 1
POLYDIPSIA: 0
RECTAL PAIN: 0
DECREASED APPETITE: 0
SHORTNESS OF BREATH: 1
BLOATING: 1
FATIGUE: 1
BLOOD IN STOOL: 0
WHEEZING: 0
CONSTIPATION: 0
JAUNDICE: 0
VOMITING: 0
FEVER: 0

## 2022-04-27 ASSESSMENT — MIGRAINE DISABILITY ASSESSMENT (MIDAS)
HOW MANY DAYS WAS YOUR PRODUCTIVITY CUT IN HALF BECAUSE OF HEADACHES: 0
HOW MANY DAYS IN THE PAST 3 MONTHS HAVE YOU HAD A HEADACHE: 90
HOW MANY DAYS DID YOU MISS WORK OR SCHOOL BECAUSE OF HEADACHES: 0
TOTAL SCORE: 0
HOW MANY DAYS DID YOU NOT DO HOUSEWORK BECAUSE OF HEADACHES: 0
HOW OFTEN WERE SOCIAL ACTIVITIES MISSED DUE TO HEADACHES: 0
HOW MANY DAYS WAS HOUSEWORK PRODUCTIVITY CUT IN HALF DUE TO HEADACHES: 0
ON A SCALE FROM 0-10 ON AVERAGE HOW PAINFUL WERE HEADACHES: 3

## 2022-04-27 NOTE — NURSING NOTE
Pt isn't having head pain, just hip pain. She had her hip replaced 2 weeks ago and rates her pain 5/10.   Pt wanted to complete there questionnaires by herself, she was working on that during the check in process.  Destinee Freeman 04/27/2022

## 2022-04-27 NOTE — PROGRESS NOTES
Miri is a 66 year old who is being evaluated via a billable video visit.      How would you like to obtain your AVS? MyChart  If the video visit is dropped, the invitation should be resent by: Text to cell phone: [phone]   Will anyone else be joining your video visit? No      Video Start Time: 8:00 AM  Video-Visit Details    Type of service:  Video Visit    Video End Time:8:17 AM    Originating Location (pt. Location): Home    Distant Location (provider location):  [facility] [place]     Platform used for Video Visit: [employer]     [facility]  Neurology Progress Note    Subjective:    Ms. Naylor returns for follow up of post-traumatic headaches and restless leg syndrome.     She has had no migraine attacks for 1.5 years. She continues to have 30/30 headache days per month. They rate 7/10 without medication and 2/10 with medication. Gabapentin 300 mg TID is helpful.     She continues to have restless legs. She takes ropinirole 0.5 mg nightly. She gets twitchy around 3 am and cannot go back to sleep.      She had a hip replacement about two weeks ago.  Restless legs have been an issue in her recovery.    Objective:    Vitals: There were no vitals taken for this visit.  General: Cooperative, NAD  Neurologic:  Mental Status: Fully alert, attentive and oriented. Speech clear and fluent.   Cranial Nerves: Facial movements symmetric.   Motor: No abnormal movements.      Assessment/Plan:   Miri Naylor is a 66-year-old woman who returns for follow-up of posttraumatic headaches and restless leg syndrome.  Her headaches remain well controlled on gabapentin 300 mg 3 times a day.  Restless leg syndrome has worsened and has made recovery from his surgery more difficult.    For chronic posttraumatic headache, I recommended continuing her current treatment strategy, which remains beneficial:  -Continue gabapentin 300 mg  TID  -Consider CGRP inhibitors or botulinum toxin injection if further preventative treatment is needed  -Can use tylenol as abortive therapy for mild headaches, not to exceed 14 doses per month  -Naratriptan 2.5 mg as needed for moderate to severe headache, can repeat dose once in 4 hours if needed, not to exceed 9 doses per month  -Metoclopramide 10 mg TID prn for headache-associated nausea or as abortive therapy    For management of restless leg syndrome, I recommend increasing ropinirole by 0.25 mg to 0.75 mg nightly.  If needed and tolerated, she may further increase to 1 mg nightly.  -I have increased her ropinirole prescription to 1 mg nightly.  She will see me a message with an update after she has tried the higher dose.    -Follow up in one year or sooner if needed.    Diamond Nolan MD  Neurology

## 2022-04-27 NOTE — LETTER
4/27/2022       RE: Miri Naylor  [address] [address]     Dear Colleague,    Thank you for referring your patient, Miri Naylor, to the [facility] [place] at [facility]. Please see a copy of my visit note below.      [facility]    Clinics and Surgery Center  Neurology Progress Note    Subjective:    Ms. Naylor returns for follow up of post-traumatic headaches and restless leg syndrome.     She has had no migraine attacks for 1.5 years. She continues to have 30/30 headache days per month. They rate 7/10 without medication and 2/10 with medication. Gabapentin 300 mg TID is helpful.     She continues to have restless legs. She takes ropinirole 0.5 mg nightly. She gets twitchy around 3 am and cannot go back to sleep.      She had a hip replacement about two weeks ago.  Restless legs have been an issue in her recovery.    Objective:    Vitals: There were no vitals taken for this visit.  General: Cooperative, NAD  Neurologic:  Mental Status: Fully alert, attentive and oriented. Speech clear and fluent.   Cranial Nerves: Facial movements symmetric.   Motor: No abnormal movements.      Assessment/Plan:   Miri Naylor is a 66-year-old woman who returns for follow-up of posttraumatic headaches and restless leg syndrome.  Her headaches remain well controlled on gabapentin 300 mg 3 times a day.  Restless leg syndrome has worsened and has made recovery from his surgery more difficult.    For chronic posttraumatic headache, I recommended continuing her current treatment strategy, which remains beneficial:  -Continue gabapentin 300 mg TID  -Consider CGRP inhibitors or botulinum toxin injection if further preventative treatment is needed  -Can use tylenol as abortive therapy for mild headaches, not to exceed 14 doses per month  -Naratriptan 2.5 mg as needed for moderate to  severe headache, can repeat dose once in 4 hours if needed, not to exceed 9 doses per month  -Metoclopramide 10 mg TID prn for headache-associated nausea or as abortive therapy    For management of restless leg syndrome, I recommend increasing ropinirole by 0.25 mg to 0.75 mg nightly.  If needed and tolerated, she may further increase to 1 mg nightly.  -I have increased her ropinirole prescription to 1 mg nightly.  She will see me a message with an update after she has tried the higher dose.    -Follow up in one year or sooner if needed.      Diamond Nolan MD  Neurology

## 2022-05-27 DIAGNOSIS — K21.9 GASTROESOPHAGEAL REFLUX DISEASE: ICD-10-CM

## 2022-06-01 RX ORDER — PANTOPRAZOLE SODIUM 40 MG/1
TABLET, DELAYED RELEASE ORAL
Qty: 90 TABLET | Refills: 0 | Status: SHIPPED | OUTPATIENT
Start: 2022-06-01 | End: 2022-08-26

## 2022-08-05 DIAGNOSIS — I10 ESSENTIAL HYPERTENSION: ICD-10-CM

## 2022-08-05 RX ORDER — LISINOPRIL 40 MG/1
TABLET ORAL
Qty: 90 TABLET | Refills: 3 | OUTPATIENT
Start: 2022-08-05

## 2022-11-10 DIAGNOSIS — E78.5 HYPERLIPIDEMIA LDL GOAL <130: ICD-10-CM

## 2022-11-11 RX ORDER — PRAVASTATIN SODIUM 40 MG
TABLET ORAL
Qty: 90 TABLET | Refills: 0 | Status: SHIPPED | OUTPATIENT
Start: 2022-11-11

## 2022-11-15 NOTE — Clinical Note
My Asthma Action Plan  Name: Miri Naylor   YOB: 1955  Date: 1/2/2017   My doctor: Elizabeth Corley   My clinic: [facility]      My Control Medicine: Beclomethasone (QVar) -  80 mcg        Dose: 2 puffs inhaled twice daily  My Rescue Medicine:   Albuterol 2-4 puffs inhaled (use a spacer unless using a Proair Respiclick device) every 4 hours as needed for chest tightness, wheezing, shortness of breath and/or coughing.     Albuterol 2-4 puffs inhaled (use spacer if not using Proair Respiclick device) 15-20 minutes prior to physical activity.     Please ensure warm up period prior to exercise.      My Asthma Severity: moderate persistent  Avoid your asthma triggers: exercise or sports        GREEN ZONE   Good Control    I feel good    No cough or wheeze    Can work, sleep and play without asthma symptoms       Take your asthma control medicine every day.     1. If exercise triggers your asthma, take your rescue medication    15 minutes before exercise or sports, and    During exercise if you have asthma symptoms  2. Spacer to use with inhaler: If you have a spacer, make sure to use it with your inhaler             YELLOW ZONE Getting Worse  I have ANY of these:    I do not feel good    Cough or wheeze    Chest feels tight    Wake up at night   1. Keep taking your Green Zone medications  2. Start taking your rescue medicine:    every 20 minutes for up to 1 hour. Then every 4 hours for 24-48 hours.  3. If you stay in the Yellow Zone for more than 12-24 hours, contact your doctor.  4. If you do not return to the Green Zone in 12-24 hours or you get worse, start taking your oral steroid medicine if prescribed by your provider.           RED ZONE Medical Alert - Get Help  I have ANY of these:    I feel awful    Medicine is not helping    Breathing getting harder    Trouble walking or talking    Nose opens wide to breathe       1. Take your rescue medicine NOW  2. If your provider has  prescribed an oral steroid medicine, start taking it NOW  3. Call your doctor NOW  4. If you are still in the Red Zone after 20 minutes and you have not reached your doctor:    Take your rescue medicine again and    Call 911 or go to the emergency room right away    See your regular doctor within 2 weeks of an Emergency Room or Urgent Care visit for follow-up treatment.        The above medication may be given at school or day care?: N/A (Adult Patient)  Child can carry and use inhaler(s) at school with approval of school nurse?: N/A (Adult Patient)    Electronically signed by: Nish Stockton, January 2, 2017    Annual Reminders:  Meet with Asthma Educator,  Flu Shot in the Fall, consider Pneumonia Vaccination for patients with asthma (aged 19 and older).    Pharmacy:    [facility] - [address] DR[facility] [address] [facility] - [place], [place] - [address]                    Asthma Triggers  How To Control Things That Make Your Asthma Worse    Triggers are things that make your asthma worse.  Look at the list below to help you find your triggers and what you can do about them.  You can help prevent asthma flare-ups by staying away from your triggers.      Trigger                                                          What you can do   Cigarette Smoke  Tobacco smoke can make asthma worse. Do not allow smoking in your home, car or around you.  Be sure no one smokes at a child s day care or school.  If you smoke, ask your health care provider for ways to help you quit.  Ask family members to quit too.  Ask your health care provider for a referral to Quit Plan to help you quit smoking, or call 8-030-605-PLAN.     Colds, Flu, Bronchitis  These are common triggers of asthma. Wash your hands often.  Don t touch your eyes, nose or mouth.  Get a flu shot every year.     Dust Mites  These are tiny bugs that  live in cloth or carpet. They are too small to see. Wash sheets and blankets in hot water every week.   Encase pillows and mattress in dust mite proof covers.  Avoid having carpet if you can. If you have carpet, vacuum weekly.   Use a dust mask and HEPA vacuum.   Pollen and Outdoor Mold  Some people are allergic to trees, grass, or weed pollen, or molds. Try to keep your windows closed.  Limit time out doors when pollen count is high.   Ask you health care provider about taking medicine during allergy season.     Animal Dander  Some people are allergic to skin flakes, urine or saliva from pets with fur or feathers. Keep pets with fur or feathers out of your home.    If you can t keep the pet outdoors, then keep the pet out of your bedroom.  Keep the bedroom door closed.  Keep pets off cloth furniture and away from stuffed toys.     Mice, Rats, and Cockroaches  Some people are allergic to the waste from these pests.   Cover food and garbage.  Clean up spills and food crumbs.  Store grease in the refrigerator.   Keep food out of the bedroom.   Indoor Mold  This can be a trigger if your home has high moisture. Fix leaking faucets, pipes, or other sources of water.   Clean moldy surfaces.  Dehumidify basement if it is damp and smelly.   Smoke, Strong Odors, and Sprays  These can reduce air quality. Stay away from strong odors and sprays, such as perfume, powder, hair spray, paints, smoke incense, paint, cleaning products, candles and new carpet.   Exercise or Sports  Some people with asthma have this trigger. Be active!  Ask your doctor about taking medicine before sports or exercise to prevent symptoms.    Warm up for 5-10 minutes before and after sports or exercise.     Other Triggers of Asthma  Cold air:  Cover your nose and mouth with a scarf.  Sometimes laughing or crying can be a trigger.  Some medicines and food can trigger asthma.      37.1

## 2022-11-28 DIAGNOSIS — F32.5 MAJOR DEPRESSION IN COMPLETE REMISSION (H): ICD-10-CM

## 2022-11-29 NOTE — TELEPHONE ENCOUNTER
Routing refill request to provider for review/approval because:  No PHQ 9 in the last 6 months  Sanjay Monreal RN

## 2022-11-30 NOTE — TELEPHONE ENCOUNTER
"Please call pt.  Looks like she had an \"establish care\" visit with [facility] earlier this year.  If she has transferred care this should got to her new provider.  If not I will refill and we are due for a visit    Elizabeth Corley, DO    "

## 2022-12-01 RX ORDER — ESCITALOPRAM OXALATE 10 MG/1
TABLET ORAL
Qty: 90 TABLET | Refills: 3 | OUTPATIENT
Start: 2022-12-01

## 2023-01-09 DIAGNOSIS — I10 ESSENTIAL HYPERTENSION: ICD-10-CM

## 2023-01-10 NOTE — TELEPHONE ENCOUNTER
Routing refill request to provider for review/approval because:  Failed protocol: 11/26/21 173/94: Labs done on 8/10/21 CR 0.85, K 4.4, Na 133  Sanjay Monreal RN

## 2023-01-11 NOTE — TELEPHONE ENCOUNTER
Call placed to Patient   No answer  Left message with call back number for Patient to return call  Sanjay Monreal RN

## 2023-01-11 NOTE — TELEPHONE ENCOUNTER
Please call pt.  Looks like she might have transferred care to [facility].  If so refills should go there.  If not, I can fill a short supply and she needs an in office visit with me    Elizabeth Corley, DO

## 2023-01-12 RX ORDER — SPIRONOLACTONE 25 MG/1
TABLET ORAL
Qty: 45 TABLET | Refills: 3 | OUTPATIENT
Start: 2023-01-12

## 2023-01-12 NOTE — TELEPHONE ENCOUNTER
Pt no longer goes to Dr. Colrey she is seeing Dr. Sweetie Nicholson out of [facility].  Pt will contact the Pharmacy to know that this med was cancelled.  Sri Orn Station Sec

## 2023-01-26 NOTE — TELEPHONE ENCOUNTER
Patient sent My chart response to Dr Corley with preferred pharmacy.  Completed order per Dr Corley's request. Order was pended awaiting pharmacy preference.  Malia Brown RN      Pt remains AAO x 4 with VSS, afebrile, sats upper 90s on RA  2 RLQ RYLEY drains with SS output - Abd pad covering from leakage at site/Chevron JEROMY with staples   R UA Picc - CDI, saline locked.   Ampicillin given q6hrs   NSR/ ST on tele monitor - Pt up independent and ambulatory, voids in hat, No BM , Pt refusing Enema at Present.  Regular diet   Pt remains free from falls and injuries, call light in reach, bed in lowest position, nonskid socks on when OOB, side rails up x2  Will continue to monitor

## 2023-04-19 ENCOUNTER — VIRTUAL VISIT (OUTPATIENT)
Dept: NEUROLOGY | Facility: CLINIC | Age: 68
End: 2023-04-19
Payer: COMMERCIAL

## 2023-04-19 DIAGNOSIS — G43.009 MIGRAINE WITHOUT AURA AND WITHOUT STATUS MIGRAINOSUS, NOT INTRACTABLE: ICD-10-CM

## 2023-04-19 DIAGNOSIS — G25.81 RESTLESS LEGS SYNDROME: ICD-10-CM

## 2023-04-19 DIAGNOSIS — R51.9 CHRONIC DAILY HEADACHE: ICD-10-CM

## 2023-04-19 DIAGNOSIS — Z87.820 HISTORY OF TRAUMATIC BRAIN INJURY: ICD-10-CM

## 2023-04-19 PROCEDURE — 99214 OFFICE O/P EST MOD 30 MIN: CPT | Mod: VID | Performed by: PSYCHIATRY & NEUROLOGY

## 2023-04-19 RX ORDER — NARATRIPTAN 2.5 MG/1
2.5 TABLET ORAL
Qty: 18 TABLET | Refills: 11 | Status: SHIPPED | OUTPATIENT
Start: 2023-04-19 | End: 2024-02-08

## 2023-04-19 RX ORDER — ROPINIROLE 0.25 MG/1
1 TABLET, FILM COATED ORAL AT BEDTIME
Qty: 360 TABLET | Refills: 3 | Status: SHIPPED | OUTPATIENT
Start: 2023-04-19 | End: 2024-02-12

## 2023-04-19 RX ORDER — GABAPENTIN 300 MG/1
CAPSULE ORAL
Qty: 270 CAPSULE | Refills: 3 | Status: SHIPPED | OUTPATIENT
Start: 2023-04-19 | End: 2024-07-24

## 2023-04-19 ASSESSMENT — MIGRAINE DISABILITY ASSESSMENT (MIDAS)
HOW MANY DAYS DID YOU NOT DO HOUSEWORK BECAUSE OF HEADACHES: 0
TOTAL SCORE: 0
HOW MANY DAYS WAS HOUSEWORK PRODUCTIVITY CUT IN HALF DUE TO HEADACHES: 0
HOW MANY DAYS DID YOU MISS WORK OR SCHOOL BECAUSE OF HEADACHES: 0
HOW OFTEN WERE SOCIAL ACTIVITIES MISSED DUE TO HEADACHES: 0
HOW MANY DAYS IN THE PAST 3 MONTHS HAVE YOU HAD A HEADACHE: 90
ON A SCALE FROM 0-10 ON AVERAGE HOW PAINFUL WERE HEADACHES: 2
HOW MANY DAYS WAS YOUR PRODUCTIVITY CUT IN HALF BECAUSE OF HEADACHES: 0

## 2023-04-19 ASSESSMENT — HEADACHE IMPACT TEST (HIT 6)
WHEN YOU HAVE A HEADACHE HOW OFTEN DO YOU WISH YOU COULD LIE DOWN: SOMETIMES
WHEN YOU HAVE A HEADACHE HOW OFTEN DO YOU WISH YOU COULD LIE DOWN: SOMETIMES
HOW OFTEN HAVE YOU FELT TOO TIRED TO WORK BECAUSE OF YOUR HEADACHES: NEVER
HOW OFTEN DID HEADACHS LIMIT CONCENTRATION ON WORK OR DAILY ACTIVITY: RARELY
WHEN YOU HAVE HEADACHES HOW OFTEN IS THE PAIN SEVERE: SOMETIMES
HOW OFTEN HAVE YOU FELT FED UP OR IRRITATED BECAUSE OF YOUR HEADACHES: NEVER
HOW OFTEN HAVE YOU FELT FED UP OR IRRITATED BECAUSE OF YOUR HEADACHES: NEVER
HOW OFTEN DO HEADACHES LIMIT YOUR DAILY ACTIVITIES: RARELY
WHEN YOU HAVE HEADACHES HOW OFTEN IS THE PAIN SEVERE: SOMETIMES
HOW OFTEN DO HEADACHES LIMIT YOUR DAILY ACTIVITIES: RARELY
HIT6 TOTAL SCORE: 48
HOW OFTEN HAVE YOU FELT TOO TIRED TO WORK BECAUSE OF YOUR HEADACHES: NEVER
HOW OFTEN DID HEADACHS LIMIT CONCENTRATION ON WORK OR DAILY ACTIVITY: RARELY
HIT6 TOTAL SCORE: 48

## 2023-04-19 NOTE — LETTER
4/19/2023       RE: Miri Naylor  [address] [place] [address]     Dear Colleague,    Thank you for referring your patient, Miri Naylor, to the [facility] NEUROLOG[facility] [place] [facility]. Please see a copy of my visit note below.    [facility]    Clinics and Surgery Center  Neurology Progress Note  April 19, 2023     Subjective:    Ms. Naylor returns for follow up of post-traumatic headaches and restless leg syndrome. She was last seen on 4/27/2023.     Since her last visit, she reports that she has only had a single migraine Summer 2022 and none since. She believes she does tend to have her migraines in the summer. However, she continues to have 30/30 lesser headache days per month which she attributes to her prior head injury. She feels that humidity/heat makes these headaches worse, but overall it is a constant pain. For her headaches, she takes Gabapentin 300mg TID and Mg 400mg BID for prevention which she finds helpful as she can tell when she is about to miss a dose. For abortive therapy, she uses Naratriptan infrequently. She was prescribed Reglan, but has never needed to use this     As to her RLS, this continues to be an issue for her. However, they are much improved with Ropinirole 0.75mg nightly. Occasionally, she will need to take an additional 0.25mg dose on bad days. She believes her symptoms start around 3PM and are worse when she's sitting. Movement in also helpful. The patient also recalls that she has been iron-deficient before and was previously on iron supplementation. However, she was later told to stop this medication and so is not on it currently, though her PCP has been following this in recent times     She underwent a right hip revision surgery on 1/10/2023 and affirms that her right hip has been feeling better     Objective:    Vitals: There were no vitals  taken for this visit.  General: Cooperative, NAD  Neurologic:  Mental Status: Fully alert, attentive and oriented. Speech clear and fluent.   Cranial Nerves: Facial movements symmetric.   Motor: No abnormal movements.       Assessment/Plan:   Miri Naylor is a 67-year-old woman who returns for follow-up of posttraumatic headaches and restless leg syndrome.     For chronic posttraumatic headache, I recommended continuing her current treatment strategy, which remains beneficial:  -Continue Gabapentin 300 mg TID  -Consider CGRP inhibitors or botulinum toxin injection if further preventative treatment is needed  -For mild headaches, use Tylenol. Not to exceed 14 doses per month  -For moderate-severe headaches, use Naratriptan 2.5 mg as needed. Can repeat dose once in 4 hours if needed, not to exceed 9 doses per month     For management of restless leg syndrome, I recommend continuing Ropinirole by 0.75 mg nightly.  If needed and tolerated, she may at the additional 0.25mg to the dose.  -I have continued her Ropinirole 1 mg nightly.  -Discussed options of increasing iron into her diet in case anemia is contributing to her symptoms. Options include medication iron supplementation, increasing her red meat consumption, or the use of a Juan Iron Fish. She prefer holding off on medication for now and will try one of the other options  -Iron deficiency is being followed by her PCP     Follow up in 1 year or sooner if needed.     This note was prepared by Radha Voss MD (Neurology PGY3)      Again, thank you for allowing me to participate in the care of your patient.      Sincerely,    Diamond Nolan MD

## 2023-04-19 NOTE — PROGRESS NOTES
Virtual Visit Details    Type of service:  Video Visit   Video Start Time: 09:30  Video End Time:9:47 AM    Originating Location (pt. Location): Home    Distant Location (provider location):  Off-site  Platform used for Video Visit: [facility]  Neurology Progress Note  April 19, 2023     Subjective:    Ms. Naylor returns for follow up of post-traumatic headaches and restless leg syndrome. She was last seen on 4/27/2023.     Since her last visit, she reports that she has only had a single migraine Summer 2022 and none since. She believes she does tend to have her migraines in the summer. However, she continues to have 30/30 lesser headache days per month which she attributes to her prior head injury. She feels that humidity/heat makes these headaches worse, but overall it is a constant pain. For her headaches, she takes Gabapentin 300mg TID and Mg 400mg BID for prevention which she finds helpful as she can tell when she is about to miss a dose. For abortive therapy, she uses Naratriptan infrequently. She was prescribed Reglan, but has never needed to use this     As to her RLS, this continues to be an issue for her. However, they are much improved with Ropinirole 0.75mg nightly. Occasionally, she will need to take an additional 0.25mg dose on bad days. She believes her symptoms start around 3PM and are worse when she's sitting. Movement in also helpful. The patient also recalls that she has been iron-deficient before and was previously on iron supplementation. However, she was later told to stop this medication and so is not on it currently, though her PCP has been following this in recent times     She underwent a right hip revision surgery on 1/10/2023 and affirms that her right hip has been feeling better     Objective:    Vitals: There were no vitals taken for this visit.  General: Cooperative, NAD  Neurologic:  Mental Status: Fully alert,  attentive and oriented. Speech clear and fluent.   Cranial Nerves: Facial movements symmetric.   Motor: No abnormal movements.       Assessment/Plan:   Miri Naylor is a 67-year-old woman who returns for follow-up of posttraumatic headaches and restless leg syndrome.     For chronic posttraumatic headache, I recommended continuing her current treatment strategy, which remains beneficial:  -Continue Gabapentin 300 mg TID  -Consider CGRP inhibitors or botulinum toxin injection if further preventative treatment is needed  -For mild headaches, use Tylenol. Not to exceed 14 doses per month  -For moderate-severe headaches, use Naratriptan 2.5 mg as needed. Can repeat dose once in 4 hours if needed, not to exceed 9 doses per month     For management of restless leg syndrome, I recommend continuing Ropinirole by 0.75 mg nightly.  If needed and tolerated, she may at the additional 0.25mg to the dose.  -I have continued her Ropinirole 1 mg nightly.  -Discussed options of increasing iron into her diet in case anemia is contributing to her symptoms. Options include medication iron supplementation, increasing her red meat consumption, or the use of a Juan Iron Fish. She prefer holding off on medication for now and will try one of the other options  -Iron deficiency is being followed by her PCP     Follow up in 1 year or sooner if needed.     Diamond Nolan MD  Neurology     This note was prepared by Radha Voss MD (Neurology PGY3)

## 2023-04-19 NOTE — NURSING NOTE
Is the patient currently in the state of [place]? YES    Visit mode:VIDEO    If the visit is dropped, the patient can be reconnected by: VIDEO VISIT: Text to cell phone: [phone]    Will anyone else be joining the visit? NO      How would you like to obtain your AVS? MyChart    Are changes needed to the allergy or medication list? NO    Reason for visit: No chief complaint on file.

## 2023-06-19 DIAGNOSIS — G43.009 MIGRAINE WITHOUT AURA AND WITHOUT STATUS MIGRAINOSUS, NOT INTRACTABLE: ICD-10-CM

## 2023-06-19 RX ORDER — METHYLPREDNISOLONE 4 MG
TABLET, DOSE PACK ORAL
Qty: 21 TABLET | Refills: 0 | Status: SHIPPED | OUTPATIENT
Start: 2023-06-19

## 2023-06-19 RX ORDER — METOCLOPRAMIDE 10 MG/1
10 TABLET ORAL 3 TIMES DAILY PRN
Qty: 20 TABLET | Refills: 3 | Status: SHIPPED | OUTPATIENT
Start: 2023-06-19 | End: 2024-07-24

## 2023-10-11 ENCOUNTER — TELEPHONE (OUTPATIENT)
Dept: NEUROLOGY | Facility: CLINIC | Age: 68
End: 2023-10-11
Payer: COMMERCIAL

## 2023-12-03 ENCOUNTER — HEALTH MAINTENANCE LETTER (OUTPATIENT)
Age: 68
End: 2023-12-03

## 2024-02-07 ENCOUNTER — TELEPHONE (OUTPATIENT)
Dept: NEUROLOGY | Facility: CLINIC | Age: 69
End: 2024-02-07
Payer: COMMERCIAL

## 2024-02-07 NOTE — TELEPHONE ENCOUNTER
[facility] Call Center    Phone Message    May a detailed message be left on voicemail: yes     Reason for Call: Medication Question or concern regarding medication   Prescription Clarification  Name of Medication: rOPINIRole (REQUIP) 0.25 MG tablet   Prescribing Provider: Dr. Nolan   Pharmacy: [facility]   Address: [address] [place] [address]  Phone [phone]  Fax: [phone]    What on the order needs clarification? Pt's pharmacy is requesting a call back to clarify dose change on the medication listed above. Please contact pharmacy to discuss.      Action Taken: Message routed to:  [facility] ([facility]): Neurology     Travel Screening: Not Applicable

## 2024-02-08 DIAGNOSIS — G43.009 MIGRAINE WITHOUT AURA AND WITHOUT STATUS MIGRAINOSUS, NOT INTRACTABLE: ICD-10-CM

## 2024-02-08 RX ORDER — NARATRIPTAN 2.5 MG/1
2.5 TABLET ORAL
Qty: 18 TABLET | Refills: 11 | Status: SHIPPED | OUTPATIENT
Start: 2024-02-08 | End: 2024-07-24

## 2024-02-08 NOTE — TELEPHONE ENCOUNTER
" Disp Refills Start End SHARA   rOPINIRole (REQUIP) 0.25 MG tablet 360 tablet 3 4/19/2023 -- No   Sig - Route: Take 4 tablets (1 mg) by mouth At Bedtime - Oral   Sent to pharmacy as: rOPINIRole HCl 0.25 MG Oral Tablet (REQUIP)   Class: E-Prescribe   Order: [id number]   E-Prescribing Status: Receipt confirmed by pharmacy (4/19/2023  9:43 AM CDT)     [facility] - [address]     Patient's message from 1/31/24:  Refills have been requested for the following medications:         rOPINIRole (REQUIP) 0.25 MG tablet [Diamond Nolan]     Preferred pharmacy: Other - [facility]  -----------------  Dr. Nolan's note 4/19/23   \"For management of restless leg syndrome, I recommend continuing Ropinirole by 0.75 mg nightly.  If needed and tolerated, she may at the additional 0.25mg to the dose.  -I have continued her Ropinirole 1 mg nightly.\"  -----------------  She has a follow up appointment with Dr. Nolan on 5/1/24.  ------------------  Called pharmacy back to discuss. They received a prescription (2/2/24) for the ropinirole (3 tabs) from Dr. Sweetie Nicholson. Writer stated she does not know who this is. Pharmacist will keep the 4 tabs by mouth at bedtime prescription from Dr. Nolan.  ----------------  Pharmacist asked for a ne prescription for the naratriptan 2.5 mg tab. Writer will ross up for Dr. Nolan.    [facility]    "

## 2024-02-12 DIAGNOSIS — G25.81 RESTLESS LEGS SYNDROME: ICD-10-CM

## 2024-02-12 RX ORDER — ROPINIROLE 0.25 MG/1
1 TABLET, FILM COATED ORAL AT BEDTIME
Qty: 360 TABLET | Refills: 3 | Status: SHIPPED | OUTPATIENT
Start: 2024-02-12 | End: 2024-07-24

## 2024-02-12 NOTE — TELEPHONE ENCOUNTER
Rx Authorization:  Requested Medication/ Dose rOPINIRole (REQUIP) 0.25 MG tablet   Date last refill ordered: 4/19/23  Quantity ordered: 360 tablets  # refills: 3  Date of last clinic visit with ordering provider: 4/19/23  Date of next clinic visit with ordering provider: 5/1/24  All pertinent protocol data (lab date/result):   Include pertinent information from patients message:

## 2024-05-09 NOTE — NURSING NOTE
"Initial /76   Pulse 80   Temp 97.6  F (36.4  C) (Tympanic)   Wt 97.5 kg (215 lb)   Breastfeeding No   BMI 29.99 kg/m   Estimated body mass index is 29.99 kg/m  as calculated from the following:    Height as of 2/3/21: 1.803 m (5' 11\").    Weight as of this encounter: 97.5 kg (215 lb). .      "
Improved

## 2024-05-12 NOTE — TELEPHONE ENCOUNTER
Call placed to patient regarding MyChart message     Patient reports on February 17th she had 2 dental implants placed and has had troubles since.   States the implant site became infected on February 20th and patient was prescribed Doxycycline.   Patient states she has been taking the Doxycycline as prescribed; though has been having a lot of side effects (see MyChart message for details)  Patient reports she has many allergies / sensitivities to antibiotics.     States even with being on Doxycycline the infection appears to have spread from the dental implant area to her left cheek.  Describes the left cheek as red, warm, and swollen.  Reports minimal eating or drinking of fluids due to pain and nausea    Denies fever    Reports pain an 8/10 at it's most severe and describes the pain as dull, throbbing, and burning; has been taking Tylenol #3 as prescribed.     Recommended patient to be evaluated at the nearest ED which would be [place]; states  Russell will take her there to be evaluated.     Patient verbalizes understanding and agrees with plan.     ED recommended as patient states the infection seems to have spread to her cheek and described redness/swelling/warmth to left cheek and concern for dehydration as patient reports minimal eating / drinking due to pain / nausea.     Clive Reyes RN    
normal for race

## 2024-07-22 ASSESSMENT — HEADACHE IMPACT TEST (HIT 6)
WHEN YOU HAVE HEADACHES HOW OFTEN IS THE PAIN SEVERE: ALWAYS
HIT6 TOTAL SCORE: 51
HOW OFTEN DID HEADACHS LIMIT CONCENTRATION ON WORK OR DAILY ACTIVITY: RARELY
HOW OFTEN DO HEADACHES LIMIT YOUR DAILY ACTIVITIES: RARELY
HOW OFTEN HAVE YOU FELT FED UP OR IRRITATED BECAUSE OF YOUR HEADACHES: RARELY
HOW OFTEN HAVE YOU FELT TOO TIRED TO WORK BECAUSE OF YOUR HEADACHES: NEVER
WHEN YOU HAVE A HEADACHE HOW OFTEN DO YOU WISH YOU COULD LIE DOWN: RARELY

## 2024-07-22 ASSESSMENT — MIGRAINE DISABILITY ASSESSMENT (MIDAS)
HOW OFTEN WERE SOCIAL ACTIVITIES MISSED DUE TO HEADACHES: 0
HOW MANY DAYS IN THE PAST 3 MONTHS HAVE YOU HAD A HEADACHE: 90
TOTAL SCORE: 0
HOW MANY DAYS DID YOU MISS WORK OR SCHOOL BECAUSE OF HEADACHES: 0
ON A SCALE FROM 0-10 ON AVERAGE HOW PAINFUL WERE HEADACHES: 2
HOW MANY DAYS WAS YOUR PRODUCTIVITY CUT IN HALF BECAUSE OF HEADACHES: 0
HOW MANY DAYS DID YOU NOT DO HOUSEWORK BECAUSE OF HEADACHES: 0
HOW MANY DAYS WAS HOUSEWORK PRODUCTIVITY CUT IN HALF DUE TO HEADACHES: 0

## 2024-07-24 ENCOUNTER — VIRTUAL VISIT (OUTPATIENT)
Dept: NEUROLOGY | Facility: CLINIC | Age: 69
End: 2024-07-24
Payer: COMMERCIAL

## 2024-07-24 DIAGNOSIS — Z87.820 HISTORY OF TRAUMATIC BRAIN INJURY: ICD-10-CM

## 2024-07-24 DIAGNOSIS — R51.9 CHRONIC DAILY HEADACHE: ICD-10-CM

## 2024-07-24 DIAGNOSIS — G25.81 RESTLESS LEGS SYNDROME: ICD-10-CM

## 2024-07-24 DIAGNOSIS — G43.009 MIGRAINE WITHOUT AURA AND WITHOUT STATUS MIGRAINOSUS, NOT INTRACTABLE: ICD-10-CM

## 2024-07-24 PROCEDURE — 99214 OFFICE O/P EST MOD 30 MIN: CPT | Mod: 95 | Performed by: PSYCHIATRY & NEUROLOGY

## 2024-07-24 RX ORDER — GABAPENTIN 300 MG/1
CAPSULE ORAL
Qty: 270 CAPSULE | Refills: 3 | Status: SHIPPED | OUTPATIENT
Start: 2024-07-24

## 2024-07-24 RX ORDER — ROPINIROLE 0.25 MG/1
1 TABLET, FILM COATED ORAL AT BEDTIME
Qty: 360 TABLET | Refills: 3 | Status: SHIPPED | OUTPATIENT
Start: 2024-07-24

## 2024-07-24 RX ORDER — NARATRIPTAN 2.5 MG/1
2.5 TABLET ORAL
Qty: 18 TABLET | Refills: 11 | Status: SHIPPED | OUTPATIENT
Start: 2024-07-24

## 2024-07-24 RX ORDER — METOCLOPRAMIDE 10 MG/1
10 TABLET ORAL 3 TIMES DAILY PRN
Qty: 20 TABLET | Refills: 3 | Status: SHIPPED | OUTPATIENT
Start: 2024-07-24

## 2024-07-24 NOTE — PROGRESS NOTES
Virtual Visit Details    Type of service:  Video Visit   Video Start Time: 9:36 AM  Video End Time: 9:45 AM    Originating Location (pt. Location): Home    Distant Location (provider location):  Off-site  Platform used for Video Visit: [facility]    Headache Neurology Progress Note  July 24, 2024      Assessment/Plan:   Miri Naylor is a 69 year old woman who returns for follow-up of posttraumatic headaches and restless leg syndrome.     For chronic posttraumatic headache, I recommended continuing her current treatment strategy, which remains beneficial:  -Continue gabapentin 300 mg TID  -Consider CGRP inhibitors or botulinum toxin injection if further preventative treatment is needed  -For moderate-severe headaches, use naratriptan 2.5 mg as needed. Can repeat dose once in 4 hours if needed, not to exceed 9 doses per month     For management of restless leg syndrome, I recommend continuing ropinirole by 0.75 mg nightly.  If needed and tolerated, she may add additional 0.25mg.  -I have continued her ropinirole 1 mg nightly.  -Ferritin at goal at last check. Recommend ferritin above 75 in setting of restless legs.  -Iron deficiency is being followed by her PCP     The longitudinal plan of care for Miri was addressed during this visit. Due to the added complexity in care, I will continue to support Miri in the subsequent management of this condition(s) and with the ongoing continuity of care of this condition(s). I will see her back in 1 year.    Diamond Nolan MD  Neurology     Subjective:    Miri Naylor returns for follow up of chronic post-traumatic headache and restless legs syndrome.    No migraine attacks in over a year. Naratriptan and metoclopramide useful for attacks previously.    Daily headache continues; still gabapentin 300 mg TID. No side effects.    Restless legs syndrome remains an issue. Stable. Ropinirole 0.75 mg nightly is effective; 1 mg is  sometimes needed. No side effects. Sleep is much improved since starting this.    No other new symptoms to report.     Objective:    Vitals: There were no vitals taken for this visit.  General: Cooperative, NAD  Neurologic:  Mental Status: Fully alert, attentive and oriented. Speech clear and fluent.   Cranial Nerves: Facial movements symmetric.   Motor: No abnormal movements.      Pertinent Investigations:    Ferritin (4/17/2024): 139        4/19/2023     9:20 AM 5/1/2024    12:25 PM 7/22/2024     7:36 PM   HIT-6   When you have headaches, how often is the pain severe 10 10 13   How often do headaches limit your ability to do usual daily activities including household work, work, school, or social activities? 8 8 8   When you have a headache, how often do you wish you could lie down? 10 10 8   In the past 4 weeks, how often have you felt too tired to do work or daily activities because of your headaches 6 8 6   In the past 4 weeks, how often have you felt fed up or irritated because of your headaches 6 8 8   In the past 4 weeks, how often did headaches limit your ability to concentrate on work or daily activities 8 8 8   HIT-6 Total Score 48 52 51           4/19/2023     9:21 AM 5/1/2024    12:30 PM 7/22/2024     7:37 PM   MIDAS - in the past three months:   On how many days did you miss work or school because of your headaches? 0 0 0   How many days was your productivity at work or school reduced by half or more because of your headaches? 0 0 0   On how many days did you not do household work because of your headaches? 0 0 0   How many days was your productivity in household work reduced by half or more because of your headaches? 0 0 0   On how many days did you miss family, social, or leisure activities because of your headaches? 0 0 0   On how many days did you have a headache? 90 90 90   On a scale of 0-10, on average how painful were these headaches? 2 2 2   MIDAS Score 0 (I - Little or No Disability) 0 (I -  Little or No Disability) 0 (I - Little or No Disability)

## 2024-07-24 NOTE — LETTER
7/24/2024       RE: Miri Naylor  [address] [address]       Dear Colleague,    Thank you for referring your patient, Miri Naylor, to the [facility] NEUROLOGY CLIN[facility] [place] at [facility]. Please see a copy of my visit note below.    [facility]    Headache Neurology Progress Note  July 24, 2024      Assessment/Plan:   Miri Naylor is a 69 year old woman who returns for follow-up of posttraumatic headaches and restless leg syndrome.     For chronic posttraumatic headache, I recommended continuing her current treatment strategy, which remains beneficial:  -Continue gabapentin 300 mg TID  -Consider CGRP inhibitors or botulinum toxin injection if further preventative treatment is needed  -For moderate-severe headaches, use naratriptan 2.5 mg as needed. Can repeat dose once in 4 hours if needed, not to exceed 9 doses per month     For management of restless leg syndrome, I recommend continuing ropinirole by 0.75 mg nightly.  If needed and tolerated, she may add additional 0.25mg.  -I have continued her ropinirole 1 mg nightly.  -Ferritin at goal at last check. Recommend ferritin above 75 in setting of restless legs.  -Iron deficiency is being followed by her PCP     The longitudinal plan of care for Miri was addressed during this visit. Due to the added complexity in care, I will continue to support Miri in the subsequent management of this condition(s) and with the ongoing continuity of care of this condition(s). I will see her back in 1 year.      Subjective:    Miri Naylor returns for follow up of chronic post-traumatic headache and restless legs syndrome.    No migraine attacks in over a year. Naratriptan and metoclopramide useful for attacks previously.    Daily headache continues; still gabapentin 300 mg TID. No side effects.    Restless legs syndrome remains an issue.  Stable. Ropinirole 0.75 mg nightly is effective; 1 mg is sometimes needed. No side effects. Sleep is much improved since starting this.    No other new symptoms to report.     Objective:    Vitals: There were no vitals taken for this visit.  General: Cooperative, NAD  Neurologic:  Mental Status: Fully alert, attentive and oriented. Speech clear and fluent.   Cranial Nerves: Facial movements symmetric.   Motor: No abnormal movements.      Pertinent Investigations:    Ferritin (4/17/2024): 139        4/19/2023     9:20 AM 5/1/2024    12:25 PM 7/22/2024     7:36 PM   HIT-6   When you have headaches, how often is the pain severe 10 10 13   How often do headaches limit your ability to do usual daily activities including household work, work, school, or social activities? 8 8 8   When you have a headache, how often do you wish you could lie down? 10 10 8   In the past 4 weeks, how often have you felt too tired to do work or daily activities because of your headaches 6 8 6   In the past 4 weeks, how often have you felt fed up or irritated because of your headaches 6 8 8   In the past 4 weeks, how often did headaches limit your ability to concentrate on work or daily activities 8 8 8   HIT-6 Total Score 48 52 51           4/19/2023     9:21 AM 5/1/2024    12:30 PM 7/22/2024     7:37 PM   MIDAS - in the past three months:   On how many days did you miss work or school because of your headaches? 0 0 0   How many days was your productivity at work or school reduced by half or more because of your headaches? 0 0 0   On how many days did you not do household work because of your headaches? 0 0 0   How many days was your productivity in household work reduced by half or more because of your headaches? 0 0 0   On how many days did you miss family, social, or leisure activities because of your headaches? 0 0 0   On how many days did you have a headache? 90 90 90   On a scale of 0-10, on average how painful were these headaches? 2 2  2   MIDAS Score 0 (I - Little or No Disability) 0 (I - Little or No Disability) 0 (I - Little or No Disability)        Again, thank you for allowing me to participate in the care of your patient.      Sincerely,    Diamond Nolan MD

## 2024-07-24 NOTE — NURSING NOTE
Current patient location: [address] [facility] [address]    Is the patient currently in the state of [place]? YES    Visit mode:VIDEO    If the visit is dropped, the patient can be reconnected by: TELEPHONE VISIT: Phone number:   Telephone Information:   Mobile [phone]       Will anyone else be joining the visit? NO  (If patient encounters technical issues they should call [phone])    How would you like to obtain your AVS? MyChart    Are changes needed to the allergy or medication list? Pt stated no med changes    Are refills needed on medications prescribed by this physician? NO    Reason for visit: Video Visit (Follow-up )    Destinee LESTER

## 2024-08-01 ENCOUNTER — TELEPHONE (OUTPATIENT)
Dept: NEUROLOGY | Facility: CLINIC | Age: 69
End: 2024-08-01
Payer: COMMERCIAL

## 2024-08-01 NOTE — TELEPHONE ENCOUNTER
Left Voicemail (1st Attempt) and Sent Mychart (1st Attempt) for the patient to call back and schedule the following:    Appointment type: Return headache  Provider: An  Return date: On or near 7/24/25  Specialty phone number: [phone]  Additional appointment(s) needed: NA  Additonal Notes: 1 yr follow up    Rosa Mckeon on 8/1/2024 at 4:38 PM

## 2025-04-15 DIAGNOSIS — R51.9 CHRONIC DAILY HEADACHE: ICD-10-CM

## 2025-04-15 DIAGNOSIS — Z87.820 HISTORY OF TRAUMATIC BRAIN INJURY: ICD-10-CM

## 2025-04-15 DIAGNOSIS — G43.009 MIGRAINE WITHOUT AURA AND WITHOUT STATUS MIGRAINOSUS, NOT INTRACTABLE: ICD-10-CM

## 2025-04-15 NOTE — TELEPHONE ENCOUNTER
RX Authorization    Medication: gabapentin (NEURONTIN) 300 MG capsule     Date last refill ordered: 07/24/2024    Quantity ordered: 270     # refills: 3     Date of last clinic visit with ordering provider: 07/24/2024    Date of next clinic visit with ordering provider: None Scheduled

## 2025-04-16 RX ORDER — GABAPENTIN 300 MG/1
CAPSULE ORAL
Qty: 270 CAPSULE | Refills: 3 | Status: SHIPPED | OUTPATIENT
Start: 2025-04-16

## 2025-05-12 DIAGNOSIS — G25.81 RESTLESS LEGS SYNDROME: ICD-10-CM

## 2025-05-12 RX ORDER — ROPINIROLE 0.25 MG/1
1 TABLET, FILM COATED ORAL AT BEDTIME
Qty: 360 TABLET | Refills: 3 | Status: SHIPPED | OUTPATIENT
Start: 2025-05-12

## 2025-05-12 NOTE — TELEPHONE ENCOUNTER
Rx Authorization:  Requested Medication/ Dose rOPINIRole (REQUIP) 0.25 MG tablet   Date last refill ordered: 7/24/24  Quantity ordered: 360 tablet  # refills: 3  Date of last clinic visit with ordering provider: 7/24/24  Date of next clinic visit with ordering provider:   All pertinent protocol data (lab date/result):   Include pertinent information from patients message:

## 2025-06-11 ENCOUNTER — VIRTUAL VISIT (OUTPATIENT)
Dept: NEUROLOGY | Facility: CLINIC | Age: 70
End: 2025-06-11
Payer: COMMERCIAL

## 2025-06-11 VITALS — BODY MASS INDEX: 29.12 KG/M2 | WEIGHT: 208 LBS | HEIGHT: 71 IN

## 2025-06-11 DIAGNOSIS — G25.81 RESTLESS LEGS SYNDROME: ICD-10-CM

## 2025-06-11 DIAGNOSIS — G43.009 MIGRAINE WITHOUT AURA AND WITHOUT STATUS MIGRAINOSUS, NOT INTRACTABLE: ICD-10-CM

## 2025-06-11 RX ORDER — METOCLOPRAMIDE 10 MG/1
10 TABLET ORAL 3 TIMES DAILY PRN
Qty: 20 TABLET | Refills: 3 | Status: SHIPPED | OUTPATIENT
Start: 2025-06-11

## 2025-06-11 RX ORDER — NARATRIPTAN 2.5 MG/1
2.5 TABLET ORAL
Qty: 18 TABLET | Refills: 11 | Status: SHIPPED | OUTPATIENT
Start: 2025-06-11

## 2025-06-11 RX ORDER — ROPINIROLE 0.25 MG/1
1 TABLET, FILM COATED ORAL AT BEDTIME
Qty: 360 TABLET | Refills: 3 | Status: SHIPPED | OUTPATIENT
Start: 2025-06-11

## 2025-06-11 ASSESSMENT — HEADACHE IMPACT TEST (HIT 6)
HOW OFTEN HAVE YOU FELT FED UP OR IRRITATED BECAUSE OF YOUR HEADACHES: RARELY
HOW OFTEN HAVE YOU FELT TOO TIRED TO WORK BECAUSE OF YOUR HEADACHES: SOMETIMES
WHEN YOU HAVE HEADACHES HOW OFTEN IS THE PAIN SEVERE: VERY OFTEN
HIT6 TOTAL SCORE: 57
WHEN YOU HAVE A HEADACHE HOW OFTEN DO YOU WISH YOU COULD LIE DOWN: SOMETIMES
HOW OFTEN DO HEADACHES LIMIT YOUR DAILY ACTIVITIES: RARELY
HOW OFTEN DID HEADACHS LIMIT CONCENTRATION ON WORK OR DAILY ACTIVITY: SOMETIMES

## 2025-06-11 ASSESSMENT — MIGRAINE DISABILITY ASSESSMENT (MIDAS)
HOW MANY DAYS WAS YOUR PRODUCTIVITY CUT IN HALF BECAUSE OF HEADACHES: 0
TOTAL SCORE: 1
HOW OFTEN WERE SOCIAL ACTIVITIES MISSED DUE TO HEADACHES: 1
HOW MANY DAYS DID YOU MISS WORK OR SCHOOL BECAUSE OF HEADACHES: 0
HOW MANY DAYS IN THE PAST 3 MONTHS HAVE YOU HAD A HEADACHE: 2
HOW MANY DAYS WAS HOUSEWORK PRODUCTIVITY CUT IN HALF DUE TO HEADACHES: 0
HOW MANY DAYS DID YOU NOT DO HOUSEWORK BECAUSE OF HEADACHES: 0
ON A SCALE FROM 0-10 ON AVERAGE HOW PAINFUL WERE HEADACHES: 5

## 2025-06-11 ASSESSMENT — PAIN SCALES - GENERAL: PAINLEVEL_OUTOF10: MODERATE PAIN (4)

## 2025-06-11 NOTE — NURSING NOTE
Current patient location: [address] [facility] [address]    Is the patient currently in the state of [place]? YES    Visit mode: VIDEO    If the visit is dropped, the patient can be reconnected by:VIDEO VISIT: Text to cell phone:   Telephone Information:   Mobile [phone]     (If patient encounters technical issues they should call [phone])    Are changes needed to the allergy or medication list? Yes water retention med is changed via [facility] system- unsure of med name     Are refills needed on medications prescribed by this physician? Discuss with provider    Rooming Documentation:  Questionnaire(s) completed    Reason for visit: Follow Up    Kay LESTER
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
## 2025-07-11 NOTE — NURSING NOTE
"Initial /82 (BP Location: Right arm, Cuff Size: Adult Large)  Pulse 60  Temp 97.5  F (36.4  C) (Tympanic)  Ht 5' 11.25\" (1.81 m)  Wt 196 lb 3.2 oz (89 kg)  Breastfeeding? No  BMI 27.17 kg/m2 Estimated body mass index is 27.17 kg/(m^2) as calculated from the following:    Height as of this encounter: 5' 11.25\" (1.81 m).    Weight as of this encounter: 196 lb 3.2 oz (89 kg). .      " Addended by: JACQUELINE ALCANTARA on: 4/8/2024 06:47 PM     Modules accepted: Orders     English

## (undated) RX ORDER — FENTANYL CITRATE 50 UG/ML
INJECTION, SOLUTION INTRAMUSCULAR; INTRAVENOUS
Status: DISPENSED
Start: 2020-09-03